# Patient Record
Sex: MALE | Race: WHITE | NOT HISPANIC OR LATINO | Employment: OTHER | URBAN - METROPOLITAN AREA
[De-identification: names, ages, dates, MRNs, and addresses within clinical notes are randomized per-mention and may not be internally consistent; named-entity substitution may affect disease eponyms.]

---

## 2017-01-03 ENCOUNTER — ALLSCRIPTS OFFICE VISIT (OUTPATIENT)
Dept: OTHER | Facility: OTHER | Age: 82
End: 2017-01-03

## 2017-02-28 ENCOUNTER — TRANSCRIBE ORDERS (OUTPATIENT)
Dept: RADIOLOGY | Facility: MEDICAL CENTER | Age: 82
End: 2017-02-28

## 2017-02-28 ENCOUNTER — APPOINTMENT (OUTPATIENT)
Dept: LAB | Facility: MEDICAL CENTER | Age: 82
End: 2017-02-28
Payer: MEDICARE

## 2017-02-28 ENCOUNTER — HOSPITAL ENCOUNTER (OUTPATIENT)
Dept: RADIOLOGY | Facility: MEDICAL CENTER | Age: 82
Discharge: HOME | End: 2017-02-28
Admitting: SURGERY
Payer: MEDICARE

## 2017-02-28 DIAGNOSIS — E11.8 TYPE 2 DIABETES MELLITUS WITH COMPLICATION, WITH LONG-TERM CURRENT USE OF INSULIN (HCC): Primary | ICD-10-CM

## 2017-02-28 DIAGNOSIS — Z79.4 TYPE 2 DIABETES MELLITUS WITH COMPLICATION, WITH LONG-TERM CURRENT USE OF INSULIN (HCC): Primary | ICD-10-CM

## 2017-02-28 DIAGNOSIS — Z01.810 PRE-OPERATIVE CARDIOVASCULAR EXAMINATION: Primary | ICD-10-CM

## 2017-02-28 LAB
ALBUMIN SERPL BCP-MCNC: 3.3 G/DL (ref 3.5–5)
ALP SERPL-CCNC: 121 U/L (ref 46–116)
ALT SERPL W P-5'-P-CCNC: 14 U/L (ref 12–78)
ANION GAP SERPL CALCULATED.3IONS-SCNC: 7 MMOL/L (ref 4–13)
AST SERPL W P-5'-P-CCNC: 7 U/L (ref 5–45)
ATRIAL RATE: 64 BPM
BILIRUB SERPL-MCNC: 0.26 MG/DL (ref 0.2–1)
BUN SERPL-MCNC: 48 MG/DL (ref 5–25)
CALCIUM SERPL-MCNC: 8.3 MG/DL (ref 8.3–10.1)
CHLORIDE SERPL-SCNC: 109 MMOL/L (ref 100–108)
CO2 SERPL-SCNC: 23 MMOL/L (ref 21–32)
CREAT SERPL-MCNC: 3.5 MG/DL (ref 0.6–1.3)
EST. AVERAGE GLUCOSE BLD GHB EST-MCNC: 143 MG/DL
GFR SERPL CREATININE-BSD FRML MDRD: 16.7 ML/MIN/1.73SQ M
GLUCOSE SERPL-MCNC: 109 MG/DL (ref 65–140)
HBA1C MFR BLD: 6.6 % (ref 4.2–6.3)
P AXIS: 48 DEGREES
POTASSIUM SERPL-SCNC: 5.2 MMOL/L (ref 3.5–5.3)
PR INTERVAL: 168 MS
PROT SERPL-MCNC: 7.5 G/DL (ref 6.4–8.2)
QRS AXIS: 10 DEGREES
QRSD INTERVAL: 90 MS
QT INTERVAL: 404 MS
QTC INTERVAL: 416 MS
SODIUM SERPL-SCNC: 139 MMOL/L (ref 136–145)
T WAVE AXIS: 40 DEGREES
VENTRICULAR RATE: 64 BPM

## 2017-02-28 PROCEDURE — 93005 ELECTROCARDIOGRAM TRACING: CPT

## 2017-02-28 PROCEDURE — 36415 COLL VENOUS BLD VENIPUNCTURE: CPT | Performed by: FAMILY MEDICINE

## 2017-02-28 PROCEDURE — 80053 COMPREHEN METABOLIC PANEL: CPT | Performed by: FAMILY MEDICINE

## 2017-02-28 PROCEDURE — 71020 HB CHEST X-RAY 2VW FRONTAL&LATL: CPT

## 2017-02-28 PROCEDURE — 83036 HEMOGLOBIN GLYCOSYLATED A1C: CPT | Performed by: FAMILY MEDICINE

## 2017-05-03 DIAGNOSIS — I70.25 ATHEROSCLEROSIS OF NATIVE ARTERIES OF OTHER EXTREMITIES WITH ULCERATION (HCC): ICD-10-CM

## 2017-07-06 ENCOUNTER — HOSPITAL ENCOUNTER (INPATIENT)
Facility: HOSPITAL | Age: 82
LOS: 3 days | Discharge: HOME | DRG: 683 | End: 2017-07-09
Attending: EMERGENCY MEDICINE | Admitting: INTERNAL MEDICINE
Payer: MEDICARE

## 2017-07-06 ENCOUNTER — APPOINTMENT (EMERGENCY)
Dept: CT IMAGING | Facility: HOSPITAL | Age: 82
DRG: 683 | End: 2017-07-06
Payer: MEDICARE

## 2017-07-06 ENCOUNTER — APPOINTMENT (INPATIENT)
Dept: ULTRASOUND IMAGING | Facility: HOSPITAL | Age: 82
DRG: 683 | End: 2017-07-06
Payer: MEDICARE

## 2017-07-06 ENCOUNTER — APPOINTMENT (EMERGENCY)
Dept: RADIOLOGY | Facility: HOSPITAL | Age: 82
DRG: 683 | End: 2017-07-06
Payer: MEDICARE

## 2017-07-06 DIAGNOSIS — E87.5 HYPERKALEMIA: ICD-10-CM

## 2017-07-06 DIAGNOSIS — E87.5 ACUTE HYPERKALEMIA: Primary | ICD-10-CM

## 2017-07-06 DIAGNOSIS — IMO0002 DM (DIABETES MELLITUS), TYPE 2, UNCONTROLLED: Chronic | ICD-10-CM

## 2017-07-06 DIAGNOSIS — E08.621 BILATERAL DIABETIC FOOT ULCER ASSOCIATED WITH SECONDARY DIABETES MELLITUS (HCC): ICD-10-CM

## 2017-07-06 DIAGNOSIS — N18.4 CKD (CHRONIC KIDNEY DISEASE), STAGE IV (HCC): ICD-10-CM

## 2017-07-06 DIAGNOSIS — L97.529 BILATERAL DIABETIC FOOT ULCER ASSOCIATED WITH SECONDARY DIABETES MELLITUS (HCC): ICD-10-CM

## 2017-07-06 DIAGNOSIS — N18.30 ACUTE RENAL FAILURE SUPERIMPOSED ON STAGE 3 CHRONIC KIDNEY DISEASE (HCC): ICD-10-CM

## 2017-07-06 DIAGNOSIS — I73.9 PVD (PERIPHERAL VASCULAR DISEASE) (HCC): Chronic | ICD-10-CM

## 2017-07-06 DIAGNOSIS — I10 ESSENTIAL HYPERTENSION: Chronic | ICD-10-CM

## 2017-07-06 DIAGNOSIS — L97.519 BILATERAL DIABETIC FOOT ULCER ASSOCIATED WITH SECONDARY DIABETES MELLITUS (HCC): ICD-10-CM

## 2017-07-06 DIAGNOSIS — N17.9 ACUTE RENAL FAILURE SUPERIMPOSED ON STAGE 3 CHRONIC KIDNEY DISEASE (HCC): ICD-10-CM

## 2017-07-06 PROBLEM — E78.5 HLD (HYPERLIPIDEMIA): Chronic | Status: ACTIVE | Noted: 2017-07-06

## 2017-07-06 LAB
ALBUMIN SERPL BCP-MCNC: 3.6 G/DL (ref 3.5–5)
ALP SERPL-CCNC: 132 U/L (ref 46–116)
ALT SERPL W P-5'-P-CCNC: 36 U/L (ref 12–78)
ANION GAP SERPL CALCULATED.3IONS-SCNC: 10 MMOL/L (ref 4–13)
ANION GAP SERPL CALCULATED.3IONS-SCNC: 7 MMOL/L (ref 4–13)
APTT PPP: 30 SECONDS (ref 23–35)
AST SERPL W P-5'-P-CCNC: 19 U/L (ref 5–45)
ATRIAL RATE: 59 BPM
BASOPHILS # BLD AUTO: 0.03 THOUSANDS/ΜL (ref 0–0.1)
BASOPHILS NFR BLD AUTO: 0 % (ref 0–1)
BILIRUB SERPL-MCNC: 0.2 MG/DL (ref 0.2–1)
BUN BLD-MCNC: 62 MG/DL (ref 5–25)
BUN SERPL-MCNC: 56 MG/DL (ref 5–25)
BUN SERPL-MCNC: 57 MG/DL (ref 5–25)
CALCIUM SERPL-MCNC: 8.3 MG/DL (ref 8.3–10.1)
CALCIUM SERPL-MCNC: 8.4 MG/DL (ref 8.3–10.1)
CHLORIDE BLD-SCNC: 110 MMOL/L (ref 100–108)
CHLORIDE SERPL-SCNC: 103 MMOL/L (ref 100–108)
CHLORIDE SERPL-SCNC: 105 MMOL/L (ref 100–108)
CLARITY, POC: CLEAR
CO2 SERPL-SCNC: 22 MMOL/L (ref 21–32)
CO2 SERPL-SCNC: 24 MMOL/L (ref 21–32)
COLOR, POC: YELLOW
CREAT BLD-MCNC: 3.2 MG/DL (ref 0.6–1.3)
CREAT SERPL-MCNC: 3.12 MG/DL (ref 0.6–1.3)
CREAT SERPL-MCNC: 3.42 MG/DL (ref 0.6–1.3)
EOSINOPHIL # BLD AUTO: 0.24 THOUSAND/ΜL (ref 0–0.61)
EOSINOPHIL NFR BLD AUTO: 4 % (ref 0–6)
ERYTHROCYTE [DISTWIDTH] IN BLOOD BY AUTOMATED COUNT: 13.8 % (ref 11.6–15.1)
EXT BILIRUBIN, UA: NEGATIVE
EXT BLOOD URINE: 6.5
EXT GLUCOSE, UA: 100
EXT KETONES: NEGATIVE
EXT PH, UA: NORMAL
EXT PROTEIN, UA: 0.2
EXT SPECIFIC GRAVITY, UA: 1.01
EXT UROBILINOGEN: NEGATIVE
GFR SERPL CREATININE-BSD FRML MDRD: 17.1 ML/MIN/1.73SQ M
GFR SERPL CREATININE-BSD FRML MDRD: 18.5 ML/MIN/1.73SQ M
GFR SERPL CREATININE-BSD FRML MDRD: 19 ML/MIN/1.73SQ M
GLUCOSE SERPL-MCNC: 174 MG/DL (ref 65–140)
GLUCOSE SERPL-MCNC: 181 MG/DL (ref 65–140)
GLUCOSE SERPL-MCNC: 200 MG/DL (ref 65–140)
GLUCOSE SERPL-MCNC: 82 MG/DL (ref 65–140)
HCT VFR BLD AUTO: 31.3 % (ref 36.5–49.3)
HCT VFR BLD CALC: 33 % (ref 36.5–49.3)
HGB BLD-MCNC: 9.9 G/DL (ref 12–17)
HGB BLDA-MCNC: 11.2 G/DL (ref 12–17)
INR PPP: 1.3 (ref 0.86–1.16)
LYMPHOCYTES # BLD AUTO: 1.28 THOUSANDS/ΜL (ref 0.6–4.47)
LYMPHOCYTES NFR BLD AUTO: 19 % (ref 14–44)
MCH RBC QN AUTO: 29.5 PG (ref 26.8–34.3)
MCHC RBC AUTO-ENTMCNC: 31.6 G/DL (ref 31.4–37.4)
MCV RBC AUTO: 93 FL (ref 82–98)
MONOCYTES # BLD AUTO: 0.42 THOUSAND/ΜL (ref 0.17–1.22)
MONOCYTES NFR BLD AUTO: 6 % (ref 4–12)
NEUTROPHILS # BLD AUTO: 4.82 THOUSANDS/ΜL (ref 1.85–7.62)
NEUTS SEG NFR BLD AUTO: 71 % (ref 43–75)
NT-PROBNP SERPL-MCNC: 1982 PG/ML
P AXIS: 43 DEGREES
PCO2 BLD: 20 MMOL/L (ref 21–32)
PLATELET # BLD AUTO: 173 THOUSANDS/UL (ref 149–390)
PMV BLD AUTO: 10.3 FL (ref 8.9–12.7)
POTASSIUM BLD-SCNC: >9 MMOL/L (ref 3.5–5.3)
POTASSIUM SERPL-SCNC: 6.8 MMOL/L (ref 3.5–5.3)
POTASSIUM SERPL-SCNC: 7.7 MMOL/L (ref 3.5–5.3)
PR INTERVAL: 192 MS
PROT SERPL-MCNC: 7.9 G/DL (ref 6.4–8.2)
PROTHROMBIN TIME: 16.6 SECONDS (ref 12.1–14.4)
QRS AXIS: 5 DEGREES
QRSD INTERVAL: 96 MS
QT INTERVAL: 428 MS
QTC INTERVAL: 423 MS
RBC # BLD AUTO: 3.36 MILLION/UL (ref 3.88–5.62)
SODIUM BLD-SCNC: 132 MMOL/L (ref 136–145)
SODIUM SERPL-SCNC: 134 MMOL/L (ref 136–145)
SODIUM SERPL-SCNC: 137 MMOL/L (ref 136–145)
SPECIMEN SOURCE: ABNORMAL
T WAVE AXIS: 49 DEGREES
TROPONIN I SERPL-MCNC: <0.02 NG/ML
VENTRICULAR RATE: 59 BPM
WBC # BLD AUTO: 6.79 THOUSAND/UL (ref 4.31–10.16)

## 2017-07-06 PROCEDURE — 81002 URINALYSIS NONAUTO W/O SCOPE: CPT | Performed by: PHYSICIAN ASSISTANT

## 2017-07-06 PROCEDURE — 71020 HB CHEST X-RAY 2VW FRONTAL&LATL: CPT

## 2017-07-06 PROCEDURE — 99285 EMERGENCY DEPT VISIT HI MDM: CPT

## 2017-07-06 PROCEDURE — 83880 ASSAY OF NATRIURETIC PEPTIDE: CPT | Performed by: PHYSICIAN ASSISTANT

## 2017-07-06 PROCEDURE — 93005 ELECTROCARDIOGRAM TRACING: CPT | Performed by: PHYSICIAN ASSISTANT

## 2017-07-06 PROCEDURE — 76770 US EXAM ABDO BACK WALL COMP: CPT

## 2017-07-06 PROCEDURE — 94760 N-INVAS EAR/PLS OXIMETRY 1: CPT

## 2017-07-06 PROCEDURE — 94640 AIRWAY INHALATION TREATMENT: CPT

## 2017-07-06 PROCEDURE — 36415 COLL VENOUS BLD VENIPUNCTURE: CPT | Performed by: PHYSICIAN ASSISTANT

## 2017-07-06 PROCEDURE — 85025 COMPLETE CBC W/AUTO DIFF WBC: CPT | Performed by: PHYSICIAN ASSISTANT

## 2017-07-06 PROCEDURE — 84484 ASSAY OF TROPONIN QUANT: CPT | Performed by: PHYSICIAN ASSISTANT

## 2017-07-06 PROCEDURE — 80048 BASIC METABOLIC PNL TOTAL CA: CPT | Performed by: PHYSICIAN ASSISTANT

## 2017-07-06 PROCEDURE — 85610 PROTHROMBIN TIME: CPT | Performed by: PHYSICIAN ASSISTANT

## 2017-07-06 PROCEDURE — 85730 THROMBOPLASTIN TIME PARTIAL: CPT | Performed by: PHYSICIAN ASSISTANT

## 2017-07-06 PROCEDURE — 82948 REAGENT STRIP/BLOOD GLUCOSE: CPT

## 2017-07-06 PROCEDURE — 80053 COMPREHEN METABOLIC PANEL: CPT | Performed by: PHYSICIAN ASSISTANT

## 2017-07-06 PROCEDURE — 85014 HEMATOCRIT: CPT

## 2017-07-06 PROCEDURE — 70450 CT HEAD/BRAIN W/O DYE: CPT

## 2017-07-06 RX ORDER — CALCIUM CARBONATE 200(500)MG
1000 TABLET,CHEWABLE ORAL DAILY PRN
Status: DISCONTINUED | OUTPATIENT
Start: 2017-07-06 | End: 2017-07-09 | Stop reason: HOSPADM

## 2017-07-06 RX ORDER — ONDANSETRON 2 MG/ML
4 INJECTION INTRAMUSCULAR; INTRAVENOUS EVERY 6 HOURS PRN
Status: DISCONTINUED | OUTPATIENT
Start: 2017-07-06 | End: 2017-07-09 | Stop reason: HOSPADM

## 2017-07-06 RX ORDER — MEMANTINE HYDROCHLORIDE 10 MG/1
10 TABLET ORAL 2 TIMES DAILY
Status: DISCONTINUED | OUTPATIENT
Start: 2017-07-07 | End: 2017-07-09 | Stop reason: HOSPADM

## 2017-07-06 RX ORDER — SERTRALINE HYDROCHLORIDE 100 MG/1
100 TABLET, FILM COATED ORAL DAILY
COMMUNITY
End: 2019-04-04

## 2017-07-06 RX ORDER — HYDRALAZINE HYDROCHLORIDE 20 MG/ML
10 INJECTION INTRAMUSCULAR; INTRAVENOUS EVERY 6 HOURS PRN
Status: DISCONTINUED | OUTPATIENT
Start: 2017-07-06 | End: 2017-07-09 | Stop reason: HOSPADM

## 2017-07-06 RX ORDER — DEXTROSE MONOHYDRATE 25 G/50ML
50 INJECTION, SOLUTION INTRAVENOUS ONCE
Status: COMPLETED | OUTPATIENT
Start: 2017-07-06 | End: 2017-07-06

## 2017-07-06 RX ORDER — WARFARIN SODIUM 1 MG/1
1 TABLET ORAL DAILY
COMMUNITY
End: 2017-07-09 | Stop reason: HOSPADM

## 2017-07-06 RX ORDER — TAMSULOSIN HYDROCHLORIDE 0.4 MG/1
0.4 CAPSULE ORAL
Status: DISCONTINUED | OUTPATIENT
Start: 2017-07-07 | End: 2017-07-09 | Stop reason: HOSPADM

## 2017-07-06 RX ORDER — MEMANTINE HYDROCHLORIDE 10 MG/1
10 TABLET ORAL 2 TIMES DAILY
COMMUNITY
End: 2017-09-07

## 2017-07-06 RX ORDER — HEPARIN SODIUM 5000 [USP'U]/ML
5000 INJECTION, SOLUTION INTRAVENOUS; SUBCUTANEOUS EVERY 8 HOURS SCHEDULED
Status: DISCONTINUED | OUTPATIENT
Start: 2017-07-06 | End: 2017-07-09 | Stop reason: HOSPADM

## 2017-07-06 RX ORDER — DOCUSATE SODIUM 100 MG/1
100 CAPSULE, LIQUID FILLED ORAL 2 TIMES DAILY
Status: DISCONTINUED | OUTPATIENT
Start: 2017-07-07 | End: 2017-07-09 | Stop reason: HOSPADM

## 2017-07-06 RX ORDER — WARFARIN SODIUM 1 MG/1
1 TABLET ORAL
Status: DISCONTINUED | OUTPATIENT
Start: 2017-07-07 | End: 2017-07-07

## 2017-07-06 RX ORDER — DEXTROSE MONOHYDRATE 25 G/50ML
25 INJECTION, SOLUTION INTRAVENOUS ONCE
Status: DISCONTINUED | OUTPATIENT
Start: 2017-07-06 | End: 2017-07-06

## 2017-07-06 RX ORDER — MELATONIN
2000 DAILY
Status: DISCONTINUED | OUTPATIENT
Start: 2017-07-07 | End: 2017-07-09 | Stop reason: HOSPADM

## 2017-07-06 RX ORDER — SODIUM POLYSTYRENE SULFONATE 15 G/60ML
15 SUSPENSION ORAL; RECTAL ONCE
Status: COMPLETED | OUTPATIENT
Start: 2017-07-06 | End: 2017-07-06

## 2017-07-06 RX ORDER — TAMSULOSIN HYDROCHLORIDE 0.4 MG/1
0.4 CAPSULE ORAL
COMMUNITY
End: 2020-08-23 | Stop reason: HOSPADM

## 2017-07-06 RX ORDER — LABETALOL HYDROCHLORIDE 5 MG/ML
10 INJECTION, SOLUTION INTRAVENOUS EVERY 6 HOURS PRN
Status: DISCONTINUED | OUTPATIENT
Start: 2017-07-06 | End: 2017-07-09 | Stop reason: HOSPADM

## 2017-07-06 RX ORDER — FUROSEMIDE 40 MG/1
40 TABLET ORAL DAILY
COMMUNITY
End: 2020-08-23 | Stop reason: HOSPADM

## 2017-07-06 RX ORDER — MULTIVIT-MIN/IRON/FOLIC ACID/K 18-600-40
2000 CAPSULE ORAL WEEKLY
COMMUNITY
End: 2020-04-28 | Stop reason: SDUPTHER

## 2017-07-06 RX ORDER — ALBUTEROL SULFATE 2.5 MG/3ML
10 SOLUTION RESPIRATORY (INHALATION) ONCE
Status: COMPLETED | OUTPATIENT
Start: 2017-07-06 | End: 2017-07-06

## 2017-07-06 RX ADMIN — HEPARIN SODIUM 5000 UNITS: 5000 INJECTION, SOLUTION INTRAVENOUS; SUBCUTANEOUS at 23:42

## 2017-07-06 RX ADMIN — DEXTROSE MONOHYDRATE 50 ML: 25 INJECTION, SOLUTION INTRAVENOUS at 18:55

## 2017-07-06 RX ADMIN — HYDRALAZINE HYDROCHLORIDE 10 MG: 20 INJECTION INTRAMUSCULAR; INTRAVENOUS at 21:50

## 2017-07-06 RX ADMIN — INSULIN HUMAN 10 UNITS: 100 INJECTION, SOLUTION PARENTERAL at 18:56

## 2017-07-06 RX ADMIN — DEXTROSE MONOHYDRATE 50 ML: 25 INJECTION, SOLUTION INTRAVENOUS at 21:53

## 2017-07-06 RX ADMIN — INSULIN HUMAN 10 UNITS: 100 INJECTION, SOLUTION PARENTERAL at 21:55

## 2017-07-06 RX ADMIN — SODIUM POLYSTYRENE SULFONATE 15 G: 15 SUSPENSION ORAL; RECTAL at 18:55

## 2017-07-06 RX ADMIN — CALCIUM GLUCONATE 1 G: 94 INJECTION, SOLUTION INTRAVENOUS at 19:08

## 2017-07-06 RX ADMIN — Medication: at 23:35

## 2017-07-06 RX ADMIN — ALBUTEROL SULFATE 10 MG: 2.5 SOLUTION RESPIRATORY (INHALATION) at 19:15

## 2017-07-07 LAB
ALBUMIN SERPL BCP-MCNC: 3.3 G/DL (ref 3.5–5)
ALP SERPL-CCNC: 119 U/L (ref 46–116)
ALT SERPL W P-5'-P-CCNC: 28 U/L (ref 12–78)
ANION GAP SERPL CALCULATED.3IONS-SCNC: 10 MMOL/L (ref 4–13)
ANION GAP SERPL CALCULATED.3IONS-SCNC: 7 MMOL/L (ref 4–13)
ANION GAP SERPL CALCULATED.3IONS-SCNC: 9 MMOL/L (ref 4–13)
AST SERPL W P-5'-P-CCNC: 19 U/L (ref 5–45)
ATRIAL RATE: 61 BPM
BACTERIA UR QL AUTO: NORMAL /HPF
BILIRUB SERPL-MCNC: 0.2 MG/DL (ref 0.2–1)
BILIRUB UR QL STRIP: NEGATIVE
BUN SERPL-MCNC: 47 MG/DL (ref 5–25)
BUN SERPL-MCNC: 53 MG/DL (ref 5–25)
BUN SERPL-MCNC: 56 MG/DL (ref 5–25)
CALCIUM SERPL-MCNC: 7.4 MG/DL (ref 8.3–10.1)
CALCIUM SERPL-MCNC: 7.9 MG/DL (ref 8.3–10.1)
CALCIUM SERPL-MCNC: 8.1 MG/DL (ref 8.3–10.1)
CHLORIDE SERPL-SCNC: 105 MMOL/L (ref 100–108)
CHLORIDE SERPL-SCNC: 105 MMOL/L (ref 100–108)
CHLORIDE SERPL-SCNC: 106 MMOL/L (ref 100–108)
CLARITY UR: CLEAR
CO2 SERPL-SCNC: 20 MMOL/L (ref 21–32)
CO2 SERPL-SCNC: 21 MMOL/L (ref 21–32)
CO2 SERPL-SCNC: 25 MMOL/L (ref 21–32)
COLOR UR: YELLOW
CREAT SERPL-MCNC: 2.75 MG/DL (ref 0.6–1.3)
CREAT SERPL-MCNC: 2.96 MG/DL (ref 0.6–1.3)
CREAT SERPL-MCNC: 3.17 MG/DL (ref 0.6–1.3)
ERYTHROCYTE [DISTWIDTH] IN BLOOD BY AUTOMATED COUNT: 13.8 % (ref 11.6–15.1)
EST. AVERAGE GLUCOSE BLD GHB EST-MCNC: 154 MG/DL
GFR SERPL CREATININE-BSD FRML MDRD: 18.7 ML/MIN/1.73SQ M
GFR SERPL CREATININE-BSD FRML MDRD: 20.2 ML/MIN/1.73SQ M
GFR SERPL CREATININE-BSD FRML MDRD: 22 ML/MIN/1.73SQ M
GLUCOSE SERPL-MCNC: 101 MG/DL (ref 65–140)
GLUCOSE SERPL-MCNC: 102 MG/DL (ref 65–140)
GLUCOSE SERPL-MCNC: 128 MG/DL (ref 65–140)
GLUCOSE SERPL-MCNC: 131 MG/DL (ref 65–140)
GLUCOSE SERPL-MCNC: 155 MG/DL (ref 65–140)
GLUCOSE SERPL-MCNC: 156 MG/DL (ref 65–140)
GLUCOSE SERPL-MCNC: 99 MG/DL (ref 65–140)
GLUCOSE SERPL-MCNC: 99 MG/DL (ref 65–140)
GLUCOSE UR STRIP-MCNC: ABNORMAL MG/DL
HBA1C MFR BLD: 7 % (ref 4.2–6.3)
HCT VFR BLD AUTO: 31.4 % (ref 36.5–49.3)
HGB BLD-MCNC: 9.9 G/DL (ref 12–17)
HGB UR QL STRIP.AUTO: NEGATIVE
KETONES UR STRIP-MCNC: NEGATIVE MG/DL
LEUKOCYTE ESTERASE UR QL STRIP: NEGATIVE
MCH RBC QN AUTO: 29.4 PG (ref 26.8–34.3)
MCHC RBC AUTO-ENTMCNC: 31.5 G/DL (ref 31.4–37.4)
MCV RBC AUTO: 93 FL (ref 82–98)
NITRITE UR QL STRIP: NEGATIVE
NON-SQ EPI CELLS URNS QL MICRO: NORMAL /HPF
P AXIS: 42 DEGREES
PH UR STRIP.AUTO: 7.5 [PH] (ref 4.5–8)
PLATELET # BLD AUTO: 181 THOUSANDS/UL (ref 149–390)
PMV BLD AUTO: 10.9 FL (ref 8.9–12.7)
POTASSIUM SERPL-SCNC: 5.9 MMOL/L (ref 3.5–5.3)
POTASSIUM SERPL-SCNC: 6.1 MMOL/L (ref 3.5–5.3)
POTASSIUM SERPL-SCNC: 6.4 MMOL/L (ref 3.5–5.3)
PR INTERVAL: 182 MS
PROT SERPL-MCNC: 7.2 G/DL (ref 6.4–8.2)
PROT UR STRIP-MCNC: ABNORMAL MG/DL
QRS AXIS: 23 DEGREES
QRSD INTERVAL: 96 MS
QT INTERVAL: 412 MS
QTC INTERVAL: 414 MS
RBC # BLD AUTO: 3.37 MILLION/UL (ref 3.88–5.62)
RBC #/AREA URNS AUTO: NORMAL /HPF
SODIUM SERPL-SCNC: 135 MMOL/L (ref 136–145)
SODIUM SERPL-SCNC: 136 MMOL/L (ref 136–145)
SODIUM SERPL-SCNC: 137 MMOL/L (ref 136–145)
SP GR UR STRIP.AUTO: 1.01 (ref 1–1.03)
T WAVE AXIS: 51 DEGREES
TSH SERPL DL<=0.05 MIU/L-ACNC: 1.16 UIU/ML (ref 0.36–3.74)
UROBILINOGEN UR QL STRIP.AUTO: 0.2 E.U./DL
VENTRICULAR RATE: 61 BPM
WBC # BLD AUTO: 7.09 THOUSAND/UL (ref 4.31–10.16)
WBC #/AREA URNS AUTO: NORMAL /HPF

## 2017-07-07 PROCEDURE — 85027 COMPLETE CBC AUTOMATED: CPT | Performed by: PHYSICIAN ASSISTANT

## 2017-07-07 PROCEDURE — 80048 BASIC METABOLIC PNL TOTAL CA: CPT | Performed by: PHYSICIAN ASSISTANT

## 2017-07-07 PROCEDURE — 83036 HEMOGLOBIN GLYCOSYLATED A1C: CPT | Performed by: PHYSICIAN ASSISTANT

## 2017-07-07 PROCEDURE — 80048 BASIC METABOLIC PNL TOTAL CA: CPT | Performed by: INTERNAL MEDICINE

## 2017-07-07 PROCEDURE — 82948 REAGENT STRIP/BLOOD GLUCOSE: CPT

## 2017-07-07 PROCEDURE — G8978 MOBILITY CURRENT STATUS: HCPCS

## 2017-07-07 PROCEDURE — 97163 PT EVAL HIGH COMPLEX 45 MIN: CPT

## 2017-07-07 PROCEDURE — G8979 MOBILITY GOAL STATUS: HCPCS

## 2017-07-07 PROCEDURE — 81001 URINALYSIS AUTO W/SCOPE: CPT | Performed by: PHYSICIAN ASSISTANT

## 2017-07-07 PROCEDURE — 84443 ASSAY THYROID STIM HORMONE: CPT | Performed by: PHYSICIAN ASSISTANT

## 2017-07-07 PROCEDURE — 80053 COMPREHEN METABOLIC PANEL: CPT | Performed by: PHYSICIAN ASSISTANT

## 2017-07-07 RX ORDER — DEXTROSE MONOHYDRATE 25 G/50ML
50 INJECTION, SOLUTION INTRAVENOUS ONCE
Status: COMPLETED | OUTPATIENT
Start: 2017-07-07 | End: 2017-07-07

## 2017-07-07 RX ORDER — WARFARIN SODIUM 5 MG/1
5 TABLET ORAL
Status: DISCONTINUED | OUTPATIENT
Start: 2017-07-07 | End: 2017-07-09 | Stop reason: HOSPADM

## 2017-07-07 RX ORDER — WARFARIN SODIUM 4 MG/1
4 TABLET ORAL
Status: DISCONTINUED | OUTPATIENT
Start: 2017-07-07 | End: 2017-07-07

## 2017-07-07 RX ORDER — GABAPENTIN 100 MG/1
100 CAPSULE ORAL DAILY
Status: DISCONTINUED | OUTPATIENT
Start: 2017-07-07 | End: 2017-07-07

## 2017-07-07 RX ORDER — METOPROLOL SUCCINATE 25 MG/1
25 TABLET, EXTENDED RELEASE ORAL DAILY
Status: DISCONTINUED | OUTPATIENT
Start: 2017-07-08 | End: 2017-07-09 | Stop reason: HOSPADM

## 2017-07-07 RX ORDER — ATORVASTATIN CALCIUM 10 MG/1
10 TABLET, FILM COATED ORAL
Status: DISCONTINUED | OUTPATIENT
Start: 2017-07-07 | End: 2017-07-09 | Stop reason: HOSPADM

## 2017-07-07 RX ORDER — CLOPIDOGREL BISULFATE 75 MG/1
75 TABLET ORAL DAILY
Status: DISCONTINUED | OUTPATIENT
Start: 2017-07-07 | End: 2017-07-09 | Stop reason: HOSPADM

## 2017-07-07 RX ORDER — SODIUM POLYSTYRENE SULFONATE 15 G/60ML
30 SUSPENSION ORAL; RECTAL ONCE
Status: COMPLETED | OUTPATIENT
Start: 2017-07-07 | End: 2017-07-07

## 2017-07-07 RX ADMIN — INSULIN LISPRO 1 UNITS: 100 INJECTION, SOLUTION INTRAVENOUS; SUBCUTANEOUS at 22:28

## 2017-07-07 RX ADMIN — CLOPIDOGREL BISULFATE 75 MG: 75 TABLET ORAL at 14:50

## 2017-07-07 RX ADMIN — MEMANTINE HYDROCHLORIDE 10 MG: 10 TABLET ORAL at 09:45

## 2017-07-07 RX ADMIN — METOPROLOL TARTRATE 25 MG: 25 TABLET ORAL at 09:45

## 2017-07-07 RX ADMIN — Medication: at 11:33

## 2017-07-07 RX ADMIN — INSULIN LISPRO 1 UNITS: 100 INJECTION, SOLUTION INTRAVENOUS; SUBCUTANEOUS at 14:55

## 2017-07-07 RX ADMIN — INSULIN HUMAN 10 UNITS: 100 INJECTION, SOLUTION PARENTERAL at 14:54

## 2017-07-07 RX ADMIN — DOCUSATE SODIUM 100 MG: 100 CAPSULE, LIQUID FILLED ORAL at 17:16

## 2017-07-07 RX ADMIN — ATORVASTATIN CALCIUM 10 MG: 10 TABLET, FILM COATED ORAL at 17:16

## 2017-07-07 RX ADMIN — HEPARIN SODIUM 5000 UNITS: 5000 INJECTION, SOLUTION INTRAVENOUS; SUBCUTANEOUS at 22:27

## 2017-07-07 RX ADMIN — MEMANTINE HYDROCHLORIDE 10 MG: 10 TABLET ORAL at 17:16

## 2017-07-07 RX ADMIN — TAMSULOSIN HYDROCHLORIDE 0.4 MG: 0.4 CAPSULE ORAL at 17:16

## 2017-07-07 RX ADMIN — DOCUSATE SODIUM 100 MG: 100 CAPSULE, LIQUID FILLED ORAL at 09:45

## 2017-07-07 RX ADMIN — SERTRALINE HYDROCHLORIDE 50 MG: 50 TABLET ORAL at 10:08

## 2017-07-07 RX ADMIN — HEPARIN SODIUM 5000 UNITS: 5000 INJECTION, SOLUTION INTRAVENOUS; SUBCUTANEOUS at 14:30

## 2017-07-07 RX ADMIN — Medication: at 10:12

## 2017-07-07 RX ADMIN — WARFARIN SODIUM 5 MG: 5 TABLET ORAL at 17:16

## 2017-07-07 RX ADMIN — DEXTROSE MONOHYDRATE 50 ML: 25 INJECTION, SOLUTION INTRAVENOUS at 14:50

## 2017-07-07 RX ADMIN — SODIUM POLYSTYRENE SULFONATE 30 G: 15 SUSPENSION ORAL; RECTAL at 14:53

## 2017-07-07 RX ADMIN — CHOLECALCIFEROL TAB 25 MCG (1000 UNIT) 2000 UNITS: 25 TAB at 09:45

## 2017-07-08 LAB
ANION GAP SERPL CALCULATED.3IONS-SCNC: 6 MMOL/L (ref 4–13)
BUN SERPL-MCNC: 44 MG/DL (ref 5–25)
CALCIUM SERPL-MCNC: 7.3 MG/DL (ref 8.3–10.1)
CHLORIDE SERPL-SCNC: 108 MMOL/L (ref 100–108)
CO2 SERPL-SCNC: 28 MMOL/L (ref 21–32)
CREAT SERPL-MCNC: 2.67 MG/DL (ref 0.6–1.3)
GFR SERPL CREATININE-BSD FRML MDRD: 22.8 ML/MIN/1.73SQ M
GLUCOSE SERPL-MCNC: 149 MG/DL (ref 65–140)
GLUCOSE SERPL-MCNC: 155 MG/DL (ref 65–140)
GLUCOSE SERPL-MCNC: 186 MG/DL (ref 65–140)
GLUCOSE SERPL-MCNC: 187 MG/DL (ref 65–140)
GLUCOSE SERPL-MCNC: 187 MG/DL (ref 65–140)
GLUCOSE SERPL-MCNC: 228 MG/DL (ref 65–140)
GLUCOSE SERPL-MCNC: 91 MG/DL (ref 65–140)
GLUCOSE SERPL-MCNC: 92 MG/DL (ref 65–140)
INR PPP: 1.64 (ref 0.86–1.16)
POTASSIUM SERPL-SCNC: 5.4 MMOL/L (ref 3.5–5.3)
PROTHROMBIN TIME: 20 SECONDS (ref 12.1–14.4)
SODIUM SERPL-SCNC: 142 MMOL/L (ref 136–145)

## 2017-07-08 PROCEDURE — 80048 BASIC METABOLIC PNL TOTAL CA: CPT | Performed by: INTERNAL MEDICINE

## 2017-07-08 PROCEDURE — 85610 PROTHROMBIN TIME: CPT | Performed by: INTERNAL MEDICINE

## 2017-07-08 PROCEDURE — 82948 REAGENT STRIP/BLOOD GLUCOSE: CPT

## 2017-07-08 RX ADMIN — DOCUSATE SODIUM 100 MG: 100 CAPSULE, LIQUID FILLED ORAL at 09:55

## 2017-07-08 RX ADMIN — HEPARIN SODIUM 5000 UNITS: 5000 INJECTION, SOLUTION INTRAVENOUS; SUBCUTANEOUS at 21:00

## 2017-07-08 RX ADMIN — METOPROLOL SUCCINATE 25 MG: 25 TABLET, EXTENDED RELEASE ORAL at 09:55

## 2017-07-08 RX ADMIN — WARFARIN SODIUM 5 MG: 5 TABLET ORAL at 18:54

## 2017-07-08 RX ADMIN — CLOPIDOGREL BISULFATE 75 MG: 75 TABLET ORAL at 09:55

## 2017-07-08 RX ADMIN — Medication: at 01:14

## 2017-07-08 RX ADMIN — MEMANTINE HYDROCHLORIDE 10 MG: 10 TABLET ORAL at 09:55

## 2017-07-08 RX ADMIN — INSULIN LISPRO 1 UNITS: 100 INJECTION, SOLUTION INTRAVENOUS; SUBCUTANEOUS at 10:07

## 2017-07-08 RX ADMIN — CHOLECALCIFEROL TAB 25 MCG (1000 UNIT) 2000 UNITS: 25 TAB at 09:55

## 2017-07-08 RX ADMIN — INSULIN LISPRO 2 UNITS: 100 INJECTION, SOLUTION INTRAVENOUS; SUBCUTANEOUS at 16:06

## 2017-07-08 RX ADMIN — INSULIN LISPRO 1 UNITS: 100 INJECTION, SOLUTION INTRAVENOUS; SUBCUTANEOUS at 18:55

## 2017-07-08 RX ADMIN — DOCUSATE SODIUM 100 MG: 100 CAPSULE, LIQUID FILLED ORAL at 18:54

## 2017-07-08 RX ADMIN — TAMSULOSIN HYDROCHLORIDE 0.4 MG: 0.4 CAPSULE ORAL at 18:53

## 2017-07-08 RX ADMIN — HEPARIN SODIUM 5000 UNITS: 5000 INJECTION, SOLUTION INTRAVENOUS; SUBCUTANEOUS at 05:30

## 2017-07-08 RX ADMIN — MEMANTINE HYDROCHLORIDE 10 MG: 10 TABLET ORAL at 18:54

## 2017-07-08 RX ADMIN — SERTRALINE HYDROCHLORIDE 50 MG: 50 TABLET ORAL at 09:55

## 2017-07-08 RX ADMIN — ATORVASTATIN CALCIUM 10 MG: 10 TABLET, FILM COATED ORAL at 16:07

## 2017-07-08 RX ADMIN — INSULIN LISPRO 1 UNITS: 100 INJECTION, SOLUTION INTRAVENOUS; SUBCUTANEOUS at 21:00

## 2017-07-09 VITALS
TEMPERATURE: 98.1 F | WEIGHT: 200.18 LBS | OXYGEN SATURATION: 98 % | BODY MASS INDEX: 28.02 KG/M2 | SYSTOLIC BLOOD PRESSURE: 183 MMHG | RESPIRATION RATE: 20 BRPM | DIASTOLIC BLOOD PRESSURE: 74 MMHG | HEIGHT: 71 IN | HEART RATE: 63 BPM

## 2017-07-09 PROBLEM — E87.5 HYPERKALEMIA: Status: RESOLVED | Noted: 2017-07-06 | Resolved: 2017-07-09

## 2017-07-09 LAB
ANION GAP SERPL CALCULATED.3IONS-SCNC: 8 MMOL/L (ref 4–13)
BASOPHILS # BLD AUTO: 0.03 THOUSANDS/ΜL (ref 0–0.1)
BASOPHILS NFR BLD AUTO: 1 % (ref 0–1)
BUN SERPL-MCNC: 47 MG/DL (ref 5–25)
CALCIUM SERPL-MCNC: 7.5 MG/DL (ref 8.3–10.1)
CHLORIDE SERPL-SCNC: 109 MMOL/L (ref 100–108)
CO2 SERPL-SCNC: 25 MMOL/L (ref 21–32)
CREAT SERPL-MCNC: 2.83 MG/DL (ref 0.6–1.3)
EOSINOPHIL # BLD AUTO: 0.29 THOUSAND/ΜL (ref 0–0.61)
EOSINOPHIL NFR BLD AUTO: 5 % (ref 0–6)
ERYTHROCYTE [DISTWIDTH] IN BLOOD BY AUTOMATED COUNT: 13.6 % (ref 11.6–15.1)
GFR SERPL CREATININE-BSD FRML MDRD: 21.3 ML/MIN/1.73SQ M
GLUCOSE SERPL-MCNC: 102 MG/DL (ref 65–140)
GLUCOSE SERPL-MCNC: 95 MG/DL (ref 65–140)
HCT VFR BLD AUTO: 26.2 % (ref 36.5–49.3)
HGB BLD-MCNC: 8.3 G/DL (ref 12–17)
INR PPP: 1.43 (ref 0.86–1.16)
LYMPHOCYTES # BLD AUTO: 1.9 THOUSANDS/ΜL (ref 0.6–4.47)
LYMPHOCYTES NFR BLD AUTO: 35 % (ref 14–44)
MCH RBC QN AUTO: 30.1 PG (ref 26.8–34.3)
MCHC RBC AUTO-ENTMCNC: 31.7 G/DL (ref 31.4–37.4)
MCV RBC AUTO: 95 FL (ref 82–98)
MONOCYTES # BLD AUTO: 0.47 THOUSAND/ΜL (ref 0.17–1.22)
MONOCYTES NFR BLD AUTO: 9 % (ref 4–12)
NEUTROPHILS # BLD AUTO: 2.69 THOUSANDS/ΜL (ref 1.85–7.62)
NEUTS SEG NFR BLD AUTO: 50 % (ref 43–75)
PLATELET # BLD AUTO: 159 THOUSANDS/UL (ref 149–390)
PMV BLD AUTO: 10.3 FL (ref 8.9–12.7)
POTASSIUM SERPL-SCNC: 4.6 MMOL/L (ref 3.5–5.3)
PROTHROMBIN TIME: 17.9 SECONDS (ref 12.1–14.4)
RBC # BLD AUTO: 2.76 MILLION/UL (ref 3.88–5.62)
SODIUM SERPL-SCNC: 142 MMOL/L (ref 136–145)
WBC # BLD AUTO: 5.38 THOUSAND/UL (ref 4.31–10.16)

## 2017-07-09 PROCEDURE — 85610 PROTHROMBIN TIME: CPT | Performed by: INTERNAL MEDICINE

## 2017-07-09 PROCEDURE — 85025 COMPLETE CBC W/AUTO DIFF WBC: CPT | Performed by: INTERNAL MEDICINE

## 2017-07-09 PROCEDURE — 82948 REAGENT STRIP/BLOOD GLUCOSE: CPT

## 2017-07-09 PROCEDURE — 80048 BASIC METABOLIC PNL TOTAL CA: CPT | Performed by: INTERNAL MEDICINE

## 2017-07-09 RX ORDER — NIFEDIPINE 30 MG/1
30 TABLET, FILM COATED, EXTENDED RELEASE ORAL DAILY
Qty: 30 TABLET | Refills: 0 | Status: SHIPPED | OUTPATIENT
Start: 2017-07-09 | End: 2019-05-14 | Stop reason: CLARIF

## 2017-07-09 RX ORDER — ATORVASTATIN CALCIUM 10 MG/1
10 TABLET, FILM COATED ORAL
Refills: 0
Start: 2017-07-09 | End: 2019-05-14 | Stop reason: CLARIF

## 2017-07-09 RX ORDER — METOPROLOL SUCCINATE 25 MG/1
25 TABLET, EXTENDED RELEASE ORAL DAILY
Refills: 0
Start: 2017-07-09 | End: 2019-05-14 | Stop reason: CLARIF

## 2017-07-09 RX ORDER — NIFEDIPINE 30 MG/1
30 TABLET, EXTENDED RELEASE ORAL DAILY
Status: DISCONTINUED | OUTPATIENT
Start: 2017-07-09 | End: 2017-07-09 | Stop reason: HOSPADM

## 2017-07-09 RX ORDER — CLOPIDOGREL BISULFATE 75 MG/1
75 TABLET ORAL DAILY
Refills: 0
Start: 2017-07-09 | End: 2020-08-23 | Stop reason: HOSPADM

## 2017-07-09 RX ORDER — WARFARIN SODIUM 5 MG/1
5 TABLET ORAL
Refills: 0
Start: 2017-07-09 | End: 2020-08-07 | Stop reason: HOSPADM

## 2017-07-09 RX ADMIN — DOCUSATE SODIUM 100 MG: 100 CAPSULE, LIQUID FILLED ORAL at 09:18

## 2017-07-09 RX ADMIN — MEMANTINE HYDROCHLORIDE 10 MG: 10 TABLET ORAL at 09:18

## 2017-07-09 RX ADMIN — CHOLECALCIFEROL TAB 25 MCG (1000 UNIT) 2000 UNITS: 25 TAB at 09:18

## 2017-07-09 RX ADMIN — SERTRALINE HYDROCHLORIDE 50 MG: 50 TABLET ORAL at 09:18

## 2017-07-09 RX ADMIN — NIFEDIPINE 30 MG: 30 TABLET, FILM COATED, EXTENDED RELEASE ORAL at 09:18

## 2017-07-09 RX ADMIN — CLOPIDOGREL BISULFATE 75 MG: 75 TABLET ORAL at 09:18

## 2017-07-09 RX ADMIN — HEPARIN SODIUM 5000 UNITS: 5000 INJECTION, SOLUTION INTRAVENOUS; SUBCUTANEOUS at 06:25

## 2017-07-09 RX ADMIN — METOPROLOL SUCCINATE 25 MG: 25 TABLET, EXTENDED RELEASE ORAL at 09:18

## 2017-07-17 ENCOUNTER — APPOINTMENT (OUTPATIENT)
Dept: LAB | Facility: MEDICAL CENTER | Age: 82
End: 2017-07-17
Payer: MEDICARE

## 2017-07-17 DIAGNOSIS — N18.4 CKD (CHRONIC KIDNEY DISEASE), STAGE IV (HCC): ICD-10-CM

## 2017-07-17 DIAGNOSIS — I10 ESSENTIAL HYPERTENSION: Chronic | ICD-10-CM

## 2017-07-17 LAB
ANION GAP SERPL CALCULATED.3IONS-SCNC: 7 MMOL/L (ref 4–13)
BUN SERPL-MCNC: 50 MG/DL (ref 5–25)
CALCIUM SERPL-MCNC: 8.3 MG/DL (ref 8.3–10.1)
CHLORIDE SERPL-SCNC: 108 MMOL/L (ref 100–108)
CO2 SERPL-SCNC: 23 MMOL/L (ref 21–32)
CREAT SERPL-MCNC: 3.01 MG/DL (ref 0.6–1.3)
GFR SERPL CREATININE-BSD FRML MDRD: 19.8 ML/MIN/1.73SQ M
GLUCOSE P FAST SERPL-MCNC: 101 MG/DL (ref 65–99)
POTASSIUM SERPL-SCNC: 5.1 MMOL/L (ref 3.5–5.3)
SODIUM SERPL-SCNC: 138 MMOL/L (ref 136–145)

## 2017-07-17 PROCEDURE — 36415 COLL VENOUS BLD VENIPUNCTURE: CPT

## 2017-07-17 PROCEDURE — 80048 BASIC METABOLIC PNL TOTAL CA: CPT

## 2017-07-18 ENCOUNTER — ALLSCRIPTS OFFICE VISIT (OUTPATIENT)
Dept: OTHER | Facility: OTHER | Age: 82
End: 2017-07-18

## 2017-07-18 LAB
BILIRUB UR QL STRIP: NORMAL
CLARITY UR: NORMAL
COLOR UR: YELLOW
GLUCOSE (HISTORICAL): 250
HGB UR QL STRIP.AUTO: NORMAL
KETONES UR STRIP-MCNC: NORMAL MG/DL
LEUKOCYTE ESTERASE UR QL STRIP: NORMAL
NITRITE UR QL STRIP: NORMAL
PH UR STRIP.AUTO: 6 [PH]
PROT UR STRIP-MCNC: 30 MG/DL
SP GR UR STRIP.AUTO: 1.01
UROBILINOGEN UR QL STRIP.AUTO: 0.2

## 2017-07-21 ENCOUNTER — HOSPITAL ENCOUNTER (OUTPATIENT)
Dept: RADIOLOGY | Facility: MEDICAL CENTER | Age: 82
Discharge: HOME | End: 2017-07-21
Payer: MEDICARE

## 2017-07-21 DIAGNOSIS — I70.25 ATHEROSCLEROSIS OF NATIVE ARTERIES OF OTHER EXTREMITIES WITH ULCERATION (HCC): ICD-10-CM

## 2017-07-21 PROCEDURE — 93925 LOWER EXTREMITY STUDY: CPT

## 2017-07-21 PROCEDURE — 93923 UPR/LXTR ART STDY 3+ LVLS: CPT

## 2017-08-10 ENCOUNTER — TRANSCRIBE ORDERS (OUTPATIENT)
Dept: ADMINISTRATIVE | Facility: HOSPITAL | Age: 82
End: 2017-08-10

## 2017-08-10 ENCOUNTER — APPOINTMENT (OUTPATIENT)
Dept: LAB | Facility: MEDICAL CENTER | Age: 82
End: 2017-08-10
Payer: MEDICARE

## 2017-08-10 DIAGNOSIS — E11.59 TYPE 2 DIABETES MELLITUS WITH OTHER CIRCULATORY COMPLICATION, WITHOUT LONG-TERM CURRENT USE OF INSULIN (HCC): ICD-10-CM

## 2017-08-10 DIAGNOSIS — I73.9 PERIPHERAL VASCULAR DISEASE, UNSPECIFIED (HCC): Primary | ICD-10-CM

## 2017-08-10 LAB
ANION GAP SERPL CALCULATED.3IONS-SCNC: 9 MMOL/L (ref 4–13)
BUN SERPL-MCNC: 47 MG/DL (ref 5–25)
CALCIUM SERPL-MCNC: 7.8 MG/DL (ref 8.3–10.1)
CHLORIDE SERPL-SCNC: 109 MMOL/L (ref 100–108)
CO2 SERPL-SCNC: 20 MMOL/L (ref 21–32)
CREAT SERPL-MCNC: 3.29 MG/DL (ref 0.6–1.3)
EST. AVERAGE GLUCOSE BLD GHB EST-MCNC: 163 MG/DL
GFR SERPL CREATININE-BSD FRML MDRD: 16 ML/MIN/1.73SQ M
GLUCOSE SERPL-MCNC: 97 MG/DL (ref 65–140)
HBA1C MFR BLD: 7.3 % (ref 4.2–6.3)
INR PPP: 2.63 (ref 0.86–1.16)
POTASSIUM SERPL-SCNC: 5.2 MMOL/L (ref 3.5–5.3)
PROTHROMBIN TIME: 28.4 SECONDS (ref 12.1–14.4)
SODIUM SERPL-SCNC: 138 MMOL/L (ref 136–145)

## 2017-08-10 PROCEDURE — 36415 COLL VENOUS BLD VENIPUNCTURE: CPT | Performed by: FAMILY MEDICINE

## 2017-08-10 PROCEDURE — 80048 BASIC METABOLIC PNL TOTAL CA: CPT | Performed by: FAMILY MEDICINE

## 2017-08-10 PROCEDURE — 83036 HEMOGLOBIN GLYCOSYLATED A1C: CPT | Performed by: FAMILY MEDICINE

## 2017-08-10 PROCEDURE — 85610 PROTHROMBIN TIME: CPT | Performed by: FAMILY MEDICINE

## 2017-08-14 DIAGNOSIS — N18.30 CHRONIC KIDNEY DISEASE, STAGE III (MODERATE) (HCC): ICD-10-CM

## 2017-08-15 ENCOUNTER — ALLSCRIPTS OFFICE VISIT (OUTPATIENT)
Dept: OTHER | Facility: OTHER | Age: 82
End: 2017-08-15

## 2017-08-26 ENCOUNTER — HOSPITAL ENCOUNTER (INPATIENT)
Facility: HOSPITAL | Age: 82
LOS: 3 days | Discharge: HOME HEALTH | DRG: 683 | End: 2017-08-30
Attending: EMERGENCY MEDICINE | Admitting: INTERNAL MEDICINE
Payer: MEDICARE

## 2017-08-26 DIAGNOSIS — N17.9 ACUTE RENAL FAILURE SUPERIMPOSED ON STAGE 3 CHRONIC KIDNEY DISEASE (HCC): ICD-10-CM

## 2017-08-26 DIAGNOSIS — I10 ESSENTIAL HYPERTENSION: Chronic | ICD-10-CM

## 2017-08-26 DIAGNOSIS — D64.9 CHRONIC ANEMIA: ICD-10-CM

## 2017-08-26 DIAGNOSIS — L97.429 ULCER OF LEFT HEEL (HCC): ICD-10-CM

## 2017-08-26 DIAGNOSIS — R33.9 URINARY RETENTION: ICD-10-CM

## 2017-08-26 DIAGNOSIS — E87.5 HYPERKALEMIA, DIMINISHED RENAL EXCRETION: Primary | ICD-10-CM

## 2017-08-26 DIAGNOSIS — F32.A DEPRESSION: ICD-10-CM

## 2017-08-26 DIAGNOSIS — I73.9 PVD (PERIPHERAL VASCULAR DISEASE) (HCC): Chronic | ICD-10-CM

## 2017-08-26 DIAGNOSIS — IMO0002 DM (DIABETES MELLITUS), TYPE 2, UNCONTROLLED: Chronic | ICD-10-CM

## 2017-08-26 DIAGNOSIS — N18.30 ACUTE RENAL FAILURE SUPERIMPOSED ON STAGE 3 CHRONIC KIDNEY DISEASE (HCC): ICD-10-CM

## 2017-08-26 DIAGNOSIS — R00.1 BRADYCARDIA: ICD-10-CM

## 2017-08-26 PROBLEM — R74.01 TRANSAMINITIS: Status: ACTIVE | Noted: 2017-08-26

## 2017-08-26 PROBLEM — I95.9 HYPOTENSION: Status: ACTIVE | Noted: 2017-08-26

## 2017-08-26 LAB
ABO GROUP BLD: NORMAL
ALBUMIN SERPL BCP-MCNC: 3.3 G/DL (ref 3.5–5)
ALP SERPL-CCNC: 157 U/L (ref 46–116)
ALT SERPL W P-5'-P-CCNC: 200 U/L (ref 12–78)
ANION GAP SERPL CALCULATED.3IONS-SCNC: 12 MMOL/L (ref 4–13)
ANION GAP SERPL CALCULATED.3IONS-SCNC: 13 MMOL/L (ref 4–13)
APTT PPP: 37 SECONDS (ref 23–35)
AST SERPL W P-5'-P-CCNC: 242 U/L (ref 5–45)
BASOPHILS # BLD AUTO: 0.04 THOUSANDS/ΜL (ref 0–0.1)
BASOPHILS NFR BLD AUTO: 1 % (ref 0–1)
BILIRUB SERPL-MCNC: 0.2 MG/DL (ref 0.2–1)
BLD GP AB SCN SERPL QL: NEGATIVE
BUN SERPL-MCNC: 60 MG/DL (ref 5–25)
BUN SERPL-MCNC: 66 MG/DL (ref 5–25)
CALCIUM SERPL-MCNC: 7.6 MG/DL (ref 8.3–10.1)
CALCIUM SERPL-MCNC: 7.7 MG/DL (ref 8.3–10.1)
CHLORIDE SERPL-SCNC: 106 MMOL/L (ref 100–108)
CHLORIDE SERPL-SCNC: 106 MMOL/L (ref 100–108)
CO2 SERPL-SCNC: 19 MMOL/L (ref 21–32)
CO2 SERPL-SCNC: 21 MMOL/L (ref 21–32)
CREAT SERPL-MCNC: 3.65 MG/DL (ref 0.6–1.3)
CREAT SERPL-MCNC: 3.98 MG/DL (ref 0.6–1.3)
EOSINOPHIL # BLD AUTO: 0.24 THOUSAND/ΜL (ref 0–0.61)
EOSINOPHIL NFR BLD AUTO: 4 % (ref 0–6)
ERYTHROCYTE [DISTWIDTH] IN BLOOD BY AUTOMATED COUNT: 13.4 % (ref 11.6–15.1)
GFR SERPL CREATININE-BSD FRML MDRD: 13 ML/MIN/1.73SQ M
GFR SERPL CREATININE-BSD FRML MDRD: 14 ML/MIN/1.73SQ M
GLUCOSE SERPL-MCNC: 187 MG/DL (ref 65–140)
GLUCOSE SERPL-MCNC: 222 MG/DL (ref 65–140)
GLUCOSE SERPL-MCNC: 247 MG/DL (ref 65–140)
HCT VFR BLD AUTO: 30.1 % (ref 36.5–49.3)
HGB BLD-MCNC: 9.4 G/DL (ref 12–17)
INR PPP: 2.1 (ref 0.86–1.16)
LYMPHOCYTES # BLD AUTO: 1.56 THOUSANDS/ΜL (ref 0.6–4.47)
LYMPHOCYTES NFR BLD AUTO: 25 % (ref 14–44)
MAGNESIUM SERPL-MCNC: 2.3 MG/DL (ref 1.6–2.6)
MAGNESIUM SERPL-MCNC: 2.3 MG/DL (ref 1.6–2.6)
MCH RBC QN AUTO: 29.3 PG (ref 26.8–34.3)
MCHC RBC AUTO-ENTMCNC: 31.2 G/DL (ref 31.4–37.4)
MCV RBC AUTO: 94 FL (ref 82–98)
MONOCYTES # BLD AUTO: 0.63 THOUSAND/ΜL (ref 0.17–1.22)
MONOCYTES NFR BLD AUTO: 10 % (ref 4–12)
NEUTROPHILS # BLD AUTO: 3.7 THOUSANDS/ΜL (ref 1.85–7.62)
NEUTS SEG NFR BLD AUTO: 60 % (ref 43–75)
PLATELET # BLD AUTO: 200 THOUSANDS/UL (ref 149–390)
PMV BLD AUTO: 10 FL (ref 8.9–12.7)
POTASSIUM SERPL-SCNC: 6 MMOL/L (ref 3.5–5.3)
POTASSIUM SERPL-SCNC: 6.2 MMOL/L (ref 3.5–5.3)
PROT SERPL-MCNC: 7.2 G/DL (ref 6.4–8.2)
PROTHROMBIN TIME: 24.3 SECONDS (ref 12.1–14.4)
RBC # BLD AUTO: 3.21 MILLION/UL (ref 3.88–5.62)
RH BLD: NEGATIVE
SODIUM SERPL-SCNC: 138 MMOL/L (ref 136–145)
SODIUM SERPL-SCNC: 139 MMOL/L (ref 136–145)
SPECIMEN EXPIRATION DATE: NORMAL
WBC # BLD AUTO: 6.17 THOUSAND/UL (ref 4.31–10.16)

## 2017-08-26 PROCEDURE — 94644 CONT INHLJ TX 1ST HOUR: CPT

## 2017-08-26 PROCEDURE — 86901 BLOOD TYPING SEROLOGIC RH(D): CPT | Performed by: EMERGENCY MEDICINE

## 2017-08-26 PROCEDURE — 85730 THROMBOPLASTIN TIME PARTIAL: CPT | Performed by: EMERGENCY MEDICINE

## 2017-08-26 PROCEDURE — 80053 COMPREHEN METABOLIC PANEL: CPT | Performed by: EMERGENCY MEDICINE

## 2017-08-26 PROCEDURE — 83735 ASSAY OF MAGNESIUM: CPT | Performed by: PHYSICIAN ASSISTANT

## 2017-08-26 PROCEDURE — 86900 BLOOD TYPING SEROLOGIC ABO: CPT | Performed by: EMERGENCY MEDICINE

## 2017-08-26 PROCEDURE — 36415 COLL VENOUS BLD VENIPUNCTURE: CPT | Performed by: EMERGENCY MEDICINE

## 2017-08-26 PROCEDURE — 85025 COMPLETE CBC W/AUTO DIFF WBC: CPT | Performed by: EMERGENCY MEDICINE

## 2017-08-26 PROCEDURE — 93005 ELECTROCARDIOGRAM TRACING: CPT | Performed by: EMERGENCY MEDICINE

## 2017-08-26 PROCEDURE — 96360 HYDRATION IV INFUSION INIT: CPT

## 2017-08-26 PROCEDURE — 83735 ASSAY OF MAGNESIUM: CPT | Performed by: EMERGENCY MEDICINE

## 2017-08-26 PROCEDURE — 80048 BASIC METABOLIC PNL TOTAL CA: CPT | Performed by: PHYSICIAN ASSISTANT

## 2017-08-26 PROCEDURE — 82948 REAGENT STRIP/BLOOD GLUCOSE: CPT

## 2017-08-26 PROCEDURE — 99285 EMERGENCY DEPT VISIT HI MDM: CPT

## 2017-08-26 PROCEDURE — 94760 N-INVAS EAR/PLS OXIMETRY 1: CPT

## 2017-08-26 PROCEDURE — 86850 RBC ANTIBODY SCREEN: CPT | Performed by: EMERGENCY MEDICINE

## 2017-08-26 PROCEDURE — 85610 PROTHROMBIN TIME: CPT | Performed by: EMERGENCY MEDICINE

## 2017-08-26 PROCEDURE — 94640 AIRWAY INHALATION TREATMENT: CPT

## 2017-08-26 RX ORDER — ATORVASTATIN CALCIUM 10 MG/1
10 TABLET, FILM COATED ORAL
Status: DISCONTINUED | OUTPATIENT
Start: 2017-08-27 | End: 2017-08-27

## 2017-08-26 RX ORDER — DEXTROSE MONOHYDRATE 25 G/50ML
50 INJECTION, SOLUTION INTRAVENOUS ONCE
Status: COMPLETED | OUTPATIENT
Start: 2017-08-26 | End: 2017-08-26

## 2017-08-26 RX ORDER — ALBUMIN, HUMAN INJ 5% 5 %
12.5 SOLUTION INTRAVENOUS ONCE
Status: COMPLETED | OUTPATIENT
Start: 2017-08-26 | End: 2017-08-27

## 2017-08-26 RX ORDER — MEMANTINE HYDROCHLORIDE 10 MG/1
10 TABLET ORAL 2 TIMES DAILY
Status: DISCONTINUED | OUTPATIENT
Start: 2017-08-26 | End: 2017-08-30 | Stop reason: HOSPADM

## 2017-08-26 RX ORDER — ATROPINE SULFATE 0.1 MG/ML
0.5 INJECTION INTRAVENOUS AS NEEDED
Status: DISCONTINUED | OUTPATIENT
Start: 2017-08-26 | End: 2017-08-30 | Stop reason: HOSPADM

## 2017-08-26 RX ORDER — ALBUTEROL SULFATE 2.5 MG/3ML
10 SOLUTION RESPIRATORY (INHALATION) ONCE
Status: COMPLETED | OUTPATIENT
Start: 2017-08-26 | End: 2017-08-26

## 2017-08-26 RX ORDER — WARFARIN SODIUM 5 MG/1
5 TABLET ORAL
Status: DISCONTINUED | OUTPATIENT
Start: 2017-08-26 | End: 2017-08-30 | Stop reason: HOSPADM

## 2017-08-26 RX ORDER — HEPARIN SODIUM 5000 [USP'U]/ML
5000 INJECTION, SOLUTION INTRAVENOUS; SUBCUTANEOUS EVERY 8 HOURS SCHEDULED
Status: DISCONTINUED | OUTPATIENT
Start: 2017-08-26 | End: 2017-08-26

## 2017-08-26 RX ORDER — OXYCODONE HYDROCHLORIDE AND ACETAMINOPHEN 5; 325 MG/1; MG/1
1 TABLET ORAL EVERY 4 HOURS PRN
Status: DISCONTINUED | OUTPATIENT
Start: 2017-08-26 | End: 2017-08-30 | Stop reason: HOSPADM

## 2017-08-26 RX ORDER — CLOPIDOGREL BISULFATE 75 MG/1
75 TABLET ORAL DAILY
Status: DISCONTINUED | OUTPATIENT
Start: 2017-08-27 | End: 2017-08-30 | Stop reason: HOSPADM

## 2017-08-26 RX ORDER — TAMSULOSIN HYDROCHLORIDE 0.4 MG/1
0.4 CAPSULE ORAL
Status: DISCONTINUED | OUTPATIENT
Start: 2017-08-27 | End: 2017-08-30 | Stop reason: HOSPADM

## 2017-08-26 RX ORDER — ATROPINE SULFATE 0.1 MG/ML
INJECTION INTRAVENOUS
Status: COMPLETED
Start: 2017-08-26 | End: 2017-08-26

## 2017-08-26 RX ORDER — ATROPINE SULFATE 1 MG/ML
INJECTION, SOLUTION INTRAMUSCULAR; INTRAVENOUS; SUBCUTANEOUS
Status: COMPLETED
Start: 2017-08-26 | End: 2017-08-26

## 2017-08-26 RX ADMIN — GLUCAGON HYDROCHLORIDE 1 MG: KIT at 22:18

## 2017-08-26 RX ADMIN — CALCIUM GLUCONATE 1 G: 94 INJECTION, SOLUTION INTRAVENOUS at 23:52

## 2017-08-26 RX ADMIN — INSULIN HUMAN 10 UNITS: 100 INJECTION, SOLUTION PARENTERAL at 19:31

## 2017-08-26 RX ADMIN — WARFARIN SODIUM 5 MG: 5 TABLET ORAL at 22:54

## 2017-08-26 RX ADMIN — SODIUM BICARBONATE 50 MEQ: 84 INJECTION, SOLUTION INTRAVENOUS at 18:20

## 2017-08-26 RX ADMIN — SODIUM CHLORIDE 500 ML: 0.9 INJECTION, SOLUTION INTRAVENOUS at 23:53

## 2017-08-26 RX ADMIN — INSULIN HUMAN 10 UNITS: 100 INJECTION, SOLUTION PARENTERAL at 23:49

## 2017-08-26 RX ADMIN — DEXTROSE MONOHYDRATE 50 ML: 25 INJECTION, SOLUTION INTRAVENOUS at 19:26

## 2017-08-26 RX ADMIN — ALBUTEROL SULFATE 10 MG: 2.5 SOLUTION RESPIRATORY (INHALATION) at 23:41

## 2017-08-26 RX ADMIN — Medication 1 MG: at 21:45

## 2017-08-26 RX ADMIN — OXYCODONE HYDROCHLORIDE AND ACETAMINOPHEN 1 TABLET: 5; 325 TABLET ORAL at 22:57

## 2017-08-26 RX ADMIN — ALBUMIN HUMAN 12.5 G: 0.05 INJECTION, SOLUTION INTRAVENOUS at 22:00

## 2017-08-26 RX ADMIN — ALBUMIN HUMAN 12.5 G: 0.05 INJECTION, SOLUTION INTRAVENOUS at 20:55

## 2017-08-26 RX ADMIN — MEMANTINE 10 MG: 10 TABLET ORAL at 23:45

## 2017-08-26 RX ADMIN — GLUCAGON HYDROCHLORIDE 1 MG: KIT at 21:09

## 2017-08-26 RX ADMIN — CALCIUM GLUCONATE 1 G: 94 INJECTION, SOLUTION INTRAVENOUS at 18:15

## 2017-08-26 RX ADMIN — SODIUM CHLORIDE 500 ML: 0.9 INJECTION, SOLUTION INTRAVENOUS at 16:45

## 2017-08-27 ENCOUNTER — APPOINTMENT (INPATIENT)
Dept: RADIOLOGY | Facility: HOSPITAL | Age: 82
DRG: 683 | End: 2017-08-27
Payer: MEDICARE

## 2017-08-27 ENCOUNTER — APPOINTMENT (INPATIENT)
Dept: ULTRASOUND IMAGING | Facility: HOSPITAL | Age: 82
DRG: 683 | End: 2017-08-27
Payer: MEDICARE

## 2017-08-27 ENCOUNTER — APPOINTMENT (OUTPATIENT)
Dept: NON INVASIVE DIAGNOSTICS | Facility: HOSPITAL | Age: 82
DRG: 683 | End: 2017-08-27
Payer: MEDICARE

## 2017-08-27 LAB
ALBUMIN SERPL BCP-MCNC: 3.5 G/DL (ref 3.5–5)
ALP SERPL-CCNC: 136 U/L (ref 46–116)
ALT SERPL W P-5'-P-CCNC: 232 U/L (ref 12–78)
ANION GAP SERPL CALCULATED.3IONS-SCNC: 10 MMOL/L (ref 4–13)
ANION GAP SERPL CALCULATED.3IONS-SCNC: 14 MMOL/L (ref 4–13)
ANION GAP SERPL CALCULATED.3IONS-SCNC: 15 MMOL/L (ref 4–13)
AST SERPL W P-5'-P-CCNC: 258 U/L (ref 5–45)
BASOPHILS # BLD AUTO: 0.02 THOUSANDS/ΜL (ref 0–0.1)
BASOPHILS NFR BLD AUTO: 0 % (ref 0–1)
BILIRUB SERPL-MCNC: 0.2 MG/DL (ref 0.2–1)
BUN SERPL-MCNC: 66 MG/DL (ref 5–25)
BUN SERPL-MCNC: 66 MG/DL (ref 5–25)
BUN SERPL-MCNC: 68 MG/DL (ref 5–25)
CALCIUM SERPL-MCNC: 7.7 MG/DL (ref 8.3–10.1)
CALCIUM SERPL-MCNC: 7.7 MG/DL (ref 8.3–10.1)
CALCIUM SERPL-MCNC: 7.9 MG/DL (ref 8.3–10.1)
CHLORIDE SERPL-SCNC: 105 MMOL/L (ref 100–108)
CHLORIDE SERPL-SCNC: 106 MMOL/L (ref 100–108)
CHLORIDE SERPL-SCNC: 106 MMOL/L (ref 100–108)
CO2 SERPL-SCNC: 19 MMOL/L (ref 21–32)
CO2 SERPL-SCNC: 20 MMOL/L (ref 21–32)
CO2 SERPL-SCNC: 21 MMOL/L (ref 21–32)
CREAT SERPL-MCNC: 3.84 MG/DL (ref 0.6–1.3)
CREAT SERPL-MCNC: 4.04 MG/DL (ref 0.6–1.3)
CREAT SERPL-MCNC: 4.12 MG/DL (ref 0.6–1.3)
EOSINOPHIL # BLD AUTO: 0.02 THOUSAND/ΜL (ref 0–0.61)
EOSINOPHIL NFR BLD AUTO: 0 % (ref 0–6)
ERYTHROCYTE [DISTWIDTH] IN BLOOD BY AUTOMATED COUNT: 13.4 % (ref 11.6–15.1)
GFR SERPL CREATININE-BSD FRML MDRD: 12 ML/MIN/1.73SQ M
GFR SERPL CREATININE-BSD FRML MDRD: 12 ML/MIN/1.73SQ M
GFR SERPL CREATININE-BSD FRML MDRD: 13 ML/MIN/1.73SQ M
GLUCOSE P FAST SERPL-MCNC: 152 MG/DL (ref 65–99)
GLUCOSE SERPL-MCNC: 107 MG/DL (ref 65–140)
GLUCOSE SERPL-MCNC: 152 MG/DL (ref 65–140)
GLUCOSE SERPL-MCNC: 173 MG/DL (ref 65–140)
GLUCOSE SERPL-MCNC: 226 MG/DL (ref 65–140)
GLUCOSE SERPL-MCNC: 276 MG/DL (ref 65–140)
GLUCOSE SERPL-MCNC: 314 MG/DL (ref 65–140)
GLUCOSE SERPL-MCNC: 355 MG/DL (ref 65–140)
HCT VFR BLD AUTO: 26.6 % (ref 36.5–49.3)
HGB BLD-MCNC: 8.4 G/DL (ref 12–17)
INR PPP: 2.58 (ref 0.86–1.16)
LYMPHOCYTES # BLD AUTO: 0.97 THOUSANDS/ΜL (ref 0.6–4.47)
LYMPHOCYTES NFR BLD AUTO: 14 % (ref 14–44)
MAGNESIUM SERPL-MCNC: 2.2 MG/DL (ref 1.6–2.6)
MCH RBC QN AUTO: 29.6 PG (ref 26.8–34.3)
MCHC RBC AUTO-ENTMCNC: 31.6 G/DL (ref 31.4–37.4)
MCV RBC AUTO: 94 FL (ref 82–98)
MONOCYTES # BLD AUTO: 0.59 THOUSAND/ΜL (ref 0.17–1.22)
MONOCYTES NFR BLD AUTO: 9 % (ref 4–12)
NEUTROPHILS # BLD AUTO: 5.19 THOUSANDS/ΜL (ref 1.85–7.62)
NEUTS SEG NFR BLD AUTO: 77 % (ref 43–75)
PLATELET # BLD AUTO: 200 THOUSANDS/UL (ref 149–390)
PMV BLD AUTO: 10.1 FL (ref 8.9–12.7)
POTASSIUM SERPL-SCNC: 5 MMOL/L (ref 3.5–5.3)
POTASSIUM SERPL-SCNC: 5 MMOL/L (ref 3.5–5.3)
POTASSIUM SERPL-SCNC: 5.9 MMOL/L (ref 3.5–5.3)
PROT SERPL-MCNC: 6.8 G/DL (ref 6.4–8.2)
PROTHROMBIN TIME: 28.6 SECONDS (ref 12.1–14.4)
RBC # BLD AUTO: 2.84 MILLION/UL (ref 3.88–5.62)
SODIUM SERPL-SCNC: 137 MMOL/L (ref 136–145)
SODIUM SERPL-SCNC: 139 MMOL/L (ref 136–145)
SODIUM SERPL-SCNC: 140 MMOL/L (ref 136–145)
WBC # BLD AUTO: 6.79 THOUSAND/UL (ref 4.31–10.16)

## 2017-08-27 PROCEDURE — 82948 REAGENT STRIP/BLOOD GLUCOSE: CPT

## 2017-08-27 PROCEDURE — 80048 BASIC METABOLIC PNL TOTAL CA: CPT | Performed by: PHYSICIAN ASSISTANT

## 2017-08-27 PROCEDURE — 94640 AIRWAY INHALATION TREATMENT: CPT

## 2017-08-27 PROCEDURE — 83735 ASSAY OF MAGNESIUM: CPT | Performed by: INTERNAL MEDICINE

## 2017-08-27 PROCEDURE — 85610 PROTHROMBIN TIME: CPT | Performed by: INTERNAL MEDICINE

## 2017-08-27 PROCEDURE — 93306 TTE W/DOPPLER COMPLETE: CPT

## 2017-08-27 PROCEDURE — 0T9B70Z DRAINAGE OF BLADDER WITH DRAINAGE DEVICE, VIA NATURAL OR ARTIFICIAL OPENING: ICD-10-PCS | Performed by: INTERNAL MEDICINE

## 2017-08-27 PROCEDURE — 76705 ECHO EXAM OF ABDOMEN: CPT

## 2017-08-27 PROCEDURE — 94760 N-INVAS EAR/PLS OXIMETRY 1: CPT

## 2017-08-27 PROCEDURE — 85025 COMPLETE CBC W/AUTO DIFF WBC: CPT | Performed by: INTERNAL MEDICINE

## 2017-08-27 PROCEDURE — 71010 HB CHEST X-RAY 1 VIEW FRONTAL (PORTABLE): CPT

## 2017-08-27 PROCEDURE — 93005 ELECTROCARDIOGRAM TRACING: CPT

## 2017-08-27 PROCEDURE — 94645 CONT INHLJ TX EACH ADDL HOUR: CPT

## 2017-08-27 PROCEDURE — 80053 COMPREHEN METABOLIC PANEL: CPT | Performed by: INTERNAL MEDICINE

## 2017-08-27 RX ORDER — HYDRALAZINE HYDROCHLORIDE 20 MG/ML
10 INJECTION INTRAMUSCULAR; INTRAVENOUS EVERY 4 HOURS PRN
Status: DISCONTINUED | OUTPATIENT
Start: 2017-08-27 | End: 2017-08-30 | Stop reason: HOSPADM

## 2017-08-27 RX ORDER — INSULIN GLARGINE 100 [IU]/ML
6 INJECTION, SOLUTION SUBCUTANEOUS EVERY MORNING
Status: DISCONTINUED | OUTPATIENT
Start: 2017-08-28 | End: 2017-08-27

## 2017-08-27 RX ORDER — DEXTROSE MONOHYDRATE 25 G/50ML
50 INJECTION, SOLUTION INTRAVENOUS ONCE
Status: COMPLETED | OUTPATIENT
Start: 2017-08-27 | End: 2017-08-27

## 2017-08-27 RX ORDER — SODIUM POLYSTYRENE SULFONATE 15 G/60ML
15 SUSPENSION ORAL; RECTAL ONCE
Status: COMPLETED | OUTPATIENT
Start: 2017-08-27 | End: 2017-08-27

## 2017-08-27 RX ORDER — FUROSEMIDE 10 MG/ML
80 INJECTION INTRAMUSCULAR; INTRAVENOUS ONCE
Status: DISCONTINUED | OUTPATIENT
Start: 2017-08-27 | End: 2017-08-27

## 2017-08-27 RX ORDER — SODIUM BICARBONATE 650 MG/1
650 TABLET ORAL
Status: DISCONTINUED | OUTPATIENT
Start: 2017-08-27 | End: 2017-08-30 | Stop reason: HOSPADM

## 2017-08-27 RX ORDER — ALBUTEROL SULFATE 2.5 MG/3ML
SOLUTION RESPIRATORY (INHALATION)
Status: COMPLETED
Start: 2017-08-27 | End: 2017-08-27

## 2017-08-27 RX ORDER — NIFEDIPINE 30 MG/1
30 TABLET, EXTENDED RELEASE ORAL DAILY
Status: DISCONTINUED | OUTPATIENT
Start: 2017-08-28 | End: 2017-08-30 | Stop reason: HOSPADM

## 2017-08-27 RX ORDER — ALBUTEROL SULFATE 2.5 MG/3ML
10 SOLUTION RESPIRATORY (INHALATION) ONCE
Status: COMPLETED | OUTPATIENT
Start: 2017-08-27 | End: 2017-08-27

## 2017-08-27 RX ORDER — INSULIN GLARGINE 100 [IU]/ML
6 INJECTION, SOLUTION SUBCUTANEOUS EVERY MORNING
Status: DISCONTINUED | OUTPATIENT
Start: 2017-08-27 | End: 2017-08-28

## 2017-08-27 RX ADMIN — SODIUM BICARBONATE 50 MEQ: 84 INJECTION INTRAVENOUS at 00:16

## 2017-08-27 RX ADMIN — ALBUTEROL SULFATE 10 MG: 2.5 SOLUTION RESPIRATORY (INHALATION) at 05:55

## 2017-08-27 RX ADMIN — INSULIN LISPRO 2 UNITS: 100 INJECTION, SOLUTION INTRAVENOUS; SUBCUTANEOUS at 07:54

## 2017-08-27 RX ADMIN — SODIUM BICARBONATE 50 MEQ: 84 INJECTION INTRAVENOUS at 06:08

## 2017-08-27 RX ADMIN — MEMANTINE 10 MG: 10 TABLET ORAL at 18:12

## 2017-08-27 RX ADMIN — SERTRALINE HYDROCHLORIDE 50 MG: 50 TABLET ORAL at 08:22

## 2017-08-27 RX ADMIN — INSULIN GLARGINE 6 UNITS: 100 INJECTION, SOLUTION SUBCUTANEOUS at 16:19

## 2017-08-27 RX ADMIN — HYDRALAZINE HYDROCHLORIDE 10 MG: 20 INJECTION INTRAMUSCULAR; INTRAVENOUS at 22:05

## 2017-08-27 RX ADMIN — SODIUM POLYSTYRENE SULFONATE 15 G: 15 SUSPENSION ORAL; RECTAL at 06:08

## 2017-08-27 RX ADMIN — CALCIUM GLUCONATE 1 G: 94 INJECTION, SOLUTION INTRAVENOUS at 06:09

## 2017-08-27 RX ADMIN — INSULIN LISPRO 6 UNITS: 100 INJECTION, SOLUTION INTRAVENOUS; SUBCUTANEOUS at 16:19

## 2017-08-27 RX ADMIN — INSULIN HUMAN 10 UNITS: 100 INJECTION, SOLUTION PARENTERAL at 06:08

## 2017-08-27 RX ADMIN — INSULIN LISPRO 5 UNITS: 100 INJECTION, SOLUTION INTRAVENOUS; SUBCUTANEOUS at 12:02

## 2017-08-27 RX ADMIN — TAMSULOSIN HYDROCHLORIDE 0.4 MG: 0.4 CAPSULE ORAL at 16:19

## 2017-08-27 RX ADMIN — WARFARIN SODIUM 5 MG: 5 TABLET ORAL at 18:12

## 2017-08-27 RX ADMIN — SODIUM BICARBONATE 650 MG TABLET 650 MG: at 10:30

## 2017-08-27 RX ADMIN — SODIUM BICARBONATE 650 MG TABLET 650 MG: at 17:58

## 2017-08-27 RX ADMIN — MEMANTINE 10 MG: 10 TABLET ORAL at 08:22

## 2017-08-27 RX ADMIN — DEXTROSE MONOHYDRATE 50 ML: 25 INJECTION, SOLUTION INTRAVENOUS at 06:08

## 2017-08-27 RX ADMIN — OXYCODONE HYDROCHLORIDE AND ACETAMINOPHEN 1 TABLET: 5; 325 TABLET ORAL at 22:04

## 2017-08-27 RX ADMIN — CLOPIDOGREL BISULFATE 75 MG: 75 TABLET ORAL at 08:22

## 2017-08-28 LAB
ALBUMIN SERPL BCP-MCNC: 3 G/DL (ref 3.5–5)
ALP SERPL-CCNC: 131 U/L (ref 46–116)
ALT SERPL W P-5'-P-CCNC: 160 U/L (ref 12–78)
ANION GAP SERPL CALCULATED.3IONS-SCNC: 11 MMOL/L (ref 4–13)
ANION GAP SERPL CALCULATED.3IONS-SCNC: 11 MMOL/L (ref 4–13)
AST SERPL W P-5'-P-CCNC: 94 U/L (ref 5–45)
BACTERIA UR QL AUTO: ABNORMAL /HPF
BILIRUB DIRECT SERPL-MCNC: 0.08 MG/DL (ref 0–0.2)
BILIRUB SERPL-MCNC: 0.2 MG/DL (ref 0.2–1)
BILIRUB UR QL STRIP: NEGATIVE
BUN SERPL-MCNC: 62 MG/DL (ref 5–25)
BUN SERPL-MCNC: 64 MG/DL (ref 5–25)
CALCIUM SERPL-MCNC: 7.6 MG/DL (ref 8.3–10.1)
CALCIUM SERPL-MCNC: 7.7 MG/DL (ref 8.3–10.1)
CHLORIDE SERPL-SCNC: 107 MMOL/L (ref 100–108)
CHLORIDE SERPL-SCNC: 108 MMOL/L (ref 100–108)
CLARITY UR: CLEAR
CO2 SERPL-SCNC: 22 MMOL/L (ref 21–32)
CO2 SERPL-SCNC: 23 MMOL/L (ref 21–32)
COLOR UR: YELLOW
CREAT SERPL-MCNC: 3.54 MG/DL (ref 0.6–1.3)
CREAT SERPL-MCNC: 3.76 MG/DL (ref 0.6–1.3)
GFR SERPL CREATININE-BSD FRML MDRD: 14 ML/MIN/1.73SQ M
GFR SERPL CREATININE-BSD FRML MDRD: 15 ML/MIN/1.73SQ M
GLUCOSE SERPL-MCNC: 103 MG/DL (ref 65–140)
GLUCOSE SERPL-MCNC: 104 MG/DL (ref 65–140)
GLUCOSE SERPL-MCNC: 133 MG/DL (ref 65–140)
GLUCOSE SERPL-MCNC: 229 MG/DL (ref 65–140)
GLUCOSE SERPL-MCNC: 252 MG/DL (ref 65–140)
GLUCOSE SERPL-MCNC: 89 MG/DL (ref 65–140)
GLUCOSE UR STRIP-MCNC: NEGATIVE MG/DL
HGB UR QL STRIP.AUTO: ABNORMAL
KETONES UR STRIP-MCNC: NEGATIVE MG/DL
LEUKOCYTE ESTERASE UR QL STRIP: ABNORMAL
NITRITE UR QL STRIP: NEGATIVE
NON-SQ EPI CELLS URNS QL MICRO: ABNORMAL /HPF
PH UR STRIP.AUTO: 7 [PH] (ref 4.5–8)
POTASSIUM SERPL-SCNC: 4.6 MMOL/L (ref 3.5–5.3)
POTASSIUM SERPL-SCNC: 5.1 MMOL/L (ref 3.5–5.3)
PROT SERPL-MCNC: 6.3 G/DL (ref 6.4–8.2)
PROT UR STRIP-MCNC: ABNORMAL MG/DL
RBC #/AREA URNS AUTO: ABNORMAL /HPF
SODIUM SERPL-SCNC: 140 MMOL/L (ref 136–145)
SODIUM SERPL-SCNC: 142 MMOL/L (ref 136–145)
SP GR UR STRIP.AUTO: 1.01 (ref 1–1.03)
UROBILINOGEN UR QL STRIP.AUTO: 0.2 E.U./DL
WBC #/AREA URNS AUTO: ABNORMAL /HPF

## 2017-08-28 PROCEDURE — 81001 URINALYSIS AUTO W/SCOPE: CPT | Performed by: NURSE PRACTITIONER

## 2017-08-28 PROCEDURE — 80048 BASIC METABOLIC PNL TOTAL CA: CPT | Performed by: PHYSICIAN ASSISTANT

## 2017-08-28 PROCEDURE — 80076 HEPATIC FUNCTION PANEL: CPT | Performed by: INTERNAL MEDICINE

## 2017-08-28 PROCEDURE — 82948 REAGENT STRIP/BLOOD GLUCOSE: CPT

## 2017-08-28 PROCEDURE — 84165 PROTEIN E-PHORESIS SERUM: CPT | Performed by: INTERNAL MEDICINE

## 2017-08-28 PROCEDURE — 83883 ASSAY NEPHELOMETRY NOT SPEC: CPT | Performed by: INTERNAL MEDICINE

## 2017-08-28 RX ADMIN — SERTRALINE HYDROCHLORIDE 50 MG: 50 TABLET ORAL at 09:04

## 2017-08-28 RX ADMIN — INSULIN LISPRO 2 UNITS: 100 INJECTION, SOLUTION INTRAVENOUS; SUBCUTANEOUS at 15:54

## 2017-08-28 RX ADMIN — MEMANTINE 10 MG: 10 TABLET ORAL at 09:04

## 2017-08-28 RX ADMIN — INSULIN LISPRO 2 UNITS: 100 INJECTION, SOLUTION INTRAVENOUS; SUBCUTANEOUS at 21:04

## 2017-08-28 RX ADMIN — SODIUM BICARBONATE 650 MG TABLET 650 MG: at 18:56

## 2017-08-28 RX ADMIN — NIFEDIPINE 30 MG: 30 TABLET, FILM COATED, EXTENDED RELEASE ORAL at 09:03

## 2017-08-28 RX ADMIN — TAMSULOSIN HYDROCHLORIDE 0.4 MG: 0.4 CAPSULE ORAL at 15:53

## 2017-08-28 RX ADMIN — MEMANTINE 10 MG: 10 TABLET ORAL at 18:56

## 2017-08-28 RX ADMIN — CLOPIDOGREL BISULFATE 75 MG: 75 TABLET ORAL at 09:04

## 2017-08-28 RX ADMIN — SODIUM BICARBONATE 650 MG TABLET 650 MG: at 09:04

## 2017-08-28 RX ADMIN — WARFARIN SODIUM 5 MG: 5 TABLET ORAL at 18:56

## 2017-08-29 PROBLEM — I95.9 HYPOTENSION: Status: RESOLVED | Noted: 2017-08-26 | Resolved: 2017-08-29

## 2017-08-29 PROBLEM — E87.5 HYPERKALEMIA: Status: RESOLVED | Noted: 2017-07-06 | Resolved: 2017-08-29

## 2017-08-29 LAB
ALBUMIN SERPL BCP-MCNC: 2.9 G/DL (ref 3.5–5)
ALP SERPL-CCNC: 131 U/L (ref 46–116)
ALT SERPL W P-5'-P-CCNC: 120 U/L (ref 12–78)
ANION GAP SERPL CALCULATED.3IONS-SCNC: 13 MMOL/L (ref 4–13)
AST SERPL W P-5'-P-CCNC: 52 U/L (ref 5–45)
ATRIAL RATE: 83 BPM
BILIRUB SERPL-MCNC: 0.3 MG/DL (ref 0.2–1)
BUN SERPL-MCNC: 63 MG/DL (ref 5–25)
CALCIUM SERPL-MCNC: 7.3 MG/DL (ref 8.3–10.1)
CHLORIDE SERPL-SCNC: 106 MMOL/L (ref 100–108)
CO2 SERPL-SCNC: 21 MMOL/L (ref 21–32)
CREAT SERPL-MCNC: 3.4 MG/DL (ref 0.6–1.3)
GFR SERPL CREATININE-BSD FRML MDRD: 15 ML/MIN/1.73SQ M
GLUCOSE SERPL-MCNC: 106 MG/DL (ref 65–140)
GLUCOSE SERPL-MCNC: 130 MG/DL (ref 65–140)
GLUCOSE SERPL-MCNC: 206 MG/DL (ref 65–140)
GLUCOSE SERPL-MCNC: 219 MG/DL (ref 65–140)
GLUCOSE SERPL-MCNC: 268 MG/DL (ref 65–140)
INR PPP: 3.14 (ref 0.86–1.16)
KAPPA LC FREE SER-MCNC: 67.8 MG/L (ref 3.3–19.4)
KAPPA LC FREE/LAMBDA FREE SER: 1.61 {RATIO} (ref 0.26–1.65)
LAMBDA LC FREE SERPL-MCNC: 42 MG/L (ref 5.7–26.3)
MAGNESIUM SERPL-MCNC: 2 MG/DL (ref 1.6–2.6)
P AXIS: 68 DEGREES
PHOSPHATE SERPL-MCNC: 3.2 MG/DL (ref 2.3–4.1)
POTASSIUM SERPL-SCNC: 4.6 MMOL/L (ref 3.5–5.3)
PR INTERVAL: 168 MS
PROT SERPL-MCNC: 6.3 G/DL (ref 6.4–8.2)
PROTHROMBIN TIME: 33.5 SECONDS (ref 12.1–14.4)
QRS AXIS: 60 DEGREES
QRSD INTERVAL: 88 MS
QT INTERVAL: 398 MS
QTC INTERVAL: 467 MS
SODIUM SERPL-SCNC: 140 MMOL/L (ref 136–145)
T WAVE AXIS: 50 DEGREES
VENTRICULAR RATE: 83 BPM

## 2017-08-29 PROCEDURE — 82948 REAGENT STRIP/BLOOD GLUCOSE: CPT

## 2017-08-29 PROCEDURE — 84100 ASSAY OF PHOSPHORUS: CPT | Performed by: NURSE PRACTITIONER

## 2017-08-29 PROCEDURE — G8978 MOBILITY CURRENT STATUS: HCPCS

## 2017-08-29 PROCEDURE — 97163 PT EVAL HIGH COMPLEX 45 MIN: CPT

## 2017-08-29 PROCEDURE — 85610 PROTHROMBIN TIME: CPT | Performed by: INTERNAL MEDICINE

## 2017-08-29 PROCEDURE — G8987 SELF CARE CURRENT STATUS: HCPCS

## 2017-08-29 PROCEDURE — G8988 SELF CARE GOAL STATUS: HCPCS

## 2017-08-29 PROCEDURE — G8979 MOBILITY GOAL STATUS: HCPCS

## 2017-08-29 PROCEDURE — 83735 ASSAY OF MAGNESIUM: CPT | Performed by: NURSE PRACTITIONER

## 2017-08-29 PROCEDURE — 80053 COMPREHEN METABOLIC PANEL: CPT | Performed by: NURSE PRACTITIONER

## 2017-08-29 PROCEDURE — 97167 OT EVAL HIGH COMPLEX 60 MIN: CPT

## 2017-08-29 RX ORDER — METOPROLOL SUCCINATE 25 MG/1
25 TABLET, EXTENDED RELEASE ORAL DAILY
Status: DISCONTINUED | OUTPATIENT
Start: 2017-08-29 | End: 2017-08-30 | Stop reason: HOSPADM

## 2017-08-29 RX ADMIN — INSULIN LISPRO 1 UNITS: 100 INJECTION, SOLUTION INTRAVENOUS; SUBCUTANEOUS at 21:00

## 2017-08-29 RX ADMIN — SODIUM BICARBONATE 650 MG TABLET 650 MG: at 08:45

## 2017-08-29 RX ADMIN — SODIUM BICARBONATE 650 MG TABLET 650 MG: at 16:39

## 2017-08-29 RX ADMIN — INSULIN LISPRO 2 UNITS: 100 INJECTION, SOLUTION INTRAVENOUS; SUBCUTANEOUS at 16:03

## 2017-08-29 RX ADMIN — SERTRALINE HYDROCHLORIDE 50 MG: 50 TABLET ORAL at 08:45

## 2017-08-29 RX ADMIN — MEMANTINE 10 MG: 10 TABLET ORAL at 17:53

## 2017-08-29 RX ADMIN — WARFARIN SODIUM 5 MG: 5 TABLET ORAL at 17:53

## 2017-08-29 RX ADMIN — CLOPIDOGREL BISULFATE 75 MG: 75 TABLET ORAL at 08:45

## 2017-08-29 RX ADMIN — METOPROLOL SUCCINATE 25 MG: 25 TABLET, EXTENDED RELEASE ORAL at 14:39

## 2017-08-29 RX ADMIN — INSULIN LISPRO 1 UNITS: 100 INJECTION, SOLUTION INTRAVENOUS; SUBCUTANEOUS at 12:05

## 2017-08-29 RX ADMIN — NIFEDIPINE 30 MG: 30 TABLET, FILM COATED, EXTENDED RELEASE ORAL at 08:45

## 2017-08-29 RX ADMIN — MEMANTINE 10 MG: 10 TABLET ORAL at 08:45

## 2017-08-29 RX ADMIN — TAMSULOSIN HYDROCHLORIDE 0.4 MG: 0.4 CAPSULE ORAL at 16:39

## 2017-08-30 VITALS
DIASTOLIC BLOOD PRESSURE: 60 MMHG | WEIGHT: 205.69 LBS | SYSTOLIC BLOOD PRESSURE: 121 MMHG | RESPIRATION RATE: 16 BRPM | HEART RATE: 62 BPM | TEMPERATURE: 97.6 F | OXYGEN SATURATION: 97 % | HEIGHT: 71 IN | BODY MASS INDEX: 28.8 KG/M2

## 2017-08-30 PROBLEM — R00.1 BRADYCARDIA: Status: RESOLVED | Noted: 2017-08-26 | Resolved: 2017-08-30

## 2017-08-30 LAB
ALBUMIN SERPL ELPH-MCNC: 3.51 G/DL (ref 3.5–5)
ALBUMIN SERPL ELPH-MCNC: 57.6 % (ref 52–65)
ALPHA1 GLOB SERPL ELPH-MCNC: 0.3 G/DL (ref 0.1–0.4)
ALPHA1 GLOB SERPL ELPH-MCNC: 4.9 % (ref 2.5–5)
ALPHA2 GLOB SERPL ELPH-MCNC: 0.6 G/DL (ref 0.4–1.2)
ALPHA2 GLOB SERPL ELPH-MCNC: 9.8 % (ref 7–13)
ANION GAP SERPL CALCULATED.3IONS-SCNC: 10 MMOL/L (ref 4–13)
BASOPHILS # BLD AUTO: 0.03 THOUSANDS/ΜL (ref 0–0.1)
BASOPHILS NFR BLD AUTO: 0 % (ref 0–1)
BETA GLOB ABNORMAL SERPL ELPH-MCNC: 0.34 G/DL (ref 0.4–0.8)
BETA1 GLOB SERPL ELPH-MCNC: 5.5 % (ref 5–13)
BETA2 GLOB SERPL ELPH-MCNC: 5.8 % (ref 2–8)
BETA2+GAMMA GLOB SERPL ELPH-MCNC: 0.35 G/DL (ref 0.2–0.5)
BUN SERPL-MCNC: 63 MG/DL (ref 5–25)
CALCIUM SERPL-MCNC: 7.5 MG/DL (ref 8.3–10.1)
CHLORIDE SERPL-SCNC: 107 MMOL/L (ref 100–108)
CO2 SERPL-SCNC: 21 MMOL/L (ref 21–32)
CREAT SERPL-MCNC: 3.28 MG/DL (ref 0.6–1.3)
EOSINOPHIL # BLD AUTO: 0.36 THOUSAND/ΜL (ref 0–0.61)
EOSINOPHIL NFR BLD AUTO: 5 % (ref 0–6)
ERYTHROCYTE [DISTWIDTH] IN BLOOD BY AUTOMATED COUNT: 13.2 % (ref 11.6–15.1)
GAMMA GLOB ABNORMAL SERPL ELPH-MCNC: 1 G/DL (ref 0.5–1.6)
GAMMA GLOB SERPL ELPH-MCNC: 16.4 % (ref 12–22)
GFR SERPL CREATININE-BSD FRML MDRD: 16 ML/MIN/1.73SQ M
GLUCOSE SERPL-MCNC: 122 MG/DL (ref 65–140)
GLUCOSE SERPL-MCNC: 122 MG/DL (ref 65–140)
GLUCOSE SERPL-MCNC: 158 MG/DL (ref 65–140)
GLUCOSE SERPL-MCNC: 200 MG/DL (ref 65–140)
GLUCOSE SERPL-MCNC: 233 MG/DL (ref 65–140)
HCT VFR BLD AUTO: 26.2 % (ref 36.5–49.3)
HGB BLD-MCNC: 8.3 G/DL (ref 12–17)
IGG/ALB SER: 1.36 {RATIO} (ref 1.1–1.8)
LYMPHOCYTES # BLD AUTO: 1.54 THOUSANDS/ΜL (ref 0.6–4.47)
LYMPHOCYTES NFR BLD AUTO: 22 % (ref 14–44)
MCH RBC QN AUTO: 29.7 PG (ref 26.8–34.3)
MCHC RBC AUTO-ENTMCNC: 31.7 G/DL (ref 31.4–37.4)
MCV RBC AUTO: 94 FL (ref 82–98)
MONOCYTES # BLD AUTO: 0.69 THOUSAND/ΜL (ref 0.17–1.22)
MONOCYTES NFR BLD AUTO: 10 % (ref 4–12)
NEUTROPHILS # BLD AUTO: 4.49 THOUSANDS/ΜL (ref 1.85–7.62)
NEUTS SEG NFR BLD AUTO: 63 % (ref 43–75)
PLATELET # BLD AUTO: 200 THOUSANDS/UL (ref 149–390)
PMV BLD AUTO: 10 FL (ref 8.9–12.7)
POTASSIUM SERPL-SCNC: 4.7 MMOL/L (ref 3.5–5.3)
PROT PATTERN SERPL ELPH-IMP: ABNORMAL
PROT SERPL-MCNC: 6.1 G/DL (ref 6.4–8.2)
RBC # BLD AUTO: 2.79 MILLION/UL (ref 3.88–5.62)
SODIUM SERPL-SCNC: 138 MMOL/L (ref 136–145)
WBC # BLD AUTO: 7.11 THOUSAND/UL (ref 4.31–10.16)

## 2017-08-30 PROCEDURE — 80048 BASIC METABOLIC PNL TOTAL CA: CPT | Performed by: INTERNAL MEDICINE

## 2017-08-30 PROCEDURE — 82948 REAGENT STRIP/BLOOD GLUCOSE: CPT

## 2017-08-30 PROCEDURE — 85025 COMPLETE CBC W/AUTO DIFF WBC: CPT | Performed by: INTERNAL MEDICINE

## 2017-08-30 PROCEDURE — 97116 GAIT TRAINING THERAPY: CPT

## 2017-08-30 RX ORDER — REPAGLINIDE 0.5 MG/1
0.5 TABLET ORAL
Status: DISCONTINUED | OUTPATIENT
Start: 2017-08-30 | End: 2017-08-30 | Stop reason: HOSPADM

## 2017-08-30 RX ORDER — REPAGLINIDE 0.5 MG/1
0.5 TABLET ORAL
Qty: 90 TABLET | Refills: 0 | Status: SHIPPED | OUTPATIENT
Start: 2017-08-30 | End: 2017-09-07

## 2017-08-30 RX ORDER — SODIUM BICARBONATE 650 MG/1
650 TABLET ORAL
Qty: 60 TABLET | Refills: 0 | Status: SHIPPED | OUTPATIENT
Start: 2017-08-30 | End: 2020-08-23 | Stop reason: HOSPADM

## 2017-08-30 RX ADMIN — INSULIN LISPRO 1 UNITS: 100 INJECTION, SOLUTION INTRAVENOUS; SUBCUTANEOUS at 11:23

## 2017-08-30 RX ADMIN — CLOPIDOGREL BISULFATE 75 MG: 75 TABLET ORAL at 08:02

## 2017-08-30 RX ADMIN — MEMANTINE 10 MG: 10 TABLET ORAL at 08:02

## 2017-08-30 RX ADMIN — NIFEDIPINE 30 MG: 30 TABLET, FILM COATED, EXTENDED RELEASE ORAL at 08:02

## 2017-08-30 RX ADMIN — METOPROLOL SUCCINATE 25 MG: 25 TABLET, EXTENDED RELEASE ORAL at 08:02

## 2017-08-30 RX ADMIN — SERTRALINE HYDROCHLORIDE 50 MG: 50 TABLET ORAL at 08:02

## 2017-08-30 RX ADMIN — TAMSULOSIN HYDROCHLORIDE 0.4 MG: 0.4 CAPSULE ORAL at 18:41

## 2017-08-30 RX ADMIN — SODIUM BICARBONATE 650 MG TABLET 650 MG: at 18:41

## 2017-08-30 RX ADMIN — SODIUM BICARBONATE 650 MG TABLET 650 MG: at 08:02

## 2017-08-30 RX ADMIN — REPAGLINIDE 0.5 MG: 0.5 TABLET ORAL at 11:23

## 2017-08-30 RX ADMIN — MEMANTINE 10 MG: 10 TABLET ORAL at 18:42

## 2017-08-30 RX ADMIN — INSULIN LISPRO 1 UNITS: 100 INJECTION, SOLUTION INTRAVENOUS; SUBCUTANEOUS at 18:43

## 2017-09-07 ENCOUNTER — HOSPITAL ENCOUNTER (EMERGENCY)
Facility: HOSPITAL | Age: 82
Discharge: HOME | End: 2017-09-07
Payer: MEDICARE

## 2017-09-07 VITALS
DIASTOLIC BLOOD PRESSURE: 74 MMHG | BODY MASS INDEX: 29.72 KG/M2 | TEMPERATURE: 98.4 F | SYSTOLIC BLOOD PRESSURE: 174 MMHG | OXYGEN SATURATION: 100 % | RESPIRATION RATE: 16 BRPM | HEART RATE: 62 BPM | WEIGHT: 213.1 LBS

## 2017-09-07 DIAGNOSIS — D68.9 COAGULOPATHY (HCC): ICD-10-CM

## 2017-09-07 DIAGNOSIS — N39.0 URINARY TRACT INFECTION: Primary | ICD-10-CM

## 2017-09-07 LAB
ALBUMIN SERPL BCP-MCNC: 2.9 G/DL (ref 3.5–5)
ALP SERPL-CCNC: 127 U/L (ref 46–116)
ALT SERPL W P-5'-P-CCNC: 29 U/L (ref 12–78)
ANION GAP SERPL CALCULATED.3IONS-SCNC: 10 MMOL/L (ref 4–13)
AST SERPL W P-5'-P-CCNC: 15 U/L (ref 5–45)
BACTERIA UR QL AUTO: ABNORMAL /HPF
BASOPHILS # BLD AUTO: 0.04 THOUSANDS/ΜL (ref 0–0.1)
BASOPHILS NFR BLD AUTO: 1 % (ref 0–1)
BILIRUB SERPL-MCNC: 0.2 MG/DL (ref 0.2–1)
BILIRUB UR QL STRIP: NEGATIVE
BUN SERPL-MCNC: 51 MG/DL (ref 5–25)
CALCIUM SERPL-MCNC: 7.7 MG/DL (ref 8.3–10.1)
CHLORIDE SERPL-SCNC: 109 MMOL/L (ref 100–108)
CLARITY UR: ABNORMAL
CO2 SERPL-SCNC: 21 MMOL/L (ref 21–32)
COLOR UR: ABNORMAL
CREAT SERPL-MCNC: 3.02 MG/DL (ref 0.6–1.3)
EOSINOPHIL # BLD AUTO: 0.37 THOUSAND/ΜL (ref 0–0.61)
EOSINOPHIL NFR BLD AUTO: 6 % (ref 0–6)
ERYTHROCYTE [DISTWIDTH] IN BLOOD BY AUTOMATED COUNT: 13.3 % (ref 11.6–15.1)
GFR SERPL CREATININE-BSD FRML MDRD: 18 ML/MIN/1.73SQ M
GLUCOSE SERPL-MCNC: 224 MG/DL (ref 65–140)
GLUCOSE UR STRIP-MCNC: ABNORMAL MG/DL
HCT VFR BLD AUTO: 25.1 % (ref 36.5–49.3)
HGB BLD-MCNC: 7.9 G/DL (ref 12–17)
HGB UR QL STRIP.AUTO: ABNORMAL
HOLD SPECIMEN: NORMAL
HOLD SPECIMEN: NORMAL
INR PPP: 2.82 (ref 0.86–1.16)
KETONES UR STRIP-MCNC: NEGATIVE MG/DL
LEUKOCYTE ESTERASE UR QL STRIP: ABNORMAL
LYMPHOCYTES # BLD AUTO: 1.48 THOUSANDS/ΜL (ref 0.6–4.47)
LYMPHOCYTES NFR BLD AUTO: 25 % (ref 14–44)
MCH RBC QN AUTO: 29.8 PG (ref 26.8–34.3)
MCHC RBC AUTO-ENTMCNC: 31.5 G/DL (ref 31.4–37.4)
MCV RBC AUTO: 95 FL (ref 82–98)
MONOCYTES # BLD AUTO: 0.5 THOUSAND/ΜL (ref 0.17–1.22)
MONOCYTES NFR BLD AUTO: 8 % (ref 4–12)
NEUTROPHILS # BLD AUTO: 3.64 THOUSANDS/ΜL (ref 1.85–7.62)
NEUTS SEG NFR BLD AUTO: 60 % (ref 43–75)
NITRITE UR QL STRIP: NEGATIVE
NON-SQ EPI CELLS URNS QL MICRO: ABNORMAL /HPF
PH UR STRIP.AUTO: 5.5 [PH] (ref 4.5–8)
PLATELET # BLD AUTO: 218 THOUSANDS/UL (ref 149–390)
PMV BLD AUTO: 10.2 FL (ref 8.9–12.7)
POTASSIUM SERPL-SCNC: 4.6 MMOL/L (ref 3.5–5.3)
PROT SERPL-MCNC: 6.8 G/DL (ref 6.4–8.2)
PROT UR STRIP-MCNC: ABNORMAL MG/DL
PROTHROMBIN TIME: 30.7 SECONDS (ref 12.1–14.4)
RBC # BLD AUTO: 2.65 MILLION/UL (ref 3.88–5.62)
RBC #/AREA URNS AUTO: ABNORMAL /HPF
SODIUM SERPL-SCNC: 140 MMOL/L (ref 136–145)
SP GR UR STRIP.AUTO: 1.02 (ref 1–1.03)
UROBILINOGEN UR QL STRIP.AUTO: 0.2 E.U./DL
WBC # BLD AUTO: 6.03 THOUSAND/UL (ref 4.31–10.16)
WBC #/AREA URNS AUTO: ABNORMAL /HPF

## 2017-09-07 PROCEDURE — 36415 COLL VENOUS BLD VENIPUNCTURE: CPT | Performed by: PHYSICIAN ASSISTANT

## 2017-09-07 PROCEDURE — 99284 EMERGENCY DEPT VISIT MOD MDM: CPT

## 2017-09-07 PROCEDURE — 85610 PROTHROMBIN TIME: CPT | Performed by: PHYSICIAN ASSISTANT

## 2017-09-07 PROCEDURE — 85025 COMPLETE CBC W/AUTO DIFF WBC: CPT | Performed by: PHYSICIAN ASSISTANT

## 2017-09-07 PROCEDURE — 80053 COMPREHEN METABOLIC PANEL: CPT | Performed by: PHYSICIAN ASSISTANT

## 2017-09-07 PROCEDURE — 87186 SC STD MICRODIL/AGAR DIL: CPT | Performed by: PHYSICIAN ASSISTANT

## 2017-09-07 PROCEDURE — 87077 CULTURE AEROBIC IDENTIFY: CPT | Performed by: PHYSICIAN ASSISTANT

## 2017-09-07 PROCEDURE — 87086 URINE CULTURE/COLONY COUNT: CPT | Performed by: PHYSICIAN ASSISTANT

## 2017-09-07 PROCEDURE — 81001 URINALYSIS AUTO W/SCOPE: CPT | Performed by: PHYSICIAN ASSISTANT

## 2017-09-07 RX ORDER — MEMANTINE HYDROCHLORIDE 14 MG/1
1 CAPSULE, EXTENDED RELEASE ORAL DAILY
COMMUNITY
End: 2020-08-23 | Stop reason: HOSPADM

## 2017-09-07 RX ORDER — CEPHALEXIN 500 MG/1
500 CAPSULE ORAL EVERY 6 HOURS SCHEDULED
Qty: 40 CAPSULE | Refills: 0 | Status: SHIPPED | OUTPATIENT
Start: 2017-09-07 | End: 2017-09-17

## 2017-09-07 RX ORDER — CEPHALEXIN 250 MG/1
500 CAPSULE ORAL ONCE
Status: COMPLETED | OUTPATIENT
Start: 2017-09-07 | End: 2017-09-07

## 2017-09-07 RX ADMIN — CEPHALEXIN 500 MG: 250 CAPSULE ORAL at 17:46

## 2017-09-09 LAB — BACTERIA UR CULT: NORMAL

## 2017-09-14 ENCOUNTER — ALLSCRIPTS OFFICE VISIT (OUTPATIENT)
Dept: OTHER | Facility: OTHER | Age: 82
End: 2017-09-14

## 2017-09-18 ENCOUNTER — TRANSCRIBE ORDERS (OUTPATIENT)
Dept: ADMINISTRATIVE | Facility: HOSPITAL | Age: 82
End: 2017-09-18

## 2017-09-18 ENCOUNTER — APPOINTMENT (OUTPATIENT)
Dept: LAB | Facility: MEDICAL CENTER | Age: 82
End: 2017-09-18
Payer: MEDICARE

## 2017-09-18 DIAGNOSIS — N18.30 CHRONIC KIDNEY DISEASE, STAGE III (MODERATE) (HCC): ICD-10-CM

## 2017-09-18 DIAGNOSIS — I48.91 ATRIAL FIBRILLATION, UNSPECIFIED TYPE (HCC): Primary | ICD-10-CM

## 2017-09-18 LAB
ANION GAP SERPL CALCULATED.3IONS-SCNC: 11 MMOL/L (ref 4–13)
BASOPHILS # BLD AUTO: 0.05 THOUSANDS/ΜL (ref 0–0.1)
BASOPHILS NFR BLD AUTO: 1 % (ref 0–1)
BUN SERPL-MCNC: 44 MG/DL (ref 5–25)
CALCIUM SERPL-MCNC: 8.1 MG/DL (ref 8.3–10.1)
CHLORIDE SERPL-SCNC: 110 MMOL/L (ref 100–108)
CO2 SERPL-SCNC: 21 MMOL/L (ref 21–32)
CREAT SERPL-MCNC: 3.19 MG/DL (ref 0.6–1.3)
CREAT UR-MCNC: 60 MG/DL
EOSINOPHIL # BLD AUTO: 0.4 THOUSAND/ΜL (ref 0–0.61)
EOSINOPHIL NFR BLD AUTO: 6 % (ref 0–6)
ERYTHROCYTE [DISTWIDTH] IN BLOOD BY AUTOMATED COUNT: 13.9 % (ref 11.6–15.1)
GFR SERPL CREATININE-BSD FRML MDRD: 17 ML/MIN/1.73SQ M
GLUCOSE SERPL-MCNC: 105 MG/DL (ref 65–140)
HCT VFR BLD AUTO: 24.2 % (ref 36.5–49.3)
HGB BLD-MCNC: 7.8 G/DL (ref 12–17)
INR PPP: 3.28 (ref 0.86–1.16)
LYMPHOCYTES # BLD AUTO: 1.85 THOUSANDS/ΜL (ref 0.6–4.47)
LYMPHOCYTES NFR BLD AUTO: 26 % (ref 14–44)
MCH RBC QN AUTO: 30.4 PG (ref 26.8–34.3)
MCHC RBC AUTO-ENTMCNC: 32.2 G/DL (ref 31.4–37.4)
MCV RBC AUTO: 94 FL (ref 82–98)
MONOCYTES # BLD AUTO: 0.59 THOUSAND/ΜL (ref 0.17–1.22)
MONOCYTES NFR BLD AUTO: 8 % (ref 4–12)
NEUTROPHILS # BLD AUTO: 4.22 THOUSANDS/ΜL (ref 1.85–7.62)
NEUTS SEG NFR BLD AUTO: 59 % (ref 43–75)
NRBC BLD AUTO-RTO: 0 /100 WBCS
PLATELET # BLD AUTO: 241 THOUSANDS/UL (ref 149–390)
PMV BLD AUTO: 10.1 FL (ref 8.9–12.7)
POTASSIUM SERPL-SCNC: 5 MMOL/L (ref 3.5–5.3)
PROT UR-MCNC: 47 MG/DL
PROT/CREAT UR: 0.78 MG/G{CREAT} (ref 0–0.1)
PROTHROMBIN TIME: 33.9 SECONDS (ref 12.1–14.4)
RBC # BLD AUTO: 2.57 MILLION/UL (ref 3.88–5.62)
SODIUM SERPL-SCNC: 142 MMOL/L (ref 136–145)
WBC # BLD AUTO: 7.13 THOUSAND/UL (ref 4.31–10.16)

## 2017-09-18 PROCEDURE — 84156 ASSAY OF PROTEIN URINE: CPT

## 2017-09-18 PROCEDURE — 80048 BASIC METABOLIC PNL TOTAL CA: CPT

## 2017-09-18 PROCEDURE — 36415 COLL VENOUS BLD VENIPUNCTURE: CPT | Performed by: FAMILY MEDICINE

## 2017-09-18 PROCEDURE — 85025 COMPLETE CBC W/AUTO DIFF WBC: CPT

## 2017-09-18 PROCEDURE — 82570 ASSAY OF URINE CREATININE: CPT

## 2017-09-18 PROCEDURE — 85610 PROTHROMBIN TIME: CPT | Performed by: FAMILY MEDICINE

## 2017-09-19 ENCOUNTER — ALLSCRIPTS OFFICE VISIT (OUTPATIENT)
Dept: OTHER | Facility: OTHER | Age: 82
End: 2017-09-19

## 2017-09-26 ENCOUNTER — APPOINTMENT (OUTPATIENT)
Dept: LAB | Facility: MEDICAL CENTER | Age: 82
End: 2017-09-26
Payer: MEDICARE

## 2017-09-26 ENCOUNTER — TRANSCRIBE ORDERS (OUTPATIENT)
Dept: ADMINISTRATIVE | Facility: HOSPITAL | Age: 82
End: 2017-09-26

## 2017-09-26 DIAGNOSIS — I48.91 ATRIAL FIBRILLATION, UNSPECIFIED TYPE (HCC): ICD-10-CM

## 2017-09-26 DIAGNOSIS — E11.9 DIABETES MELLITUS WITHOUT COMPLICATION (HCC): Primary | ICD-10-CM

## 2017-09-26 LAB
ALBUMIN SERPL BCP-MCNC: 3.4 G/DL (ref 3.5–5)
ALP SERPL-CCNC: 137 U/L (ref 46–116)
ALT SERPL W P-5'-P-CCNC: 15 U/L (ref 12–78)
ANION GAP SERPL CALCULATED.3IONS-SCNC: 7 MMOL/L (ref 4–13)
AST SERPL W P-5'-P-CCNC: 15 U/L (ref 5–45)
BILIRUB SERPL-MCNC: 0.33 MG/DL (ref 0.2–1)
BUN SERPL-MCNC: 39 MG/DL (ref 5–25)
CALCIUM SERPL-MCNC: 7.9 MG/DL (ref 8.3–10.1)
CHLORIDE SERPL-SCNC: 109 MMOL/L (ref 100–108)
CO2 SERPL-SCNC: 25 MMOL/L (ref 21–32)
CREAT SERPL-MCNC: 3.02 MG/DL (ref 0.6–1.3)
EST. AVERAGE GLUCOSE BLD GHB EST-MCNC: 148 MG/DL
GFR SERPL CREATININE-BSD FRML MDRD: 18 ML/MIN/1.73SQ M
GLUCOSE P FAST SERPL-MCNC: 96 MG/DL (ref 65–99)
HBA1C MFR BLD: 6.8 % (ref 4.2–6.3)
INR PPP: 2.8 (ref 0.86–1.16)
POTASSIUM SERPL-SCNC: 4.7 MMOL/L (ref 3.5–5.3)
PROT SERPL-MCNC: 7.6 G/DL (ref 6.4–8.2)
PROTHROMBIN TIME: 29.9 SECONDS (ref 12.1–14.4)
SODIUM SERPL-SCNC: 141 MMOL/L (ref 136–145)

## 2017-09-26 PROCEDURE — 80053 COMPREHEN METABOLIC PANEL: CPT | Performed by: FAMILY MEDICINE

## 2017-09-26 PROCEDURE — 85610 PROTHROMBIN TIME: CPT | Performed by: FAMILY MEDICINE

## 2017-09-26 PROCEDURE — 36415 COLL VENOUS BLD VENIPUNCTURE: CPT | Performed by: FAMILY MEDICINE

## 2017-09-26 PROCEDURE — 83036 HEMOGLOBIN GLYCOSYLATED A1C: CPT | Performed by: FAMILY MEDICINE

## 2017-10-02 ENCOUNTER — ALLSCRIPTS OFFICE VISIT (OUTPATIENT)
Dept: OTHER | Facility: OTHER | Age: 82
End: 2017-10-02

## 2017-10-02 DIAGNOSIS — N18.30 CHRONIC KIDNEY DISEASE, STAGE III (MODERATE) (HCC): ICD-10-CM

## 2017-10-03 NOTE — CONSULTS
Assessment  1  Urinary retention (788 20) (R33 9)   2  Enlarged prostate (600 00) (N40 0)    Plan  Urinary retention    · Uroflow w/ Post Void Residual - POC; Status:Active - Perform Order; Requested  EBV:[id number];    Perform: In Office; [phone]; Ordered; For:Urinary retention; Ordered By:Jesus Johnson;   · Follow-up visit in 6 months Evaluation and Treatment  Follow-up  Status: Hold For -  Scheduling,Retrospective Authorization  Requested for: [id number]   Ordered; For: Urinary retention; Ordered By: Adam Mccallum Performed:  Due: 13WKX5310; Last Updated By: Adam Mccallum; 10/2/2017 1:46:34 PM    Discussion/Summary  Discussion Summary:   BPH with lower urinary tract symptoms, resolved urinary retention, resolved urinary tract infection managed by Dr Edenilson Sands  with the patient and his daughter in the office today that he is emptying his bladder well with only a postvoid residual of 50 mL  Encouraged him to continue to take tamsulosin daily  Reviewed should he continue to have recurrent bouts of urinary tract infections or urinary retention plan would be for cystoscopy to evaluate for possible TURP versus Urolift  Do not feel like that is indicated at this time due to efficient bladder emptying currently  He will follow-up in 6 months with a uroflow PVR for continued surveillance  Reviewed with the patient and his daughter should he have any signs and symptoms of urinary tract infection or sensation of retention to contact us  They verbalized understanding  All questions answered        Chief Complaint  Chief Complaint Free Text Note Form: Patient presents for hospital follow up for urinary retention      History of Present Illness  HPI: Derian Estrada is an 63-year-old male that was seen once by Dr Rinku Garland back in 2014 and most recently by Dr Edenilson Sands in the hospital for an episode of acute urinary retention and urinary tract infection, presenting for hospital follow-up   was in the hospital in August 2017 and was found to be in urinary retention with SALO  Anderson catheter was placed at that time for decompression  patient then presented to the emergency department shortly after in September due to gross hematuria within his catheter and was found to have a positive urine culture at that time revealing klebsiella pneumoniae UTI which was treated with antibiotics  Ultrasound of the retroperitoneum was performed during his hospitalization was negative for any hydronephrosis  He subsequently passed a formal voiding trial in the office on 9/14/2017  He continues to take tamsulosin daily  patient feels that he is a good stream, he feels empty after urination, and has nocturia 2-3 times nightly  He notes some mild urgency  He denies any hematuria, hesitancy, incontinence, dysuria, suprapubic pressure, flank pain, fevers, chills, or mental status change  PVR in the office today reveals 50 mL  previously had an episode of postoperative urinary retention in 2014 after lower extremity bypass surgery  does follow with nephrology for chronic kidney disease  Review of Systems  Complete-Male Urology:   Constitutional: No fever or chills, feels well, no tiredness, no recent weight gain or weight loss  Respiratory: No complaints of shortness of breath, no wheezing, no cough, no SOB on exertion, no orthopnea or PND  Cardiovascular: No complaints of slow heart rate, no fast heart rate, no chest pain, no palpitations, no leg claudication, no lower extremity  Gastrointestinal: No complaints of abdominal pain, no constipation, no nausea or vomiting, no diarrhea or bloody stools  Genitourinary: Empty sensation,-feelings of urinary urgency-and-stream quality good, but-no dysuria,-no urinary hesitancy,-no hematuria-and-no incontinence-   The patient presents with complaints of nocturia (3 times )  Musculoskeletal: No complaints of arthralgia, no myalgias, no joint swelling or stiffness, no limb pain or swelling  Integumentary: No complaints of skin rash or skin lesions, no itching, no skin wound, no dry skin  Hematologic/Lymphatic: No complaints of swollen glands, no swollen glands in the neck, does not bleed easily, no easy bruising  Neurological: No compliants of headache, no confusion, no convulsions, no numbness or tingling, no dizziness or fainting, no limb weakness, no difficulty walking  ROS Reviewed:   ROS reviewed  Active Problems  1  Acute venous stasis dermatitis (454 1) (I87 2)   2  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 (440 23,707 9) (I70 25)   3  Chronic kidney disease, stage 3 (585 3) (N18 3)   4  Complications Due To Vascular Device, Implant, Or Graft (996 74)   5  Diabetes mellitus with neuropathy (250 60,357 2) (E11 40)   6  Diastolic dysfunction (666 1) (I51 9)   7  DMII (diabetes mellitus, type 2) (250 00) (E11 9)   8  Edema of extremities (782 3) (R60 0)   9  Hyperkalemia (276 7) (E87 5)   10  Hyperlipidemia (272 4) (E78 5)   11  Hypertension (401 9) (I10)   12  Osteomyelitis (015)   13  Osteomyelitis of left foot (730 27) (M86 9)   14  Peripheral arterial disease (443 9) (I73 9)   15  Skin cancer (173 90) (C44 90)   16  Type II diabetes mellitus with foot ulcer (250 80,707 15) (E11 621,L97 509)   17  Urinary retention (788 20) (R33 9)   18  Weakness of right leg (729 89) (R29 898)   19  Wound, surgical, nonhealing (998 83) (T81 89XA)    Past Medical History  1  History of chronic kidney disease (V13 09) (Z87 448)   2  History of peripheral vascular disease (V12 59) (Z86 79)   3  History of type 2 diabetes mellitus (V12 29) (Z86 39)   4  Hyperlipidemia (272 4) (E78 5)   5  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 20)   6  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)   7  History of Osteomyelitis of right foot (730 27) (M86 9)   8  Peripheral arterial disease (443 9) (I73 9)  Active Problems And Past Medical History Reviewed:    The active problems and past medical history were reviewed and updated today  Surgical History  1  History of Appendectomy   2  History of Bypass Graft Using Vein: Femoral-tibial   3  History of Bypass Graft Using Vein: Femoral-tibial   4  History of Foot Surgery   5  History of In-Situ Vein Bypass Femoral-tibial   6  History of PTA Femoral-Popliteal   7  History of Thigh Incision And Drainage   8  History of Transcath Intravascular Stent Placement Percutaneous Femoral  Surgical History Reviewed: The surgical history was reviewed and updated today  Family History  Mother    1  Family history of diabetes mellitus (V18 0) (Z83 3)  Family History    2  Family history of Coronary artery disease  Family History Reviewed: The family history was reviewed and updated today  Social History   · Denied: History of Alcohol use   · Denied: History of Drug use   · Never A Smoker  Social History Reviewed: The social history was reviewed and is unchanged  Current Meds   1  AmLODIPine Besylate 5 MG Oral Tablet; Take 1 tablet daily; Therapy: (Recorded:22Qps8809) to Recorded   2  Atorvastatin Calcium 10 MG Oral Tablet; Take 1 tablet daily; Therapy: (Recorded:92Ykl2647) to Recorded   3  Clopidogrel Bisulfate 75 MG Oral Tablet; TAKE 1 TABLET DAILY; Therapy: (Recorded:93Hux8562) to Recorded   4  Furosemide 40 MG Oral Tablet; TAKE 1 TABLET DAILY; Therapy: (Recorded:49Leu4235) to Recorded   5  GlipiZIDE ER 2 5 MG Oral Tablet Extended Release 24 Hour; TAKE 1 TABLET DAILY AS   DIRECTED; Therapy: (Recorded:24Cdc3996) to Recorded   6  Namenda XR 14 MG Oral Capsule Extended Release 24 Hour; take 1 capsule daily; Therapy: (Recorded:16Sky4848) to Recorded   7  Sertraline HCl - 50 MG Oral Tablet; TAKE 1 TABLET DAILY; Therapy: (Recorded:29Jwb5223) to Recorded   8  Sodium Bicarbonate 650 MG Oral Tablet; take one tablet by mouth every day; Therapy: (Recorded:04Pmy3764) to Recorded   9   Sodium Polystyrene Sulfonate 15 GM/60ML Oral Suspension; TAKE 15 GRAMS (60 MLS)   BY MOUTH ON MONDAY,WEDNESDAY AND FRIDAY; Therapy: 19Sep2017 to (Evaluate:18Mar2018)  Requested for: 19Sep2017; Last   Rx:19Sep2017 Ordered   10  Tamsulosin HCl - 0 4 MG Oral Capsule; TAKE ONE CAPSULE EVERY DAY; Therapy: (Recorded:41Hmg0824) to Recorded   11  Tylenol TABS; Therapy: ((20) 8700 5051) to Recorded   12  Vitamin D (Ergocalciferol) CAPS; 2,000 iu a day; Therapy: (Recorded:19Sep2017) to Recorded   13  Warfarin Sodium 1 MG Oral Tablet; TAKE 1 TABLET DAILY AS DIRECTED; Therapy: (Recorded:17Mkc8096) to Recorded   14  Warfarin Sodium 5 MG Oral Tablet; TAKE 1 TABLET DAILY; Therapy: (Recorded:81Tfr9955) to Recorded  Medication List Reviewed: The medication list was reviewed and updated today  Allergies  1  Unasyn SOLR    Vitals  Vital Signs    Recorded: 33HEV2108 01:33PM   Heart Rate 80   Systolic 764   Diastolic 72   Height 5 ft 11 in   Weight 205 lb 4 oz   BMI Calculated 28 63   BSA Calculated 2 13     Physical Exam    Constitutional   General appearance: No acute distress, well appearing and well nourished  Pulmonary   Respiratory effort: No increased work of breathing or signs of respiratory distress  Cardiovascular   Examination of extremities for edema and/or varicosities: Normal     Musculoskeletal ambulates with cane assistance  Skin   Skin and subcutaneous tissue: Normal without rashes or lesions  Additional Exam:  no focal neurologic deficits  Mood and affect appropriate        Results/Data  AUA Symptom Score 02Oct2017 01:34PM User, s     Test Name Result Flag Reference   AUA Symptom Score (for prostate disease) 8     Incomplete emptying: Not at all (0)  Frequency: Not at all (0)  Intermittency: Not at all (0)  Urgency: Almost always (5)  Weak-stream: Not at all (0)  Straining: Not at all (0)  Nocturia: About half the time (3)   AUA Symptom Score (for prostate disease) - Quality of Life Due to Urinary Symptoms Mostly satisfied AUA Symptom Score (for prostate disease) - Score Category Moderate         Future Appointments    Date/Time Provider Specialty Site   11/14/2017 02:30 PM HERVE Fuentes   Nephrology [address]   04/02/2018 01:15 PM [address] [place] [address] Urology [facility] Lan Rivas   Electronically signed by : Carla Correia, ; Oct  2 2017  1:55PM EST                       (Author)    Electronically signed by : HERVE Levi ; Oct  2 2017  2:31PM EST                       (Author)

## 2017-10-17 ENCOUNTER — TRANSCRIBE ORDERS (OUTPATIENT)
Dept: ADMINISTRATIVE | Facility: HOSPITAL | Age: 82
End: 2017-10-17

## 2017-10-17 ENCOUNTER — APPOINTMENT (OUTPATIENT)
Dept: LAB | Facility: MEDICAL CENTER | Age: 82
End: 2017-10-17
Payer: MEDICARE

## 2017-10-17 DIAGNOSIS — I48.91 ATRIAL FIBRILLATION, UNSPECIFIED TYPE (HCC): Primary | ICD-10-CM

## 2017-10-17 DIAGNOSIS — I48.91 ATRIAL FIBRILLATION, UNSPECIFIED TYPE (HCC): ICD-10-CM

## 2017-10-17 DIAGNOSIS — N18.30 CHRONIC KIDNEY DISEASE, STAGE III (MODERATE) (HCC): ICD-10-CM

## 2017-10-17 LAB
ANION GAP SERPL CALCULATED.3IONS-SCNC: 9 MMOL/L (ref 4–13)
BUN SERPL-MCNC: 42 MG/DL (ref 5–25)
CALCIUM SERPL-MCNC: 7.9 MG/DL (ref 8.3–10.1)
CHLORIDE SERPL-SCNC: 108 MMOL/L (ref 100–108)
CO2 SERPL-SCNC: 24 MMOL/L (ref 21–32)
CREAT SERPL-MCNC: 3.1 MG/DL (ref 0.6–1.3)
GFR SERPL CREATININE-BSD FRML MDRD: 17 ML/MIN/1.73SQ M
GLUCOSE P FAST SERPL-MCNC: 101 MG/DL (ref 65–99)
INR PPP: 1.88 (ref 0.86–1.16)
POTASSIUM SERPL-SCNC: 4.2 MMOL/L (ref 3.5–5.3)
PROTHROMBIN TIME: 21.8 SECONDS (ref 12.1–14.4)
SODIUM SERPL-SCNC: 141 MMOL/L (ref 136–145)

## 2017-10-17 PROCEDURE — 85610 PROTHROMBIN TIME: CPT

## 2017-10-17 PROCEDURE — 36415 COLL VENOUS BLD VENIPUNCTURE: CPT

## 2017-10-17 PROCEDURE — 80048 BASIC METABOLIC PNL TOTAL CA: CPT

## 2017-10-26 NOTE — PROGRESS NOTES
Assessment  1  Hyperkalemia (276 7) (E87 5)   2  Chronic kidney disease, stage 3 (585 3) (N18 3)   3  Hypertension (401 9) (I10)    Plan  Chronic kidney disease, stage 3    · (1) BASIC METABOLIC PROFILE; Status:Active; Requested KWH:[id number];    Perform:[facility]; LNV:92XVE0806; Ordered; For:Chronic kidney disease, stage 3; Ordered By:Milton Simmons;   · (1) BASIC METABOLIC PROFILE; Status:Active; Requested XKX:[id number];    Perform:[facility]; JOSEFA:[id number]; Ordered; For:Chronic kidney disease, stage 3; Ordered By:Milton Simmons;  Hyperkalemia    · Sodium Polystyrene Sulfonate 15 GM/60ML Oral Suspension; TAKE 15 GRAMS (60  MLS) BY MOUTH ON MONDAY,WEDNESDAY AND FRIDAY   Rx By: [facility] [place]; Dispense: 30 Days ; #:1 X 500 ML Bottle; Refill: 5;For: Hyperkalemia; GARY = N; Verified Transmission to [id number] Last Updated By: System, [employer]; 9/19/2017 2:53:47 PM    As we reviewed you have been through a lot in terms of medical issues that we discussed  It seems like things are stabilized now as you've had medicine adjustments  The creatinine is 3 1 which is the measure your kidney function and is stable  You have had issues with elevated potassium and I'm giving a prescription for sodium polystyrene to take 2 tablespoons and you eat a higher potassium meal     Monitor lab work and follow-up as scheduled     Discussion/Summary  The patient's is here for follow-up of chronic kidney disease  In terms of my evaluation it appears that after his vascular procedures his creatinine increased to 3 and has remained stable there  I explained it's possible he may have had some complication such as atheroemboli  Creatinine remained stable at 3  There was an admission for hypotension severe hyperkalemia and medications were adjusted  He also had some urine retention and apparently that's improving as the Anderson catheter was removed  He is mild hyperkalemia with a creatinine of 3 1   We're going to start him on sodium polystyrene 15 g 3 times a week especially given the severe hyperkalemia that had occurred  I will be checking labs on a monthly basis and see him back in 2 months to make sure he remained stable  I told his daughter to call me if there's any problems in between the visit  Medications were reviewed and adjusted        Reason For Visit  You're here for follow-up of chronic kidney disease and elevated potassium and blood pressure  History of Present Illness  Patient here for routine follow-up of chronic kidney disease  Since I've seen him he's actually been in the hospital couple times  One time was in late August where he was in for being weak and passing out was found to have very low blood pressure  He had some medical adjustments and has been okay  Another one was related to urine retention and he had a Anderson in and that was removed September 14 and is being followed by urology and so far states that there is no significant postvoid residual  Today the patient really presents with no complaints  Review of Systems    Constitutional: no fever,-- no chills-- and-- no anorexia  Integumentary: no rashes  Gastrointestinal: no abdominal pain,-- no nausea,-- no diarrhea-- and-- no vomiting  Respiratory: no shortness of breath-- and-- no cough  Cardiovascular: no orthopnea,-- no chest pain,-- no palpitations-- and-- no lower extremity edema  Musculoskeletal: Some left-sided shoulder pain  Neurological: no headache,-- no lightheadedness-- and-- no dizziness  Genitourinary: no dysuria,-- no hematuria,-- no nocturia-- and-- no incomplete emptying of bladder  Eyes: No complaints of eyesight problems or dryness of eyes  ENT: no complaints of hearing loss, no nasal discharge  Current Meds   1  AmLODIPine Besylate 5 MG Oral Tablet; Take 1 tablet daily; Therapy: (Recorded:58Jkt0511) to Recorded   2  Atorvastatin Calcium 10 MG Oral Tablet; Take 1 tablet daily;    Therapy: (Recorded:37Tmj0441) to Recorded   3  Clopidogrel Bisulfate 75 MG Oral Tablet; TAKE 1 TABLET DAILY; Therapy: (Recorded:84Quv3805) to Recorded   4  Furosemide 40 MG Oral Tablet; TAKE 1 TABLET DAILY; Therapy: (Recorded:01Oxi4799) to Recorded   5  GlipiZIDE ER 2 5 MG Oral Tablet Extended Release 24 Hour; TAKE 1 TABLET DAILY AS   DIRECTED; Therapy: (Recorded:20Ckx1496) to Recorded   6  Namenda XR 14 MG Oral Capsule Extended Release 24 Hour; take 1 capsule daily; Therapy: (Recorded:24Grk8136) to Recorded   7  Sertraline HCl - 50 MG Oral Tablet; TAKE 1 TABLET DAILY; Therapy: (Recorded:76Mba1111) to Recorded   8  Sodium Bicarbonate 650 MG Oral Tablet; take one tablet by mouth every day; Therapy: (Recorded:61Syt8100) to Recorded   9  Tamsulosin HCl - 0 4 MG Oral Capsule; TAKE ONE CAPSULE EVERY DAY; Therapy: (Recorded:23Mks2514) to Recorded   10  Vitamin D (Ergocalciferol) CAPS; 2,000 iu a day; Therapy: (Recorded:58Wtw2748) to Recorded   11  Warfarin Sodium 1 MG Oral Tablet; TAKE 1 TABLET DAILY AS DIRECTED; Therapy: (Recorded:29Ewt5536) to Recorded   12  Warfarin Sodium 5 MG Oral Tablet; TAKE 1 TABLET DAILY; Therapy: (Recorded:92Wcz5701) to Recorded    The medication list was reviewed and updated today  Vitals  Vital Signs    Recorded: 19Sep2017 02:28PM   Height 5 ft 11 in   Weight 207 lb    BMI Calculated 28 87   BSA Calculated 2 14     Physical Exam    Constitutional: General appearance: No acute distress, well appearing and well nourished  ENT: External ears and nose appear normal      Eyes: Anicteric sclerae  JVD:  No JVD present  Pulmonary: Respiratory effort: No increased work of breathing or signs of respiratory distress  -- Auscultation of lungs: Clear to auscultation  Cardiovascular: 1/6 systolic murmur no rub  Abdomen: Non-tender, no masses  Extremities: No cyanosis, clubbing or edema  -- No edema     Neurologic: Non Focal         Results/Data  (1) BASIC METABOLIC PROFILE 65LBS2455 01:14PM Elden Litten Order Number: [id number]     Test Name Result Flag Reference   GLUCOSE,RANDM 105 mg/dL     If the patient is fasting, the ADA then defines impaired fasting glucose as > 100 mg/dL and diabetes as > or equal to 123 mg/dL  Specimen collection should occur prior to Sulfasalazine administration due to the potential for falsely depressed results  Specimen collection should occur prior to Sulfapyridine administration due to the potential for falsely elevated results  SODIUM 142 mmol/L  136-145   POTASSIUM 5 0 mmol/L  3 5-5 3   CHLORIDE 110 mmol/L H 100-108   CARBON DIOXIDE 21 mmol/L  21-32   ANION GAP (CALC) 11 mmol/L  4-13   BLOOD UREA NITROGEN 44 mg/dL H 5-25   CREATININE 3 19 mg/dL H 0 60-1 30   Standardized to IDMS reference method   CALCIUM 8 1 mg/dL L 8 3-10 1   eGFR 17 ml/min/1 73sq [facility] Disease Education Program recommendations are as follows:  GFR calculation is accurate only with a steady state creatinine  Chronic Kidney disease less than 60 ml/min/1 73 sq  meters  Kidney failure less than 15 ml/min/1 73 sq  meters  (1) CBC/PLT/DIFF 80Hbd6163 01:14PM Elden Litten Order Number: [id number]     Test Name Result Flag Reference   WBC COUNT 7 13 Thousand/uL  4 31-10 16   RBC COUNT 2 57 Million/uL L 3 88-5 62   HEMOGLOBIN 7 8 g/dL L 12 0-17 0   HEMATOCRIT 24 2 % L 36 5-49 3   MCV 94 fL  82-98   MCH 30 4 pg  26 8-34 3   MCHC 32 2 g/dL  31 4-37 4   RDW 13 9 %  11 6-15 1   MPV 10 1 fL  8 9-12 7   PLATELET COUNT 554 Thousands/uL  149-390   nRBC AUTOMATED 0 /100 WBCs     NEUTROPHILS RELATIVE PERCENT 59 %  43-75   LYMPHOCYTES RELATIVE PERCENT 26 %  14-44   MONOCYTES RELATIVE PERCENT 8 %  4-12   EOSINOPHILS RELATIVE PERCENT 6 %  0-6   BASOPHILS RELATIVE PERCENT 1 %  0-1   NEUTROPHILS ABSOLUTE COUNT 4 22 Thousands/? ??L  1 85-7 62   LYMPHOCYTES ABSOLUTE COUNT 1 85 Thousands/? ??L  0 60-4 47   MONOCYTES ABSOLUTE COUNT 0 59 Thousand/? ? ? L 0  17-1 22   EOSINOPHILS ABSOLUTE COUNT 0 40 Thousand/? ??L  0 00-0 61   BASOPHILS ABSOLUTE COUNT 0 05 Thousands/? ??L  0 00-0 10     (1) URINE PROTEIN CREATININE RATIO 18Sep2017 01:14PM Silverio Lyle Order Number: [id number]     Test Name Result Flag Reference   CREATININE URINE 60 0 mg/dL     URINE PROTEIN:CREATININE RATIO 0 78 H 0 00-0 10   URINE PROTEIN 47 mg/dL       (1) PT WITH INR [id number] 01:14PM EPIC, Provider   Test ordered by: Carlitos Collier     Test Name Result Flag Reference   INR 3 28 H 0 86-1 16   PT 33 9 seconds H 12 1-14 4     (1) URINE CULTURE 80Gpt2848 05:18PM EPIC, Provider   Test ordered by: Chio Robertson     Test Name Result Flag Reference   CLINICAL REPORT (Report)     Test:        Urine culture  Specimen Source:  Urine, Indwelling Anderson Catheter  Specimen Type:   Urine  Specimen Date:   9/7/2017 5:18 PM  Result Date:    9/9/2017 1:26 PM  Result Status:   Final result  Resulting Lab:   [place] [address]            Tel: [phone]      CULTURE                                       ------------------                                   >100,000 cfu/ml Klebsiella pneumoniae      SUSCEPTIBILITY                                   ------------------                                                       Klebsiella pneumoniae  METHOD                 CHRISTIANNE  -------------------------------------  --------------------------  AMPICILLIN ($$)             >16 00 ug/ml Resistant  AMPICILLIN + SULBACTAM ($)       <=8/4 ug/ml  Susceptible  AZTREONAM ($$$)             <=8 ug/ml   Susceptible  CEFAZOLIN ($)              <=8 00 ug/ml Susceptible  CIPROFLOXACIN ($)            <=1 00 ug/ml Susceptible  GENTAMICIN ($$)             <=4 ug/ml   Susceptible  LEVOFLOXACIN ($)            <=2 00 ug/ml Susceptible  NITROFURANTOIN             64 ug/ml   Intermediate  PIPERACILLIN + TAZOBACTAM ($$$)     <=16 ug/ml  Susceptible  TETRACYCLINE <=4 ug/ml   Susceptible  TOBRAMYCIN ($)             <=4 ug/ml   Susceptible  TRIMETHOPRIM + SULFAMETHOXAZOLE ($$$)  <=2/38 ug/ml Susceptible     Nephrology Flowsheet [id number] 09:02AM Hector Lees     Test Name Result Flag Reference   WBC 6 03     Hemoglobin 7 9     Hematocrit 25 1     Platelets 659     Sodium 140     Potassium 4 6     Chloride 109     Carbon Dioxide 21     Calcium 7 7     GLUCOSE 224     BUN 51     Serum Creatinine 3 02     GFR, NON- 18     Albumin 2 9     AST 15     ALT 29     Alkaline Phosphatase 127     TOTAL PROTEIN 6 8         Future Appointments    Date/Time Provider Specialty Site   11/14/2017 02:30 PM HERVE Alvarenga  Nephrology [facility] 21   10/02/2017 01:15 PM 1808 [facility] Urology [facility]     Signatures   Electronically signed by :  HERVE Orlando ; Sep 19 2017  3:40PM EST                       (Author)

## 2017-10-30 ENCOUNTER — APPOINTMENT (OUTPATIENT)
Dept: LAB | Facility: MEDICAL CENTER | Age: 82
End: 2017-10-30
Payer: MEDICARE

## 2017-10-30 DIAGNOSIS — I48.91 ATRIAL FIBRILLATION, UNSPECIFIED TYPE (HCC): ICD-10-CM

## 2017-10-30 LAB
ALBUMIN SERPL BCP-MCNC: 3.2 G/DL (ref 3.5–5)
ALP SERPL-CCNC: 117 U/L (ref 46–116)
ALT SERPL W P-5'-P-CCNC: 17 U/L (ref 12–78)
ANION GAP SERPL CALCULATED.3IONS-SCNC: 7 MMOL/L (ref 4–13)
AST SERPL W P-5'-P-CCNC: 14 U/L (ref 5–45)
BILIRUB SERPL-MCNC: 0.29 MG/DL (ref 0.2–1)
BUN SERPL-MCNC: 37 MG/DL (ref 5–25)
CALCIUM SERPL-MCNC: 7.6 MG/DL (ref 8.3–10.1)
CHLORIDE SERPL-SCNC: 106 MMOL/L (ref 100–108)
CO2 SERPL-SCNC: 24 MMOL/L (ref 21–32)
CREAT SERPL-MCNC: 3.13 MG/DL (ref 0.6–1.3)
EST. AVERAGE GLUCOSE BLD GHB EST-MCNC: 137 MG/DL
GFR SERPL CREATININE-BSD FRML MDRD: 17 ML/MIN/1.73SQ M
GLUCOSE SERPL-MCNC: 263 MG/DL (ref 65–140)
HBA1C MFR BLD: 6.4 % (ref 4.2–6.3)
INR PPP: 2.29 (ref 0.86–1.16)
POTASSIUM SERPL-SCNC: 4.7 MMOL/L (ref 3.5–5.3)
PROT SERPL-MCNC: 7 G/DL (ref 6.4–8.2)
PROTHROMBIN TIME: 25.5 SECONDS (ref 12.1–14.4)
SODIUM SERPL-SCNC: 137 MMOL/L (ref 136–145)

## 2017-10-30 PROCEDURE — 83036 HEMOGLOBIN GLYCOSYLATED A1C: CPT

## 2017-10-30 PROCEDURE — 36415 COLL VENOUS BLD VENIPUNCTURE: CPT

## 2017-10-30 PROCEDURE — 80053 COMPREHEN METABOLIC PANEL: CPT

## 2017-10-30 PROCEDURE — 85610 PROTHROMBIN TIME: CPT

## 2017-11-10 ENCOUNTER — APPOINTMENT (OUTPATIENT)
Dept: LAB | Facility: MEDICAL CENTER | Age: 82
End: 2017-11-10
Payer: MEDICARE

## 2017-11-10 DIAGNOSIS — N18.30 CHRONIC KIDNEY DISEASE, STAGE III (MODERATE) (HCC): ICD-10-CM

## 2017-11-10 LAB
ANION GAP SERPL CALCULATED.3IONS-SCNC: 9 MMOL/L (ref 4–13)
BUN SERPL-MCNC: 41 MG/DL (ref 5–25)
CALCIUM SERPL-MCNC: 8.1 MG/DL (ref 8.3–10.1)
CHLORIDE SERPL-SCNC: 110 MMOL/L (ref 100–108)
CO2 SERPL-SCNC: 22 MMOL/L (ref 21–32)
CREAT SERPL-MCNC: 3.23 MG/DL (ref 0.6–1.3)
GFR SERPL CREATININE-BSD FRML MDRD: 16 ML/MIN/1.73SQ M
GLUCOSE P FAST SERPL-MCNC: 198 MG/DL (ref 65–99)
POTASSIUM SERPL-SCNC: 4.9 MMOL/L (ref 3.5–5.3)
SODIUM SERPL-SCNC: 141 MMOL/L (ref 136–145)

## 2017-11-10 PROCEDURE — 36415 COLL VENOUS BLD VENIPUNCTURE: CPT

## 2017-11-10 PROCEDURE — 80048 BASIC METABOLIC PNL TOTAL CA: CPT

## 2017-11-14 ENCOUNTER — ALLSCRIPTS OFFICE VISIT (OUTPATIENT)
Dept: OTHER | Facility: OTHER | Age: 82
End: 2017-11-14

## 2017-11-15 NOTE — PROGRESS NOTES
Assessment    1  Chronic kidney disease, stage 3 (585 3) (N18 3)  2  Hypertension (401 9) (I10)    Plan  Chronic kidney disease, stage 3    · Start: Sodium Bicarbonate 650 MG Oral Tablet; TAKE 1 TABLET TWICE DAILY WITHMEALS  Rx By: Karrie Daigle; Dispense: 90 Days ; #:180 Tablet; Refill: 3;For: Chronic kidney disease, stage 3; GARY = N; Sent To: [facility]/PHARMACY #2401  · (1) BASIC METABOLIC PROFILE; Status:Active; Requested SDS:[id number]; Perform:[facility]; GBP:86BDZ5052;XHRSPBR; For:Chronic kidney disease, stage 3; Ordered By:Milton Simmons;   · (1) BASIC METABOLIC PROFILE; Status:Active; Requested CUK:[id number]; Perform:[facility]; QNC:[id number];ECIJCWL; For:Chronic kidney disease, stage 3; Ordered By:Milton Simmons;  Hyperkalemia    · Renew: Sodium Polystyrene Sulfonate 15 GM/60ML Oral Suspension; TAKE 15 GRAMS(60 MLS) BY MOUTH ON MONDAY,WEDNESDAY AND FRIDAY  Rx By: Karrie Daigle; Dispense: 30 Days ; #:1 X 500 ML Bottle; Refill: 5;For: Hyperkalemia; GARY = N; Sent To: [facility]/PHARMACY #2439  your creatinine level was stable around 3 which has been year baseline for about a year and a half so there has been no worsening over that period time  You feel well have no other symptoms  Your blood pressure was borderline but at home it has been better and other checks around 140 so will not going to change any medications  Continue to monitor continue with wound care continue current medications no changes  Your potassium level is also in the normal range  Discussion/Summary   patient's chronic kidney disease baseline creatinine has been around 3 this level was latest at 3 2  Of the LEs urine have not been relatively stable since the rapid increase  We do not know definitively what happened but he did have some vascular procedures done at that time so there may been atheroemboli sub clinically    For now he did not have a kidney biopsy I would not recommend as I do not see anything that would really change in management  Will continue to monitor  His blood pressure is elevated today in the office but his daughter states that it usually is little bit better in the 140 range will continue current medications and follow  I instructed them to call me if there is any problems or concerns  With respect hyperkalemia this is improved as with serial monitoring it has been in the 4s so he can continue with his binder as prescribed  Reason For Visit  Your here for follow-up to monitor your chronic kidney disease      History of Present Illness  The patient is here for follow-up of chronic kidney disease  He is here any stable clinically he has had no intercurrent illnesses or hospitalizations  He has been taking his Sodium polystyramine with no difficulty  No intercurrent illnesses or hospitalizations  Review of Systems   Constitutional: no fever,-- no chills-- and-- no anorexia  Integumentary: no rashes  Gastrointestinal: no abdominal pain,-- no nausea,-- no diarrhea-- and-- no vomiting  Respiratory: no shortness of breath-- and-- no cough  Cardiovascular: no orthopnea,-- no chest pain,-- no palpitations-- and-- no lower extremity edema  Musculoskeletal: Some left-sided shoulder pain  Neurological: no headache,-- no lightheadedness-- and-- no dizziness  Genitourinary: nocturia, but-- no dysuria,-- no hematuria-- and-- no incomplete emptying of bladder  Eyes: No complaints of eyesight problems or dryness of eyes  ENT: no complaints of hearing loss, no nasal discharge  Current Meds  1  AmLODIPine Besylate 5 MG Oral Tablet; Take 1 tablet daily; Therapy: (Recorded:31Ysi4238) to Recorded  2  Atorvastatin Calcium 10 MG Oral Tablet; Take 1 tablet daily; Therapy: (Recorded:60Tjm3135) to Recorded  3  Clopidogrel Bisulfate 75 MG Oral Tablet; TAKE 1 TABLET DAILY; Therapy: (Recorded:58Wyo1270) to Recorded  4  Furosemide 40 MG Oral Tablet; TAKE 1 TABLET DAILY;  Therapy: (Recorded:81Ska4940) to Recorded  5  GlipiZIDE ER 2 5 MG Oral Tablet Extended Release 24 Hour; TAKE 1 TABLET DAILY AS DIRECTED; Therapy: (Recorded:73Oeh3643) to Recorded  6  Namenda XR 14 MG Oral Capsule Extended Release 24 Hour; take 1 capsule daily; Therapy: (Recorded:20Rhz5595) to Recorded  7  Sertraline HCl - 50 MG Oral Tablet; TAKE 1 TABLET DAILY; Therapy: (Recorded:55Qva2241) to Recorded  8  Sodium Bicarbonate 650 MG Oral Tablet; take one tablet by mouth every day; Therapy: (Recorded:48Bfm9489) to Recorded  9  Sodium Polystyrene Sulfonate 15 GM/60ML Oral Suspension; TAKE 15 GRAMS (60 MLS) BY MOUTH ON MONDAY,WEDNESDAY AND FRIDAY; Therapy: 19Sep2017 to (Evaluate:18Mar2018)  Requested for: 19Sep2017; Last Rx:19Sep2017 Ordered  10  Tamsulosin HCl - 0 4 MG Oral Capsule; TAKE ONE CAPSULE EVERY DAY; Therapy: (Recorded:19Gpx2701) to Recorded  11  Tylenol TABS; TAKE 1 TO 2 TABLETS EVERY 6 HOURS AS NEEDED; Therapy: (Recorded:14Nov2017) to Recorded  12  Vitamin D (Ergocalciferol) CAPS; TAKE ONE CAPSULE BY MOUTH WEEKLY; Therapy: (Recorded:14Nov2017) to Recorded  13  Warfarin Sodium 4 MG Oral Tablet; TAKE 1 TABLET EVERY OTHER DAY; Therapy: (Recorded:14Nov2017) to Recorded  14  Warfarin Sodium 5 MG Oral Tablet; TAKE 1 TABLET DAILY OTHER DAY; Therapy: (Recorded:14Nov2017) to Recorded    The medication list was reviewed and updated today  Allergies  1  Unasyn SOLR    Vitals  Vital Signs    Recorded: [id number] 02:54PM Recorded: 51XLW5712 02:28PM   Heart Rate 78    Respiration 18    Systolic 365    Diastolic 68    Height  5 ft 11 in   Weight  213 lb    BMI Calculated  29 71   BSA Calculated  2 17       Physical Exam   Constitutional: General appearance: No acute distress, well appearing and well nourished  ENT: External ears and nose appear normal     Eyes: Anicteric sclerae  JVD:  No JVD present  Pulmonary: Respiratory effort: No increased work of breathing or signs of respiratory distress   -- Auscultation of lungs: Clear to auscultation  Cardiovascular: 2/6 systolic murmur regular rhythm  Abdomen: Non-tender, no masses  Extremities: No cyanosis, clubbing or edema  Neurologic: Non Focal         Results/Data  (1) BASIC METABOLIC PROFILE 14AUR1953 01:19PM Mazin Tijerina Order Number: [id number]     Test Name Result Flag Reference   SODIUM 141 mmol/L  136-145   POTASSIUM 4 9 mmol/L  3 5-5 3   CHLORIDE 110 mmol/L H 100-108   CARBON DIOXIDE 22 mmol/L  21-32   ANION GAP (CALC) 9 mmol/L  4-13   BLOOD UREA NITROGEN 41 mg/dL H 5-25   CREATININE 3 23 mg/dL H 0 60-1 30   Standardized to IDMS reference method   CALCIUM 8 1 mg/dL L 8 3-10 1   eGFR 16 ml/min/1 73sq m       National Kidney Disease Education Program recommendations are as follows: GFR calculation is accurate only with a steady state creatinine Chronic Kidney disease less than 60 ml/min/1 73 sq  meters Kidney failure less than 15 ml/min/1 73 sq  meters  GLUCOSE FASTING 198 mg/dL H 65-99   Specimen collection should occur prior to Sulfasalazine administration due to the potential for falsely depressed results  Specimen collection should occur prior to Sulfapyridine administration due to the potential for falsely elevated results  Future Appointments    Date/Time Provider Specialty Site   04/02/2018 01:15 PM Mariana Fernandez Urology [facility] Micah Kern       Signatures   Electronically signed by : HERVE Hoffmann ; Nov 14 2017  2:57PM EST                       (Author)    Electronically signed by :  HERVE Hoffmann ; Nov 14 2017  2:58PM EST                       (Author)

## 2018-01-01 DIAGNOSIS — N18.30 CHRONIC KIDNEY DISEASE, STAGE III (MODERATE) (HCC): ICD-10-CM

## 2018-01-02 ENCOUNTER — APPOINTMENT (OUTPATIENT)
Dept: LAB | Facility: MEDICAL CENTER | Age: 83
End: 2018-01-02
Payer: MEDICARE

## 2018-01-02 ENCOUNTER — TRANSCRIBE ORDERS (OUTPATIENT)
Dept: ADMINISTRATIVE | Facility: HOSPITAL | Age: 83
End: 2018-01-02

## 2018-01-02 DIAGNOSIS — N18.30 CHRONIC KIDNEY DISEASE, STAGE III (MODERATE) (HCC): ICD-10-CM

## 2018-01-02 DIAGNOSIS — I48.91 ATRIAL FIBRILLATION, UNSPECIFIED TYPE (HCC): Primary | ICD-10-CM

## 2018-01-02 LAB
ANION GAP SERPL CALCULATED.3IONS-SCNC: 5 MMOL/L (ref 4–13)
BUN SERPL-MCNC: 44 MG/DL (ref 5–25)
CALCIUM SERPL-MCNC: 8.1 MG/DL (ref 8.3–10.1)
CHLORIDE SERPL-SCNC: 109 MMOL/L (ref 100–108)
CO2 SERPL-SCNC: 25 MMOL/L (ref 21–32)
CREAT SERPL-MCNC: 3.18 MG/DL (ref 0.6–1.3)
GFR SERPL CREATININE-BSD FRML MDRD: 17 ML/MIN/1.73SQ M
GLUCOSE SERPL-MCNC: 183 MG/DL (ref 65–140)
INR PPP: 2.2 (ref 0.86–1.16)
POTASSIUM SERPL-SCNC: 5.2 MMOL/L (ref 3.5–5.3)
PROTHROMBIN TIME: 24.7 SECONDS (ref 12.1–14.4)
SODIUM SERPL-SCNC: 139 MMOL/L (ref 136–145)

## 2018-01-02 PROCEDURE — 85610 PROTHROMBIN TIME: CPT | Performed by: FAMILY MEDICINE

## 2018-01-02 PROCEDURE — 80048 BASIC METABOLIC PNL TOTAL CA: CPT

## 2018-01-02 PROCEDURE — 36415 COLL VENOUS BLD VENIPUNCTURE: CPT | Performed by: FAMILY MEDICINE

## 2018-01-12 NOTE — CONSULTS
Reason For Visit  You're here for follow-up of chronic kidney disease and elevated potassium and blood pressure  History of Present Illness  Patient here for routine follow-up of chronic kidney disease  Since I've seen him he's actually been in the hospital couple times  One time was in late August where he was in for being weak and passing out was found to have very low blood pressure  He had some medical adjustments and has been okay  Another one was related to urine retention and he had a Anderson in and that was removed September 14 and is being followed by urology and so far states that there is no significant postvoid residual  Today the patient really presents with no complaints  Review of Systems    Constitutional: no fever, no chills and no anorexia  Integumentary: no rashes  Gastrointestinal: no abdominal pain, no nausea, no diarrhea and no vomiting  Respiratory: no shortness of breath and no cough  Cardiovascular: no orthopnea, no chest pain, no palpitations and no lower extremity edema  Musculoskeletal: Some left-sided shoulder pain  Neurological: no headache, no lightheadedness and no dizziness  Genitourinary: no dysuria, no hematuria, no nocturia and no incomplete emptying of bladder  Eyes: No complaints of eyesight problems or dryness of eyes  ENT: no complaints of hearing loss, no nasal discharge  Current Meds   1  AmLODIPine Besylate 5 MG Oral Tablet; Take 1 tablet daily; Therapy: (Recorded:97Cmb7855) to Recorded   2  Atorvastatin Calcium 10 MG Oral Tablet; Take 1 tablet daily; Therapy: (Recorded:58Brp9789) to Recorded   3  Clopidogrel Bisulfate 75 MG Oral Tablet; TAKE 1 TABLET DAILY; Therapy: (Recorded:38Wlj2509) to Recorded   4  Furosemide 40 MG Oral Tablet; TAKE 1 TABLET DAILY; Therapy: (Recorded:64Pxm9064) to Recorded   5  GlipiZIDE ER 2 5 MG Oral Tablet Extended Release 24 Hour; TAKE 1 TABLET DAILY AS   DIRECTED;    Therapy: (Recorded:95Zqk9220) to Recorded   6  Namenda XR 14 MG Oral Capsule Extended Release 24 Hour; take 1 capsule daily; Therapy: (Recorded:42Mjf3237) to Recorded   7  Sertraline HCl - 50 MG Oral Tablet; TAKE 1 TABLET DAILY; Therapy: (Recorded:23Kjh0768) to Recorded   8  Sodium Bicarbonate 650 MG Oral Tablet; take one tablet by mouth every day; Therapy: (Recorded:48Jpa9215) to Recorded   9  Tamsulosin HCl - 0 4 MG Oral Capsule; TAKE ONE CAPSULE EVERY DAY; Therapy: (Recorded:30Wcw3294) to Recorded   10  Vitamin D (Ergocalciferol) CAPS; 2,000 iu a day; Therapy: (Recorded:85Hne3964) to Recorded   11  Warfarin Sodium 1 MG Oral Tablet; TAKE 1 TABLET DAILY AS DIRECTED; Therapy: (Recorded:51Oho3056) to Recorded   12  Warfarin Sodium 5 MG Oral Tablet; TAKE 1 TABLET DAILY; Therapy: (Recorded:04Qzj3513) to Recorded    The medication list was reviewed and updated today  Vitals   Recorded: 19Sep2017 02:28PM   Height 5 ft 11 in   Weight 207 lb    BMI Calculated 28 87   BSA Calculated 2 14     Physical Exam    Constitutional: General appearance: No acute distress, well appearing and well nourished  ENT: External ears and nose appear normal      Eyes: Anicteric sclerae  JVD:  No JVD present  Pulmonary: Respiratory effort: No increased work of breathing or signs of respiratory distress  Auscultation of lungs: Clear to auscultation  Cardiovascular: 1/6 systolic murmur no rub  Abdomen: Non-tender, no masses  Extremities: No cyanosis, clubbing or edema  No edema  Neurologic: Non Focal         Results/Data  (1) BASIC METABOLIC PROFILE 67YHE4394 01:14PM Serafin Aquino Order Number: [id number]     Test Name Result Flag Reference   GLUCOSE,RANDM 105 mg/dL     If the patient is fasting, the ADA then defines impaired fasting glucose as > 100 mg/dL and diabetes as > or equal to 123 mg/dL    Specimen collection should occur prior to Sulfasalazine administration due to the potential for falsely depressed results  Specimen collection should occur prior to Sulfapyridine administration due to the potential for falsely elevated results  SODIUM 142 mmol/L  136-145   POTASSIUM 5 0 mmol/L  3 5-5 3   CHLORIDE 110 mmol/L H 100-108   CARBON DIOXIDE 21 mmol/L  21-32   ANION GAP (CALC) 11 mmol/L  4-13   BLOOD UREA NITROGEN 44 mg/dL H 5-25   CREATININE 3 19 mg/dL H 0 60-1 30   Standardized to IDMS reference method   CALCIUM 8 1 mg/dL L 8 3-10 1   eGFR 17 ml/min/1 73sq [facility] Disease Education Program recommendations are as follows:  GFR calculation is accurate only with a steady state creatinine  Chronic Kidney disease less than 60 ml/min/1 73 sq  meters  Kidney failure less than 15 ml/min/1 73 sq  meters  (1) CBC/PLT/DIFF 18Sep2017 01:14PM [employer] Order Number: [id number]     Test Name Result Flag Reference   WBC COUNT 7 13 Thousand/uL  4 31-10 16   RBC COUNT 2 57 Million/uL L 3 88-5 62   HEMOGLOBIN 7 8 g/dL L 12 0-17 0   HEMATOCRIT 24 2 % L 36 5-49 3   MCV 94 fL  82-98   MCH 30 4 pg  26 8-34 3   MCHC 32 2 g/dL  31 4-37 4   RDW 13 9 %  11 6-15 1   MPV 10 1 fL  8 9-12 7   PLATELET COUNT 128 Thousands/uL  149-390   nRBC AUTOMATED 0 /100 WBCs     NEUTROPHILS RELATIVE PERCENT 59 %  43-75   LYMPHOCYTES RELATIVE PERCENT 26 %  14-44   MONOCYTES RELATIVE PERCENT 8 %  4-12   EOSINOPHILS RELATIVE PERCENT 6 %  0-6   BASOPHILS RELATIVE PERCENT 1 %  0-1   NEUTROPHILS ABSOLUTE COUNT 4 22 Thousands/? ??L  1 85-7 62   LYMPHOCYTES ABSOLUTE COUNT 1 85 Thousands/? ??L  0 60-4 47   MONOCYTES ABSOLUTE COUNT 0 59 Thousand/? ??L  0 17-1 22   EOSINOPHILS ABSOLUTE COUNT 0 40 Thousand/? ??L  0 00-0 61   BASOPHILS ABSOLUTE COUNT 0 05 Thousands/? ??L  0 00-0 10     (1) URINE PROTEIN CREATININE RATIO 18Sep2017 01:14PM [employer] Order Number: [id number]     Test Name Result Flag Reference   CREATININE URINE 60 0 mg/dL     URINE PROTEIN:CREATININE RATIO 0 78 H 0 00-0 10   URINE PROTEIN 47 mg/dL       (1) PT WITH INR 07Ffl0856 01:14PM [facility], Provider   Test ordered by: Gregorio Mera     Test Name Result Flag Reference   INR 3 28 H 0 86-1 16   PT 33 9 seconds H 12 1-14 4     (1) URINE CULTURE 84Kmh7442 05:18PM [facility], Provider   Test ordered by: Jean Pierre Higginbotham     Test Name Result Flag Reference   CLINICAL REPORT (Report)     Test:        Urine culture  Specimen Source:  Urine, Indwelling Anderson Catheter  Specimen Type:   Urine  Specimen Date:   9/7/2017 5:18 PM  Result Date:    9/9/2017 1:26 PM  Result Status:   Final result  Resulting Lab:   [address] [address]            Tel: [phone]      CULTURE                                       ------------------                                   >100,000 cfu/ml Klebsiella pneumoniae      SUSCEPTIBILITY                                   ------------------                                                       Klebsiella pneumoniae  METHOD                 CHRISTIANNE  -------------------------------------  --------------------------  AMPICILLIN ($$)             >16 00 ug/ml Resistant  AMPICILLIN + SULBACTAM ($)       <=8/4 ug/ml  Susceptible  AZTREONAM ($$$)             <=8 ug/ml   Susceptible  CEFAZOLIN ($)              <=8 00 ug/ml Susceptible  CIPROFLOXACIN ($)            <=1 00 ug/ml Susceptible  GENTAMICIN ($$)             <=4 ug/ml   Susceptible  LEVOFLOXACIN ($)            <=2 00 ug/ml Susceptible  NITROFURANTOIN             64 ug/ml   Intermediate  PIPERACILLIN + TAZOBACTAM ($$$)     <=16 ug/ml  Susceptible  TETRACYCLINE              <=4 ug/ml   Susceptible  TOBRAMYCIN ($)             <=4 ug/ml   Susceptible  TRIMETHOPRIM + SULFAMETHOXAZOLE ($$$)  <=2/38 ug/ml Susceptible     Nephrology Flowsheet [id number] 09:02AM Travalee Carrier     Test Name Result Flag Reference   WBC 6 03     Hemoglobin 7 9     Hematocrit 25 1     Platelets 681     Sodium 140     Potassium 4 6     Chloride 109     Carbon Dioxide 21 Calcium 7 7     GLUCOSE 224     BUN 51     Serum Creatinine 3 02     GFR, NON- 18     Albumin 2 9     AST 15     ALT 29     Alkaline Phosphatase 127     TOTAL PROTEIN 6 8         Assessment    1  Hyperkalemia (276 7) (E87 5)   2  Chronic kidney disease, stage 3 (585 3) (N18 3)   3  Hypertension (401 9) (I10)    Plan  Chronic kidney disease, stage 3    · (1) BASIC METABOLIC PROFILE; Status:Active; Requested WUI:64KZG3688;    Perform:[facility]; [id number]; Ordered; For:Chronic kidney disease, stage 3; Ordered By:Milton Simmons;   · (1) BASIC METABOLIC PROFILE; Status:Active; Requested EVV:71UGK8835;    Perform:[facility]; EEB:53CEI0651; Ordered; For:Chronic kidney disease, stage 3; Ordered By:Milton Simmons;  Hyperkalemia    · Sodium Polystyrene Sulfonate 15 GM/60ML Oral Suspension; TAKE 15 GRAMS (60  MLS) BY MOUTH ON MONDAY,WEDNESDAY AND FRIDAY   Rx By: Sheila Pelletier; Dispense: 30 Days ; #:1 X 500 ML Bottle; Refill: 5; For: Hyperkalemia; GARY = N; Verified Transmission to [facility]/PHARMACY #3219 Last Updated By: System, [employer]; 2017 2:53:47 PM    As we reviewed you have been through a lot in terms of medical issues that we discussed  It seems like things are stabilized now as you've had medicine adjustments  The creatinine is 3 1 which is the measure your kidney function and is stable  You have had issues with elevated potassium and I'm giving a prescription for sodium polystyrene to take 2 tablespoons and you eat a higher potassium meal     Monitor lab work and follow-up as scheduled     Discussion/Summary  The patient's is here for follow-up of chronic kidney disease  In terms of my evaluation it appears that after his vascular procedures his creatinine increased to 3 and has remained stable there  I explained it's possible he may have had some complication such as atheroemboli  Creatinine remained stable at 3   There was an admission for hypotension severe hyperkalemia and medications were adjusted  He also had some urine retention and apparently that's improving as the Anderson catheter was removed  He is mild hyperkalemia with a creatinine of 3 1  We're going to start him on sodium polystyrene 15 g 3 times a week especially given the severe hyperkalemia that had occurred  I will be checking labs on a monthly basis and see him back in 2 months to make sure he remained stable  I told his daughter to call me if there's any problems in between the visit  Medications were reviewed and adjusted        Future Appointments    Signatures   Electronically signed by :  HERVE Oakes ; Sep 19 2017  3:40PM EST                       (Author)

## 2018-01-13 VITALS
SYSTOLIC BLOOD PRESSURE: 158 MMHG | HEART RATE: 78 BPM | BODY MASS INDEX: 29.82 KG/M2 | HEIGHT: 71 IN | DIASTOLIC BLOOD PRESSURE: 68 MMHG | RESPIRATION RATE: 18 BRPM | WEIGHT: 213 LBS

## 2018-01-13 VITALS
RESPIRATION RATE: 16 BRPM | WEIGHT: 223 LBS | HEIGHT: 71 IN | HEART RATE: 70 BPM | DIASTOLIC BLOOD PRESSURE: 60 MMHG | SYSTOLIC BLOOD PRESSURE: 118 MMHG | BODY MASS INDEX: 31.22 KG/M2

## 2018-01-13 VITALS — WEIGHT: 207 LBS | BODY MASS INDEX: 28.98 KG/M2 | HEIGHT: 71 IN

## 2018-01-14 VITALS
WEIGHT: 205.25 LBS | BODY MASS INDEX: 28.73 KG/M2 | DIASTOLIC BLOOD PRESSURE: 72 MMHG | HEART RATE: 80 BPM | HEIGHT: 71 IN | SYSTOLIC BLOOD PRESSURE: 142 MMHG

## 2018-01-14 VITALS
HEIGHT: 71 IN | WEIGHT: 206.13 LBS | SYSTOLIC BLOOD PRESSURE: 124 MMHG | HEART RATE: 72 BPM | BODY MASS INDEX: 28.86 KG/M2 | RESPIRATION RATE: 14 BRPM | DIASTOLIC BLOOD PRESSURE: 60 MMHG

## 2018-01-15 VITALS
HEART RATE: 70 BPM | WEIGHT: 220 LBS | BODY MASS INDEX: 30.8 KG/M2 | DIASTOLIC BLOOD PRESSURE: 52 MMHG | SYSTOLIC BLOOD PRESSURE: 120 MMHG | HEIGHT: 71 IN | RESPIRATION RATE: 18 BRPM

## 2018-01-17 NOTE — PROGRESS NOTES
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 (440 23,707 9) (I70 25)   3  Chronic kidney disease, stage 3 (585 3) (N18 3)   4  Complications Due To Vascular Device, Implant, Or Graft (996 74)   5  Diabetes mellitus with neuropathy (250 60,357 2) (E11 40)   6  Diastolic dysfunction (900 8) (I51 9)   7  DMII (diabetes mellitus, type 2) (250 00) (E11 9)   8  Edema of extremities (782 3) (R60 0)   9  Hyperlipidemia (272 4) (E78 5)   10  Hypertension (401 9) (I10)   11  Osteomyelitis (015)   12  Osteomyelitis of left foot (730 27) (M86 9)   13  Peripheral arterial disease (443 9) (I73 9)   14  Skin cancer (173 90) (C44 90)   15  Type II diabetes mellitus with foot ulcer (250 80,707 15) (E11 621,L97 509)   16  Urinary retention (788 20) (R33 9)   17  Weakness of right leg (729 89) (R29 898)   18  Wound, surgical, nonhealing (998 83) (T81 89XA)    Current Meds   1  AmLODIPine Besylate 5 MG Oral Tablet; Therapy: (Recorded:31Xwz0431) to Recorded   2  Atorvastatin Calcium 10 MG Oral Tablet; Take 1 tablet daily; Therapy: (Recorded:70Mpv4820) to Recorded   3  Cephalexin 500 MG Oral Capsule; Therapy: (Recorded:13Efl2127) to Recorded   4  Clopidogrel Bisulfate 75 MG Oral Tablet; TAKE 1 TABLET DAILY; Therapy: (Recorded:89Jki4556) to Recorded   5  Furosemide 40 MG Oral Tablet; TAKE 1 TABLET DAILY; Therapy: (Recorded:35Eku7852) to Recorded   6  GlipiZIDE ER 2 5 MG Oral Tablet Extended Release 24 Hour; Therapy: (Recorded:46Ijl8698) to Recorded   7  Memantine HCl - 10 MG Oral Tablet; TAKE 1 TABLET TWICE DAILY; Therapy: (Recorded:36Qlq4557) to Recorded   8  Metoprolol Succinate ER 25 MG Oral Tablet Extended Release 24 Hour; TAKE 1 TABLET   DAILY; Therapy: (Recorded:76Whs7470) to Recorded   9   Multivitamin TABS; TAKE 1 TABLET DAILY; Therapy: (Recorded:02Mii9286) to Recorded   10  Namenda XR 14 MG Oral Capsule Extended Release 24 Hour; Therapy: (Recorded:77Qft8762) to Recorded   11  NIFEdipine ER 30 MG Oral Tablet Extended Release 24 Hour; TAKE 1 TABLET DAILY; Therapy: (Recorded:81Nrl1326) to Recorded   12  Nystop 095793 UNIT/GM External Powder; APPLY TOPICALLY TO ABDOMINAL FOLD 3    TIMES A DAY; Therapy: (Recorded:84Ukk1495) to Recorded   13  Sertraline HCl - 50 MG Oral Tablet; Therapy: (Recorded:49Pdp2695) to Recorded   14  Sodium Bicarbonate 650 MG Oral Tablet; Therapy: (Recorded:43Rdn5754) to Recorded   15  Tamsulosin HCl - 0 4 MG Oral Capsule; TAKE ONE CAPSULE EVERY DAY; Therapy: (Recorded:34Bxm1080) to Recorded   16  Vitamin D (Ergocalciferol) 66804 UNIT Oral Capsule; TAKE 1 CAPSULE WEEKLY; Therapy: (Recorded:01Uhr2743) to Recorded   17  Warfarin Sodium 1 MG Oral Tablet; TAKE 1 TABLET DAILY AS DIRECTED; Therapy: (Recorded:71Tgz0660) to Recorded   18  Warfarin Sodium 5 MG Oral Tablet; TAKE 1 TABLET DAILY; Therapy: (Recorded:70Vwf4977) to Recorded    Allergies    1  No Known Drug Allergies    Vitals  Signs    Heart Rate: 84  Systolic: 613  Diastolic: 58  Height: 5 ft 11 in  Weight: 208 lb 6 oz  BMI Calculated: 29 06  BSA Calculated: 2 15    Procedure    Procedure:   mcneil catheter removed by VNA this morning     Procedure: Bladder Ultrasound Post Void Residual    Equipment And Procedure: The patient voided 50 ml pt only voided a small amt at home, only drank a few sips of water and a glass of juice since mcneil removal    U/S Findings: bladder scan:38 ml  Assessment    1  Urinary retention (788 20) (R33 9)    Plan  Urinary retention    · Follow-up as previously scheduled Evaluation and Treatment  Follow-up  Status:  Complete - Retrospective By Protocol Authorization  Done: 46RPM2195   Ordered;  For: Urinary retention; Ordered By: Mayela Lane Performed:  Due: 27CRU1393; Last Updated By: BETO Kit Joyce; 9/14/2017 2:24:11 PM    Discussion/Summary    Pt should complete antibx course prescribed at the last ER visit completely  otherwise FU in 2 weeks as scheduled with advanced practitioner, will check another PVR at that time to ensure continued emptying of the bladder  In the meantime patient encouraged to hydrate well and monitor voiding pattern, he is to call back with any difficulty voiding  he and dgtr verbalized understanding and agree to plan  Future Appointments    Date/Time Provider Specialty Site   09/19/2017 02:00 PM HERVE Mcgowan   Nephrology [place] [address]   10/02/2017 01:15 PM [address] [place] [address] Urology [facility] Robel Brown   Electronically signed by : Dina Weinberg RN; Sep 14 2017  2:23PM EST                       (Author)    Electronically signed by : Nicholas Gannon MD; Sep 14 2017  4:29PM EST                       (Author)

## 2018-01-22 VITALS
BODY MASS INDEX: 29.17 KG/M2 | SYSTOLIC BLOOD PRESSURE: 122 MMHG | DIASTOLIC BLOOD PRESSURE: 58 MMHG | HEIGHT: 71 IN | WEIGHT: 208.38 LBS | HEART RATE: 84 BPM

## 2018-02-15 DIAGNOSIS — I70.25 ATHEROSCLEROSIS OF NATIVE ARTERIES OF OTHER EXTREMITIES WITH ULCERATION (HCC): ICD-10-CM

## 2018-02-15 DIAGNOSIS — N18.30 CHRONIC KIDNEY DISEASE, STAGE III (MODERATE) (HCC): ICD-10-CM

## 2018-02-16 ENCOUNTER — HOSPITAL ENCOUNTER (OUTPATIENT)
Dept: RADIOLOGY | Facility: MEDICAL CENTER | Age: 83
Discharge: HOME | End: 2018-02-16
Payer: MEDICARE

## 2018-02-16 DIAGNOSIS — I70.25 ATHEROSCLEROSIS OF NATIVE ARTERIES OF OTHER EXTREMITIES WITH ULCERATION (HCC): ICD-10-CM

## 2018-02-16 PROCEDURE — 93925 LOWER EXTREMITY STUDY: CPT

## 2018-02-16 PROCEDURE — 93922 UPR/L XTREMITY ART 2 LEVELS: CPT | Performed by: SURGERY

## 2018-02-16 PROCEDURE — 93925 LOWER EXTREMITY STUDY: CPT | Performed by: SURGERY

## 2018-02-16 PROCEDURE — 93923 UPR/LXTR ART STDY 3+ LVLS: CPT

## 2018-02-26 ENCOUNTER — TRANSCRIBE ORDERS (OUTPATIENT)
Dept: ADMINISTRATIVE | Facility: HOSPITAL | Age: 83
End: 2018-02-26

## 2018-02-26 ENCOUNTER — APPOINTMENT (OUTPATIENT)
Dept: LAB | Facility: MEDICAL CENTER | Age: 83
End: 2018-02-26
Payer: MEDICARE

## 2018-02-26 DIAGNOSIS — Z79.4 TYPE 2 DIABETES MELLITUS WITHOUT COMPLICATION, WITH LONG-TERM CURRENT USE OF INSULIN (HCC): Primary | ICD-10-CM

## 2018-02-26 DIAGNOSIS — E11.9 TYPE 2 DIABETES MELLITUS WITHOUT COMPLICATION, WITH LONG-TERM CURRENT USE OF INSULIN (HCC): Primary | ICD-10-CM

## 2018-02-26 LAB
ALBUMIN SERPL BCP-MCNC: 3.6 G/DL (ref 3.5–5)
ALP SERPL-CCNC: 133 U/L (ref 46–116)
ALT SERPL W P-5'-P-CCNC: 23 U/L (ref 12–78)
ANION GAP SERPL CALCULATED.3IONS-SCNC: 7 MMOL/L (ref 4–13)
AST SERPL W P-5'-P-CCNC: 20 U/L (ref 5–45)
BILIRUB SERPL-MCNC: 0.21 MG/DL (ref 0.2–1)
BUN SERPL-MCNC: 45 MG/DL (ref 5–25)
CALCIUM SERPL-MCNC: 7.8 MG/DL (ref 8.3–10.1)
CHLORIDE SERPL-SCNC: 109 MMOL/L (ref 100–108)
CO2 SERPL-SCNC: 24 MMOL/L (ref 21–32)
CREAT SERPL-MCNC: 3.34 MG/DL (ref 0.6–1.3)
EST. AVERAGE GLUCOSE BLD GHB EST-MCNC: 151 MG/DL
GFR SERPL CREATININE-BSD FRML MDRD: 16 ML/MIN/1.73SQ M
GLUCOSE P FAST SERPL-MCNC: 93 MG/DL (ref 65–99)
HBA1C MFR BLD: 6.9 % (ref 4.2–6.3)
POTASSIUM SERPL-SCNC: 4.9 MMOL/L (ref 3.5–5.3)
PROT SERPL-MCNC: 7.7 G/DL (ref 6.4–8.2)
SODIUM SERPL-SCNC: 140 MMOL/L (ref 136–145)

## 2018-02-26 PROCEDURE — 83036 HEMOGLOBIN GLYCOSYLATED A1C: CPT | Performed by: FAMILY MEDICINE

## 2018-02-26 PROCEDURE — 36415 COLL VENOUS BLD VENIPUNCTURE: CPT | Performed by: FAMILY MEDICINE

## 2018-02-26 PROCEDURE — 80053 COMPREHEN METABOLIC PANEL: CPT | Performed by: FAMILY MEDICINE

## 2018-03-20 ENCOUNTER — OFFICE VISIT (OUTPATIENT)
Dept: NEPHROLOGY | Facility: CLINIC | Age: 83
End: 2018-03-20
Payer: MEDICARE

## 2018-03-20 VITALS
SYSTOLIC BLOOD PRESSURE: 140 MMHG | HEART RATE: 80 BPM | DIASTOLIC BLOOD PRESSURE: 68 MMHG | BODY MASS INDEX: 28 KG/M2 | HEIGHT: 71 IN | WEIGHT: 200 LBS

## 2018-03-20 DIAGNOSIS — I10 HYPERTENSION, UNSPECIFIED TYPE: ICD-10-CM

## 2018-03-20 DIAGNOSIS — N18.30 CKD (CHRONIC KIDNEY DISEASE) STAGE 3, GFR 30-59 ML/MIN (HCC): Primary | ICD-10-CM

## 2018-03-20 PROCEDURE — 99214 OFFICE O/P EST MOD 30 MIN: CPT | Performed by: INTERNAL MEDICINE

## 2018-03-20 RX ORDER — PRIMIDONE 50 MG/1
50 TABLET ORAL DAILY
COMMUNITY
End: 2020-08-23 | Stop reason: HOSPADM

## 2018-03-20 RX ORDER — AMLODIPINE BESYLATE 5 MG/1
1 TABLET ORAL DAILY
COMMUNITY
End: 2020-08-23 | Stop reason: HOSPADM

## 2018-03-20 RX ORDER — GLIPIZIDE 2.5 MG/1
1 TABLET, EXTENDED RELEASE ORAL DAILY
COMMUNITY
End: 2020-08-23 | Stop reason: HOSPADM

## 2018-03-20 NOTE — PATIENT INSTRUCTIONS
As we discussed your creatinine was 3 3 which is stable usual baseline range  It is not bad enough that you have any symptoms  Your potassium level is in the normal ranges well  Blood pressure is well controlled continue current medications and follow-up as scheduled     continue with wound care

## 2018-03-20 NOTE — LETTER
March 20, 2018     Isabela Luis MD  [id number] [facility] [address]    Patient: Kurt Ibrahim   YOB: 1930   Date of Visit: 3/20/2018       Dear Dr Leyla Jameson: Thank you for referring Vladimir Limon to me for evaluation  Below are my notes for this consultation  If you have questions, please do not hesitate to call me  I look forward to following your patient along with you  Sincerely,        Daquan Wynn MD        CC: No Recipients  Daquan Wynn MD  3/20/2018  2:49 PM  Sign at close encounter  Axel Perez 80 y o  male MRN: [id number]  Unit/Bed#:  Encounter: [id number]  Reason for Consult:  Chronic kidney disease    The patient is here for routine follow-up he is doing very well but recently did bump his toe the wound is healed now  Other than that he has no complaints  He is tolerating his medications and has had no other intercurrent illnesses  ASSESSMENT/PLAN:  1  Renal    Patient's chronic kidney disease and his creatinine team to increased significantly after he underwent some vascular procedures  I do not know if this occurred subacutely due to atheroemboli or some other factors but it appears now his baseline creatinine is in the mid 3 range  It remained relatively stable he has no symptoms of uremia or volume overload  Hyperkalemia is under good control on Kayexalate which she takes a couple times a week  He also has dietary discretion on potassium foods  Will continue to monitor with labs and blood pressure control  SUBJECTIVE:  Review of Systems   Constitution: Negative for chills and fever  HENT: Negative  Eyes: Negative  Cardiovascular: Negative  Negative for chest pain, dyspnea on exertion and orthopnea  Respiratory: Negative for cough and shortness of breath  Gastrointestinal: Negative  Genitourinary: Negative          OBJECTIVE:  Current Weight: Weight - Scale: 90 7 kg (200 lb)  [email or web address] com:     Blood pressure 140/68, pulse 80, height 5' 11" (1 803 m), weight 90 7 kg (200 lb)  , Body mass index is 27 89 kg/m²  [unfilled]    Physical Exam: /68 (BP Location: Left arm, Patient Position: Sitting, Cuff Size: Standard)   Pulse 80   Ht 5' 11" (1 803 m)   Wt 90 7 kg (200 lb)   BMI 27 89 kg/m²    Physical Exam   Constitutional: No distress  HENT:   Mouth/Throat: No oropharyngeal exudate  Eyes: No scleral icterus  Neck: Neck supple  No JVD present  Cardiovascular: Normal rate  Exam reveals no friction rub  Pulmonary/Chest: Effort normal and breath sounds normal  No respiratory distress  He has no wheezes  He has no rales  Abdominal: Soft  Bowel sounds are normal  He exhibits no distension  There is no tenderness  There is no rebound         Medications:    Current Outpatient Prescriptions:     amLODIPine (NORVASC) 5 mg tablet, Take 1 tablet by mouth daily, Disp: , Rfl:     atorvastatin (LIPITOR) 10 mg tablet, Take 1 tablet by mouth daily with dinner, Disp: , Rfl: 0    Cholecalciferol (VITAMIN D) 2000 units CAPS, Take 2,000 Units by mouth once a week tuesdays , Disp: , Rfl:     clopidogrel (PLAVIX) 75 mg tablet, Take 1 tablet by mouth daily, Disp: , Rfl: 0    furosemide (LASIX) 40 mg tablet, Take 40 mg by mouth daily, Disp: , Rfl:     glipiZIDE (GLUCOTROL XL) 2 5 mg 24 hr tablet, Take 1 tablet by mouth daily, Disp: , Rfl:     Memantine HCl ER (NAMENDA XR) 14 MG CP24, Take 1 capsule by mouth daily, Disp: , Rfl:     sertraline (ZOLOFT) 100 mg tablet, Take 100 mg by mouth daily  , Disp: , Rfl:     sodium bicarbonate 650 mg tablet, Take 1 tablet by mouth 2 (two) times daily after meals, Disp: 60 tablet, Rfl: 0    tamsulosin (FLOMAX) 0 4 mg, Take 0 4 mg by mouth daily with dinner, Disp: , Rfl:     warfarin (COUMADIN) 5 mg tablet, Take 1 tablet by mouth daily, Disp: , Rfl: 0    metoprolol succinate (TOPROL-XL) 25 mg 24 hr tablet, Take 1 tablet by mouth daily, Disp: , Rfl: 0    NIFEdipine (ADALAT CC) 30 MG 24 hr tablet, Take 1 tablet by mouth daily, Disp: 30 tablet, Rfl: 0    primidone (MYSOLINE) 50 mg tablet, Take 50 mg by mouth daily, Disp: , Rfl:     Laboratory Results:  Lab Results   Component Value Date    WBC 7 13 09/18/2017    HGB 7 8 (L) 09/18/2017    HCT 24 2 (L) 09/18/2017    MCV 94 09/18/2017     09/18/2017     Lab Results   Component Value Date    GLUCOSE 183 (H) 01/02/2018    CALCIUM 7 8 (L) 02/26/2018     02/26/2018    K 4 9 02/26/2018    CO2 24 02/26/2018     (H) 02/26/2018    BUN 45 (H) 02/26/2018    CREATININE 3 34 (H) 02/26/2018     Lab Results   Component Value Date    CALCIUM 7 8 (L) 02/26/2018    PHOS 3 2 08/29/2017     No results found for: LABPROT

## 2018-04-02 ENCOUNTER — OFFICE VISIT (OUTPATIENT)
Dept: UROLOGY | Facility: CLINIC | Age: 83
End: 2018-04-02
Payer: MEDICARE

## 2018-04-02 VITALS
WEIGHT: 226 LBS | HEART RATE: 84 BPM | BODY MASS INDEX: 31.64 KG/M2 | HEIGHT: 71 IN | SYSTOLIC BLOOD PRESSURE: 140 MMHG | DIASTOLIC BLOOD PRESSURE: 60 MMHG

## 2018-04-02 DIAGNOSIS — R33.9 INCOMPLETE BLADDER EMPTYING: ICD-10-CM

## 2018-04-02 DIAGNOSIS — N40.0 ENLARGED PROSTATE: Primary | ICD-10-CM

## 2018-04-02 PROCEDURE — 99213 OFFICE O/P EST LOW 20 MIN: CPT | Performed by: PHYSICIAN ASSISTANT

## 2018-04-02 PROCEDURE — 51798 US URINE CAPACITY MEASURE: CPT | Performed by: PHYSICIAN ASSISTANT

## 2018-04-02 RX ORDER — SODIUM POLYSTYRENE SULFONATE 15 G/60ML
SUSPENSION ORAL; RECTAL
COMMUNITY
Start: 2018-03-08 | End: 2019-04-04 | Stop reason: SDUPTHER

## 2018-04-02 RX ORDER — WARFARIN SODIUM 1 MG/1
TABLET ORAL
COMMUNITY
Start: 2018-02-13 | End: 2020-08-07 | Stop reason: HOSPADM

## 2018-04-02 NOTE — PROGRESS NOTES
1  Enlarged prostate     2  Incomplete bladder emptying  US kidney and bladder with pvr    Urinalysis with microscopic         Assessment and plan:       1  BPH, resolved urinary retention  - Patient is emptying his bladder well with PVR 61mL today  - encouraged him to continue tamsulosin daily  - follow up in 6 months with PVR for evaluation  - given worsening kidney function, he will obtain US kidney and bladder prior to visit to rule out any post-renal cause  He will also obtain UA with micro prior    - all questions answered  Greyson Hurley PA-C      Chief Complaint     Follow up urinary retention      History of Present Illness     Jos Ryan is a 80 y o  male that was seen once by Dr Maddison Gibson back in 2014 and most recently by Dr Jayy Moser in the hospital for an episode of acute urinary retention and urinary tract infection, presenting for follow-up  He was in the hospital in August 2017 and was found to be in urinary retention with SALO  Anderson catheter was placed at that time for decompression  Patient then presented to the emergency department shortly after in September due to gross hematuria within his catheter and was found to have a positive urine culture at that time revealing klebsiella pneumoniae UTI which was treated with antibiotics  Ultrasound of the retroperitoneum was performed during his hospitalization was negative for any hydronephrosis  He subsequently passed a formal voiding trial in the office on 9/14/2017  He continues to take tamsulosin daily  Patient does have CKD stage 3 and follows with nephrology      Pst void residual in the office today demonstrates adequate bladder emptying at 61mL  Laboratory     Lab Results   Component Value Date    CREATININE 3 34 (H) 02/26/2018         Review of Systems     Review of Systems   Constitutional: Negative for activity change, appetite change, chills, diaphoresis, fatigue, fever and unexpected weight change  Respiratory: Negative for chest tightness and shortness of breath  Cardiovascular: Negative for chest pain, palpitations and leg swelling  Gastrointestinal: Negative for abdominal distention, abdominal pain, constipation, diarrhea, nausea and vomiting  Genitourinary: Negative for decreased urine volume, difficulty urinating, dysuria, enuresis, flank pain, frequency, genital sores, hematuria and urgency  Musculoskeletal: Negative for back pain, gait problem and myalgias  Skin: Negative for color change, pallor, rash and wound  Psychiatric/Behavioral: Negative for behavioral problems  The patient is not nervous/anxious  Allergies     Allergies   Allergen Reactions    Unasyn [Ampicillin-Sulbactam Sodium] Rash       Physical Exam     Physical Exam   Constitutional: He is oriented to person, place, and time  He appears well-developed and well-nourished  No distress  HENT:   Head: Normocephalic and atraumatic  Eyes: Conjunctivae are normal    Neck: Normal range of motion  No tracheal deviation present  Pulmonary/Chest: Effort normal    Musculoskeletal: He exhibits no edema or deformity  Unsteady gait  Ambulates with cane assistance  Neurological: He is alert and oriented to person, place, and time  Skin: Skin is warm and dry  No rash noted  He is not diaphoretic  No erythema  Psychiatric: He has a normal mood and affect   His behavior is normal          Vital Signs     Vitals:    04/02/18 1338   BP: 140/60   Pulse: 84   Weight: 103 kg (226 lb)   Height: 5' 11" (1 803 m)         Current Medications       Current Outpatient Prescriptions:     amLODIPine (NORVASC) 5 mg tablet, Take 1 tablet by mouth daily, Disp: , Rfl:     atorvastatin (LIPITOR) 10 mg tablet, Take 1 tablet by mouth daily with dinner, Disp: , Rfl: 0    Cholecalciferol (VITAMIN D) 2000 units CAPS, Take 2,000 Units by mouth once a week tuesdays , Disp: , Rfl:     clopidogrel (PLAVIX) 75 mg tablet, Take 1 tablet by mouth daily, Disp: , Rfl: 0    furosemide (LASIX) 40 mg tablet, Take 40 mg by mouth daily, Disp: , Rfl:     glipiZIDE (GLUCOTROL XL) 2 5 mg 24 hr tablet, Take 1 tablet by mouth daily, Disp: , Rfl:     KIONEX 15 GM/60ML suspension, , Disp: , Rfl:     Memantine HCl ER (NAMENDA XR) 14 MG CP24, Take 1 capsule by mouth daily, Disp: , Rfl:     primidone (MYSOLINE) 50 mg tablet, Take 50 mg by mouth daily, Disp: , Rfl:     sertraline (ZOLOFT) 100 mg tablet, Take 100 mg by mouth daily  , Disp: , Rfl:     sodium bicarbonate 650 mg tablet, Take 1 tablet by mouth 2 (two) times daily after meals, Disp: 60 tablet, Rfl: 0    tamsulosin (FLOMAX) 0 4 mg, Take 0 4 mg by mouth daily with dinner, Disp: , Rfl:     warfarin (COUMADIN) 4 mg tablet, , Disp: , Rfl:     warfarin (COUMADIN) 5 mg tablet, Take 1 tablet by mouth daily, Disp: , Rfl: 0    metoprolol succinate (TOPROL-XL) 25 mg 24 hr tablet, Take 1 tablet by mouth daily, Disp: , Rfl: 0    NIFEdipine (ADALAT CC) 30 MG 24 hr tablet, Take 1 tablet by mouth daily, Disp: 30 tablet, Rfl: 0    sertraline (ZOLOFT) 50 mg tablet, , Disp: , Rfl:       Active Problems     Patient Active Problem List   Diagnosis    Acute renal failure superimposed on stage 3 chronic kidney disease ([place] [address] )    PVD (peripheral vascular disease) ([place] [address] )    DM (diabetes mellitus), type 2, uncontrolled ([place] [address] )    HTN (hypertension)    HLD (hyperlipidemia)    PAD [facility]peripheral artery disease) ([facility] )    Transaminitis    Chronic anemia    Depression    Ulcer of left heel (HCC)    CKD (chronic kidney disease) stage 3, GFR 30-59 ml/min    Enlarged prostate    Incomplete bladder emptying         Past Medical History     Past Medical History:   Diagnosis Date    Diabetes mellitus ([facility] )     Anderson catheter in place     History of atrial fibrillation     History of chronic kidney disease     History of peripheral vascular disease     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 )     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

## 2018-05-15 ENCOUNTER — APPOINTMENT (OUTPATIENT)
Dept: LAB | Facility: MEDICAL CENTER | Age: 83
End: 2018-05-15
Payer: MEDICARE

## 2018-07-16 ENCOUNTER — APPOINTMENT (OUTPATIENT)
Dept: LAB | Facility: MEDICAL CENTER | Age: 83
End: 2018-07-16
Payer: MEDICARE

## 2018-07-16 ENCOUNTER — TRANSCRIBE ORDERS (OUTPATIENT)
Dept: ADMINISTRATIVE | Facility: HOSPITAL | Age: 83
End: 2018-07-16

## 2018-07-16 DIAGNOSIS — Z79.4 TYPE 2 DIABETES MELLITUS WITHOUT COMPLICATION, WITH LONG-TERM CURRENT USE OF INSULIN (HCC): Primary | ICD-10-CM

## 2018-07-16 DIAGNOSIS — E11.9 TYPE 2 DIABETES MELLITUS WITHOUT COMPLICATION, WITH LONG-TERM CURRENT USE OF INSULIN (HCC): Primary | ICD-10-CM

## 2018-07-16 DIAGNOSIS — I48.91 ATRIAL FIBRILLATION, UNSPECIFIED TYPE (HCC): ICD-10-CM

## 2018-07-16 DIAGNOSIS — N18.30 CHRONIC KIDNEY DISEASE, STAGE III (MODERATE) (HCC): ICD-10-CM

## 2018-07-16 DIAGNOSIS — N18.30 CKD (CHRONIC KIDNEY DISEASE) STAGE 3, GFR 30-59 ML/MIN (HCC): ICD-10-CM

## 2018-07-16 LAB
ANION GAP SERPL CALCULATED.3IONS-SCNC: 8 MMOL/L (ref 4–13)
BASOPHILS # BLD AUTO: 0.04 THOUSANDS/ΜL (ref 0–0.1)
BASOPHILS NFR BLD AUTO: 1 % (ref 0–1)
BUN SERPL-MCNC: 48 MG/DL (ref 5–25)
CALCIUM SERPL-MCNC: 7.4 MG/DL (ref 8.3–10.1)
CHLORIDE SERPL-SCNC: 110 MMOL/L (ref 100–108)
CO2 SERPL-SCNC: 20 MMOL/L (ref 21–32)
CREAT SERPL-MCNC: 3.45 MG/DL (ref 0.6–1.3)
EOSINOPHIL # BLD AUTO: 0.37 THOUSAND/ΜL (ref 0–0.61)
EOSINOPHIL NFR BLD AUTO: 6 % (ref 0–6)
ERYTHROCYTE [DISTWIDTH] IN BLOOD BY AUTOMATED COUNT: 13.5 % (ref 11.6–15.1)
EST. AVERAGE GLUCOSE BLD GHB EST-MCNC: 160 MG/DL
GFR SERPL CREATININE-BSD FRML MDRD: 15 ML/MIN/1.73SQ M
GLUCOSE P FAST SERPL-MCNC: 93 MG/DL (ref 65–99)
HBA1C MFR BLD: 7.2 % (ref 4.2–6.3)
HCT VFR BLD AUTO: 31.7 % (ref 36.5–49.3)
HGB BLD-MCNC: 9.9 G/DL (ref 12–17)
IMM GRANULOCYTES # BLD AUTO: 0.01 THOUSAND/UL (ref 0–0.2)
IMM GRANULOCYTES NFR BLD AUTO: 0 % (ref 0–2)
INR PPP: 2.15 (ref 0.86–1.17)
LYMPHOCYTES # BLD AUTO: 1.34 THOUSANDS/ΜL (ref 0.6–4.47)
LYMPHOCYTES NFR BLD AUTO: 23 % (ref 14–44)
MCH RBC QN AUTO: 29.9 PG (ref 26.8–34.3)
MCHC RBC AUTO-ENTMCNC: 31.2 G/DL (ref 31.4–37.4)
MCV RBC AUTO: 96 FL (ref 82–98)
MONOCYTES # BLD AUTO: 0.62 THOUSAND/ΜL (ref 0.17–1.22)
MONOCYTES NFR BLD AUTO: 11 % (ref 4–12)
NEUTROPHILS # BLD AUTO: 3.55 THOUSANDS/ΜL (ref 1.85–7.62)
NEUTS SEG NFR BLD AUTO: 59 % (ref 43–75)
NRBC BLD AUTO-RTO: 0 /100 WBCS
PLATELET # BLD AUTO: 184 THOUSANDS/UL (ref 149–390)
PMV BLD AUTO: 10.8 FL (ref 8.9–12.7)
POTASSIUM SERPL-SCNC: 4.8 MMOL/L (ref 3.5–5.3)
PROTHROMBIN TIME: 24.1 SECONDS (ref 11.8–14.2)
RBC # BLD AUTO: 3.31 MILLION/UL (ref 3.88–5.62)
SODIUM SERPL-SCNC: 138 MMOL/L (ref 136–145)
WBC # BLD AUTO: 5.93 THOUSAND/UL (ref 4.31–10.16)

## 2018-07-16 PROCEDURE — 80048 BASIC METABOLIC PNL TOTAL CA: CPT

## 2018-07-16 PROCEDURE — 85025 COMPLETE CBC W/AUTO DIFF WBC: CPT

## 2018-07-16 PROCEDURE — 36415 COLL VENOUS BLD VENIPUNCTURE: CPT | Performed by: FAMILY MEDICINE

## 2018-07-16 PROCEDURE — 83036 HEMOGLOBIN GLYCOSYLATED A1C: CPT | Performed by: FAMILY MEDICINE

## 2018-07-16 PROCEDURE — 85610 PROTHROMBIN TIME: CPT | Performed by: FAMILY MEDICINE

## 2018-07-19 ENCOUNTER — OFFICE VISIT (OUTPATIENT)
Dept: NEPHROLOGY | Facility: CLINIC | Age: 83
End: 2018-07-19
Payer: MEDICARE

## 2018-07-19 VITALS
SYSTOLIC BLOOD PRESSURE: 144 MMHG | DIASTOLIC BLOOD PRESSURE: 80 MMHG | WEIGHT: 221 LBS | BODY MASS INDEX: 30.94 KG/M2 | HEART RATE: 80 BPM | HEIGHT: 71 IN

## 2018-07-19 DIAGNOSIS — N18.30 CKD (CHRONIC KIDNEY DISEASE) STAGE 3, GFR 30-59 ML/MIN (HCC): Primary | ICD-10-CM

## 2018-07-19 PROCEDURE — 99213 OFFICE O/P EST LOW 20 MIN: CPT | Performed by: INTERNAL MEDICINE

## 2018-07-19 NOTE — PROGRESS NOTES
NEPHROLOGY PROGRESS NOTE    Sera Proctor 80 y o  male MRN: [id number]  Unit/Bed#:  Encounter: [id number]  Reason for Consult:  Chronic kidney disease    ASSESSMENT/PLAN:  1  Renal   The patient is chronic kidney disease I suspect due to atheroemboli as it really worsened after vascular procedure  It remained stable in the 3 range and is around 3 5  At this point is a psych he has no significant volume overload  He has in the past had hyperkalemia but it is simply takes Kayexalate once a week  Overall patient means relatively stable will continue to monitor  His daughter was present for the visit as well  SUBJECTIVE:  Review of Systems   Constitution: Negative for chills and fever  HENT: Negative  Eyes: Negative  Cardiovascular: Negative  Negative for chest pain and dyspnea on exertion  Respiratory: Negative for cough and shortness of breath  Gastrointestinal: Negative  Genitourinary: Negative for dysuria, hematuria and incomplete emptying  OBJECTIVE:  Current Weight: Weight - Scale: 100 kg (221 lb)  [email or web address]:     Blood pressure 144/80, pulse 80, height 5' 11" (1 803 m), weight 100 kg (221 lb)  , Body mass index is 30 82 kg/m²  [unfilled]    Physical Exam: /80 (BP Location: Right arm, Patient Position: Sitting, Cuff Size: Standard)   Pulse 80   Ht 5' 11" (1 803 m)   Wt 100 kg (221 lb)   BMI 30 82 kg/m²   Physical Exam   Constitutional: No distress  HENT:   Mouth/Throat: No oropharyngeal exudate  Eyes: No scleral icterus  Neck: Neck supple  No JVD present  Cardiovascular: Normal rate  Exam reveals no friction rub  Pulmonary/Chest: Effort normal and breath sounds normal  No respiratory distress  He has no wheezes  He has no rales  Abdominal: Soft  Bowel sounds are normal  He exhibits no distension  There is no tenderness  There is no rebound         Medications:    Current Outpatient Prescriptions:     amLODIPine (NORVASC) 5 mg tablet, Take 1 tablet by mouth daily, Disp: , Rfl:     atorvastatin (LIPITOR) 10 mg tablet, Take 1 tablet by mouth daily with dinner, Disp: , Rfl: 0    Cholecalciferol (VITAMIN D) 2000 units CAPS, Take 2,000 Units by mouth once a week tuesdays , Disp: , Rfl:     clopidogrel (PLAVIX) 75 mg tablet, Take 1 tablet by mouth daily, Disp: , Rfl: 0    furosemide (LASIX) 40 mg tablet, Take 40 mg by mouth daily, Disp: , Rfl:     glipiZIDE (GLUCOTROL XL) 2 5 mg 24 hr tablet, Take 1 tablet by mouth daily, Disp: , Rfl:     KIONEX 15 GM/60ML suspension, , Disp: , Rfl:     Memantine HCl ER (NAMENDA XR) 14 MG CP24, Take 1 capsule by mouth daily, Disp: , Rfl:     NIFEdipine (ADALAT CC) 30 MG 24 hr tablet, Take 1 tablet by mouth daily, Disp: 30 tablet, Rfl: 0    primidone (MYSOLINE) 50 mg tablet, Take 50 mg by mouth daily, Disp: , Rfl:     sertraline (ZOLOFT) 100 mg tablet, Take 100 mg by mouth daily  , Disp: , Rfl:     sertraline (ZOLOFT) 50 mg tablet, , Disp: , Rfl:     sodium bicarbonate 650 mg tablet, Take 1 tablet by mouth 2 (two) times daily after meals, Disp: 60 tablet, Rfl: 0    tamsulosin (FLOMAX) 0 4 mg, Take 0 4 mg by mouth daily with dinner, Disp: , Rfl:     warfarin (COUMADIN) 4 mg tablet, , Disp: , Rfl:     warfarin (COUMADIN) 5 mg tablet, Take 1 tablet by mouth daily, Disp: , Rfl: 0    metoprolol succinate (TOPROL-XL) 25 mg 24 hr tablet, Take 1 tablet by mouth daily, Disp: , Rfl: 0    Laboratory Results:  Lab Results   Component Value Date    WBC 5 93 07/16/2018    HGB 9 9 (L) 07/16/2018    HCT 31 7 (L) 07/16/2018    MCV 96 07/16/2018     07/16/2018     Lab Results   Component Value Date    GLUCOSE 183 (H) 01/02/2018    CALCIUM 7 4 (L) 07/16/2018     07/16/2018    K 4 8 07/16/2018    CO2 20 (L) 07/16/2018     (H) 07/16/2018    BUN 48 (H) 07/16/2018    CREATININE 3 45 (H) 07/16/2018     Lab Results   Component Value Date    CALCIUM 7 4 (L) 07/16/2018    PHOS 3 2 08/29/2017     No results found for: LABPROT

## 2018-07-19 NOTE — PATIENT INSTRUCTIONS
Your creatinine is 3 5 which is the measure of your kidney function  That has remained relatively stable and likely something during your vascular procedure in the past cause this because that is when it started  Your blood pressure is good you have no symptoms related to this  Her potassium is normal so continue medications as you are doing  No other significant changes  Your hemoglobin is close to 10 which is good so hopefully that will provide with some energy

## 2018-09-19 ENCOUNTER — TRANSCRIBE ORDERS (OUTPATIENT)
Dept: ADMINISTRATIVE | Facility: HOSPITAL | Age: 83
End: 2018-09-19

## 2018-09-19 DIAGNOSIS — Z87.39 HISTORY OF OSTEOMYELITIS: ICD-10-CM

## 2018-09-19 DIAGNOSIS — L97.429 DIABETIC ULCER OF LEFT HEEL ASSOCIATED WITH DIABETES MELLITUS OF OTHER TYPE, UNSPECIFIED ULCER STAGE (HCC): Primary | ICD-10-CM

## 2018-09-19 DIAGNOSIS — E13.621 DIABETIC ULCER OF LEFT HEEL ASSOCIATED WITH DIABETES MELLITUS OF OTHER TYPE, UNSPECIFIED ULCER STAGE (HCC): Primary | ICD-10-CM

## 2018-09-27 ENCOUNTER — HOSPITAL ENCOUNTER (OUTPATIENT)
Dept: RADIOLOGY | Facility: MEDICAL CENTER | Age: 83
Discharge: HOME | End: 2018-09-27
Payer: MEDICARE

## 2018-09-27 DIAGNOSIS — L97.429 DIABETIC ULCER OF LEFT HEEL ASSOCIATED WITH DIABETES MELLITUS OF OTHER TYPE, UNSPECIFIED ULCER STAGE (HCC): ICD-10-CM

## 2018-09-27 DIAGNOSIS — Z87.39 HISTORY OF OSTEOMYELITIS: ICD-10-CM

## 2018-09-27 DIAGNOSIS — E13.621 DIABETIC ULCER OF LEFT HEEL ASSOCIATED WITH DIABETES MELLITUS OF OTHER TYPE, UNSPECIFIED ULCER STAGE (HCC): ICD-10-CM

## 2018-09-27 PROCEDURE — 93922 UPR/L XTREMITY ART 2 LEVELS: CPT | Performed by: SURGERY

## 2018-09-27 PROCEDURE — 93926 LOWER EXTREMITY STUDY: CPT

## 2018-09-27 PROCEDURE — 93926 LOWER EXTREMITY STUDY: CPT | Performed by: SURGERY

## 2018-10-01 ENCOUNTER — TELEPHONE (OUTPATIENT)
Dept: NEPHROLOGY | Facility: CLINIC | Age: 83
End: 2018-10-01

## 2018-10-01 NOTE — TELEPHONE ENCOUNTER
[facility] called, they don't have Kionex or Christian De Anda  Usually regular pharmacies don't carry it  A new script has to be called in or the script has to go to a different pharmacy

## 2018-10-01 NOTE — TELEPHONE ENCOUNTER
Prescription was called into [facility] pharmacy at [facility]

## 2018-10-09 ENCOUNTER — TRANSCRIBE ORDERS (OUTPATIENT)
Dept: ADMINISTRATIVE | Facility: HOSPITAL | Age: 83
End: 2018-10-09

## 2018-10-09 ENCOUNTER — APPOINTMENT (OUTPATIENT)
Dept: LAB | Facility: MEDICAL CENTER | Age: 83
End: 2018-10-09
Payer: MEDICARE

## 2018-10-09 ENCOUNTER — HOSPITAL ENCOUNTER (OUTPATIENT)
Dept: RADIOLOGY | Facility: MEDICAL CENTER | Age: 83
Discharge: HOME | End: 2018-10-09
Payer: MEDICARE

## 2018-10-09 ENCOUNTER — TELEPHONE (OUTPATIENT)
Dept: UROLOGY | Facility: CLINIC | Age: 83
End: 2018-10-09

## 2018-10-09 ENCOUNTER — HOSPITAL ENCOUNTER (OUTPATIENT)
Dept: MRI IMAGING | Facility: HOSPITAL | Age: 83
Discharge: HOME | End: 2018-10-09
Attending: PODIATRIST
Payer: MEDICARE

## 2018-10-09 DIAGNOSIS — E13.621 DIABETIC ULCER OF LEFT HEEL ASSOCIATED WITH DIABETES MELLITUS OF OTHER TYPE, UNSPECIFIED ULCER STAGE (HCC): ICD-10-CM

## 2018-10-09 DIAGNOSIS — L97.429 DIABETIC ULCER OF LEFT HEEL ASSOCIATED WITH DIABETES MELLITUS OF OTHER TYPE, UNSPECIFIED ULCER STAGE (HCC): ICD-10-CM

## 2018-10-09 DIAGNOSIS — R33.9 INCOMPLETE BLADDER EMPTYING: ICD-10-CM

## 2018-10-09 DIAGNOSIS — Z87.39 HISTORY OF OSTEOMYELITIS: ICD-10-CM

## 2018-10-09 DIAGNOSIS — R33.9 INCOMPLETE BLADDER EMPTYING: Primary | ICD-10-CM

## 2018-10-09 LAB
BACTERIA UR QL AUTO: ABNORMAL /HPF
BILIRUB UR QL STRIP: NEGATIVE
CLARITY UR: CLEAR
COLOR UR: YELLOW
GLUCOSE UR STRIP-MCNC: ABNORMAL MG/DL
HGB UR QL STRIP.AUTO: ABNORMAL
HYALINE CASTS #/AREA URNS LPF: ABNORMAL /LPF
KETONES UR STRIP-MCNC: NEGATIVE MG/DL
LEUKOCYTE ESTERASE UR QL STRIP: NEGATIVE
NITRITE UR QL STRIP: NEGATIVE
NON-SQ EPI CELLS URNS QL MICRO: ABNORMAL /HPF
PH UR STRIP.AUTO: 6 [PH] (ref 4.5–8)
PROT UR STRIP-MCNC: ABNORMAL MG/DL
RBC #/AREA URNS AUTO: ABNORMAL /HPF
SP GR UR STRIP.AUTO: 1.02 (ref 1–1.03)
UROBILINOGEN UR QL STRIP.AUTO: 0.2 E.U./DL
WBC #/AREA URNS AUTO: ABNORMAL /HPF

## 2018-10-09 PROCEDURE — 73721 MRI JNT OF LWR EXTRE W/O DYE: CPT

## 2018-10-09 PROCEDURE — 51798 US URINE CAPACITY MEASURE: CPT

## 2018-10-09 PROCEDURE — 81001 URINALYSIS AUTO W/SCOPE: CPT | Performed by: PHYSICIAN ASSISTANT

## 2018-10-09 NOTE — TELEPHONE ENCOUNTER
Pt was here for apt today but the u/s results were still in progress  I called [facility] to see if we could get the results but they were unable to do them in time for the apt  Pt could not r/s because he will not have transportation  Please follow up with pt when results are in

## 2018-10-10 ENCOUNTER — TELEPHONE (OUTPATIENT)
Dept: UROLOGY | Facility: CLINIC | Age: 83
End: 2018-10-10

## 2018-10-10 NOTE — TELEPHONE ENCOUNTER
I called the patient's home phone number discussed his most recent ultrasound(10/9/18) with his daughter  His ultrasound was negative for any evidence of obstructive uropathy  There is no hydronephrosis present and bilateral ureteral jets were detected  He had a postvoid residual of approximately 138 mL which is within adequate range  Patient also did have prostatomegaly which is known  They will have a significantly difficult time coming in for a follow-up appointment in the near future due to lack of transportation  Recommendations are to continue on tamsulosin monotherapy at this time  Patient should remain well hydrated  Patient's daughter verbalized understanding and will discuss further with her father  He will need a follow-up appointment in 1 year's time with a uroflow PVR at his office visit  They are aware to contact us sooner with any urologic concerns  All questions answered

## 2018-10-15 ENCOUNTER — TELEPHONE (OUTPATIENT)
Dept: NEPHROLOGY | Facility: CLINIC | Age: 83
End: 2018-10-15

## 2018-10-15 NOTE — TELEPHONE ENCOUNTER
Patient has no concerning findings on U/S but a small cysts, an enlarged prostate, and PVR of 138mL

## 2018-10-15 NOTE — TELEPHONE ENCOUNTER
[facility] and [facility] all do not have kayexaxlate  Per Dr Debra Tolbert have the patient take Ishmael Nielsen 1 packet on Monday and Thursday and repeat labs next week  I spoke with the patient's wife and she says she has enough for 3 weeks and she will call us when he's almost out and then we can see if the pharmacy's have that medication again or if they will switch to Ishmael Nielsen

## 2018-10-15 NOTE — TELEPHONE ENCOUNTER
Called and talked to Ismael's daughter, explained that there were no concerning results, small cysts and enlarged prostate which he knew and there was urine still in [address]    Reiterated 1 year f/u appointment next october

## 2018-11-12 ENCOUNTER — APPOINTMENT (OUTPATIENT)
Dept: LAB | Facility: MEDICAL CENTER | Age: 83
End: 2018-11-12
Payer: MEDICARE

## 2018-11-12 ENCOUNTER — TRANSCRIBE ORDERS (OUTPATIENT)
Dept: ADMINISTRATIVE | Facility: HOSPITAL | Age: 83
End: 2018-11-12

## 2018-11-12 DIAGNOSIS — E78.5 HYPERLIPIDEMIA, UNSPECIFIED HYPERLIPIDEMIA TYPE: ICD-10-CM

## 2018-11-12 DIAGNOSIS — E11.9 DIABETES MELLITUS WITHOUT COMPLICATION (HCC): Primary | ICD-10-CM

## 2018-11-12 DIAGNOSIS — I48.91 ATRIAL FIBRILLATION, UNSPECIFIED TYPE (HCC): ICD-10-CM

## 2018-11-12 DIAGNOSIS — N18.30 CKD (CHRONIC KIDNEY DISEASE) STAGE 3, GFR 30-59 ML/MIN (HCC): ICD-10-CM

## 2018-11-12 LAB
ALBUMIN SERPL BCP-MCNC: 3.5 G/DL (ref 3.5–5)
ALP SERPL-CCNC: 175 U/L (ref 46–116)
ALT SERPL W P-5'-P-CCNC: 16 U/L (ref 12–78)
ANION GAP SERPL CALCULATED.3IONS-SCNC: 7 MMOL/L (ref 4–13)
AST SERPL W P-5'-P-CCNC: 12 U/L (ref 5–45)
BASOPHILS # BLD AUTO: 0.04 THOUSANDS/ΜL (ref 0–0.1)
BASOPHILS NFR BLD AUTO: 1 % (ref 0–1)
BILIRUB SERPL-MCNC: 0.23 MG/DL (ref 0.2–1)
BUN SERPL-MCNC: 47 MG/DL (ref 5–25)
CALCIUM SERPL-MCNC: 7.5 MG/DL (ref 8.3–10.1)
CHLORIDE SERPL-SCNC: 107 MMOL/L (ref 100–108)
CHOLEST SERPL-MCNC: 163 MG/DL (ref 50–200)
CO2 SERPL-SCNC: 21 MMOL/L (ref 21–32)
CREAT SERPL-MCNC: 3.44 MG/DL (ref 0.6–1.3)
EOSINOPHIL # BLD AUTO: 0.42 THOUSAND/ΜL (ref 0–0.61)
EOSINOPHIL NFR BLD AUTO: 8 % (ref 0–6)
ERYTHROCYTE [DISTWIDTH] IN BLOOD BY AUTOMATED COUNT: 13.4 % (ref 11.6–15.1)
EST. AVERAGE GLUCOSE BLD GHB EST-MCNC: 169 MG/DL
GFR SERPL CREATININE-BSD FRML MDRD: 15 ML/MIN/1.73SQ M
GLUCOSE P FAST SERPL-MCNC: 104 MG/DL (ref 65–99)
HBA1C MFR BLD: 7.5 % (ref 4.2–6.3)
HCT VFR BLD AUTO: 30.9 % (ref 36.5–49.3)
HDLC SERPL-MCNC: 31 MG/DL (ref 40–60)
HGB BLD-MCNC: 9.7 G/DL (ref 12–17)
IMM GRANULOCYTES # BLD AUTO: 0.01 THOUSAND/UL (ref 0–0.2)
IMM GRANULOCYTES NFR BLD AUTO: 0 % (ref 0–2)
INR PPP: 1.52 (ref 0.86–1.17)
LDLC SERPL CALC-MCNC: 91 MG/DL (ref 0–100)
LYMPHOCYTES # BLD AUTO: 1.65 THOUSANDS/ΜL (ref 0.6–4.47)
LYMPHOCYTES NFR BLD AUTO: 32 % (ref 14–44)
MCH RBC QN AUTO: 30.4 PG (ref 26.8–34.3)
MCHC RBC AUTO-ENTMCNC: 31.4 G/DL (ref 31.4–37.4)
MCV RBC AUTO: 97 FL (ref 82–98)
MONOCYTES # BLD AUTO: 0.52 THOUSAND/ΜL (ref 0.17–1.22)
MONOCYTES NFR BLD AUTO: 10 % (ref 4–12)
NEUTROPHILS # BLD AUTO: 2.46 THOUSANDS/ΜL (ref 1.85–7.62)
NEUTS SEG NFR BLD AUTO: 49 % (ref 43–75)
NONHDLC SERPL-MCNC: 132 MG/DL
NRBC BLD AUTO-RTO: 0 /100 WBCS
PLATELET # BLD AUTO: 183 THOUSANDS/UL (ref 149–390)
PMV BLD AUTO: 11 FL (ref 8.9–12.7)
POTASSIUM SERPL-SCNC: 5.1 MMOL/L (ref 3.5–5.3)
PROT SERPL-MCNC: 7.6 G/DL (ref 6.4–8.2)
PROTHROMBIN TIME: 18.4 SECONDS (ref 11.8–14.2)
RBC # BLD AUTO: 3.19 MILLION/UL (ref 3.88–5.62)
SODIUM SERPL-SCNC: 135 MMOL/L (ref 136–145)
TRIGL SERPL-MCNC: 204 MG/DL
WBC # BLD AUTO: 5.1 THOUSAND/UL (ref 4.31–10.16)

## 2018-11-12 PROCEDURE — 85610 PROTHROMBIN TIME: CPT | Performed by: FAMILY MEDICINE

## 2018-11-12 PROCEDURE — 80053 COMPREHEN METABOLIC PANEL: CPT | Performed by: FAMILY MEDICINE

## 2018-11-12 PROCEDURE — 85025 COMPLETE CBC W/AUTO DIFF WBC: CPT

## 2018-11-12 PROCEDURE — 80061 LIPID PANEL: CPT | Performed by: FAMILY MEDICINE

## 2018-11-12 PROCEDURE — 36415 COLL VENOUS BLD VENIPUNCTURE: CPT | Performed by: FAMILY MEDICINE

## 2018-11-12 PROCEDURE — 83036 HEMOGLOBIN GLYCOSYLATED A1C: CPT | Performed by: FAMILY MEDICINE

## 2018-12-13 ENCOUNTER — OFFICE VISIT (OUTPATIENT)
Dept: NEPHROLOGY | Facility: CLINIC | Age: 83
End: 2018-12-13
Payer: MEDICARE

## 2018-12-13 VITALS
HEART RATE: 80 BPM | HEIGHT: 71 IN | WEIGHT: 230 LBS | SYSTOLIC BLOOD PRESSURE: 138 MMHG | BODY MASS INDEX: 32.2 KG/M2 | DIASTOLIC BLOOD PRESSURE: 74 MMHG

## 2018-12-13 DIAGNOSIS — N18.9 CHRONIC KIDNEY DISEASE, UNSPECIFIED CKD STAGE: Primary | ICD-10-CM

## 2018-12-13 PROBLEM — N18.30 CKD (CHRONIC KIDNEY DISEASE) STAGE 3, GFR 30-59 ML/MIN (HCC): Status: RESOLVED | Noted: 2018-03-20 | Resolved: 2018-12-13

## 2018-12-13 PROCEDURE — 99214 OFFICE O/P EST MOD 30 MIN: CPT | Performed by: INTERNAL MEDICINE

## 2018-12-13 NOTE — PROGRESS NOTES
NEPHROLOGY PROGRESS NOTE    Stacy Bustamante 80 y o  male MRN: [id number]  Unit/Bed#:  Encounter: [id number]  Reason for Consult:  Chronic kidney disease    Patient is here for routine follow-up  Since he has last been seen has not had many problems  Son able to walk a long distance cause he gets back pain but states that the legs are feeling okay  Other than that no changes in medications other than stopping nifedipine and metoprolol  ASSESSMENT/PLAN:  1  Renal    The patient is chronic kidney disease and this also really worsened in the summer of 2015 after some vascular procedures so 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  Creatinine now has remained stable in the 3 range without significant progression since that period of time  The patient is well compensated at this point he is receiving Kayexalate periodically for hyperkalemia and that is been well controlled  There has been some difficulty getting in the pharmacy but because he did not take it regularly he was continuing to take the extra he had  He recently had bladder ultrasound that shows mild postvoid residual but overall her just continue current medications with no changes  His daughter is present and I asked if they had questions and they were satisfied with the visit  Other than that follow-up as scheduled  SUBJECTIVE:  Review of Systems   Constitution: Negative for chills and fever  HENT: Negative  Eyes: Negative  Cardiovascular: Negative for chest pain, dyspnea on exertion, leg swelling and orthopnea  Respiratory: Negative for cough and shortness of breath  Gastrointestinal: Negative for abdominal pain, diarrhea and vomiting  Genitourinary: Negative for dysuria, hematuria and incomplete emptying  Neurological:        Positive tremor in his right arm  OBJECTIVE:  Current Weight:    [email or web address] com:     There were no vitals taken for this visit   , There is no height or weight on file to calculate BMI  [unfilled]    Physical Exam: There were no vitals taken for this visit  Physical Exam   Constitutional: No distress  HENT:   Mouth/Throat: No oropharyngeal exudate  Neck: Neck supple  No JVD present  Cardiovascular: Normal rate and regular rhythm  Exam reveals no friction rub  Very mild lower extremity edema  Pulmonary/Chest: Effort normal and breath sounds normal  No respiratory distress  He has no wheezes  He has no rales  Abdominal: Soft  Bowel sounds are normal  He exhibits no distension  There is no tenderness  There is no rebound         Medications:    Current Outpatient Prescriptions:     amLODIPine (NORVASC) 5 mg tablet, Take 1 tablet by mouth daily, Disp: , Rfl:     atorvastatin (LIPITOR) 10 mg tablet, Take 1 tablet by mouth daily with dinner, Disp: , Rfl: 0    Cholecalciferol (VITAMIN D) 2000 units CAPS, Take 2,000 Units by mouth once a week tuesdays , Disp: , Rfl:     clopidogrel (PLAVIX) 75 mg tablet, Take 1 tablet by mouth daily, Disp: , Rfl: 0    furosemide (LASIX) 40 mg tablet, Take 40 mg by mouth daily, Disp: , Rfl:     glipiZIDE (GLUCOTROL XL) 2 5 mg 24 hr tablet, Take 1 tablet by mouth daily, Disp: , Rfl:     KIONEX 15 GM/60ML suspension, , Disp: , Rfl:     Memantine HCl ER (NAMENDA XR) 14 MG CP24, Take 1 capsule by mouth daily, Disp: , Rfl:     metoprolol succinate (TOPROL-XL) 25 mg 24 hr tablet, Take 1 tablet by mouth daily, Disp: , Rfl: 0    NIFEdipine (ADALAT CC) 30 MG 24 hr tablet, Take 1 tablet by mouth daily, Disp: 30 tablet, Rfl: 0    primidone (MYSOLINE) 50 mg tablet, Take 50 mg by mouth daily, Disp: , Rfl:     sertraline (ZOLOFT) 100 mg tablet, Take 100 mg by mouth daily  , Disp: , Rfl:     sertraline (ZOLOFT) 50 mg tablet, , Disp: , Rfl:     sodium bicarbonate 650 mg tablet, Take 1 tablet by mouth 2 (two) times daily after meals, Disp: 60 tablet, Rfl: 0    tamsulosin (FLOMAX) 0 4 mg, Take 0 4 mg by mouth daily with dinner, Disp: , Rfl:    warfarin (COUMADIN) 4 mg tablet, , Disp: , Rfl:     warfarin (COUMADIN) 5 mg tablet, Take 1 tablet by mouth daily, Disp: , Rfl: 0    Laboratory Results:  Lab Results   Component Value Date    WBC 5 10 11/12/2018    HGB 9 7 (L) 11/12/2018    HCT 30 9 (L) 11/12/2018    MCV 97 11/12/2018     11/12/2018     Lab Results   Component Value Date    GLUCOSE 174 (H) 07/06/2017    CALCIUM 7 5 (L) 11/12/2018     09/21/2015    K 5 1 11/12/2018    CO2 21 11/12/2018     11/12/2018    BUN 47 (H) 11/12/2018    CREATININE 3 44 (H) 11/12/2018     Lab Results   Component Value Date    CALCIUM 7 5 (L) 11/12/2018    PHOS 3 2 08/29/2017     No results found for: LABPROT

## 2018-12-13 NOTE — PATIENT INSTRUCTIONS
You are here for follow-up your creatinine test is stable at your baseline and has not really gotten significantly worse even in 3 years  Continue her medications to help keep her blood thin continue with your blood pressure medications  I know it has been difficult taking the medicine for your potassium but try to take it to avoid a severe elevation of that level  We will have to look into whether not it is available in the pharmacy when you will require new prescription because there has been problems as you are aware  Labs and follow-up as scheduled

## 2018-12-13 NOTE — LETTER
December 13, 2018     Marilyn Villagran MD  [id number] [facility] [address] [facility] [address]    Patient: Agustin Mcdonough   YOB: 1930   Date of Visit: 12/13/2018       Dear Dr Erasmo Ely: Thank you for referring Scott Oneal to me for evaluation  Below are my notes for this consultation  If you have questions, please do not hesitate to call me  I look forward to following your patient along with you  Sincerely,        Yosef Ruiz MD        CC: No Recipients  Yosef Ruiz MD  12/13/2018  3:16 PM  Sign at close encounter  Axel Perez 80 y o  male MRN: [id number]  Unit/Bed#:  Encounter: [id number]  Reason for Consult:  Chronic kidney disease    Patient is here for routine follow-up  Since he has last been seen has not had many problems  Son able to walk a long distance cause he gets back pain but states that the legs are feeling okay  Other than that no changes in medications other than stopping nifedipine and metoprolol  ASSESSMENT/PLAN:  1  Renal    The patient is chronic kidney disease and this also really worsened in the summer of 2015 after some vascular procedures so 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  Creatinine now has remained stable in the 3 range without significant progression since that period of time  The patient is well compensated at this point he is receiving Kayexalate periodically for hyperkalemia and that is been well controlled  There has been some difficulty getting in the pharmacy but because he did not take it regularly he was continuing to take the extra he had  He recently had bladder ultrasound that shows mild postvoid residual but overall her just continue current medications with no changes  His daughter is present and I asked if they had questions and they were satisfied with the visit  Other than that follow-up as scheduled  SUBJECTIVE:  Review of Systems   Constitution: Negative for chills and fever  HENT: Negative  Eyes: Negative  Cardiovascular: Negative for chest pain, dyspnea on exertion, leg swelling and orthopnea  Respiratory: Negative for cough and shortness of breath  Gastrointestinal: Negative for abdominal pain, diarrhea and vomiting  Genitourinary: Negative for dysuria, hematuria and incomplete emptying  OBJECTIVE:  Current Weight:    [email or web address]:     There were no vitals taken for this visit  , There is no height or weight on file to calculate BMI  [unfilled]    Physical Exam: There were no vitals taken for this visit  Physical Exam   Constitutional: No distress  HENT:   Mouth/Throat: No oropharyngeal exudate  Neck: Neck supple  No JVD present  Cardiovascular: Normal rate and regular rhythm  Exam reveals no friction rub  Very mild lower extremity edema  Pulmonary/Chest: Effort normal and breath sounds normal  No respiratory distress  He has no wheezes  He has no rales  Abdominal: Soft  Bowel sounds are normal  He exhibits no distension  There is no tenderness  There is no rebound         Medications:    Current Outpatient Prescriptions:     amLODIPine (NORVASC) 5 mg tablet, Take 1 tablet by mouth daily, Disp: , Rfl:     atorvastatin (LIPITOR) 10 mg tablet, Take 1 tablet by mouth daily with dinner, Disp: , Rfl: 0    Cholecalciferol (VITAMIN D) 2000 units CAPS, Take 2,000 Units by mouth once a week tuesdays , Disp: , Rfl:     clopidogrel (PLAVIX) 75 mg tablet, Take 1 tablet by mouth daily, Disp: , Rfl: 0    furosemide (LASIX) 40 mg tablet, Take 40 mg by mouth daily, Disp: , Rfl:     glipiZIDE (GLUCOTROL XL) 2 5 mg 24 hr tablet, Take 1 tablet by mouth daily, Disp: , Rfl:     KIONEX 15 GM/60ML suspension, , Disp: , Rfl:     Memantine HCl ER (NAMENDA XR) 14 MG CP24, Take 1 capsule by mouth daily, Disp: , Rfl:     metoprolol succinate (TOPROL-XL) 25 mg 24 hr tablet, Take 1 tablet by mouth daily, Disp: , Rfl: 0    NIFEdipine (ADALAT CC) 30 MG 24 hr tablet, Take 1 tablet by mouth daily, Disp: 30 tablet, Rfl: 0    primidone (MYSOLINE) 50 mg tablet, Take 50 mg by mouth daily, Disp: , Rfl:     sertraline (ZOLOFT) 100 mg tablet, Take 100 mg by mouth daily  , Disp: , Rfl:     sertraline (ZOLOFT) 50 mg tablet, , Disp: , Rfl:     sodium bicarbonate 650 mg tablet, Take 1 tablet by mouth 2 (two) times daily after meals, Disp: 60 tablet, Rfl: 0    tamsulosin (FLOMAX) 0 4 mg, Take 0 4 mg by mouth daily with dinner, Disp: , Rfl:     warfarin (COUMADIN) 4 mg tablet, , Disp: , Rfl:     warfarin (COUMADIN) 5 mg tablet, Take 1 tablet by mouth daily, Disp: , Rfl: 0    Laboratory Results:  Lab Results   Component Value Date    WBC 5 10 11/12/2018    HGB 9 7 (L) 11/12/2018    HCT 30 9 (L) 11/12/2018    MCV 97 11/12/2018     11/12/2018     Lab Results   Component Value Date    GLUCOSE 174 (H) 07/06/2017    CALCIUM 7 5 (L) 11/12/2018     09/21/2015    K 5 1 11/12/2018    CO2 21 11/12/2018     11/12/2018    BUN 47 (H) 11/12/2018    CREATININE 3 44 (H) 11/12/2018     Lab Results   Component Value Date    CALCIUM 7 5 (L) 11/12/2018    PHOS 3 2 08/29/2017     No results found for: LABPROT

## 2019-02-18 ENCOUNTER — APPOINTMENT (OUTPATIENT)
Dept: LAB | Facility: MEDICAL CENTER | Age: 84
End: 2019-02-18
Payer: MEDICARE

## 2019-02-18 ENCOUNTER — TELEPHONE (OUTPATIENT)
Dept: NEUROLOGY | Facility: CLINIC | Age: 84
End: 2019-02-18

## 2019-02-18 ENCOUNTER — TRANSCRIBE ORDERS (OUTPATIENT)
Dept: ADMINISTRATIVE | Facility: HOSPITAL | Age: 84
End: 2019-02-18

## 2019-02-18 DIAGNOSIS — E11.9 TYPE 2 DIABETES MELLITUS WITHOUT COMPLICATION, UNSPECIFIED WHETHER LONG TERM INSULIN USE (HCC): Primary | ICD-10-CM

## 2019-02-18 DIAGNOSIS — I48.91 ATRIAL FIBRILLATION, UNSPECIFIED TYPE (HCC): ICD-10-CM

## 2019-02-18 DIAGNOSIS — E11.9 TYPE 2 DIABETES MELLITUS WITHOUT COMPLICATION, UNSPECIFIED WHETHER LONG TERM INSULIN USE (HCC): ICD-10-CM

## 2019-02-18 LAB
ALBUMIN SERPL BCP-MCNC: 3.6 G/DL (ref 3.5–5)
ALP SERPL-CCNC: 209 U/L (ref 46–116)
ALT SERPL W P-5'-P-CCNC: 15 U/L (ref 12–78)
ANION GAP SERPL CALCULATED.3IONS-SCNC: 8 MMOL/L (ref 4–13)
AST SERPL W P-5'-P-CCNC: 11 U/L (ref 5–45)
BILIRUB SERPL-MCNC: 0.3 MG/DL (ref 0.2–1)
BUN SERPL-MCNC: 39 MG/DL (ref 5–25)
CALCIUM SERPL-MCNC: 8 MG/DL (ref 8.3–10.1)
CHLORIDE SERPL-SCNC: 109 MMOL/L (ref 100–108)
CO2 SERPL-SCNC: 20 MMOL/L (ref 21–32)
CREAT SERPL-MCNC: 3.05 MG/DL (ref 0.6–1.3)
EST. AVERAGE GLUCOSE BLD GHB EST-MCNC: 163 MG/DL
GFR SERPL CREATININE-BSD FRML MDRD: 17 ML/MIN/1.73SQ M
GLUCOSE P FAST SERPL-MCNC: 98 MG/DL (ref 65–99)
HBA1C MFR BLD: 7.3 % (ref 4.2–6.3)
INR PPP: 1.37 (ref 0.86–1.17)
POTASSIUM SERPL-SCNC: 4.9 MMOL/L (ref 3.5–5.3)
PROT SERPL-MCNC: 8 G/DL (ref 6.4–8.2)
PROTHROMBIN TIME: 17 SECONDS (ref 11.8–14.2)
SODIUM SERPL-SCNC: 137 MMOL/L (ref 136–145)

## 2019-02-18 PROCEDURE — 83036 HEMOGLOBIN GLYCOSYLATED A1C: CPT | Performed by: FAMILY MEDICINE

## 2019-02-18 PROCEDURE — 85610 PROTHROMBIN TIME: CPT

## 2019-02-18 PROCEDURE — 80053 COMPREHEN METABOLIC PANEL: CPT

## 2019-02-18 PROCEDURE — 36415 COLL VENOUS BLD VENIPUNCTURE: CPT | Performed by: FAMILY MEDICINE

## 2019-03-28 ENCOUNTER — TELEPHONE (OUTPATIENT)
Dept: UROLOGY | Facility: AMBULATORY SURGERY CENTER | Age: 84
End: 2019-03-28

## 2019-03-28 ENCOUNTER — APPOINTMENT (OUTPATIENT)
Dept: LAB | Facility: MEDICAL CENTER | Age: 84
End: 2019-03-28
Payer: MEDICARE

## 2019-03-28 ENCOUNTER — TRANSCRIBE ORDERS (OUTPATIENT)
Dept: ADMINISTRATIVE | Facility: HOSPITAL | Age: 84
End: 2019-03-28

## 2019-03-28 DIAGNOSIS — E11.9 DIABETES MELLITUS WITHOUT COMPLICATION (HCC): ICD-10-CM

## 2019-03-28 DIAGNOSIS — R31.0 GROSS HEMATURIA: ICD-10-CM

## 2019-03-28 DIAGNOSIS — I48.91 ATRIAL FIBRILLATION, UNSPECIFIED TYPE (HCC): ICD-10-CM

## 2019-03-28 DIAGNOSIS — R31.0 GROSS HEMATURIA: Primary | ICD-10-CM

## 2019-03-28 DIAGNOSIS — N18.9 CHRONIC KIDNEY DISEASE, UNSPECIFIED CKD STAGE: ICD-10-CM

## 2019-03-28 DIAGNOSIS — I48.91 ATRIAL FIBRILLATION, UNSPECIFIED TYPE (HCC): Primary | ICD-10-CM

## 2019-03-28 LAB
ALBUMIN SERPL BCP-MCNC: 3.4 G/DL (ref 3.5–5)
ALP SERPL-CCNC: 191 U/L (ref 46–116)
ALT SERPL W P-5'-P-CCNC: 14 U/L (ref 12–78)
ANION GAP SERPL CALCULATED.3IONS-SCNC: 8 MMOL/L (ref 4–13)
AST SERPL W P-5'-P-CCNC: 14 U/L (ref 5–45)
BACTERIA UR QL AUTO: ABNORMAL /HPF
BASOPHILS # BLD AUTO: 0.05 THOUSANDS/ΜL (ref 0–0.1)
BASOPHILS NFR BLD AUTO: 1 % (ref 0–1)
BILIRUB SERPL-MCNC: 0.27 MG/DL (ref 0.2–1)
BILIRUB UR QL STRIP: NEGATIVE
BUN SERPL-MCNC: 45 MG/DL (ref 5–25)
CALCIUM SERPL-MCNC: 6.9 MG/DL (ref 8.3–10.1)
CHLORIDE SERPL-SCNC: 108 MMOL/L (ref 100–108)
CLARITY UR: CLEAR
CO2 SERPL-SCNC: 20 MMOL/L (ref 21–32)
COLOR UR: YELLOW
CREAT SERPL-MCNC: 3.08 MG/DL (ref 0.6–1.3)
EOSINOPHIL # BLD AUTO: 0.32 THOUSAND/ΜL (ref 0–0.61)
EOSINOPHIL NFR BLD AUTO: 6 % (ref 0–6)
ERYTHROCYTE [DISTWIDTH] IN BLOOD BY AUTOMATED COUNT: 13.1 % (ref 11.6–15.1)
EST. AVERAGE GLUCOSE BLD GHB EST-MCNC: 148 MG/DL
GFR SERPL CREATININE-BSD FRML MDRD: 17 ML/MIN/1.73SQ M
GLUCOSE P FAST SERPL-MCNC: 99 MG/DL (ref 65–99)
GLUCOSE UR STRIP-MCNC: ABNORMAL MG/DL
HBA1C MFR BLD: 6.8 % (ref 4.2–6.3)
HCT VFR BLD AUTO: 30.5 % (ref 36.5–49.3)
HGB BLD-MCNC: 9.6 G/DL (ref 12–17)
HGB UR QL STRIP.AUTO: ABNORMAL
IMM GRANULOCYTES # BLD AUTO: 0.01 THOUSAND/UL (ref 0–0.2)
IMM GRANULOCYTES NFR BLD AUTO: 0 % (ref 0–2)
INR PPP: 1.85 (ref 0.86–1.17)
KETONES UR STRIP-MCNC: NEGATIVE MG/DL
LEUKOCYTE ESTERASE UR QL STRIP: NEGATIVE
LYMPHOCYTES # BLD AUTO: 1.53 THOUSANDS/ΜL (ref 0.6–4.47)
LYMPHOCYTES NFR BLD AUTO: 27 % (ref 14–44)
MCH RBC QN AUTO: 30.1 PG (ref 26.8–34.3)
MCHC RBC AUTO-ENTMCNC: 31.5 G/DL (ref 31.4–37.4)
MCV RBC AUTO: 96 FL (ref 82–98)
MONOCYTES # BLD AUTO: 0.49 THOUSAND/ΜL (ref 0.17–1.22)
MONOCYTES NFR BLD AUTO: 9 % (ref 4–12)
NEUTROPHILS # BLD AUTO: 3.3 THOUSANDS/ΜL (ref 1.85–7.62)
NEUTS SEG NFR BLD AUTO: 57 % (ref 43–75)
NITRITE UR QL STRIP: NEGATIVE
NON-SQ EPI CELLS URNS QL MICRO: ABNORMAL /HPF
NRBC BLD AUTO-RTO: 0 /100 WBCS
PH UR STRIP.AUTO: 6.5 [PH]
PLATELET # BLD AUTO: 219 THOUSANDS/UL (ref 149–390)
PMV BLD AUTO: 10.7 FL (ref 8.9–12.7)
POTASSIUM SERPL-SCNC: 4.5 MMOL/L (ref 3.5–5.3)
PROT SERPL-MCNC: 7.3 G/DL (ref 6.4–8.2)
PROT UR STRIP-MCNC: ABNORMAL MG/DL
PROTHROMBIN TIME: 21.4 SECONDS (ref 11.8–14.2)
RBC # BLD AUTO: 3.19 MILLION/UL (ref 3.88–5.62)
RBC #/AREA URNS AUTO: ABNORMAL /HPF
SODIUM SERPL-SCNC: 136 MMOL/L (ref 136–145)
SP GR UR STRIP.AUTO: 1.01 (ref 1–1.03)
UROBILINOGEN UR QL STRIP.AUTO: 0.2 E.U./DL
WBC # BLD AUTO: 5.7 THOUSAND/UL (ref 4.31–10.16)
WBC #/AREA URNS AUTO: ABNORMAL /HPF

## 2019-03-28 PROCEDURE — 80053 COMPREHEN METABOLIC PANEL: CPT

## 2019-03-28 PROCEDURE — 83036 HEMOGLOBIN GLYCOSYLATED A1C: CPT

## 2019-03-28 PROCEDURE — 81001 URINALYSIS AUTO W/SCOPE: CPT

## 2019-03-28 PROCEDURE — 85025 COMPLETE CBC W/AUTO DIFF WBC: CPT

## 2019-03-28 PROCEDURE — 85610 PROTHROMBIN TIME: CPT

## 2019-03-28 PROCEDURE — 36415 COLL VENOUS BLD VENIPUNCTURE: CPT

## 2019-03-28 PROCEDURE — 87086 URINE CULTURE/COLONY COUNT: CPT

## 2019-03-28 NOTE — TELEPHONE ENCOUNTER
[facility] patient, seen in follow up on 4/2/18 with Suzy Persaud [place]  History of BPH/LUTS/ UTI/ resolved retention, managed by DR Ed Saenz 2017  Last imaging 10/9/18 - US kidney bladder with pvr in Epic   - negative for obstructive uropathy, adequate pvr range, prostatomegaly which is known  Current follow up scheduled on 10/15/19  Called and spoke to daughter, Antonina Vargas ,  Patient is reporting the past couple of days when he initiates urine stream he notices a few drops of blood, however urine is clear  Denies fever, denies chills, denies difficulty urinating  Confirmed with daughter patient is on Tamsulosin daily and he is having daily regular bowel movements  Advised to have patient increase fluids, water and go for urine testing to check for infection, ua/ culture, advised orders entered for testing and that they can go to [facility] for testing  Advised urinalysis results take about 24 hours and culture 2 to 3 days, advised we will contact her after UA results for further recommendation  Advised message will be sent to provider to review  Pharmacy is [facility] in chart  Daughter is best contact for patient

## 2019-03-29 ENCOUNTER — TELEPHONE (OUTPATIENT)
Dept: UROLOGY | Facility: AMBULATORY SURGERY CENTER | Age: 84
End: 2019-03-29

## 2019-03-29 DIAGNOSIS — R31.0 GROSS HEMATURIA: Primary | ICD-10-CM

## 2019-03-29 LAB — BACTERIA UR CULT: NORMAL

## 2019-03-29 NOTE — TELEPHONE ENCOUNTER
I have ordered a CT scan and cystoscopy for this patient  That is normal workup for hematuria  This is likely due to his Coumadin use, although he is not at a supratherapeutic, or even therapeutic level at this time  Please let me know how else I can help

## 2019-03-29 NOTE — TELEPHONE ENCOUNTER
Called and spoke to daughter, advised that it is recommended patient have hematuria work up which includes CT abdomen/pelvis with and without contrast and in office cystoscopy  I scheduled patient for cysto on 4/24/19 at 3:30 pm in [facility] with Dr Ed Saenz  Patient has kidney disease and is followed by nephrology ,  He had a comprehensive metabolic profile done yesterday 3/28/19 results in Epic  BUN = 45 and Creatinine 3 08  Patient also with family history of bladder cancer, his father and brother both had bladder cancer  I advised daughter that message will be sent to provider to review BUN/Creatinine level and review ordering of CT scan with and without contrast    Also advised perhaps patient could have urine cytology ordered prior to cystoscopy  Advised daughter office will follow up by Monday with further recommendation regarding CT scan and urine cytology

## 2019-03-29 NOTE — TELEPHONE ENCOUNTER
Please see phone note from yesterday  I spoke to daughter just now and informed her urine culture is negative  Patient is still reporting that at onset of urine stream he has a few drops of blood, and that urine is clear  Advised her I would update provider

## 2019-04-01 DIAGNOSIS — R31.0 GROSS HEMATURIA: Primary | ICD-10-CM

## 2019-04-01 NOTE — TELEPHONE ENCOUNTER
Called and spoke to daughter, advised based on kidney function,  Patient has been ordered and ultrasound of kidney and bladder instead of CT scan, provided her with phone number for central scheduling to schedule US at least 7 days prior to upcoming cystoscopy on 4/24/19  Also advised order entered for urine testing - urine cytology and to have done at least 7 days before cystoscopy as well  Verbal understanding given

## 2019-04-04 ENCOUNTER — CONSULT (OUTPATIENT)
Dept: NEUROLOGY | Facility: CLINIC | Age: 84
End: 2019-04-04
Payer: MEDICARE

## 2019-04-04 VITALS
DIASTOLIC BLOOD PRESSURE: 74 MMHG | SYSTOLIC BLOOD PRESSURE: 138 MMHG | HEART RATE: 74 BPM | BODY MASS INDEX: 31.08 KG/M2 | HEIGHT: 71 IN | WEIGHT: 222 LBS

## 2019-04-04 DIAGNOSIS — N18.9 CHRONIC KIDNEY DISEASE, UNSPECIFIED CKD STAGE: Primary | ICD-10-CM

## 2019-04-04 DIAGNOSIS — R25.1 TREMOR: Primary | ICD-10-CM

## 2019-04-04 DIAGNOSIS — I10 HYPERTENSION, UNSPECIFIED TYPE: ICD-10-CM

## 2019-04-04 PROCEDURE — 99204 OFFICE O/P NEW MOD 45 MIN: CPT | Performed by: PSYCHIATRY & NEUROLOGY

## 2019-04-04 RX ORDER — SIMVASTATIN 20 MG
20 TABLET ORAL
COMMUNITY
End: 2020-08-23 | Stop reason: HOSPADM

## 2019-04-04 RX ORDER — ZONISAMIDE 25 MG/1
100 CAPSULE ORAL
Qty: 120 CAPSULE | Refills: 3 | Status: SHIPPED | OUTPATIENT
Start: 2019-04-04 | End: 2019-10-24 | Stop reason: SDUPTHER

## 2019-04-04 RX ORDER — FLUOXETINE HYDROCHLORIDE 40 MG/1
40 CAPSULE ORAL DAILY
COMMUNITY
End: 2020-08-23 | Stop reason: HOSPADM

## 2019-04-04 RX ORDER — SODIUM POLYSTYRENE SULFONATE 15 G/60ML
15 SUSPENSION ORAL; RECTAL 3 TIMES WEEKLY
Qty: 500 ML | Refills: 3 | Status: SHIPPED | OUTPATIENT
Start: 2019-04-05 | End: 2020-08-07 | Stop reason: HOSPADM

## 2019-04-05 DIAGNOSIS — E87.5 HYPERKALEMIA: Primary | ICD-10-CM

## 2019-04-09 ENCOUNTER — TELEPHONE (OUTPATIENT)
Dept: NEPHROLOGY | Facility: CLINIC | Age: 84
End: 2019-04-09

## 2019-04-18 ENCOUNTER — HOSPITAL ENCOUNTER (OUTPATIENT)
Dept: RADIOLOGY | Facility: MEDICAL CENTER | Age: 84
Discharge: HOME | End: 2019-04-18
Payer: MEDICARE

## 2019-04-18 DIAGNOSIS — R31.0 GROSS HEMATURIA: ICD-10-CM

## 2019-04-18 PROCEDURE — 76770 US EXAM ABDO BACK WALL COMP: CPT

## 2019-04-24 ENCOUNTER — TELEPHONE (OUTPATIENT)
Dept: UROLOGY | Facility: AMBULATORY SURGERY CENTER | Age: 84
End: 2019-04-24

## 2019-04-24 ENCOUNTER — PROCEDURE VISIT (OUTPATIENT)
Dept: UROLOGY | Facility: CLINIC | Age: 84
End: 2019-04-24
Payer: MEDICARE

## 2019-04-24 VITALS
BODY MASS INDEX: 29.82 KG/M2 | HEART RATE: 84 BPM | WEIGHT: 213 LBS | HEIGHT: 71 IN | DIASTOLIC BLOOD PRESSURE: 58 MMHG | SYSTOLIC BLOOD PRESSURE: 110 MMHG

## 2019-04-24 DIAGNOSIS — N40.0 ENLARGED PROSTATE: Primary | ICD-10-CM

## 2019-04-24 DIAGNOSIS — R33.9 INCOMPLETE BLADDER EMPTYING: ICD-10-CM

## 2019-04-24 DIAGNOSIS — R31.0 GROSS HEMATURIA: ICD-10-CM

## 2019-04-24 PROCEDURE — 99214 OFFICE O/P EST MOD 30 MIN: CPT | Performed by: UROLOGY

## 2019-04-24 RX ORDER — WARFARIN SODIUM 4 MG/1
TABLET ORAL
COMMUNITY
End: 2020-08-07 | Stop reason: HOSPADM

## 2019-04-24 RX ORDER — FINASTERIDE 5 MG/1
5 TABLET, FILM COATED ORAL DAILY
Qty: 90 TABLET | Refills: 3 | Status: SHIPPED | OUTPATIENT
Start: 2019-04-24 | End: 2020-06-29 | Stop reason: SDUPTHER

## 2019-04-27 ENCOUNTER — HOSPITAL ENCOUNTER (OUTPATIENT)
Dept: MRI IMAGING | Facility: HOSPITAL | Age: 84
Discharge: HOME | End: 2019-04-27
Attending: PSYCHIATRY & NEUROLOGY
Payer: MEDICARE

## 2019-04-27 DIAGNOSIS — R25.1 TREMOR: ICD-10-CM

## 2019-04-27 PROCEDURE — 70551 MRI BRAIN STEM W/O DYE: CPT

## 2019-05-02 ENCOUNTER — TELEPHONE (OUTPATIENT)
Dept: NEUROLOGY | Facility: CLINIC | Age: 84
End: 2019-05-02

## 2019-05-10 ENCOUNTER — TRANSCRIBE ORDERS (OUTPATIENT)
Dept: ADMINISTRATIVE | Facility: HOSPITAL | Age: 84
End: 2019-05-10

## 2019-05-10 ENCOUNTER — APPOINTMENT (OUTPATIENT)
Dept: LAB | Facility: MEDICAL CENTER | Age: 84
End: 2019-05-10
Payer: MEDICARE

## 2019-05-10 DIAGNOSIS — E87.5 HYPERKALEMIA: ICD-10-CM

## 2019-05-10 LAB
ANION GAP SERPL CALCULATED.3IONS-SCNC: 10 MMOL/L (ref 4–13)
BUN SERPL-MCNC: 44 MG/DL (ref 5–25)
CALCIUM SERPL-MCNC: 7.4 MG/DL (ref 8.3–10.1)
CHLORIDE SERPL-SCNC: 105 MMOL/L (ref 100–108)
CO2 SERPL-SCNC: 20 MMOL/L (ref 21–32)
CREAT SERPL-MCNC: 3.07 MG/DL (ref 0.6–1.3)
GFR SERPL CREATININE-BSD FRML MDRD: 17 ML/MIN/1.73SQ M
GLUCOSE P FAST SERPL-MCNC: 103 MG/DL (ref 65–99)
POTASSIUM SERPL-SCNC: 4.7 MMOL/L (ref 3.5–5.3)
SODIUM SERPL-SCNC: 135 MMOL/L (ref 136–145)

## 2019-05-10 PROCEDURE — 80048 BASIC METABOLIC PNL TOTAL CA: CPT

## 2019-05-13 ENCOUNTER — TELEPHONE (OUTPATIENT)
Dept: NEPHROLOGY | Facility: CLINIC | Age: 84
End: 2019-05-13

## 2019-05-14 ENCOUNTER — OFFICE VISIT (OUTPATIENT)
Dept: NEPHROLOGY | Facility: CLINIC | Age: 84
End: 2019-05-14
Payer: MEDICARE

## 2019-05-14 VITALS
HEART RATE: 70 BPM | DIASTOLIC BLOOD PRESSURE: 62 MMHG | WEIGHT: 208 LBS | BODY MASS INDEX: 29.78 KG/M2 | HEIGHT: 70 IN | SYSTOLIC BLOOD PRESSURE: 122 MMHG

## 2019-05-14 DIAGNOSIS — I10 ESSENTIAL HYPERTENSION: Chronic | ICD-10-CM

## 2019-05-14 DIAGNOSIS — N18.9 CHRONIC KIDNEY DISEASE, UNSPECIFIED CKD STAGE: Primary | ICD-10-CM

## 2019-05-14 PROCEDURE — 99213 OFFICE O/P EST LOW 20 MIN: CPT | Performed by: INTERNAL MEDICINE

## 2019-07-26 ENCOUNTER — TELEPHONE (OUTPATIENT)
Dept: NEPHROLOGY | Facility: CLINIC | Age: 84
End: 2019-07-26

## 2019-07-26 NOTE — TELEPHONE ENCOUNTER
L/m for pt to call the office to schedule a follow up with Dr Bonita Mesa in [facility]

## 2019-09-25 ENCOUNTER — APPOINTMENT (OUTPATIENT)
Dept: LAB | Facility: MEDICAL CENTER | Age: 84
End: 2019-09-25
Payer: MEDICARE

## 2019-09-25 ENCOUNTER — TRANSCRIBE ORDERS (OUTPATIENT)
Dept: ADMINISTRATIVE | Facility: HOSPITAL | Age: 84
End: 2019-09-25

## 2019-09-25 DIAGNOSIS — E11.9 DIABETES MELLITUS WITHOUT COMPLICATION (HCC): Primary | ICD-10-CM

## 2019-09-25 DIAGNOSIS — N18.9 CHRONIC KIDNEY DISEASE, UNSPECIFIED CKD STAGE: ICD-10-CM

## 2019-09-25 DIAGNOSIS — I48.91 ATRIAL FIBRILLATION, UNSPECIFIED TYPE (HCC): ICD-10-CM

## 2019-09-25 LAB
ALBUMIN SERPL BCP-MCNC: 3.6 G/DL (ref 3.5–5)
ALP SERPL-CCNC: 133 U/L (ref 46–116)
ALT SERPL W P-5'-P-CCNC: 15 U/L (ref 12–78)
ANION GAP SERPL CALCULATED.3IONS-SCNC: 8 MMOL/L (ref 4–13)
AST SERPL W P-5'-P-CCNC: 11 U/L (ref 5–45)
BILIRUB SERPL-MCNC: 0.26 MG/DL (ref 0.2–1)
BUN SERPL-MCNC: 37 MG/DL (ref 5–25)
CALCIUM SERPL-MCNC: 7.6 MG/DL (ref 8.3–10.1)
CHLORIDE SERPL-SCNC: 111 MMOL/L (ref 100–108)
CO2 SERPL-SCNC: 20 MMOL/L (ref 21–32)
CREAT SERPL-MCNC: 3.03 MG/DL (ref 0.6–1.3)
EST. AVERAGE GLUCOSE BLD GHB EST-MCNC: 146 MG/DL
GFR SERPL CREATININE-BSD FRML MDRD: 17 ML/MIN/1.73SQ M
GLUCOSE P FAST SERPL-MCNC: 97 MG/DL (ref 65–99)
HBA1C MFR BLD: 6.7 % (ref 4.2–6.3)
POTASSIUM SERPL-SCNC: 5.1 MMOL/L (ref 3.5–5.3)
PROT SERPL-MCNC: 7.1 G/DL (ref 6.4–8.2)
SODIUM SERPL-SCNC: 139 MMOL/L (ref 136–145)

## 2019-09-25 PROCEDURE — 80053 COMPREHEN METABOLIC PANEL: CPT | Performed by: FAMILY MEDICINE

## 2019-09-25 PROCEDURE — 83036 HEMOGLOBIN GLYCOSYLATED A1C: CPT | Performed by: FAMILY MEDICINE

## 2019-10-24 ENCOUNTER — OFFICE VISIT (OUTPATIENT)
Dept: NEUROLOGY | Facility: CLINIC | Age: 84
End: 2019-10-24
Payer: MEDICARE

## 2019-10-24 ENCOUNTER — OFFICE VISIT (OUTPATIENT)
Dept: NEPHROLOGY | Facility: CLINIC | Age: 84
End: 2019-10-24
Payer: MEDICARE

## 2019-10-24 VITALS
HEIGHT: 71 IN | DIASTOLIC BLOOD PRESSURE: 70 MMHG | HEART RATE: 70 BPM | SYSTOLIC BLOOD PRESSURE: 120 MMHG | BODY MASS INDEX: 28.28 KG/M2 | WEIGHT: 202 LBS

## 2019-10-24 VITALS
SYSTOLIC BLOOD PRESSURE: 124 MMHG | DIASTOLIC BLOOD PRESSURE: 68 MMHG | WEIGHT: 202 LBS | HEIGHT: 70 IN | BODY MASS INDEX: 28.92 KG/M2

## 2019-10-24 DIAGNOSIS — G62.9 NEUROPATHY: ICD-10-CM

## 2019-10-24 DIAGNOSIS — N18.9 CHRONIC KIDNEY DISEASE, UNSPECIFIED CKD STAGE: Primary | ICD-10-CM

## 2019-10-24 DIAGNOSIS — G20 PARKINSONISM, UNSPECIFIED PARKINSONISM TYPE (HCC): Primary | ICD-10-CM

## 2019-10-24 DIAGNOSIS — I10 ESSENTIAL HYPERTENSION: Chronic | ICD-10-CM

## 2019-10-24 DIAGNOSIS — R25.1 TREMOR: ICD-10-CM

## 2019-10-24 DIAGNOSIS — R26.9 UNSPECIFIED ABNORMALITIES OF GAIT AND MOBILITY: ICD-10-CM

## 2019-10-24 PROCEDURE — 99214 OFFICE O/P EST MOD 30 MIN: CPT | Performed by: PSYCHIATRY & NEUROLOGY

## 2019-10-24 PROCEDURE — 99214 OFFICE O/P EST MOD 30 MIN: CPT | Performed by: INTERNAL MEDICINE

## 2019-10-24 RX ORDER — ZONISAMIDE 25 MG/1
75 CAPSULE ORAL
Qty: 270 CAPSULE | Refills: 3 | Status: SHIPPED | OUTPATIENT
Start: 2019-10-24 | End: 2020-08-07 | Stop reason: HOSPADM

## 2019-10-24 NOTE — PROGRESS NOTES
Assessment/Plan:    Tremor  80-year-old man presents in follow-up for tremor  His tremor is significantly improved with the addition of zonisamide at low dose  We will continue with 75 mg of zonisamide at bedtime  Could consider adding back in primidone in the future if needed  The patient has minimal parkinsonism remained unchanged  The patient is still driving  He denies any issues with driving  No accidents, no getting lost, no fender benders  His daughter reports that she drives with him regularly and has not noticed anything concerning  They were not open to a formal driving assessment  Would recommend his other providers continue to monitor for his driving safety  Multifactorial gait disorder  Patient denies any falls  Walking with a cane  Diagnoses and all orders for this visit:    Parkinsonism, unspecified Parkinsonism type ([facility] [address] )    Tremor  -     zonisamide (ZONEGRAN) 25 mg capsule; Take 3 capsules (75 mg total) by mouth daily at bedtime    Neuropathy    Unspecified abnormalities of gait and mobility        Subjective:     Patient ID: Terri Hilario is a 80 y o  male  I had the pleasure of seeing your patient, Terri Hilario in the Movement Disorders [facility] at the [facility]  Cristopher Hitchcock 80year-old right-handed man with 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 and foot ulcers, atrial fibrillation, chronic kidney disease and cognitive impairment who presents in follow up for right>left hand tremor, symptom onset 2017  On initial presentation the patient had a right sided postural tremor worse in specific positions and a bilateral 1 cm kinetic tremor  He also has mild parkinsonism  He was switched from primidone to zonisamide  Prior medication trials:  Primidone: probably was helpful (could try again in the future)   Metoprolol: depression symptomatic bradycardia       Current medications:  zonisamide 75mg QHS    Interval history:  Tremor is much better on 75mg QHS of the zonisamide  When he came off of the primidone his tremor got much worse  Main goal was to control the mouse on the computer which he can do! Signing his name is much better as well  No side effects from the medication  The following portions of the patient's history were reviewed and updated as appropriate: allergies, current medications, past family history, past medical history, past social history, past surgical history and problem list       Objective:  /68 (BP Location: Left arm, Patient Position: Sitting, Cuff Size: Standard)   Ht 5' 9 5" (1 765 m)   Wt 91 6 kg (202 lb)   BMI 29 40 kg/m²     Physical Exam    Neurological Exam    Head Tremor: 0  Face Tremor: 0  Voice Tremor: 0  Upper limb tremor       R outstretched posture: <1cm       L outstretched posture: <1cm       R Wing Beatincm       L Wing Beatincm       R Kinetic: 1cm       L Kinetic: 1 5cm       R rest: 0       L rest: 0  Lower limb tremor:        R: 0       L: 0  Archimedes Spirals:        R: slight       L: mild   Handwriting: obvious tremor but totally legible   Standing tremor: 0  Tone:       R: slight if any       L: slight if any  Bradykinesia:       R: slight, no decrement or hesitations        L: normal   Gait: needs to use his hands to stand  Slowed gait, unsteady, trips over his feet  Some balance troubles when turning  In a diabetic sandal        Review of Systems   Constitutional: Negative  Negative for appetite change and fever  HENT: Negative  Negative for hearing loss, tinnitus, trouble swallowing and voice change  Eyes: Negative  Negative for photophobia and pain  Respiratory: Negative  Negative for shortness of breath  Cardiovascular: Negative  Negative for palpitations  Gastrointestinal: Negative  Negative for nausea and vomiting  Endocrine: Negative  Negative for cold intolerance and heat intolerance  Genitourinary: Negative    Negative for dysuria, frequency and urgency  Musculoskeletal: Positive for gait problem  Negative for myalgias and neck pain  Skin: Negative  Negative for rash  Neurological: Positive for tremors  Negative for dizziness, seizures, syncope, facial asymmetry, speech difficulty, weakness, light-headedness, numbness and headaches  Hematological: Negative  Does not bruise/bleed easily  Psychiatric/Behavioral: Negative  Negative for confusion, hallucinations and sleep disturbance  The above ROS was reviewed and updated  Flaquito Hinkle MD  Medical Director   [facility]  Movement and Memory Specialist       Current Outpatient Medications on File Prior to Visit   Medication Sig Dispense Refill    amLODIPine (NORVASC) 5 mg tablet Take 1 tablet by mouth daily      Cholecalciferol (VITAMIN D) 2000 units CAPS Take 2,000 Units by mouth once a week tuesdays       clopidogrel (PLAVIX) 75 mg tablet Take 1 tablet by mouth daily  0    finasteride (PROSCAR) 5 mg tablet Take 1 tablet (5 mg total) by mouth daily 90 tablet 3    furosemide (LASIX) 40 mg tablet Take 40 mg by mouth daily      glipiZIDE (GLUCOTROL XL) 2 5 mg 24 hr tablet Take 1 tablet by mouth daily      Memantine HCl ER (NAMENDA XR) 14 MG CP24 Take 1 capsule by mouth daily      Patiromer Sorbitex Calcium (VELTASSA) 8 4 g PACK Take 8 4 g by mouth 3 (three) times a week 12 each 5    simvastatin (ZOCOR) 20 mg tablet Take 20 mg by mouth daily at bedtime      sodium bicarbonate 650 mg tablet Take 1 tablet by mouth 2 (two) times daily after meals 60 tablet 0    tamsulosin (FLOMAX) 0 4 mg Take 0 4 mg by mouth daily with dinner      warfarin (COUMADIN) 1 mg tablet Take by mouth daily       warfarin (COUMADIN) 4 mg tablet Take by mouth daily      [DISCONTINUED] zonisamide (ZONEGRAN) 25 mg capsule Take 4 capsules (100 mg total) by mouth daily at bedtime Start with 1 tab QHS and increase by 1 tab every 2 weeks up to 4 tabs   (Patient taking differently: Take 25 mg by mouth daily at bedtime 25 mg at bedtime) 120 capsule 3    FLUoxetine (PROzac) 40 MG capsule Take 40 mg by mouth daily      KIONEX 15 GM/60ML suspension Take 60 mL (15 g total) by mouth 3 (three) times a week (Patient not taking: Reported on 4/24/2019) 500 mL 3    primidone (MYSOLINE) 50 mg tablet Take 50 mg by mouth daily      warfarin (COUMADIN) 5 mg tablet Take 1 tablet by mouth daily (Patient not taking: Reported on 4/24/2019)  0     No current facility-administered medications on file prior to visit

## 2019-10-24 NOTE — PATIENT INSTRUCTIONS
You are here for follow-up sounds like your health has been doing well  Your going to see your urologist soon  Your creatinine level which is the blood test for the kidney is a 3 and I showed her going back a year and a half it is stable infected was a little worse back then so there has been no progression  At this point continue current medications her blood pressure is excellent  Will to continue to monitor and I am very happy that there has been no progression  Labs and follow-up as scheduled

## 2019-10-24 NOTE — ASSESSMENT & PLAN NOTE
51-year-old man presents in follow-up for tremor  His tremor is significantly improved with the addition of zonisamide at low dose  We will continue with 75 mg of zonisamide at bedtime  Could consider adding back in primidone in the future if needed  The patient has minimal parkinsonism remained unchanged  The patient is still driving  He denies any issues with driving  No accidents, no getting lost, no fender benders  His daughter reports that she drives with him regularly and has not noticed anything concerning  They were not open to a formal driving assessment  Would recommend his other providers continue to monitor for his driving safety

## 2019-10-24 NOTE — PROGRESS NOTES
NEPHROLOGY PROGRESS NOTE    Rocco De Anda 80 y o  male MRN: [id number]  Unit/Bed#:  Encounter: [id number]  Reason for Consult:  Chronic kidney disease    The patient is here for routine follow-up  His health has been doing well he is here with his daughter there has been no reported hospitalizations or significant changes in the medications overall he is feeling well states he still living alone in driving  ASSESSMENT/PLAN:  1  Renal    Patient's chronic kidney disease his latest creatinine is 3 in going back a year and a half there has been no progression in fact was little higher back then  In reviewing the history I suspect that the patient had some atheroemboli as his creatinine has really increased subacutely after had vascular procedures in the past   At this point creatinine remained stable blood pressure is under good control  He has no symptoms of uremia is doing very well  He does have chronic hyperkalemia is on medication to help with meals and that is under good control  Continue current medications  PLAN:  Will continue to follow richi  SUBJECTIVE:  Review of Systems   Constitution: Negative for chills and fever  HENT: Negative  Eyes: Negative  Cardiovascular: Negative  Negative for chest pain, dyspnea on exertion, leg swelling, orthopnea and palpitations  Respiratory: Negative  Negative for cough, shortness of breath and wheezing  Gastrointestinal: Negative  Negative for abdominal pain, diarrhea, nausea and vomiting  Genitourinary: Negative  Negative for dysuria, hematuria and incomplete emptying  Neurological: Negative  Psychiatric/Behavioral: Negative for altered mental status  OBJECTIVE:  Current Weight: Weight - Scale: 91 6 kg (202 lb)  [email or web address]:     Blood pressure 120/70, pulse 70, height 5' 11" (1 803 m), weight 91 6 kg (202 lb)  , Body mass index is 28 17 kg/m²      [unfilled]    Physical Exam: /70 (BP Location: Left arm, Patient Position: Sitting, Cuff Size: Standard)   Pulse 70   Ht 5' 11" (1 803 m)   Wt 91 6 kg (202 lb)   BMI 28 17 kg/m²   Physical Exam   Constitutional: He is oriented to person, place, and time  No distress  Eyes: No scleral icterus  Neck: Neck supple  No tracheal deviation present  Cardiovascular: Normal rate and regular rhythm  Exam reveals no gallop and no friction rub  Pulmonary/Chest: Effort normal and breath sounds normal  No respiratory distress  He has no wheezes  He has no rales  Abdominal: Soft  Bowel sounds are normal  He exhibits no distension  There is no tenderness  Neurological: He is alert and oriented to person, place, and time  Psychiatric: He has a normal mood and affect         Medications:    Current Outpatient Medications:     amLODIPine (NORVASC) 5 mg tablet, Take 1 tablet by mouth daily, Disp: , Rfl:     Cholecalciferol (VITAMIN D) 2000 units CAPS, Take 2,000 Units by mouth once a week tuesdays , Disp: , Rfl:     clopidogrel (PLAVIX) 75 mg tablet, Take 1 tablet by mouth daily, Disp: , Rfl: 0    finasteride (PROSCAR) 5 mg tablet, Take 1 tablet (5 mg total) by mouth daily, Disp: 90 tablet, Rfl: 3    FLUoxetine (PROzac) 40 MG capsule, Take 40 mg by mouth daily, Disp: , Rfl:     furosemide (LASIX) 40 mg tablet, Take 40 mg by mouth daily, Disp: , Rfl:     glipiZIDE (GLUCOTROL XL) 2 5 mg 24 hr tablet, Take 1 tablet by mouth daily, Disp: , Rfl:     Memantine HCl ER (NAMENDA XR) 14 MG CP24, Take 1 capsule by mouth daily, Disp: , Rfl:     Patiromer Sorbitex Calcium (VELTASSA) 8 4 g PACK, Take 8 4 g by mouth 3 (three) times a week, Disp: 12 each, Rfl: 5    simvastatin (ZOCOR) 20 mg tablet, Take 20 mg by mouth daily at bedtime, Disp: , Rfl:     sodium bicarbonate 650 mg tablet, Take 1 tablet by mouth 2 (two) times daily after meals, Disp: 60 tablet, Rfl: 0    tamsulosin (FLOMAX) 0 4 mg, Take 0 4 mg by mouth daily with dinner, Disp: , Rfl:     warfarin (COUMADIN) 1 mg tablet, Take by mouth daily , Disp: , Rfl:     warfarin (COUMADIN) 4 mg tablet, Take by mouth daily, Disp: , Rfl:     zonisamide (ZONEGRAN) 25 mg capsule, Take 3 capsules (75 mg total) by mouth daily at bedtime, Disp: 270 capsule, Rfl: 3    KIONEX 15 GM/60ML suspension, Take 60 mL (15 g total) by mouth 3 (three) times a week (Patient not taking: Reported on 4/24/2019), Disp: 500 mL, Rfl: 3    primidone (MYSOLINE) 50 mg tablet, Take 50 mg by mouth daily, Disp: , Rfl:     warfarin (COUMADIN) 5 mg tablet, Take 1 tablet by mouth daily (Patient not taking: Reported on 4/24/2019), Disp: , Rfl: 0    Laboratory Results:  Lab Results   Component Value Date    WBC 5 70 03/28/2019    HGB 9 6 (L) 03/28/2019    HCT 30 5 (L) 03/28/2019    MCV 96 03/28/2019     03/28/2019     Lab Results   Component Value Date    SODIUM 139 09/25/2019    K 5 1 09/25/2019     (H) 09/25/2019    CO2 20 (L) 09/25/2019    BUN 37 (H) 09/25/2019    CREATININE 3 03 (H) 09/25/2019    GLUC 183 (H) 01/02/2018    CALCIUM 7 6 (L) 09/25/2019     Lab Results   Component Value Date    CALCIUM 7 6 (L) 09/25/2019    PHOS 3 2 08/29/2017     No results found for: LABPROT

## 2019-10-24 NOTE — LETTER
October 24, 2019     Emiliano Hillman MD  [address] [facility] [address] [address]    Patient: Terri Hilario   YOB: 1930   Date of Visit: 10/24/2019       Dear Dr Khloe Lux: Thank you for referring Cristopher Hitchcock to me for evaluation  Below are my notes for this consultation  If you have questions, please do not hesitate to call me  I look forward to following your patient along with you  Sincerely,        Sarahy Hassan MD        CC: No Recipients  Sarahy Hassan MD  10/24/2019 11:48 AM  Sign at close encounter  Axel Perez 80 y o  male MRN: [id number]  Unit/Bed#:  Encounter: [id number]  Reason for Consult:  Chronic kidney disease    The patient is here for routine follow-up  His health has been doing well he is here with his daughter there has been no reported hospitalizations or significant changes in the medications overall he is feeling well states he still living alone in driving  ASSESSMENT/PLAN:  1  Renal    Patient's chronic kidney disease his latest creatinine is 3 in going back a year and a half there has been no progression in fact was little higher back then  In reviewing the history I suspect that the patient had some atheroemboli as his creatinine has really increased subacutely after had vascular procedures in the past   At this point creatinine remained stable blood pressure is under good control  He has no symptoms of uremia is doing very well  He does have chronic hyperkalemia is on medication to help with meals and that is under good control  Continue current medications  PLAN:  Will continue to follow richi  SUBJECTIVE:  Review of Systems   Constitution: Negative for chills and fever  HENT: Negative  Eyes: Negative  Cardiovascular: Negative  Negative for chest pain, dyspnea on exertion, leg swelling, orthopnea and palpitations  Respiratory: Negative  Negative for cough, shortness of breath and wheezing      Gastrointestinal: Negative  Negative for abdominal pain, diarrhea, nausea and vomiting  Genitourinary: Negative  Negative for dysuria, hematuria and incomplete emptying  Neurological: Negative  Psychiatric/Behavioral: Negative for altered mental status  OBJECTIVE:  Current Weight: Weight - Scale: 91 6 kg (202 lb)  [email or web address]:     Blood pressure 120/70, pulse 70, height 5' 11" (1 803 m), weight 91 6 kg (202 lb)  , Body mass index is 28 17 kg/m²  [unfilled]    Physical Exam: /70 (BP Location: Left arm, Patient Position: Sitting, Cuff Size: Standard)   Pulse 70   Ht 5' 11" (1 803 m)   Wt 91 6 kg (202 lb)   BMI 28 17 kg/m²    Physical Exam   Constitutional: He is oriented to person, place, and time  No distress  Eyes: No scleral icterus  Neck: Neck supple  No tracheal deviation present  Cardiovascular: Normal rate and regular rhythm  Exam reveals no gallop and no friction rub  Pulmonary/Chest: Effort normal and breath sounds normal  No respiratory distress  He has no wheezes  He has no rales  Abdominal: Soft  Bowel sounds are normal  He exhibits no distension  There is no tenderness  Neurological: He is alert and oriented to person, place, and time  Psychiatric: He has a normal mood and affect         Medications:    Current Outpatient Medications:     amLODIPine (NORVASC) 5 mg tablet, Take 1 tablet by mouth daily, Disp: , Rfl:     Cholecalciferol (VITAMIN D) 2000 units CAPS, Take 2,000 Units by mouth once a week tuesdays , Disp: , Rfl:     clopidogrel (PLAVIX) 75 mg tablet, Take 1 tablet by mouth daily, Disp: , Rfl: 0    finasteride (PROSCAR) 5 mg tablet, Take 1 tablet (5 mg total) by mouth daily, Disp: 90 tablet, Rfl: 3    FLUoxetine (PROzac) 40 MG capsule, Take 40 mg by mouth daily, Disp: , Rfl:     furosemide (LASIX) 40 mg tablet, Take 40 mg by mouth daily, Disp: , Rfl:     glipiZIDE (GLUCOTROL XL) 2 5 mg 24 hr tablet, Take 1 tablet by mouth daily, Disp: , Rfl:    Memantine HCl ER (NAMENDA XR) 14 MG CP24, Take 1 capsule by mouth daily, Disp: , Rfl:     Patiromer Sorbitex Calcium (VELTASSA) 8 4 g PACK, Take 8 4 g by mouth 3 (three) times a week, Disp: 12 each, Rfl: 5    simvastatin (ZOCOR) 20 mg tablet, Take 20 mg by mouth daily at bedtime, Disp: , Rfl:     sodium bicarbonate 650 mg tablet, Take 1 tablet by mouth 2 (two) times daily after meals, Disp: 60 tablet, Rfl: 0    tamsulosin (FLOMAX) 0 4 mg, Take 0 4 mg by mouth daily with dinner, Disp: , Rfl:     warfarin (COUMADIN) 1 mg tablet, Take by mouth daily , Disp: , Rfl:     warfarin (COUMADIN) 4 mg tablet, Take by mouth daily, Disp: , Rfl:     zonisamide (ZONEGRAN) 25 mg capsule, Take 3 capsules (75 mg total) by mouth daily at bedtime, Disp: 270 capsule, Rfl: 3    KIONEX 15 GM/60ML suspension, Take 60 mL (15 g total) by mouth 3 (three) times a week (Patient not taking: Reported on 4/24/2019), Disp: 500 mL, Rfl: 3    primidone (MYSOLINE) 50 mg tablet, Take 50 mg by mouth daily, Disp: , Rfl:     warfarin (COUMADIN) 5 mg tablet, Take 1 tablet by mouth daily (Patient not taking: Reported on 4/24/2019), Disp: , Rfl: 0    Laboratory Results:  Lab Results   Component Value Date    WBC 5 70 03/28/2019    HGB 9 6 (L) 03/28/2019    HCT 30 5 (L) 03/28/2019    MCV 96 03/28/2019     03/28/2019     Lab Results   Component Value Date    SODIUM 139 09/25/2019    K 5 1 09/25/2019     (H) 09/25/2019    CO2 20 (L) 09/25/2019    BUN 37 (H) 09/25/2019    CREATININE 3 03 (H) 09/25/2019    GLUC 183 (H) 01/02/2018    CALCIUM 7 6 (L) 09/25/2019     Lab Results   Component Value Date    CALCIUM 7 6 (L) 09/25/2019    PHOS 3 2 08/29/2017     No results found for: LABPROT

## 2019-10-25 ENCOUNTER — TELEPHONE (OUTPATIENT)
Dept: UROLOGY | Facility: MEDICAL CENTER | Age: 84
End: 2019-10-25

## 2019-10-25 NOTE — TELEPHONE ENCOUNTER
Patient of Dr Gautam Medrano seen at the [place] office  Last seen 4/2019 for this similar issue, declined cystoscopy  Called and spoke with patient's daughter [facility]) at this time  She reports her father has a couple drops of blood sometimes at night right when he starts to urinate  Patient reports to her no pain, discomfort or UTI symptoms  Patients daughter reports this same thing had happened previously in April  The patient has a follow up scheduled on 11/6/19  It sounds like bleeding from his prostate, the scheduled follow up is appropriate at this time  If he starts having other symptoms or having increased bleeding they should give our office a call  Patient's daughter verbalized understanding

## 2019-10-25 NOTE — TELEPHONE ENCOUNTER
Patient of Dr Cookie Camacho seen in [facility] office  Patient daughter states father started having drops of blood at night when he urinates  Please advise

## 2019-11-04 NOTE — PROGRESS NOTES
Assessment and plan:       1  BPH with a history of urinary retention, resolved  - Patient continues to take finasteride   - He is not bothered by his lower urinary tract symptoms   - He will follow up in 1 year for symptom reassessment  2  Gross hematuria  - As previously discussed, gross hematuria thought to be likely due to prostatic hypertrophy in the setting of anticoagulation   - Patient previously refused cystoscopy and is not interested in further workup at this time  - He will contact us in the interim if his bleeding worsens or if he develops any other concerns  Will Arita PA-C      Chief Complaint     Chief Complaint   Patient presents with    Benign Prostatic Hypertrophy         History of Present Illness     Ankita Garcia is a 80 y o  male patient initially seen by Dr Rubi Anne in consultation in 2014 and more recently saw Dr Soren Macias while hospitalized for an acute episode of urinary retention and urinary tract infection  Patient then contacted our office earlier this year reporting multiple episodes of gross, painless hematuria  He underwent ultrasonography for upper tract evaluation due to his chronic kidney disease  This showed no lesions  Then patient was scheduled for cystoscopy which he refused upon arriving to the office on 04/24/2019  Dr Soren Macias discussed with the patient and his daughter that his hematuria was most likely secondary to BPH in the setting of chronic anticoagulation  Patient is aware that he has risk factors including being a former smoker  He was initiated on finasteride which has been shown to reduce the risk of recurrent hematuria from the level of the prostate  Patient is a poor historian  He is not sure when he takes some of his medications  He does continue to get up 5 times nightly and is on furosemide and is unsure if he takes this in the morning or in the evening    He believes that he continues to take finasteride as prescribed  He demonstrates incomplete bladder emptying today with a PVR of 120 mL  AUA symptom score is 14 with an overall bother score of 0  He does continue to notice a drop or 2 of blood at the beginning of urination, primarily in the evening  Laboratory     Lab Results   Component Value Date    CREATININE 3 03 (H) 09/25/2019       No results found for: PSA    Recent Results (from the past 1 hour(s))   POCT Measure PVR    Collection Time: 11/06/19  2:37 PM   Result Value Ref Range    POST-VOID RESIDUAL VOLUME, ML  mL         Review of Systems     Review of Systems   Constitutional: Negative for chills and fever  HENT: Negative  Eyes: Negative  Respiratory: Negative for shortness of breath  Cardiovascular: Negative for chest pain  Gastrointestinal: Negative for constipation, diarrhea, nausea and vomiting  Genitourinary: Negative for difficulty urinating, dysuria, enuresis, frequency, hematuria and urgency  Musculoskeletal:        Patient is currently seen podiatry every 2 weeks for a left heel ulcer that is nonhealing due to his diabetes  Skin: Negative  AUA SYMPTOM SCORE      Most Recent Value   AUA SYMPTOM SCORE   How often have you had a sensation of not emptying your bladder completely after you finished urinating? 4   How often have you had to urinate again less than two hours after you finished urinating? 4   How often have you found you stopped and started again several times when you urinate?  0   How often have you found it difficult to postpone urination? 0   How often have you had a weak urinary stream?  1   How often have you had to push or strain to begin urination? 0   How many times did you most typically get up to urinate from the time you went to bed at night until the time you got up in the morning? 5   Quality of Life: If you were to spend the rest of your life with your urinary condition just the way it is now, how would you feel about that? 0   AUA SYMPTOM SCORE  14                Allergies     Allergies   Allergen Reactions    Metoprolol Bradycardia    Unasyn [Ampicillin-Sulbactam Sodium] Rash       Physical Exam     Physical Exam   Constitutional: He is oriented to person, place, and time  He appears well-developed and well-nourished  No distress  HENT:   Head: Normocephalic and atraumatic  Right Ear: External ear normal    Left Ear: External ear normal    Nose: Nose normal    Eyes: Right eye exhibits no discharge  Left eye exhibits no discharge  No scleral icterus  Cardiovascular: Normal rate  Pulmonary/Chest: Effort normal    Musculoskeletal:   Ambulates with a cane for assistance, walker for long distances  Neurological: He is alert and oriented to person, place, and time  Skin: Skin is warm and dry  He is not diaphoretic  Psychiatric: He has a normal mood and affect  His behavior is normal  Judgment and thought content normal    Nursing note and vitals reviewed          Vital Signs     Vitals:    11/06/19 1428   BP: 118/68   BP Location: Left arm   Patient Position: Sitting   Cuff Size: Adult   Pulse: 78   Weight: 91 6 kg (202 lb)   Height: 6' (1 829 m)         Current Medications       Current Outpatient Medications:     amLODIPine (NORVASC) 5 mg tablet, Take 1 tablet by mouth daily, Disp: , Rfl:     Cholecalciferol (VITAMIN D) 2000 units CAPS, Take 2,000 Units by mouth once a week tuesdays , Disp: , Rfl:     clopidogrel (PLAVIX) 75 mg tablet, Take 1 tablet by mouth daily, Disp: , Rfl: 0    finasteride (PROSCAR) 5 mg tablet, Take 1 tablet (5 mg total) by mouth daily, Disp: 90 tablet, Rfl: 3    FLUoxetine (PROzac) 40 MG capsule, Take 40 mg by mouth daily, Disp: , Rfl:     furosemide (LASIX) 40 mg tablet, Take 40 mg by mouth daily, Disp: , Rfl:     glipiZIDE (GLUCOTROL XL) 2 5 mg 24 hr tablet, Take 1 tablet by mouth daily, Disp: , Rfl:     Memantine HCl ER (NAMENDA XR) 14 MG CP24, Take 1 capsule by mouth daily, Disp: , Rfl:   simvastatin (ZOCOR) 20 mg tablet, Take 20 mg by mouth daily at bedtime, Disp: , Rfl:     sodium bicarbonate 650 mg tablet, Take 1 tablet by mouth 2 (two) times daily after meals, Disp: 60 tablet, Rfl: 0    tamsulosin (FLOMAX) 0 4 mg, Take 0 4 mg by mouth daily with dinner, Disp: , Rfl:     warfarin (COUMADIN) 1 mg tablet, Take by mouth daily , Disp: , Rfl:     warfarin (COUMADIN) 4 mg tablet, Take by mouth daily, Disp: , Rfl:     warfarin (COUMADIN) 5 mg tablet, Take 1 tablet by mouth daily, Disp: , Rfl: 0    zonisamide (ZONEGRAN) 25 mg capsule, Take 3 capsules (75 mg total) by mouth daily at bedtime, Disp: 270 capsule, Rfl: 3    KIONEX 15 GM/60ML suspension, Take 60 mL (15 g total) by mouth 3 (three) times a week (Patient not taking: Reported on 4/24/2019), Disp: 500 mL, Rfl: 3    Patiromer Sorbitex Calcium (VELTASSA) 8 4 g PACK, Take 8 4 g by mouth 3 (three) times a week (Patient not taking: Reported on 11/6/2019), Disp: 12 each, Rfl: 5    primidone (MYSOLINE) 50 mg tablet, Take 50 mg by mouth daily, Disp: , Rfl:       Active Problems     Patient Active Problem List   Diagnosis    Acute renal failure superimposed on stage 3 chronic kidney disease (HCC)    PVD (peripheral vascular disease) ([place] [address] )    DM (diabetes mellitus), type 2, uncontrolled ([place] [address] )    HTN (hypertension)    HLD (hyperlipidemia)    PAD (peripheral artery disease) (HCC)    Transaminitis    Chronic anemia    Depression    Ulcer of left heel (HCC)    Enlarged prostate    Incomplete bladder emptying    CKD (chronic kidney disease)    Tremor         Past Medical History     Past Medical History:   Diagnosis Date    A-fib ([place] [address] )     Alzheimer's disease ([place] [address] )     Depression     Diabetes mellitus ([place] [address] )     Anderson catheter in place     History of atrial fibrillation     History of chronic kidney disease     History of peripheral vascular disease     Hyperlipidemia     Hypertension     Kidney disease     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 [address] [facility]     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[facility] [address] [facility]     Memory loss     Osteomyelitis of right foot ([facility] [address] [facility]     Renal disorder          Surgical History     Past Surgical History:   Procedure Laterality Date    APPENDECTOMY  1945    FOOT SURGERY  06/14/2013    I&D with vac    INCISION AND DRAINAGE ABSCESS / HEMATOMA OF [place] / KNEE / THIGH  03/07/2014    VEIN BYPASS SURGERY Bilateral 2015         Family History     Family History   Problem Relation Age of Onset    Diabetes Mother     Heart failure Mother     Hypertension Mother    [facility] Cancer Father     Hypertension Sister     Hyperthyroidism Sister     Stroke Brother     Diabetes Maternal Grandmother     Clotting disorder Maternal Grandmother     No Known Problems Maternal Grandfather     No Known Problems Paternal Grandmother     No Known Problems Paternal Grandfather     Diabetes Brother     Arthritis Brother     Glaucoma Brother     Cataracts Brother     No Known Problems Son     Hyperthyroidism Daughter     Kidney disease Daughter     COPD Daughter     Diabetes Brother     Cancer Brother          Social History     Social History       Radiology

## 2019-11-06 ENCOUNTER — OFFICE VISIT (OUTPATIENT)
Dept: UROLOGY | Facility: CLINIC | Age: 84
End: 2019-11-06
Payer: MEDICARE

## 2019-11-06 VITALS
DIASTOLIC BLOOD PRESSURE: 68 MMHG | SYSTOLIC BLOOD PRESSURE: 118 MMHG | BODY MASS INDEX: 27.36 KG/M2 | WEIGHT: 202 LBS | HEIGHT: 72 IN | HEART RATE: 78 BPM

## 2019-11-06 DIAGNOSIS — R33.9 INCOMPLETE BLADDER EMPTYING: Primary | ICD-10-CM

## 2019-11-06 LAB — POST-VOID RESIDUAL VOLUME, ML POC: 120 ML

## 2019-11-06 PROCEDURE — 99213 OFFICE O/P EST LOW 20 MIN: CPT | Performed by: PHYSICIAN ASSISTANT

## 2019-11-06 PROCEDURE — 51798 US URINE CAPACITY MEASURE: CPT | Performed by: PHYSICIAN ASSISTANT

## 2019-12-05 ENCOUNTER — TELEPHONE (OUTPATIENT)
Dept: NEUROLOGY | Facility: CLINIC | Age: 84
End: 2019-12-05

## 2020-01-14 ENCOUNTER — TRANSCRIBE ORDERS (OUTPATIENT)
Dept: ADMINISTRATIVE | Facility: HOSPITAL | Age: 85
End: 2020-01-14

## 2020-01-14 DIAGNOSIS — I73.9 PVD (PERIPHERAL VASCULAR DISEASE) (HCC): Primary | Chronic | ICD-10-CM

## 2020-01-14 DIAGNOSIS — E11.21 TYPE 2 DIABETES MELLITUS WITH DIABETIC NEPHROPATHY, WITHOUT LONG-TERM CURRENT USE OF INSULIN (HCC): Primary | ICD-10-CM

## 2020-01-14 DIAGNOSIS — L97.429 HEEL ULCERATION, LEFT, WITH UNSPECIFIED SEVERITY (HCC): ICD-10-CM

## 2020-01-14 NOTE — PROGRESS NOTES
Pt's daughter Rafita Barnett called  Pt has been seeing podiatrist for non healing wound left foot and they ordered SACHIN doppler of LLE, she believes he is due for BLE doppler  She called central scheduling and scheduled doppler but was wondering if we could order for both legs  Informed her the doppler that is scheduled is for venous - she states she has rx and it is for arterial and she told central scheduling this  Per chart pt's last sachin doppler was 9/27/18 ordered by Dr Kristy Saleh  Pt's last ov here was w/ Dr Diane Andersen 8/15/17 and at that time he inidecated he wanted LEAD repeated every 6 mos  Also pt was scheduled for ov w/ TIERNEY MACIAS 9/25/18, this apt was cx and not r/s  Rafita Barnett agrees to schedule f/u ov as well  Order placed for ble lead, transf to Rosy Lyon to schedule doppler and f/u ov

## 2020-01-20 ENCOUNTER — HOSPITAL ENCOUNTER (OUTPATIENT)
Dept: RADIOLOGY | Facility: MEDICAL CENTER | Age: 85
Discharge: HOME | End: 2020-01-20
Payer: MEDICARE

## 2020-01-20 DIAGNOSIS — I73.9 PVD (PERIPHERAL VASCULAR DISEASE) (HCC): Chronic | ICD-10-CM

## 2020-01-20 DIAGNOSIS — L97.429 HEEL ULCERATION, LEFT, WITH UNSPECIFIED SEVERITY (HCC): ICD-10-CM

## 2020-01-20 PROCEDURE — 93923 UPR/LXTR ART STDY 3+ LVLS: CPT

## 2020-01-20 PROCEDURE — 93925 LOWER EXTREMITY STUDY: CPT

## 2020-01-21 PROCEDURE — 93922 UPR/L XTREMITY ART 2 LEVELS: CPT | Performed by: SURGERY

## 2020-01-21 PROCEDURE — 93925 LOWER EXTREMITY STUDY: CPT | Performed by: SURGERY

## 2020-01-23 ENCOUNTER — OFFICE VISIT (OUTPATIENT)
Dept: VASCULAR SURGERY | Facility: CLINIC | Age: 85
End: 2020-01-23
Payer: MEDICARE

## 2020-01-23 VITALS
SYSTOLIC BLOOD PRESSURE: 122 MMHG | HEIGHT: 72 IN | BODY MASS INDEX: 27.36 KG/M2 | HEART RATE: 68 BPM | TEMPERATURE: 96.8 F | WEIGHT: 202 LBS | DIASTOLIC BLOOD PRESSURE: 70 MMHG | RESPIRATION RATE: 16 BRPM

## 2020-01-23 DIAGNOSIS — I10 ESSENTIAL HYPERTENSION: Chronic | ICD-10-CM

## 2020-01-23 DIAGNOSIS — L97.421 SKIN ULCER OF LEFT HEEL, LIMITED TO BREAKDOWN OF SKIN (HCC): ICD-10-CM

## 2020-01-23 DIAGNOSIS — E11.51 TYPE 2 DIABETES MELLITUS WITH DIABETIC PERIPHERAL ANGIOPATHY WITHOUT GANGRENE, WITHOUT LONG-TERM CURRENT USE OF INSULIN (HCC): ICD-10-CM

## 2020-01-23 DIAGNOSIS — N18.30 STAGE 3 CHRONIC KIDNEY DISEASE (HCC): ICD-10-CM

## 2020-01-23 DIAGNOSIS — I73.9 PVD (PERIPHERAL VASCULAR DISEASE) (HCC): Primary | Chronic | ICD-10-CM

## 2020-01-23 DIAGNOSIS — E78.2 MIXED HYPERLIPIDEMIA: Chronic | ICD-10-CM

## 2020-01-23 PROBLEM — E11.59 TYPE 2 DIABETES MELLITUS WITH CIRCULATORY DISORDER, WITHOUT LONG-TERM CURRENT USE OF INSULIN (HCC): Status: ACTIVE | Noted: 2017-07-06

## 2020-01-23 PROCEDURE — 99215 OFFICE O/P EST HI 40 MIN: CPT | Performed by: PHYSICIAN ASSISTANT

## 2020-01-23 RX ORDER — CLINDAMYCIN HYDROCHLORIDE 300 MG/1
CAPSULE ORAL
COMMUNITY
Start: 2020-01-16 | End: 2020-08-07 | Stop reason: HOSPADM

## 2020-01-23 RX ORDER — ERGOCALCIFEROL 1.25 MG/1
50000 CAPSULE ORAL WEEKLY
Refills: 1 | COMMUNITY
Start: 2019-11-20 | End: 2020-04-29 | Stop reason: SDUPTHER

## 2020-01-23 NOTE — PROGRESS NOTES
Assessment/Plan:    PVD (peripheral vascular disease) ([facility] [address] )    Peripheral arterial disease with chronic L heel wound    80 y o  M DM, Htn, HLD, CKD 3, with underlying severe PAD and chronic LEFT heel wound present for 6 years  Hx LEFT AK to BK popliteal artery bypass using cadaveric vein by Dr Aggie Boxer  He has prior L femoropopliteal stents that have occluded  He was seen in 2017 by Dr Halley Mustafa who contemplated an angiogram with balloon angioplasty of the proximal anastomosis of the left above-knee to below-knee popliteal bypass  The patient did not want to proceed with interventions due to CKD and it was reported that the wound actually healed at one point  The patient was lost to office follow up      1/23/2020: The patient lives alone  He uses a cane and has no pain when walking  He has shoes to accomodates the wound so he can walk  He reports severe diabetic neuropathy for years so he does not feel his feet  He has followed regularly for years with Dr Zaire Rocha who has been taking care of his L heel wound  Unfortunately, he was never able to heal the LEFT heel wound which is present for 6 years  The patient is seen in the office today and he is accompanied by his daughter today  They tell me that he had been doing ok until about 3 months ago when he apparently developed an infection in the LEFT heel  He tells me that he is ordered for antibiotics and he is ordered to obtain MRI of the L  foot for Dr Brenda Dee  We reviewed his SACHIN which shows significant drop in LILIAN, metatarsal and great toe pressures  The great toe pressure would be below healing in a diabetic  VAS lower extremity arterial duplex 1/21/2020  R 0 67/116/46; < 75% below the knee SFA-PTA graft at previous PTA site  L 0 37/65/27; occlusion SFA - Pop stent and a 50-75% stenosis at the proximal deep femoral artery     Recommendations:  Continue with local wound care, antibiotics and work up with Dr Brenda Dee podiatry   She reportedly ordered MRI of the L foot  Given his age and renal function (stage IV), we will await Dr Marylee Prosper' opinion as to whether or not the wound can be managed with local wound care only  If podiatric procedures are needed, the patient and daughter understand that angiography and intervention may be recommended  The patient will think about angiography as the procedure may put him at risk for dialysis  We will see him back in the office after podiatry work up [employer]  I do not seem to have access to Dr Marylee Prosper' notes  Continue with Betadine to heel wound  Continue with Plavix, warfarin and statin therapy  Will copy Dr Xavi Nance, nephrology and Dr Marylee Prosper  Podiatry  HTN (hypertension)  -BP stable; c/w omt and tlc    Ulcer of left heel (HCC)  -L heel ulcer as discussed under PAD    CKD (chronic kidney disease)  -CKD, stage IV, severe  -Serum creatine which has been stable at 3; CCl est 17  -Followed by Dr Xavi Nance  Diagnoses and all orders for this visit:    PVD (peripheral vascular disease) ([facility] [address] )    Type 2 diabetes mellitus with diabetic peripheral angiopathy without gangrene, without long-term current use of insulin (HCC)    Essential hypertension    Mixed hyperlipidemia    Stage 3 chronic kidney disease ([facility] [address] )    Skin ulcer of left heel, limited to breakdown of skin ([facility] [address] )    Other orders  -     clindamycin (CLEOCIN) 300 MG capsule  -     ergocalciferol (VITAMIN D2) 50,000 units; Take 50,000 Units by mouth once a week          Subjective:      Patient ID: Nicki Ochoa is a 80 y o  male  Pt is here to review his SACHIN of 1/20/20  Pt has had a left heel ulcer for more than 2 years  Pt does see his podiatrist for this wound  Pt does not have any pain to the wound  Pt was last seen on 8/15/17 for a left heel ulcer  Pt is currently taking Warfarin and Plavix  HPI   Mr Taurus Perez 80 y o  M DM, Htn, HLD, CKD 3, with underlying severe PAD and chronic LEFT heel wound present for 6 years  Hx LEFT AK to BK popliteal artery bypass using cadaveric vein by Dr Sigrid Sykes  He has prior L femoropopliteal stents that have occluded  He was seen in 2017 by Dr Shyanne Laura who contemplated an angiogram with balloon angioplasty of the proximal anastomosis of the left above-knee to below-knee popliteal bypass  The patient underwent hyperbaric therapy,   Wound VAC, etc  According to the notes, he was able to heal the wound  However, patient presents today with daughter who states that the wound has been present for 6 years  History of RIGHT femoral to PT bypass using contralateral vein 1/8/2014 1/23/2020: Mr Meena Magaña lives alone  He uses a cane and has no pain when walking  He has shoes to accomodates the wound so he can walk  He reports severe diabetic neuropathy for years so he does not feel his feet  He has followed regularly for years with Dr Guillermina Sandhu who has been taking care of his wound  Unfortunately, he was never able to heal the LEFT heel wound which is present for 6 years  The patient is seen in the office today and he is accompanied by his daughter today  They tell me that he had been doing ok until about 3 months ago when he apparently developed an infection in the LEFT heel  He tells me that he is ordered for antibiotics and he is ordered to obtain MRI of the L  foot for Dr William Reynaga  The patient's daughter has a pretty good understanding of the history  She tells me that repeat angiography was considered 3 years ago, but was avoided due to patient's renal function since he was told he could end up on dialysis  In the meantime, they were lost to vascular office follow up  No fevers, chills  We reviewed his SACHIN which shows significant drop in LILIAN, metatarsal and great toe pressures  The great toe pressure would be below healing in a diabetic  VAS lower extremity arterial duplex 1/21/2020  R 0 67/116/46; < 75% below the knee SFA-PTA graft at previous PTA site    L 0 37/65/27; occlusion SFA - Pop stent and a 50-75% stenosis at the proximal deep femoral artery     Medical therapy includes: Plavix 75, warfarin, simvastatin 20, Lasix 40    A1C 6 7; BUN/SCr 37/3 03 (9/25/19)    60"; extra time required review patient's records and history  The following portions of the patient's history were reviewed and updated as appropriate: allergies, current medications, past family history, past medical history, past social history, past surgical history and problem list     Review of Systems   Constitutional: Positive for appetite change and unexpected weight change  HENT: Positive for hearing loss, rhinorrhea and tinnitus  Eyes: Negative  Respiratory: Negative  Cardiovascular: Negative  Gastrointestinal: Negative  Endocrine: Positive for cold intolerance  Genitourinary: Negative  Musculoskeletal: Positive for back pain and gait problem  Skin: Negative  Allergic/Immunologic: Negative  Neurological: Positive for tremors and numbness  Hematological: Bruises/bleeds easily  Psychiatric/Behavioral: Positive for confusion and decreased concentration  Objective:    /70 (BP Location: Right arm, Patient Position: Sitting, Cuff Size: Adult)   Pulse 68   Temp (!) 96 8 °F (36 °C) (Tympanic)   Resp 16   Ht 6' (1 829 m)   Wt 91 6 kg (202 lb)   BMI 27 40 kg/m²        Physical Exam   Constitutional: He is oriented to person, place, and time  He appears well-developed and well-nourished  He is cooperative  Elderly gentleman, appears stated age  Uses walker  HENT:   Head: Normocephalic and atraumatic  Eyes: Pupils are equal, round, and reactive to light  EOM are normal    Neck: Trachea normal  Neck supple  No JVD present  Cardiovascular: Normal rate, regular rhythm, S1 normal and S2 normal  Exam reveals no gallop and no friction rub  Murmur (soft sm) heard  Pulses:       Carotid pulses are 2+ on the right side, and 2+ on the left side  Radial pulses are 2+ on the right side, and 2+ on the left side  Femoral pulses are 2+ on the right side, and 2+ on the left side  Popliteal pulses are 0 on the right side, and 0 on the left side  Dorsalis pedis pulses are 0 on the right side, and 0 on the left side  Posterior tibial pulses are 0 on the right side, and 0 on the left side  Decreased sensation in feet  Moves toes  No pulses in feet  R monophasic doppler AT/DP signal    L weak doppler DP signal    LEFT heel wound       Pulmonary/Chest: Effort normal and breath sounds normal  No accessory muscle usage  No respiratory distress  He has no wheezes  He has no rales  Decreased BS   Abdominal: Soft  Bowel sounds are normal  He exhibits no distension  There is no hepatosplenomegaly  There is no tenderness  Musculoskeletal: Normal range of motion  He exhibits edema (1+ LE edema)  He exhibits no deformity  Neurological: He is alert and oriented to person, place, and time  Grossly normal    Skin: Skin is warm and dry  No lesion and no rash noted  No cyanosis  Nails show no clubbing  Psychiatric: He has a normal mood and affect  Nursing note and vitals reviewed                                    VAS lower extremity arterial duplex 1/21/2020  FINDINGS:     Segment                Rig       Left                                            PSV  EDV  Impression  PSV  EDV    Inflow Anastomosis      66    0                          Mid Thigh (Graft)       38    0                          Low Thigh (Graft)       54    4                          Near Knee (Graft)       64    0                          Mid Calf (Graft)        61    0                          High Calf (Graft)      545   35                          Low Calf (Graft)        50    0                          Outflow Anastomosis     56    0                          Common Femoral Artery  103    0              131    0    Prox Profunda          136    0  50-75% 212    0    Prox SFA                93    0               85    0    Mid SFA                          <50%        117    0    Dist SFA - Stent                              70    4    Proximal Pop - Stent             Occluded                Distal Pop                       Occluded                Prox Post Tibial                              55   11    Dist Post Tibial        48   10               57    9    Prox  Ant  Tibial       37    9               17    0    Dist  Ant  Tibial       14    0               11    0             CONCLUSION:  Impression:  RIGHT LOWER LIMB:  <75% stenosis below the knee SFA-PTA graft at the previous PTA  site  Ankle/Brachial index: 0 67, moderate claudication range  Prior 0 82  PVR/ PPG tracings are dampened  Metatarsal pressure of 116 mmHg  Great toe pressure of 46 mmHg, within the healing range     LEFT LOWER LIMB:  High- grade stenosis vs occlusion noted within the SFA-POP stent and a 50-75%  stenosis noted at the proximal deep femoral artery  Ankle/Brachial index: 0 37, ischemic range  Prior 0 69  PVR/ PPG tracings are dampened  Metatarsal pressure of 65 mmHg  Great toe pressure of 27 mmHg, within the healing range        VAS lower extremity arterial duplex limited 9/27/2018  FINDINGS:     Left                   Impression  PSV  EDV    Common Femoral Artery              135   12    Prox Profunda          50-75%      204    0    Prox SFA                            87    0    Mid SFA                            117    6    Dist SFA                           125   14    Proximal Pop                        63    9    Distal Pop                          58    9    Prox Post Tibial                    71   19    Dist Post Tibial                    62   14    Prox  Ant  Tibial                   22    7    Dist  Ant   Tibial                   14    6             CONCLUSION:     Impression:  RIGHT LOWER LIMB: (limited)  Ankle/Brachial index: 0 82 ( within moderate range), Prior: 0 84  Metatarsal pressure of 123 mmHg  Great toe pressure of  94 mmHg, within the healing range     LEFT LOWER LIMB:  High- grade stenosis vs occlusion noted within the SFA-POP stent and a 50-75%  stenosis noted at the proximal deep femoral artery  >75% stenosis noted in a collateral artery at distal SFA  Ankle/Brachial index: 0 69 ( within moderate range), Prior: 0 72  Metatarsal pressure of 104 mmHg  Great toe pressure of 108 mmHg, within the healing range     Compared to previous studies on 2/16/2018 there is no significant change  Angiogram / intervention 5/29/2015  R BRENDA, IIA and EIA patent  L BRENDA, IIA and EIA patent  CFA is patent  PF is patent  Prox SFA is patent  High-grade stenosis at the level of the abductor canal and the AK popliteal artery  The popliteal artery occludes a the joint PTA reconstitutes and is the predominant runoff to foot  Recanalization of occlusion involving the L popliteal artery  The L popliteal artery was balloon dilated with ALLISON  Stenoses about knee popliteal artery and distal SFA resulted in dissection and were stented  Runoff to feet unchanged  I have reviewed and made appropriate changes to the review of systems input by the medical assistant      Vitals:    01/23/20 1613   BP: 122/70   BP Location: Right arm   Patient Position: Sitting   Cuff Size: Adult   Pulse: 68   Resp: 16   Temp: (!) 96 8 °F (36 °C)   TempSrc: Tympanic   Weight: 91 6 kg (202 lb)   Height: 6' (1 829 m)       Patient Active Problem List   Diagnosis    Acute renal failure superimposed on stage 3 chronic kidney disease (HCC)    [facility]    Type 2 diabetes mellitus with circulatory disorder, without long-term current use of insulin (HCC)    HTN (hypertension)    HLD (hyperlipidemia)    PAD (peripheral artery disease) ([facility])    Transaminitis    Chronic anemia    Depression    Ulcer of left heel (HCC)    Enlarged prostate    Incomplete bladder emptying    CKD (chronic kidney disease)    Tremor       Past Surgical History:   Procedure Laterality Date    APPENDECTOMY  1945    FOOT SURGERY  06/14/2013    I&D with vac    INCISION AND DRAINAGE ABSCESS / HEMATOMA OF Araiza Deed / KNEE / THIGH  03/07/2014    VEIN BYPASS SURGERY Bilateral 2015       Family History   Problem Relation Age of Onset    Diabetes Mother     Heart failure Mother     Hypertension Mother    Brayan Taylor Cancer Father     Hypertension Sister     Hyperthyroidism Sister     Stroke Brother     Diabetes Maternal Grandmother     Clotting disorder Maternal Grandmother     No Known Problems Maternal Grandfather     No Known Problems Paternal Grandmother     No Known Problems Paternal Grandfather     Diabetes Brother     Arthritis Brother     Glaucoma Brother     Cataracts Brother     No Known Problems Son     Hyperthyroidism Daughter     Kidney disease Daughter     COPD Daughter     Diabetes Brother     Cancer Brother        Social History     Socioeconomic History    Marital status:       Spouse name: Not on file    Number of children: Not on file    Years of education: Not on file    Highest education level: Not on file   Occupational History    Not on file   Social Needs    Financial resource strain: Not on file    Food insecurity:     Worry: Not on file     Inability: Not on file    Transportation needs:     Medical: Not on file     Non-medical: Not on file   Tobacco Use    Smoking status: Never Smoker    Smokeless tobacco: Never Used   Substance and Sexual Activity    Alcohol use: Yes     Comment: one beer on his birthday    Drug use: No    Sexual activity: Never   Lifestyle    Physical activity:     Days per week: Not on file     Minutes per session: Not on file    Stress: Not on file   Relationships    Social connections:     Talks on phone: Not on file     Gets together: Not on file     Attends [employer] service: Not on file     Active member of club or organization: Not on file     Attends meetings of clubs or organizations: Not on file     Relationship status: Not on file    Intimate partner violence:     Fear of current or ex partner: Not on file     Emotionally abused: Not on file     Physically abused: Not on file     Forced sexual activity: Not on file   Other Topics Concern    Not on file   Social History Narrative    Not on file       Allergies   Allergen Reactions    Metoprolol Bradycardia    Unasyn [Ampicillin-Sulbactam Sodium] Rash         Current Outpatient Medications:     amLODIPine (NORVASC) 5 mg tablet, Take 1 tablet by mouth daily, Disp: , Rfl:     Cholecalciferol (VITAMIN D) 2000 units CAPS, Take 2,000 Units by mouth once a week tuesdays , Disp: , Rfl:     clindamycin (CLEOCIN) 300 MG capsule, , Disp: , Rfl:     clopidogrel (PLAVIX) 75 mg tablet, Take 1 tablet by mouth daily, Disp: , Rfl: 0    ergocalciferol (VITAMIN D2) 50,000 units, Take 50,000 Units by mouth once a week, Disp: , Rfl: 1    finasteride (PROSCAR) 5 mg tablet, Take 1 tablet (5 mg total) by mouth daily, Disp: 90 tablet, Rfl: 3    FLUoxetine (PROzac) 40 MG capsule, Take 40 mg by mouth daily, Disp: , Rfl:     furosemide (LASIX) 40 mg tablet, Take 40 mg by mouth daily, Disp: , Rfl:     glipiZIDE (GLUCOTROL XL) 2 5 mg 24 hr tablet, Take 1 tablet by mouth daily, Disp: , Rfl:     Memantine HCl ER (NAMENDA XR) 14 MG CP24, Take 1 capsule by mouth daily, Disp: , Rfl:     simvastatin (ZOCOR) 20 mg tablet, Take 20 mg by mouth daily at bedtime, Disp: , Rfl:     sodium bicarbonate 650 mg tablet, Take 1 tablet by mouth 2 (two) times daily after meals, Disp: 60 tablet, Rfl: 0    tamsulosin (FLOMAX) 0 4 mg, Take 0 4 mg by mouth daily with dinner, Disp: , Rfl:     warfarin (COUMADIN) 1 mg tablet, Take by mouth daily , Disp: , Rfl:     warfarin (COUMADIN) 4 mg tablet, Take by mouth daily, Disp: , Rfl:     warfarin (COUMADIN) 5 mg tablet, Take 1 tablet by mouth daily, Disp: , Rfl: 0    zonisamide (ZONEGRAN) 25 mg capsule, Take 3 capsules (75 mg total) by mouth daily at bedtime, Disp: 270 capsule, Rfl: 3    KIONEX 15 GM/60ML suspension, Take 60 mL (15 g total) by mouth 3 (three) times a week (Patient not taking: Reported on 4/24/2019), Disp: 500 mL, Rfl: 3    Patiromer Sorbitex Calcium (VELTASSA) 8 4 g PACK, Take 8 4 g by mouth 3 (three) times a week (Patient not taking: Reported on 11/6/2019), Disp: 12 each, Rfl: 5    primidone (MYSOLINE) 50 mg tablet, Take 50 mg by mouth daily, Disp: , Rfl:

## 2020-01-23 NOTE — ASSESSMENT & PLAN NOTE
Peripheral arterial disease with chronic L heel wound    80 y o  M DM, Htn, HLD, CKD 3, with underlying severe PAD and chronic LEFT heel wound present for 6 years  Hx LEFT AK to BK popliteal artery bypass using cadaveric vein by Dr Jimena Falk  He has prior L femoropopliteal stents that have occluded  He was seen in 2017 by Dr Jaren Eric who contemplated an angiogram with balloon angioplasty of the proximal anastomosis of the left above-knee to below-knee popliteal bypass  The patient did not want to proceed with interventions due to CKD and it was reported that the wound actually healed at one point  The patient was lost to office follow up      1/23/2020: The patient lives alone  He uses a cane and has no pain when walking  He has shoes to accomodates the wound so he can walk  He reports severe diabetic neuropathy for years so he does not feel his feet  He has followed regularly for years with Dr Rosas Shannon who has been taking care of his L heel wound  Unfortunately, he was never able to heal the LEFT heel wound which is present for 6 years  The patient is seen in the office today and he is accompanied by his daughter today  They tell me that he had been doing ok until about 3 months ago when he apparently developed an infection in the LEFT heel  He tells me that he is ordered for antibiotics and he is ordered to obtain MRI of the L  foot for Dr Leia Duong  We reviewed his SACHIN which shows significant drop in LILIAN, metatarsal and great toe pressures  The great toe pressure would be below healing in a diabetic  VAS lower extremity arterial duplex 1/21/2020  R 0 67/116/46; < 75% below the knee SFA-PTA graft at previous PTA site  L 0 37/65/27; occlusion SFA - Pop stent and a 50-75% stenosis at the proximal deep femoral artery     Recommendations:  Continue with local wound care, antibiotics and work up with Dr Leia Duong podiatry  She reportedly ordered MRI of the L foot   Given his age and renal function (stage IV), we will await Dr Usha Munoz' opinion as to whether or not the wound can be managed with local wound care only  If podiatric procedures are needed, the patient and daughter understand that angiography and intervention may be recommended  The patient will think about angiography as the procedure may put him at risk for dialysis  We will see him back in the office after podiatry work up [employer]  I do not seem to have access to Dr Usha Munoz' notes  Continue with Betadine to heel wound  Continue with Plavix, warfarin and statin therapy  Will copy Dr Cintia Tomas, nephrology and Dr Usha Munoz  Podiatry

## 2020-01-23 NOTE — ASSESSMENT & PLAN NOTE
-CKD, stage IV, severe  -Serum creatine which has been stable at 3; CCl est 17  -Followed by Dr Mona Graham

## 2020-01-23 NOTE — PATIENT INSTRUCTIONS
Assessment/Plan:    PVD (peripheral vascular disease) ([address] )    Peripheral arterial disease with chronic L heel wound    Recommendations:  Continue with local wound care, antibiotics and work up with Dr Deanna Banks podiatry  She reportedly ordered MRI of the L foot  Given his age and renal function (stage IV), we will await Dr Deanna Banks' opinion as to whether or not the wound can be managed with local wound care only  If podiatric procedures are needed, the patient and daughter understand that angiography and intervention may be recommended  The patient will think about angiography as the procedure may put him at risk for dialysis  We will see him back in the office after podiatry work up [employer]  I do not seem to have access to Dr Deanna Banks' notes  Continue with Betadine to heel wound  Continue with Plavix, warfarin and statin therapy  Will copy Dr Vernell Bruce, nephrology and Dr Deanna Banks  Podiatry             VAS lower extremity arterial duplex 1/21/2020  FINDINGS:     Segment                Rig       Left                                            PSV  EDV  Impression  PSV  EDV    Inflow Anastomosis      66    0                          Mid Thigh (Graft)       38    0                          Low Thigh (Graft)       54    4                          Near Knee (Graft)       64    0                          Mid Calf (Graft)        61    0                          High Calf (Graft)      545   35                          Low Calf (Graft)        50    0                          Outflow Anastomosis     56    0                          Common Femoral Artery  103    0              131    0    Prox Profunda          136    0  50-75%      212    0    Prox SFA                93    0               85    0    Mid SFA                          <50%        117    0    Dist SFA - Stent                              70    4    Proximal Pop - Stent             Occluded                Distal Pop                       Occluded Prox Post Tibial                              55   11    Dist Post Tibial        48   10               57    9    Prox  Ant  Tibial       37    9               17    0    Dist  Ant  Tibial       14    0               11    0             CONCLUSION:  Impression:  RIGHT LOWER LIMB:  <75% stenosis below the knee SFA-PTA graft at the previous PTA  site  Ankle/Brachial index: 0 67, moderate claudication range  Prior 0 82  PVR/ PPG tracings are dampened  Metatarsal pressure of 116 mmHg  Great toe pressure of 46 mmHg, within the healing range     LEFT LOWER LIMB:  High- grade stenosis vs occlusion noted within the SFA-POP stent and a 50-75%  stenosis noted at the proximal deep femoral artery  Ankle/Brachial index: 0 37, ischemic range  Prior 0 69  PVR/ PPG tracings are dampened    Metatarsal pressure of 65 mmHg  Great toe pressure of 27 mmHg, within the healing range

## 2020-01-24 ENCOUNTER — TRANSCRIBE ORDERS (OUTPATIENT)
Dept: ADMINISTRATIVE | Facility: HOSPITAL | Age: 85
End: 2020-01-24

## 2020-01-24 DIAGNOSIS — M86.9 OSTEOMYELITIS OF LEFT FOOT, UNSPECIFIED TYPE (HCC): Primary | ICD-10-CM

## 2020-02-11 ENCOUNTER — TELEPHONE (OUTPATIENT)
Dept: NEPHROLOGY | Facility: CLINIC | Age: 85
End: 2020-02-11

## 2020-02-16 ENCOUNTER — HOSPITAL ENCOUNTER (OUTPATIENT)
Dept: MRI IMAGING | Facility: HOSPITAL | Age: 85
Discharge: HOME | End: 2020-02-16
Attending: PODIATRIST
Payer: MEDICARE

## 2020-02-16 DIAGNOSIS — M86.9 OSTEOMYELITIS OF LEFT FOOT, UNSPECIFIED TYPE (HCC): ICD-10-CM

## 2020-02-16 PROCEDURE — 73721 MRI JNT OF LWR EXTRE W/O DYE: CPT

## 2020-03-04 ENCOUNTER — OFFICE VISIT (OUTPATIENT)
Dept: VASCULAR SURGERY | Facility: CLINIC | Age: 85
End: 2020-03-04
Payer: MEDICARE

## 2020-03-04 VITALS
HEART RATE: 78 BPM | RESPIRATION RATE: 16 BRPM | WEIGHT: 204 LBS | SYSTOLIC BLOOD PRESSURE: 118 MMHG | HEIGHT: 72 IN | BODY MASS INDEX: 27.63 KG/M2 | DIASTOLIC BLOOD PRESSURE: 70 MMHG | TEMPERATURE: 98.3 F

## 2020-03-04 DIAGNOSIS — M86.9 OSTEOMYELITIS OF RIGHT FOOT, UNSPECIFIED TYPE (HCC): ICD-10-CM

## 2020-03-04 DIAGNOSIS — L97.909 ATHEROSCLEROSIS OF ARTERY OF EXTREMITY WITH ULCERATION (HCC): Primary | Chronic | ICD-10-CM

## 2020-03-04 DIAGNOSIS — I70.299 ATHEROSCLEROSIS OF ARTERY OF EXTREMITY WITH ULCERATION (HCC): Primary | Chronic | ICD-10-CM

## 2020-03-04 DIAGNOSIS — N18.4 CKD (CHRONIC KIDNEY DISEASE) STAGE 4, GFR 15-29 ML/MIN (HCC): Chronic | ICD-10-CM

## 2020-03-04 PROCEDURE — 99214 OFFICE O/P EST MOD 30 MIN: CPT | Performed by: SURGERY

## 2020-03-04 NOTE — LETTER
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 with evidence of underlying osteomyelitis  We discussed the findings on recent duplex evaluation along with the treatment options available and the associated risks and benefits in light of his known underlying renal insufficiency and previous revascularization procedures which have required use of cadaveric vein bypass which subsequently failed  Unfortunately in this setting further surgical revascularization is of limited benefit and high risk  We did discuss the option of proceeding with endovascular intervention to attempt to optimize perfusion and healing potential   He is very hesitant to proceed with the concern for further renal failure and asked to discuss 1st with his nephrologist   We will make an appointment to discuss further after nephrology evaluation  2   Atherosclerotic occlusive disease right lower extremity with history of femoral -popliteal bypass  There is evidence of graft stenosis  This will be followed with duplex imaging with same concerns as noted above  Osteomyelitis of right foot ([facility] [address] )    MRI suggestive of  Left calcaneus osteomyelitis  Continue management as per podiatry  If you have questions, please do not hesitate to call me  I look forward to following Marichuy Sportsman along with you           Sincerely,        Rosie Mcdonough MD        CC: MD Barbie Ayoub DPM

## 2020-03-04 NOTE — PROGRESS NOTES
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 with evidence of underlying osteomyelitis  We discussed the findings on recent duplex evaluation along with the treatment options available and the associated risks and benefits in light of his known underlying renal insufficiency and previous revascularization procedures which have required use of cadaveric vein bypass which subsequently failed  Unfortunately in this setting further surgical revascularization is of limited benefit and high risk  We did discuss the option of proceeding with endovascular intervention to attempt to optimize perfusion and healing potential   He is very hesitant to proceed with the concern for further renal failure and asked to discuss 1st with his nephrologist   We will make an appointment to discuss further after nephrology evaluation  2   Atherosclerotic occlusive disease right lower extremity with history of femoral -popliteal bypass  There is evidence of graft stenosis  This will be followed with duplex imaging with same concerns as noted above  Osteomyelitis of right foot ([facility] )    MRI suggestive of  Left calcaneus osteomyelitis  Continue management as per podiatry  Diagnoses and all orders for this visit:    Atherosclerosis of artery of extremity with ulceration (HCC)  -     VAS lower limb arterial duplex, complete bilateral; Future    CKD (chronic kidney disease) stage 4, GFR 15-29 ml/min ([facility])    Osteomyelitis of right foot, unspecified type ([facility])          Subjective:      Patient ID: Dario Romero is a 80 y o  male  Pt is here to for a 1 month follow up for his left heel ulcer and PVD  Pt denies any pain or swelling in his LLE  Pt states the left heel ulcer is still the same  Pt has been going to his Podiatrist for treatment of the heel wound    Pt was last seen on 1/23/2020 for PAD and the left heel ulcer  Pt has a Hx of DM Type 2, HTN, CKD Stage 3, HLD and PVD  Pt is currently taking Warfarin, Plavix and Simvastatin  80year-old with long history of severe peripheral arterial occlusive disease status post bilateral lower extremity bypass procedures of which the left bypass is chronically occluded  Also of note the left bypass was performed with cadaveric vein secondary to absent conduit  He now presents in follow-up of a chronic left heel ulcer which has been present for over 6 years  He has been told by his podiatrist that this will never heal   He is present with his daughter  Of note he does live independently  He denies any pain associated with this wound since he does have an underlying neuropathy  He denies any symptoms on the contralateral right side  Arterial duplex 01/20/2020 with right greater than 75% bypass stenosis with ankle-brachial index is 0 67 and toe pressure of 46  On the left there is a stenosis versus occlusion of the femoral -popliteal bypass with 50-75% deep femoral artery stenosis with ankle-brachial index is 0 37 and toe pressure of 27  MRI 02/16/2020 with reported findings consistent with left calcaneus osteomyelitis  I have spent 30 minutes with Family today in which greater than 50% of this time was spent in counseling/coordination of care regarding Diagnostic results, Prognosis, Risks and benefits of tx options, Patient and family education and Impressions  The following portions of the patient's history were reviewed and updated as appropriate: allergies, current medications, past family history, past medical history, past social history, past surgical history and problem list     I have reviewed and made appropriate changes to the review of systems input by the medical assistant      Vitals:    03/04/20 1531   BP: 118/70   BP Location: Left arm   Patient Position: Sitting   Cuff Size: Adult   Pulse: 78   Resp: 16   Temp: 98 3 °F (36 8 °C) TempSrc: Tympanic   Weight: 92 5 kg (204 lb)   Height: 6' (1 829 m)       Patient Active Problem List   Diagnosis    Acute renal failure superimposed on stage 3 chronic kidney disease ([place] [address] )    PVD (peripheral vascular disease) ([place] [address] )    Type 2 diabetes mellitus with circulatory disorder, without long-term current use of insulin ([place] [address] )    HTN (hypertension)    HLD (hyperlipidemia)    Atherosclerosis of artery of extremity with ulceration ([facility])    Transaminitis    Chronic anemia    Depression    Ulcer of left heel (HCC)    Enlarged prostate    Incomplete bladder emptying    CKD (chronic kidney disease) stage 4, GFR 15-29 ml/min ([facility])    Tremor    Osteomyelitis of right foot ([place] [address] )       Past Surgical History:   Procedure Laterality Date    APPENDECTOMY  1945    FOOT SURGERY  06/14/2013    I&D with vac    INCISION AND DRAINAGE ABSCESS / HEMATOMA OF BURSA / KNEE / THIGH  03/07/2014    VEIN BYPASS SURGERY Bilateral 2015       Family History   Problem Relation Age of Onset    Diabetes Mother     Heart failure Mother     Hypertension Mother     Cancer Father     Hypertension Sister     Hyperthyroidism Sister     Stroke Brother     Diabetes Maternal Grandmother     Clotting disorder Maternal Grandmother     No Known Problems Maternal Grandfather     No Known Problems Paternal Grandmother     No Known Problems Paternal Grandfather     Diabetes Brother     Arthritis Brother     Glaucoma Brother     Cataracts Brother     No Known Problems Son     Hyperthyroidism Daughter     Kidney disease Daughter     COPD Daughter     Diabetes Brother     Cancer Brother        Social History     Socioeconomic History    Marital status:       Spouse name: Not on file    Number of children: Not on file    Years of education: Not on file    Highest education level: Not on file   Occupational History    Not on file   Social Needs    Financial resource strain: Not on file    Food insecurity: Worry: Not on file     Inability: Not on file    Transportation needs:     Medical: Not on file     Non-medical: Not on file   Tobacco Use    Smoking status: Never Smoker    Smokeless tobacco: Never Used   Substance and Sexual Activity    Alcohol use: Yes     Comment: one beer on his birthday   [place] Drug use: No    Sexual activity: Never   Lifestyle    Physical activity:     Days per week: Not on file     Minutes per session: Not on file    Stress: Not on file   Relationships    Social connections:     Talks on phone: Not on file     Gets together: Not on file     Attends [employer] service: Not on file     Active member of club or organization: Not on file     Attends meetings of clubs or organizations: Not on file     Relationship status: Not on file    Intimate partner violence:     Fear of current or ex partner: Not on file     Emotionally abused: Not on file     Physically abused: Not on file     Forced sexual activity: Not on file   Other Topics Concern    Not on file   Social History Narrative    Not on file       Allergies   Allergen Reactions    Metoprolol Bradycardia    Unasyn [Ampicillin-Sulbactam Sodium] Rash         Current Outpatient Medications:     amLODIPine (NORVASC) 5 mg tablet, Take 1 tablet by mouth daily, Disp: , Rfl:     Cholecalciferol (VITAMIN D) 2000 units CAPS, Take 2,000 Units by mouth once a week tuesdays , Disp: , Rfl:     clopidogrel (PLAVIX) 75 mg tablet, Take 1 tablet by mouth daily, Disp: , Rfl: 0    ergocalciferol (VITAMIN D2) 50,000 units, Take 50,000 Units by mouth once a week, Disp: , Rfl: 1    finasteride (PROSCAR) 5 mg tablet, Take 1 tablet (5 mg total) by mouth daily, Disp: 90 tablet, Rfl: 3    FLUoxetine (PROzac) 40 MG capsule, Take 40 mg by mouth daily, Disp: , Rfl:     furosemide (LASIX) 40 mg tablet, Take 40 mg by mouth daily, Disp: , Rfl:     glipiZIDE (GLUCOTROL XL) 2 5 mg 24 hr tablet, Take 1 tablet by mouth daily, Disp: , Rfl:     KIONEX 15 GM/60ML suspension, Take 60 mL (15 g total) by mouth 3 (three) times a week, Disp: 500 mL, Rfl: 3    Memantine HCl ER (NAMENDA XR) 14 MG CP24, Take 1 capsule by mouth daily, Disp: , Rfl:     Patiromer Sorbitex Calcium (VELTASSA) 8 4 g PACK, Take 8 4 g by mouth 3 (three) times a week, Disp: 12 each, Rfl: 5    primidone (MYSOLINE) 50 mg tablet, Take 50 mg by mouth daily, Disp: , Rfl:     simvastatin (ZOCOR) 20 mg tablet, Take 20 mg by mouth daily at bedtime, Disp: , Rfl:     sodium bicarbonate 650 mg tablet, Take 1 tablet by mouth 2 (two) times daily after meals, Disp: 60 tablet, Rfl: 0    tamsulosin (FLOMAX) 0 4 mg, Take 0 4 mg by mouth daily with dinner, Disp: , Rfl:     warfarin (COUMADIN) 1 mg tablet, Take by mouth daily , Disp: , Rfl:     warfarin (COUMADIN) 4 mg tablet, Take by mouth daily, Disp: , Rfl:     warfarin (COUMADIN) 5 mg tablet, Take 1 tablet by mouth daily, Disp: , Rfl: 0    zonisamide (ZONEGRAN) 25 mg capsule, Take 3 capsules (75 mg total) by mouth daily at bedtime, Disp: 270 capsule, Rfl: 3    clindamycin (CLEOCIN) 300 MG capsule, , Disp: , Rfl:        Review of Systems   Constitutional:        No motivation   HENT: Negative  Eyes: Negative  Respiratory: Negative  Cardiovascular: Negative  Gastrointestinal: Negative  Endocrine: Negative  Genitourinary: Negative  Musculoskeletal: Negative  Skin: Negative  Allergic/Immunologic: Negative  Neurological: Negative  Hematological: Negative  Psychiatric/Behavioral: Negative  Objective:      /70 (BP Location: Left arm, Patient Position: Sitting, Cuff Size: Adult)   Pulse 78   Temp 98 3 °F (36 8 °C) (Tympanic)   Resp 16   Ht 6' (1 829 m)   Wt 92 5 kg (204 lb)   BMI 27 67 kg/m²          Physical Exam   Constitutional: He is oriented to person, place, and time  He appears well-developed and well-nourished  Cardiovascular:   Pulses:       Popliteal pulses are 0 on the left side          Dorsalis pedis pulses are 0 on the left side  Posterior tibial pulses are 0 on the left side  Bilateral feet are pink and warm with decreased capillary refill  There is a dressing in place on the left heel  Musculoskeletal: Normal range of motion  He exhibits edema (  Bilateral edema)  He exhibits no tenderness  Neurological: He is alert and oriented to person, place, and time  A sensory deficit ( bilateral peripheral neuropathy) is present  Skin: Skin is warm and dry  Psychiatric: He has a normal mood and affect

## 2020-03-04 NOTE — ASSESSMENT & PLAN NOTE
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 with evidence of underlying osteomyelitis  We discussed the findings on recent duplex evaluation along with the treatment options available and the associated risks and benefits in light of his known underlying renal insufficiency and previous revascularization procedures which have required use of cadaveric vein bypass which subsequently failed  Unfortunately in this setting further surgical revascularization is of limited benefit and high risk  We did discuss the option of proceeding with endovascular intervention to attempt to optimize perfusion and healing potential   He is very hesitant to proceed with the concern for further renal failure and asked to discuss 1st with his nephrologist   We will make an appointment to discuss further after nephrology evaluation  2   Atherosclerotic occlusive disease right lower extremity with history of femoral -popliteal bypass  There is evidence of graft stenosis  This will be followed with duplex imaging with same concerns as noted above

## 2020-03-04 NOTE — PATIENT INSTRUCTIONS
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 with evidence of underlying osteomyelitis  We discussed the findings on recent duplex evaluation along with the treatment options available and the associated risks and benefits in light of his known underlying renal insufficiency and previous revascularization procedures which have required use of cadaveric vein bypass which subsequently failed  Unfortunately in this setting further surgical revascularization is of limited benefit and high risk  We did discuss the option of proceeding with endovascular intervention to attempt to optimize perfusion and healing potential   He is very hesitant to proceed with the concern for further renal failure and asked to discuss 1st with his nephrologist   We will make an appointment to discuss further after nephrology evaluation  2   Atherosclerotic occlusive disease right lower extremity with history of femoral -popliteal bypass  There is evidence of graft stenosis  This will be followed with duplex imaging with same concerns as noted above  Osteomyelitis of right foot ([facility] )    MRI suggestive of  Left calcaneus osteomyelitis  Continue management as per podiatry

## 2020-03-17 ENCOUNTER — APPOINTMENT (OUTPATIENT)
Dept: LAB | Facility: MEDICAL CENTER | Age: 85
End: 2020-03-17
Payer: MEDICARE

## 2020-03-17 ENCOUNTER — TRANSCRIBE ORDERS (OUTPATIENT)
Dept: ADMINISTRATIVE | Facility: HOSPITAL | Age: 85
End: 2020-03-17

## 2020-03-17 DIAGNOSIS — E78.5 HYPERLIPIDEMIA, UNSPECIFIED HYPERLIPIDEMIA TYPE: ICD-10-CM

## 2020-03-17 DIAGNOSIS — E11.9 TYPE 2 DIABETES MELLITUS WITHOUT COMPLICATION, WITHOUT LONG-TERM CURRENT USE OF INSULIN (HCC): Primary | ICD-10-CM

## 2020-03-17 DIAGNOSIS — I48.91 ATRIAL FIBRILLATION, UNSPECIFIED TYPE (HCC): ICD-10-CM

## 2020-03-17 DIAGNOSIS — N18.9 CHRONIC KIDNEY DISEASE, UNSPECIFIED CKD STAGE: ICD-10-CM

## 2020-03-17 LAB
ANION GAP SERPL CALCULATED.3IONS-SCNC: 9 MMOL/L (ref 4–13)
BUN SERPL-MCNC: 38 MG/DL (ref 5–25)
CALCIUM SERPL-MCNC: 7.7 MG/DL (ref 8.3–10.1)
CHLORIDE SERPL-SCNC: 111 MMOL/L (ref 100–108)
CHOLEST SERPL-MCNC: 115 MG/DL (ref 50–200)
CO2 SERPL-SCNC: 20 MMOL/L (ref 21–32)
CREAT SERPL-MCNC: 2.78 MG/DL (ref 0.6–1.3)
EST. AVERAGE GLUCOSE BLD GHB EST-MCNC: 140 MG/DL
GFR SERPL CREATININE-BSD FRML MDRD: 19 ML/MIN/1.73SQ M
GLUCOSE P FAST SERPL-MCNC: 96 MG/DL (ref 65–99)
HBA1C MFR BLD: 6.5 %
HDLC SERPL-MCNC: 40 MG/DL
INR PPP: 2.15 (ref 0.84–1.19)
LDLC SERPL CALC-MCNC: 59 MG/DL (ref 0–100)
NONHDLC SERPL-MCNC: 75 MG/DL
POTASSIUM SERPL-SCNC: 3.9 MMOL/L (ref 3.5–5.3)
PROTHROMBIN TIME: 23.5 SECONDS (ref 11.6–14.5)
SODIUM SERPL-SCNC: 140 MMOL/L (ref 136–145)
TRIGL SERPL-MCNC: 81 MG/DL

## 2020-03-17 PROCEDURE — 80048 BASIC METABOLIC PNL TOTAL CA: CPT | Performed by: FAMILY MEDICINE

## 2020-03-17 PROCEDURE — 36415 COLL VENOUS BLD VENIPUNCTURE: CPT | Performed by: FAMILY MEDICINE

## 2020-03-17 PROCEDURE — 83036 HEMOGLOBIN GLYCOSYLATED A1C: CPT | Performed by: FAMILY MEDICINE

## 2020-03-17 PROCEDURE — 80061 LIPID PANEL: CPT | Performed by: FAMILY MEDICINE

## 2020-03-17 PROCEDURE — 85610 PROTHROMBIN TIME: CPT | Performed by: FAMILY MEDICINE

## 2020-03-30 ENCOUNTER — TELEPHONE (OUTPATIENT)
Dept: NEPHROLOGY | Facility: CLINIC | Age: 85
End: 2020-03-30

## 2020-03-30 NOTE — TELEPHONE ENCOUNTER
Cirilo Hand called in and left a voicemail on [employer]  She would like a call back regarding getting [facility] set up to administer Ismael's medications three times a week   Please call [phone]

## 2020-04-03 ENCOUNTER — TELEMEDICINE (OUTPATIENT)
Dept: FAMILY MEDICINE CLINIC | Facility: CLINIC | Age: 85
End: 2020-04-03
Payer: MEDICARE

## 2020-04-03 DIAGNOSIS — E11.52 TYPE 2 DIABETES MELLITUS WITH DIABETIC PERIPHERAL ANGIOPATHY AND GANGRENE, WITHOUT LONG-TERM CURRENT USE OF INSULIN (HCC): Primary | ICD-10-CM

## 2020-04-03 DIAGNOSIS — L97.909 ATHEROSCLEROSIS OF ARTERY OF EXTREMITY WITH ULCERATION (HCC): Chronic | ICD-10-CM

## 2020-04-03 DIAGNOSIS — I70.299 ATHEROSCLEROSIS OF ARTERY OF EXTREMITY WITH ULCERATION (HCC): Chronic | ICD-10-CM

## 2020-04-03 DIAGNOSIS — M86.9 OSTEOMYELITIS OF RIGHT FOOT, UNSPECIFIED TYPE (HCC): ICD-10-CM

## 2020-04-03 DIAGNOSIS — I10 ESSENTIAL HYPERTENSION: Chronic | ICD-10-CM

## 2020-04-03 DIAGNOSIS — I73.9 PVD (PERIPHERAL VASCULAR DISEASE) (HCC): Chronic | ICD-10-CM

## 2020-04-03 PROCEDURE — 3074F SYST BP LT 130 MM HG: CPT | Performed by: FAMILY MEDICINE

## 2020-04-03 PROCEDURE — 1036F TOBACCO NON-USER: CPT | Performed by: FAMILY MEDICINE

## 2020-04-03 PROCEDURE — 1160F RVW MEDS BY RX/DR IN RCRD: CPT | Performed by: FAMILY MEDICINE

## 2020-04-03 PROCEDURE — 3078F DIAST BP <80 MM HG: CPT | Performed by: FAMILY MEDICINE

## 2020-04-03 PROCEDURE — 99213 OFFICE O/P EST LOW 20 MIN: CPT | Performed by: FAMILY MEDICINE

## 2020-04-03 PROCEDURE — 3066F NEPHROPATHY DOC TX: CPT | Performed by: FAMILY MEDICINE

## 2020-04-03 PROCEDURE — 3044F HG A1C LEVEL LT 7.0%: CPT | Performed by: FAMILY MEDICINE

## 2020-04-06 ENCOUNTER — TELEPHONE (OUTPATIENT)
Dept: FAMILY MEDICINE CLINIC | Facility: CLINIC | Age: 85
End: 2020-04-06

## 2020-04-20 ENCOUNTER — TELEPHONE (OUTPATIENT)
Dept: NEPHROLOGY | Facility: CLINIC | Age: 85
End: 2020-04-20

## 2020-04-27 DIAGNOSIS — E87.5 HYPERKALEMIA: ICD-10-CM

## 2020-04-28 ENCOUNTER — TELEMEDICINE (OUTPATIENT)
Dept: NEPHROLOGY | Facility: CLINIC | Age: 85
End: 2020-04-28
Payer: MEDICARE

## 2020-04-28 VITALS — DIASTOLIC BLOOD PRESSURE: 78 MMHG | SYSTOLIC BLOOD PRESSURE: 120 MMHG | HEART RATE: 70 BPM

## 2020-04-28 DIAGNOSIS — I10 HYPERTENSION, UNSPECIFIED TYPE: Primary | ICD-10-CM

## 2020-04-28 DIAGNOSIS — N18.4 CKD (CHRONIC KIDNEY DISEASE) STAGE 4, GFR 15-29 ML/MIN (HCC): Chronic | ICD-10-CM

## 2020-04-28 DIAGNOSIS — E87.5 HYPERKALEMIA: ICD-10-CM

## 2020-04-28 DIAGNOSIS — E87.5 HYPERKALEMIA: Primary | ICD-10-CM

## 2020-04-28 DIAGNOSIS — E55.9 VITAMIN D DEFICIENCY: Primary | ICD-10-CM

## 2020-04-28 PROCEDURE — 99443 PR PHYS/QHP TELEPHONE EVALUATION 21-30 MIN: CPT | Performed by: INTERNAL MEDICINE

## 2020-04-28 RX ORDER — PATIROMER 8.4 G/1
POWDER, FOR SUSPENSION ORAL
Qty: 30 EACH | OUTPATIENT
Start: 2020-04-28

## 2020-04-28 RX ORDER — MULTIVIT-MIN/IRON/FOLIC ACID/K 18-600-40
2000 CAPSULE ORAL WEEKLY
Qty: 12 CAPSULE | Refills: 1 | Status: SHIPPED | OUTPATIENT
Start: 2020-04-28 | End: 2020-08-07 | Stop reason: HOSPADM

## 2020-04-29 DIAGNOSIS — E55.9 VITAMIN D DEFICIENCY: Primary | ICD-10-CM

## 2020-04-29 DIAGNOSIS — E87.5 HYPERKALEMIA: ICD-10-CM

## 2020-04-29 RX ORDER — ERGOCALCIFEROL 1.25 MG/1
50000 CAPSULE ORAL WEEKLY
Qty: 12 CAPSULE | Refills: 1 | Status: SHIPPED | OUTPATIENT
Start: 2020-04-29 | End: 2020-08-23 | Stop reason: HOSPADM

## 2020-04-30 ENCOUNTER — TELEMEDICINE (OUTPATIENT)
Dept: VASCULAR SURGERY | Facility: CLINIC | Age: 85
End: 2020-04-30
Payer: MEDICARE

## 2020-04-30 ENCOUNTER — TELEPHONE (OUTPATIENT)
Dept: ADMINISTRATIVE | Facility: HOSPITAL | Age: 85
End: 2020-04-30

## 2020-04-30 VITALS — WEIGHT: 210 LBS | HEIGHT: 72 IN | BODY MASS INDEX: 28.44 KG/M2

## 2020-04-30 DIAGNOSIS — L97.909 ATHEROSCLEROSIS OF ARTERY OF EXTREMITY WITH ULCERATION (HCC): Primary | Chronic | ICD-10-CM

## 2020-04-30 DIAGNOSIS — N18.4 CKD (CHRONIC KIDNEY DISEASE) STAGE 4, GFR 15-29 ML/MIN (HCC): Chronic | ICD-10-CM

## 2020-04-30 DIAGNOSIS — I70.299 ATHEROSCLEROSIS OF ARTERY OF EXTREMITY WITH ULCERATION (HCC): Primary | Chronic | ICD-10-CM

## 2020-04-30 DIAGNOSIS — M86.8X7 OTHER OSTEOMYELITIS OF LEFT FOOT (HCC): ICD-10-CM

## 2020-04-30 PROCEDURE — 99214 OFFICE O/P EST MOD 30 MIN: CPT | Performed by: SURGERY

## 2020-05-06 ENCOUNTER — TELEPHONE (OUTPATIENT)
Dept: NEUROLOGY | Facility: CLINIC | Age: 85
End: 2020-05-06

## 2020-05-06 DIAGNOSIS — I73.9 PVD (PERIPHERAL VASCULAR DISEASE) (HCC): Primary | Chronic | ICD-10-CM

## 2020-05-11 ENCOUNTER — TELEPHONE (OUTPATIENT)
Dept: RADIOLOGY | Facility: HOSPITAL | Age: 85
End: 2020-05-11

## 2020-05-11 RX ORDER — SODIUM CHLORIDE 9 MG/ML
100 INJECTION, SOLUTION INTRAVENOUS CONTINUOUS
Status: CANCELLED | OUTPATIENT
Start: 2020-05-11

## 2020-05-12 ENCOUNTER — TELEPHONE (OUTPATIENT)
Dept: RADIOLOGY | Facility: HOSPITAL | Age: 85
End: 2020-05-12

## 2020-05-13 ENCOUNTER — TELEPHONE (OUTPATIENT)
Dept: VASCULAR SURGERY | Facility: CLINIC | Age: 85
End: 2020-05-13

## 2020-05-18 ENCOUNTER — TELEPHONE (OUTPATIENT)
Dept: INPATIENT UNIT | Facility: HOSPITAL | Age: 85
End: 2020-05-18

## 2020-05-19 ENCOUNTER — TELEPHONE (OUTPATIENT)
Dept: VASCULAR SURGERY | Facility: CLINIC | Age: 85
End: 2020-05-19

## 2020-06-03 ENCOUNTER — TELEPHONE (OUTPATIENT)
Dept: VASCULAR SURGERY | Facility: CLINIC | Age: 85
End: 2020-06-03

## 2020-06-29 DIAGNOSIS — N40.0 ENLARGED PROSTATE: ICD-10-CM

## 2020-06-29 DIAGNOSIS — R33.9 INCOMPLETE BLADDER EMPTYING: ICD-10-CM

## 2020-06-29 DIAGNOSIS — R31.0 GROSS HEMATURIA: ICD-10-CM

## 2020-06-29 RX ORDER — FINASTERIDE 5 MG/1
5 TABLET, FILM COATED ORAL DAILY
Qty: 90 TABLET | Refills: 3 | Status: SHIPPED | OUTPATIENT
Start: 2020-06-29 | End: 2020-08-23 | Stop reason: HOSPADM

## 2020-07-06 ENCOUNTER — HOSPITAL ENCOUNTER (EMERGENCY)
Facility: HOSPITAL | Age: 85
Discharge: HOME | End: 2020-07-06
Attending: EMERGENCY MEDICINE | Admitting: EMERGENCY MEDICINE
Payer: MEDICARE

## 2020-07-06 VITALS
HEART RATE: 73 BPM | DIASTOLIC BLOOD PRESSURE: 54 MMHG | RESPIRATION RATE: 18 BRPM | OXYGEN SATURATION: 96 % | TEMPERATURE: 97.7 F | SYSTOLIC BLOOD PRESSURE: 110 MMHG

## 2020-07-06 DIAGNOSIS — E16.2 HYPOGLYCEMIA: Primary | ICD-10-CM

## 2020-07-06 LAB
ATRIAL RATE: 74 BPM
GLUCOSE SERPL-MCNC: 266 MG/DL (ref 65–140)
QRS AXIS: 20 DEGREES
QRSD INTERVAL: 86 MS
QT INTERVAL: 410 MS
QTC INTERVAL: 446 MS
T WAVE AXIS: -13 DEGREES
VENTRICULAR RATE: 71 BPM

## 2020-07-06 PROCEDURE — 82948 REAGENT STRIP/BLOOD GLUCOSE: CPT

## 2020-07-06 PROCEDURE — 93010 ELECTROCARDIOGRAM REPORT: CPT | Performed by: INTERNAL MEDICINE

## 2020-07-06 PROCEDURE — 93005 ELECTROCARDIOGRAM TRACING: CPT

## 2020-07-06 PROCEDURE — 99285 EMERGENCY DEPT VISIT HI MDM: CPT

## 2020-07-06 PROCEDURE — 99283 EMERGENCY DEPT VISIT LOW MDM: CPT | Performed by: EMERGENCY MEDICINE

## 2020-07-06 NOTE — ED NOTES
[place] daughter would like an update when possible and her fathers medications are in his [address]  07/06/20 1224

## 2020-07-06 NOTE — ED NOTES
Notified patient's daughter Colletta Minor of patient status  States she will be here in approximately 30 minutes  Patient is sitting up eating and drinking at this time       Nael Chan RN  07/06/20 0112

## 2020-07-06 NOTE — ED PROVIDER NOTES
History  Chief Complaint   Patient presents with    Hypoglycemia - Symptomatic     pt was at podiatrist today and was reportedly acting strange  BG 54  given oral glucose tabs PTA  per EMS last blood sugar 199  no complaints at this time     Patient referred to the emergency department for evaluation of a low blood sugar episode  Patient was at his private podiatrist prior to arrival when his odd behavior prompted them to check his sugar which was 59  Patient was given glucose and medics were called in when medics got there is sugar was 250  Patient has no complaints at this time  Patient is a type 2 diabetic  Currently has no chest pain sob fever chills runny nose abdominal pain nausea vomiting or diarrhea  No rash  He denies headache or visual complaints  He denies extremity numbness tingling or weakness  Prior to Admission Medications   Prescriptions Last Dose Informant Patient Reported? Taking?    Cholecalciferol (VITAMIN D) 50 MCG (2000 UT) CAPS   No No   Sig: Take 1 capsule (2,000 Units total) by mouth once a week tuesdays   FLUoxetine (PROzac) 40 MG capsule  Self Yes No   Sig: Take 40 mg by mouth daily   KIONEX 15 GM/60ML suspension  Self No No   Sig: Take 60 mL (15 g total) by mouth 3 (three) times a week   Memantine HCl ER (NAMENDA XR) 14 MG CP24  Self Yes No   Sig: Take 1 capsule by mouth daily   Patiromer Sorbitex Calcium (Veltassa) 8 4 g PACK   No No   Sig: Take 8 4 g by mouth 3 (three) times a week   Patiromer Sorbitex Calcium 8 4 g PACK   No No   Sig: Take 8 4 g by mouth 3 (three) times a week   amLODIPine (NORVASC) 5 mg tablet  Self Yes No   Sig: Take 1 tablet by mouth daily   clindamycin (CLEOCIN) 300 MG capsule  Self Yes No   clopidogrel (PLAVIX) 75 mg tablet  Self No No   Sig: Take 1 tablet by mouth daily   ergocalciferol (VITAMIN D2) 50,000 units   No No   Sig: Take 1 capsule (50,000 Units total) by mouth once a week   finasteride (PROSCAR) 5 mg tablet   No No   Sig: Take 1 tablet (5 mg total) by mouth daily   furosemide (LASIX) 40 mg tablet  Self Yes No   Sig: Take 40 mg by mouth daily   glipiZIDE (GLUCOTROL XL) 2 5 mg 24 hr tablet  Self Yes No   Sig: Take 1 tablet by mouth daily   primidone (MYSOLINE) 50 mg tablet  Self Yes No   Sig: Take 50 mg by mouth daily   simvastatin (ZOCOR) 20 mg tablet  Self Yes No   Sig: Take 20 mg by mouth daily at bedtime   sodium bicarbonate 650 mg tablet  Self No No   Sig: Take 1 tablet by mouth 2 (two) times daily after meals   tamsulosin (FLOMAX) 0 4 mg  Self Yes No   Sig: Take 0 4 mg by mouth daily with dinner   warfarin (COUMADIN) 1 mg tablet  Self Yes No   Sig: Take by mouth daily    warfarin (COUMADIN) 4 mg tablet  Self Yes No   Sig: Take by mouth daily   warfarin (COUMADIN) 5 mg tablet  Self No No   Sig: Take 1 tablet by mouth daily   zonisamide (ZONEGRAN) 25 mg capsule  Self No No   Sig: Take 3 capsules (75 mg total) by mouth daily at bedtime      Facility-Administered Medications: None       Past Medical History:   Diagnosis Date    [facility]     [facility]'s disease [facility]     Depression     Diabetes mellitus [facility]     Anderson catheter in place     History of atrial fibrillation     History of chronic kidney disease     History of peripheral vascular disease     Hyperlipidemia     Hypertension     Kidney disease     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[facility]     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[facility]     Memory loss     Osteomyelitis of right foot ([facility]     Renal disorder        Past Surgical History:   Procedure Laterality Date    APPENDECTOMY  1945    FOOT SURGERY  06/14/2013    I&D with vac    INCISION AND DRAINAGE ABSCESS / HEMATOMA OF Terrea Fleet / KNEE / THIGH  03/07/2014    VEIN BYPASS SURGERY Bilateral 2015       Family History   Problem Relation Age of Onset    Diabetes Mother     Heart failure Mother     Hypertension Mother     Cancer Father     Hypertension Sister     Hyperthyroidism Sister     Stroke Brother     Diabetes Maternal Grandmother     Clotting disorder Maternal Grandmother     No Known Problems Maternal Grandfather     No Known Problems Paternal Grandmother     No Known Problems Paternal Grandfather     Diabetes Brother     Arthritis Brother     Glaucoma Brother     Cataracts Brother     No Known Problems Son     Hyperthyroidism Daughter     Kidney disease Daughter     COPD Daughter     Diabetes Brother     Cancer Brother      I have reviewed and agree with the history as documented  E-Cigarette/Vaping    E-Cigarette Use Never User      E-Cigarette/Vaping Substances    Nicotine No     THC No     CBD No     Flavoring No     Other No     Unknown No      Social History     Tobacco Use    Smoking status: Never Smoker    Smokeless tobacco: Never Used   Substance Use Topics    Alcohol use: Yes     Comment: one beer on his birthday    Drug use: No       Review of Systems   Constitutional: Negative  Negative for activity change, appetite change, chills, diaphoresis, fatigue and fever  HENT: Negative  Negative for congestion, drooling, ear pain, facial swelling, rhinorrhea, sinus pressure, sinus pain, sneezing, sore throat, tinnitus, trouble swallowing and voice change  Eyes: Negative  Negative for photophobia and visual disturbance  Respiratory: Negative  Negative for chest tightness, shortness of breath, wheezing and stridor  Cardiovascular: Negative  Negative for chest pain, palpitations and leg swelling  Gastrointestinal: Negative  Negative for abdominal pain, anal bleeding, diarrhea, nausea and vomiting  Endocrine: Negative  Genitourinary: Negative  Negative for discharge, dysuria, frequency, hematuria, penile pain, penile swelling, scrotal swelling and testicular pain  Musculoskeletal: Negative  Negative for arthralgias, back pain, neck pain and neck stiffness  Skin: Negative  Negative for rash and wound  Allergic/Immunologic: Negative  Neurological: Negative    Negative for dizziness, tremors, seizures, syncope, facial asymmetry, speech difficulty, weakness, light-headedness, numbness and headaches  Hematological: Negative  Does not bruise/bleed easily  Psychiatric/Behavioral: Positive for confusion  Physical Exam  Physical Exam   Constitutional: He is oriented to person, place, and time  He appears well-developed and well-nourished  No distress  Nontoxic appearance  No respiratory distress  Patient appears comfortable sitting upright on the stretcher  Can engage in normal conversation  Answer simple questions appropriately moves all extremities spontaneously and to command  HENT:   Head: Normocephalic and atraumatic  Right Ear: External ear normal    Left Ear: External ear normal    Nose: Nose normal    Mouth/Throat: Oropharynx is clear and moist  No oropharyngeal exudate  No visible evidence of head scalp or facial trauma   Eyes: Pupils are equal, round, and reactive to light  Conjunctivae and EOM are normal    Neck: Normal range of motion  Neck supple  Cardiovascular: Normal rate, regular rhythm, normal heart sounds and intact distal pulses  Pulmonary/Chest: Effort normal and breath sounds normal  No stridor  No respiratory distress  He has no wheezes  He has no rales  He exhibits no tenderness  Abdominal: Soft  Bowel sounds are normal  He exhibits no distension and no mass  There is no tenderness  There is no rebound and no guarding  No hernia  Musculoskeletal: Normal range of motion  He exhibits no edema, tenderness or deformity  Neurological: He is alert and oriented to person, place, and time  He has normal reflexes  He displays normal reflexes  No cranial nerve deficit or sensory deficit  He exhibits normal muscle tone  Coordination normal    Skin: Skin is warm and dry  Capillary refill takes less than 2 seconds  Rash noted  He is not diaphoretic  No erythema  No pallor  Psychiatric: He has a normal mood and affect   His behavior is normal  Judgment and thought content normal    Nursing note and vitals reviewed  Vital Signs  ED Triage Vitals   Temperature Pulse Respirations Blood Pressure SpO2   07/06/20 1305 07/06/20 1223 07/06/20 1220 07/06/20 1220 07/06/20 1223   97 7 °F (36 5 °C) 73 18 110/54 96 %      Temp Source Heart Rate Source Patient Position - Orthostatic VS BP Location FiO2 (%)   07/06/20 1305 07/06/20 1220 -- -- --   Oral Monitor         Pain Score       07/06/20 1223       No Pain           Vitals:    07/06/20 1220 07/06/20 1223   BP: 110/54    Pulse:  73         Visual Acuity      ED Medications  Medications - No data to display    Diagnostic Studies  Results Reviewed     Procedure Component Value Units Date/Time    Fingerstick Glucose (POCT) [[id number]]  (Abnormal) Collected:  07/06/20 1222    Lab Status:  Final result Updated:  07/06/20 1223     POC Glucose 266 mg/dl                  No orders to display              Procedures  ECG 12 Lead Documentation Only  Date/Time: 7/6/2020 2:01 PM  Performed by: Abelardo Frey MD  Authorized by: Abelardo Frey MD     Patient location:  ED  Previous ECG:     Previous ECG:  Compared to current    Similarity:  Changes noted  Interpretation:     Interpretation: abnormal    Quality:     Tracing quality:  Limited by artifact  Rate:     ECG rate:  71    ECG rate assessment: normal    Rhythm:     Rhythm: sinus rhythm    Ectopy:     Ectopy: none    QRS:     QRS axis:  Normal    QRS intervals:  Normal  Conduction:     Conduction: normal    ST segments:     ST segments:  Non-specific  T waves:     T waves: non-specific               ED Course  ED Course as of Jul 06 1430   Mon Jul 06, 2020   1402 Patient is stable for discharge  No complaints at discharge  EKG unremarkable along with normal vital signs  Blood sugar on arrival was 266  Patient had lunch and discussed with patient signs and symptoms requiring return to the emergency department  MDM      Disposition  Final diagnoses:   Hypoglycemia     Time reflects when diagnosis was documented in both MDM as applicable and the Disposition within this note     Time User Action Codes Description Comment    7/6/2020  2:02 PM Blane Alonso Add [E16 2] Hypoglycemia       ED Disposition     ED Disposition Condition Date/Time Comment    Discharge Stable Mon Jul 6, 2020  2:02 PM Nayla Correa discharge to home/self care  Follow-up Information     Follow up With Specialties Details Why Contact Info    Fanny Wagner MD Family Medicine Schedule an appointment as soon as possible for a visit  As needed [id number] [facility] [address] [place] [address] [address]  [phone]            Patient's Medications   Discharge Prescriptions    No medications on file     No discharge procedures on file      PDMP Review     None          ED Provider  Electronically Signed by           Abelardo Frey MD  07/06/20 9305

## 2020-07-07 ENCOUNTER — OFFICE VISIT (OUTPATIENT)
Dept: FAMILY MEDICINE CLINIC | Facility: CLINIC | Age: 85
End: 2020-07-07
Payer: MEDICARE

## 2020-07-07 VITALS
BODY MASS INDEX: 26.82 KG/M2 | RESPIRATION RATE: 18 BRPM | WEIGHT: 198 LBS | OXYGEN SATURATION: 98 % | DIASTOLIC BLOOD PRESSURE: 62 MMHG | HEIGHT: 72 IN | HEART RATE: 74 BPM | SYSTOLIC BLOOD PRESSURE: 110 MMHG | TEMPERATURE: 98.6 F

## 2020-07-07 DIAGNOSIS — G20 PARKINSONISM, UNSPECIFIED PARKINSONISM TYPE (HCC): ICD-10-CM

## 2020-07-07 DIAGNOSIS — R35.0 FREQUENCY OF URINATION: ICD-10-CM

## 2020-07-07 DIAGNOSIS — D68.9 COAGULOPATHY (HCC): ICD-10-CM

## 2020-07-07 DIAGNOSIS — E13.9 DIABETES 1.5, MANAGED AS TYPE 2 (HCC): Primary | ICD-10-CM

## 2020-07-07 DIAGNOSIS — E16.2 HYPOGLYCEMIA: ICD-10-CM

## 2020-07-07 PROCEDURE — 3074F SYST BP LT 130 MM HG: CPT | Performed by: NURSE PRACTITIONER

## 2020-07-07 PROCEDURE — 4040F PNEUMOC VAC/ADMIN/RCVD: CPT | Performed by: NURSE PRACTITIONER

## 2020-07-07 PROCEDURE — 1036F TOBACCO NON-USER: CPT | Performed by: NURSE PRACTITIONER

## 2020-07-07 PROCEDURE — 1160F RVW MEDS BY RX/DR IN RCRD: CPT | Performed by: NURSE PRACTITIONER

## 2020-07-07 PROCEDURE — 3044F HG A1C LEVEL LT 7.0%: CPT | Performed by: NURSE PRACTITIONER

## 2020-07-07 PROCEDURE — 99214 OFFICE O/P EST MOD 30 MIN: CPT | Performed by: NURSE PRACTITIONER

## 2020-07-07 PROCEDURE — 3078F DIAST BP <80 MM HG: CPT | Performed by: NURSE PRACTITIONER

## 2020-07-07 PROCEDURE — 3066F NEPHROPATHY DOC TX: CPT | Performed by: NURSE PRACTITIONER

## 2020-07-07 PROCEDURE — 3008F BODY MASS INDEX DOCD: CPT | Performed by: NURSE PRACTITIONER

## 2020-07-07 NOTE — PROGRESS NOTES
Assessment/Plan:   Advised daughter that he should stop the glipizide if he is missing meals  We will recheck his a1c in 1 month and adjust the plan of care accordingly  Also advised he need his urine checked for uti with changes in mental status  Orders were given  If fails to improve of his condition worsens seek emergency care  Pt and daughter verbalized agreement and understanding of the plan of care as outlined during is visit  There are no diagnoses linked to this encounter  Subjective:     Patient ID: Sebastián Paul is a 80 y o  male  Vira Membrenog is here for a hospital fu for hypoglycemic episode  His daughter is part of the visit  She is concerned that he is missing meals and having frequent bs lows  Review of Systems   Constitutional: Positive for appetite change and fatigue  Negative for fever  HENT: Negative for congestion and trouble swallowing  Respiratory: Negative for shortness of breath  Cardiovascular: Positive for leg swelling  Negative for chest pain  Gastrointestinal: Negative for abdominal pain  Genitourinary: Positive for dysuria, frequency and urgency  Negative for difficulty urinating  Musculoskeletal: Negative for myalgias  Skin: Positive for wound  Neurological: Positive for weakness  Negative for dizziness  Psychiatric/Behavioral: Positive for confusion  The patient is not nervous/anxious  Objective:     Physical Exam   Constitutional: He is oriented to person, place, and time  He appears well-developed and well-nourished  HENT:   Head: Normocephalic  Right Ear: External ear normal    Left Ear: External ear normal    Mouth/Throat: Oropharynx is clear and moist    Eyes: Pupils are equal, round, and reactive to light  EOM are normal    Neck: Normal range of motion  Cardiovascular: Normal rate and regular rhythm  Pulmonary/Chest: Effort normal    Abdominal: Soft  Musculoskeletal: Normal range of motion  He exhibits tenderness (throughout)  Neurological: He is alert and oriented to person, place, and time  Skin: Skin is warm and dry  Capillary refill takes more than 3 seconds  Psychiatric: He has a normal mood and affect  His behavior is normal  Judgment and thought content normal    Nursing note and vitals reviewed

## 2020-07-08 ENCOUNTER — APPOINTMENT (OUTPATIENT)
Dept: LAB | Facility: CLINIC | Age: 85
End: 2020-07-08
Payer: MEDICARE

## 2020-07-08 LAB
BACTERIA UR QL AUTO: ABNORMAL /HPF
BILIRUB UR QL STRIP: ABNORMAL
CLARITY UR: ABNORMAL
COLOR UR: ABNORMAL
GLUCOSE UR STRIP-MCNC: ABNORMAL MG/DL
HGB UR QL STRIP.AUTO: ABNORMAL
KETONES UR STRIP-MCNC: ABNORMAL MG/DL
LEUKOCYTE ESTERASE UR QL STRIP: ABNORMAL
NITRITE UR QL STRIP: ABNORMAL
NON-SQ EPI CELLS URNS QL MICRO: ABNORMAL /HPF
PH UR STRIP.AUTO: ABNORMAL [PH]
PROT UR STRIP-MCNC: ABNORMAL MG/DL
RBC #/AREA URNS AUTO: ABNORMAL /HPF
SP GR UR STRIP.AUTO: 1.01 (ref 1–1.03)
UROBILINOGEN UR QL STRIP.AUTO: ABNORMAL E.U./DL
WBC #/AREA URNS AUTO: ABNORMAL /HPF

## 2020-07-08 PROCEDURE — 87086 URINE CULTURE/COLONY COUNT: CPT | Performed by: NURSE PRACTITIONER

## 2020-07-08 PROCEDURE — 87186 SC STD MICRODIL/AGAR DIL: CPT | Performed by: NURSE PRACTITIONER

## 2020-07-08 PROCEDURE — 81001 URINALYSIS AUTO W/SCOPE: CPT | Performed by: NURSE PRACTITIONER

## 2020-07-08 PROCEDURE — 87077 CULTURE AEROBIC IDENTIFY: CPT | Performed by: NURSE PRACTITIONER

## 2020-07-09 ENCOUNTER — TELEPHONE (OUTPATIENT)
Dept: NEPHROLOGY | Facility: CLINIC | Age: 85
End: 2020-07-09

## 2020-07-09 NOTE — TELEPHONE ENCOUNTER
Patient's daughter called and stated her father fell this week, and then had dark urine that looked purple  He went to the family doctor who ordered a UA and advised them to see his Urologist  She wanted Dr Raffaele Bustos aware as well

## 2020-07-10 ENCOUNTER — TELEPHONE (OUTPATIENT)
Dept: UROLOGY | Facility: MEDICAL CENTER | Age: 85
End: 2020-07-10

## 2020-07-10 DIAGNOSIS — N30.01 ACUTE CYSTITIS WITH HEMATURIA: Primary | ICD-10-CM

## 2020-07-10 LAB — BACTERIA UR CULT: ABNORMAL

## 2020-07-10 RX ORDER — CEPHALEXIN 250 MG/1
250 CAPSULE ORAL EVERY 6 HOURS SCHEDULED
Qty: 20 CAPSULE | Refills: 0 | Status: SHIPPED | OUTPATIENT
Start: 2020-07-10 | End: 2020-07-10 | Stop reason: ALTCHOICE

## 2020-07-10 RX ORDER — AMOXICILLIN AND CLAVULANATE POTASSIUM 500; 125 MG/1; MG/1
1 TABLET, FILM COATED ORAL EVERY 12 HOURS SCHEDULED
Qty: 10 TABLET | Refills: 0 | Status: SHIPPED | OUTPATIENT
Start: 2020-07-10 | End: 2020-07-15

## 2020-07-10 NOTE — TELEPHONE ENCOUNTER
This is a patient of Dr Regulo Melgoza and seen by Elise in [place]  Patients daughter called and said her dad had a fall over the weekend and his urine was purple  The primary did a UA and said it had a very high amount of blood in urine and wanted urologist notified  The results are in epic  Please call daughter back at [phone]

## 2020-07-10 NOTE — TELEPHONE ENCOUNTER
Called and spoke with [facility]) again  Advised augmentin sent to preferred pharmacy, this is to be taken twice a day for 5 days  Patient should also aggressively hydrate and continue to monitor  If hematuria worsens or does not resolve after antibiotics completed advised a call back  Patient's daughter verbalized understanding

## 2020-07-10 NOTE — TELEPHONE ENCOUNTER
Called and spoke with patient's daughter at this time  Advised our providers did review his urine results  A prescription of keflex was sent to his preferred pharmacy  He should also aggressively hydrate and monitor hematuria  Patient's daughter reports her dad lives alone and cannot remember to take medication four times per day  She is requesting if he can have a twice a day antibiotic  Advised I will route back to providers and return call  She verbalized understanding of conversation

## 2020-07-10 NOTE — TELEPHONE ENCOUNTER
Patient of Dr Tabatha Pedraza in the [facility] office  Patient is known to the practice for BPH, incomplete bladder emptying and intermittent gross hematuria  Per notes hematuria thought to be from prostatic hypertrophy in the setting of anticoagulation  Patient previously refused cystoscopy  Please review urine testing from 7/8/20 and advise

## 2020-07-10 NOTE — TELEPHONE ENCOUNTER
Patient had a urine specimen performed 2 days ago which was concerning for infection  Preliminary culture results to reveal gram-negative bacteria  A prescription for Keflex was sent to his pharmacy  Will need to continue to monitor to ensure sensitivity  Patient should hydrate and monitor his hematuria  Thank you

## 2020-07-13 ENCOUNTER — APPOINTMENT (OUTPATIENT)
Dept: LAB | Facility: MEDICAL CENTER | Age: 85
End: 2020-07-13
Payer: MEDICARE

## 2020-07-13 ENCOUNTER — TELEPHONE (OUTPATIENT)
Dept: FAMILY MEDICINE CLINIC | Facility: CLINIC | Age: 85
End: 2020-07-13

## 2020-07-13 DIAGNOSIS — I73.9 PVD (PERIPHERAL VASCULAR DISEASE) (HCC): Chronic | ICD-10-CM

## 2020-07-13 LAB
ANION GAP SERPL CALCULATED.3IONS-SCNC: 9 MMOL/L (ref 4–13)
BUN SERPL-MCNC: 49 MG/DL (ref 5–25)
CALCIUM SERPL-MCNC: 7.4 MG/DL (ref 8.3–10.1)
CHLORIDE SERPL-SCNC: 105 MMOL/L (ref 100–108)
CO2 SERPL-SCNC: 22 MMOL/L (ref 21–32)
CREAT SERPL-MCNC: 2.98 MG/DL (ref 0.6–1.3)
ERYTHROCYTE [DISTWIDTH] IN BLOOD BY AUTOMATED COUNT: 14.6 % (ref 11.6–15.1)
GFR SERPL CREATININE-BSD FRML MDRD: 18 ML/MIN/1.73SQ M
GLUCOSE SERPL-MCNC: 316 MG/DL (ref 65–140)
HCT VFR BLD AUTO: 25.9 % (ref 36.5–49.3)
HGB BLD-MCNC: 7.8 G/DL (ref 12–17)
MCH RBC QN AUTO: 27.6 PG (ref 26.8–34.3)
MCHC RBC AUTO-ENTMCNC: 30.1 G/DL (ref 31.4–37.4)
MCV RBC AUTO: 92 FL (ref 82–98)
PLATELET # BLD AUTO: 290 THOUSANDS/UL (ref 149–390)
PMV BLD AUTO: 10.3 FL (ref 8.9–12.7)
POTASSIUM SERPL-SCNC: 3.7 MMOL/L (ref 3.5–5.3)
RBC # BLD AUTO: 2.83 MILLION/UL (ref 3.88–5.62)
SODIUM SERPL-SCNC: 136 MMOL/L (ref 136–145)
WBC # BLD AUTO: 5.51 THOUSAND/UL (ref 4.31–10.16)

## 2020-07-13 PROCEDURE — 80048 BASIC METABOLIC PNL TOTAL CA: CPT

## 2020-07-13 PROCEDURE — 36415 COLL VENOUS BLD VENIPUNCTURE: CPT

## 2020-07-13 PROCEDURE — 85027 COMPLETE CBC AUTOMATED: CPT

## 2020-07-13 NOTE — TELEPHONE ENCOUNTER
Received a call from [address] stating that patient had a CBC and a BMP done today and wanted to make sure that you look at these results because it is showing that he is anemic with his Hemoglobin at 7 8

## 2020-07-15 NOTE — TELEPHONE ENCOUNTER
Ilana Merrill should have been the one to address the CBC results  Daughter wants to know if a visit is really necessary or if an iron pill can just be prescribed, Also wanted to let candace know that the UTI is clearing up and that the urologist gave him an antibiotic   Please advise

## 2020-07-16 DIAGNOSIS — D50.9 IRON DEFICIENCY ANEMIA, UNSPECIFIED IRON DEFICIENCY ANEMIA TYPE: Primary | ICD-10-CM

## 2020-07-16 RX ORDER — FERROUS SULFATE TAB EC 324 MG (65 MG FE EQUIVALENT) 324 (65 FE) MG
324 TABLET DELAYED RESPONSE ORAL
Qty: 60 TABLET | Refills: 5 | Status: ON HOLD | OUTPATIENT
Start: 2020-07-16 | End: 2020-08-07 | Stop reason: SDUPTHER

## 2020-07-21 ENCOUNTER — APPOINTMENT (EMERGENCY)
Dept: RADIOLOGY | Facility: HOSPITAL | Age: 85
DRG: 540 | End: 2020-07-21
Payer: MEDICARE

## 2020-07-21 ENCOUNTER — HOSPITAL ENCOUNTER (INPATIENT)
Facility: HOSPITAL | Age: 85
LOS: 17 days | Discharge: NURSING, REHAB OR LONG-TERM CARE | DRG: 540 | End: 2020-08-07
Attending: EMERGENCY MEDICINE | Admitting: INTERNAL MEDICINE
Payer: MEDICARE

## 2020-07-21 DIAGNOSIS — E87.5 HYPERKALEMIA: ICD-10-CM

## 2020-07-21 DIAGNOSIS — L97.423 SKIN ULCER OF LEFT HEEL WITH NECROSIS OF MUSCLE (HCC): ICD-10-CM

## 2020-07-21 DIAGNOSIS — D50.9 IRON DEFICIENCY ANEMIA, UNSPECIFIED IRON DEFICIENCY ANEMIA TYPE: ICD-10-CM

## 2020-07-21 DIAGNOSIS — L08.9 WOUND INFECTION: Primary | ICD-10-CM

## 2020-07-21 DIAGNOSIS — D64.9 LOW HEMOGLOBIN: ICD-10-CM

## 2020-07-21 DIAGNOSIS — R26.2 AMBULATORY DYSFUNCTION: ICD-10-CM

## 2020-07-21 DIAGNOSIS — M86.8X7 OTHER OSTEOMYELITIS OF LEFT FOOT (HCC): ICD-10-CM

## 2020-07-21 DIAGNOSIS — S91.302A: ICD-10-CM

## 2020-07-21 DIAGNOSIS — T14.8XXA WOUND INFECTION: Primary | ICD-10-CM

## 2020-07-21 DIAGNOSIS — I73.9 PERIPHERAL ARTERIAL DISEASE (HCC): ICD-10-CM

## 2020-07-21 DIAGNOSIS — I48.91 ATRIAL FIBRILLATION, UNSPECIFIED TYPE (HCC): ICD-10-CM

## 2020-07-21 DIAGNOSIS — N18.4 CHRONIC KIDNEY DISEASE, STAGE 4 (SEVERE) (HCC): Chronic | ICD-10-CM

## 2020-07-21 DIAGNOSIS — I73.9 PVD (PERIPHERAL VASCULAR DISEASE) (HCC): Chronic | ICD-10-CM

## 2020-07-21 PROBLEM — D63.8 ANEMIA OF CHRONIC DISEASE: Status: ACTIVE | Noted: 2020-07-21

## 2020-07-21 LAB
ABO GROUP BLD: NORMAL
ALBUMIN SERPL BCP-MCNC: 2.4 G/DL (ref 3.5–5)
ALP SERPL-CCNC: 296 U/L (ref 46–116)
ALT SERPL W P-5'-P-CCNC: 12 U/L (ref 12–78)
ANION GAP SERPL CALCULATED.3IONS-SCNC: 7 MMOL/L (ref 4–13)
AST SERPL W P-5'-P-CCNC: 8 U/L (ref 5–45)
BACTERIA UR QL AUTO: ABNORMAL /HPF
BASOPHILS # BLD AUTO: 0.03 THOUSANDS/ΜL (ref 0–0.1)
BASOPHILS NFR BLD AUTO: 0 % (ref 0–1)
BILIRUB SERPL-MCNC: 0.34 MG/DL (ref 0.2–1)
BILIRUB UR QL STRIP: NEGATIVE
BLD GP AB SCN SERPL QL: NEGATIVE
BUN SERPL-MCNC: 43 MG/DL (ref 5–25)
CALCIUM SERPL-MCNC: 7.4 MG/DL (ref 8.3–10.1)
CHLORIDE SERPL-SCNC: 107 MMOL/L (ref 100–108)
CK SERPL-CCNC: 117 U/L (ref 39–308)
CLARITY UR: CLEAR
CO2 SERPL-SCNC: 22 MMOL/L (ref 21–32)
COLOR UR: YELLOW
CREAT SERPL-MCNC: 2.95 MG/DL (ref 0.6–1.3)
EOSINOPHIL # BLD AUTO: 0.23 THOUSAND/ΜL (ref 0–0.61)
EOSINOPHIL NFR BLD AUTO: 2 % (ref 0–6)
ERYTHROCYTE [DISTWIDTH] IN BLOOD BY AUTOMATED COUNT: 14.4 % (ref 11.6–15.1)
GFR SERPL CREATININE-BSD FRML MDRD: 18 ML/MIN/1.73SQ M
GLUCOSE SERPL-MCNC: 346 MG/DL (ref 65–140)
GLUCOSE SERPL-MCNC: 498 MG/DL (ref 65–140)
GLUCOSE UR STRIP-MCNC: ABNORMAL MG/DL
HCT VFR BLD AUTO: 26 % (ref 36.5–49.3)
HGB BLD-MCNC: 7.7 G/DL (ref 12–17)
HGB UR QL STRIP.AUTO: ABNORMAL
HYALINE CASTS #/AREA URNS LPF: ABNORMAL /LPF
IMM GRANULOCYTES # BLD AUTO: 0.05 THOUSAND/UL (ref 0–0.2)
IMM GRANULOCYTES NFR BLD AUTO: 1 % (ref 0–2)
INR PPP: 1.56 (ref 0.84–1.19)
KETONES UR STRIP-MCNC: NEGATIVE MG/DL
LEUKOCYTE ESTERASE UR QL STRIP: ABNORMAL
LYMPHOCYTES # BLD AUTO: 1.35 THOUSANDS/ΜL (ref 0.6–4.47)
LYMPHOCYTES NFR BLD AUTO: 14 % (ref 14–44)
MCH RBC QN AUTO: 27.8 PG (ref 26.8–34.3)
MCHC RBC AUTO-ENTMCNC: 29.6 G/DL (ref 31.4–37.4)
MCV RBC AUTO: 94 FL (ref 82–98)
MONOCYTES # BLD AUTO: 1.07 THOUSAND/ΜL (ref 0.17–1.22)
MONOCYTES NFR BLD AUTO: 11 % (ref 4–12)
NEUTROPHILS # BLD AUTO: 7.25 THOUSANDS/ΜL (ref 1.85–7.62)
NEUTS SEG NFR BLD AUTO: 72 % (ref 43–75)
NITRITE UR QL STRIP: NEGATIVE
NON-SQ EPI CELLS URNS QL MICRO: ABNORMAL /HPF
NRBC BLD AUTO-RTO: 0 /100 WBCS
PH UR STRIP.AUTO: 6 [PH]
PLATELET # BLD AUTO: 271 THOUSANDS/UL (ref 149–390)
PMV BLD AUTO: 9.9 FL (ref 8.9–12.7)
POTASSIUM SERPL-SCNC: 4.2 MMOL/L (ref 3.5–5.3)
PROT SERPL-MCNC: 6.7 G/DL (ref 6.4–8.2)
PROT UR STRIP-MCNC: ABNORMAL MG/DL
PROTHROMBIN TIME: 18.6 SECONDS (ref 11.6–14.5)
RBC # BLD AUTO: 2.77 MILLION/UL (ref 3.88–5.62)
RBC #/AREA URNS AUTO: ABNORMAL /HPF
RH BLD: NEGATIVE
SODIUM SERPL-SCNC: 136 MMOL/L (ref 136–145)
SP GR UR STRIP.AUTO: 1.02 (ref 1–1.03)
SPECIMEN EXPIRATION DATE: NORMAL
UROBILINOGEN UR QL STRIP.AUTO: 1 E.U./DL
WBC # BLD AUTO: 9.98 THOUSAND/UL (ref 4.31–10.16)
WBC #/AREA URNS AUTO: ABNORMAL /HPF

## 2020-07-21 PROCEDURE — 80053 COMPREHEN METABOLIC PANEL: CPT | Performed by: EMERGENCY MEDICINE

## 2020-07-21 PROCEDURE — 87040 BLOOD CULTURE FOR BACTERIA: CPT | Performed by: EMERGENCY MEDICINE

## 2020-07-21 PROCEDURE — 85025 COMPLETE CBC W/AUTO DIFF WBC: CPT | Performed by: EMERGENCY MEDICINE

## 2020-07-21 PROCEDURE — 86923 COMPATIBILITY TEST ELECTRIC: CPT

## 2020-07-21 PROCEDURE — 1124F ACP DISCUSS-NO DSCNMKR DOCD: CPT | Performed by: HOSPITALIST

## 2020-07-21 PROCEDURE — 36415 COLL VENOUS BLD VENIPUNCTURE: CPT | Performed by: EMERGENCY MEDICINE

## 2020-07-21 PROCEDURE — 99284 EMERGENCY DEPT VISIT MOD MDM: CPT | Performed by: EMERGENCY MEDICINE

## 2020-07-21 PROCEDURE — 82948 REAGENT STRIP/BLOOD GLUCOSE: CPT

## 2020-07-21 PROCEDURE — 82550 ASSAY OF CK (CPK): CPT

## 2020-07-21 PROCEDURE — 82272 OCCULT BLD FECES 1-3 TESTS: CPT

## 2020-07-21 PROCEDURE — 86900 BLOOD TYPING SEROLOGIC ABO: CPT | Performed by: EMERGENCY MEDICINE

## 2020-07-21 PROCEDURE — 81001 URINALYSIS AUTO W/SCOPE: CPT | Performed by: EMERGENCY MEDICINE

## 2020-07-21 PROCEDURE — 86850 RBC ANTIBODY SCREEN: CPT | Performed by: EMERGENCY MEDICINE

## 2020-07-21 PROCEDURE — 73630 X-RAY EXAM OF FOOT: CPT

## 2020-07-21 PROCEDURE — 86901 BLOOD TYPING SEROLOGIC RH(D): CPT | Performed by: EMERGENCY MEDICINE

## 2020-07-21 PROCEDURE — 85610 PROTHROMBIN TIME: CPT | Performed by: EMERGENCY MEDICINE

## 2020-07-21 PROCEDURE — 99284 EMERGENCY DEPT VISIT MOD MDM: CPT

## 2020-07-21 PROCEDURE — 99223 1ST HOSP IP/OBS HIGH 75: CPT | Performed by: INTERNAL MEDICINE

## 2020-07-21 RX ORDER — TAMSULOSIN HYDROCHLORIDE 0.4 MG/1
0.4 CAPSULE ORAL
Status: DISCONTINUED | OUTPATIENT
Start: 2020-07-22 | End: 2020-08-07 | Stop reason: HOSPADM

## 2020-07-21 RX ORDER — ONDANSETRON 2 MG/ML
4 INJECTION INTRAMUSCULAR; INTRAVENOUS EVERY 4 HOURS PRN
Status: DISCONTINUED | OUTPATIENT
Start: 2020-07-21 | End: 2020-08-07 | Stop reason: HOSPADM

## 2020-07-21 RX ORDER — AMLODIPINE BESYLATE 5 MG/1
5 TABLET ORAL DAILY
Status: DISCONTINUED | OUTPATIENT
Start: 2020-07-22 | End: 2020-07-23

## 2020-07-21 RX ORDER — VANCOMYCIN HYDROCHLORIDE 1 G/200ML
10 INJECTION, SOLUTION INTRAVENOUS EVERY 24 HOURS
Status: DISCONTINUED | OUTPATIENT
Start: 2020-07-22 | End: 2020-07-25

## 2020-07-21 RX ORDER — FINASTERIDE 5 MG/1
5 TABLET, FILM COATED ORAL DAILY
Status: DISCONTINUED | OUTPATIENT
Start: 2020-07-22 | End: 2020-08-07 | Stop reason: HOSPADM

## 2020-07-21 RX ORDER — WARFARIN SODIUM 1 MG/1
4 TABLET ORAL
Status: DISCONTINUED | OUTPATIENT
Start: 2020-07-21 | End: 2020-07-23

## 2020-07-21 RX ORDER — ACETAMINOPHEN 325 MG/1
650 TABLET ORAL EVERY 6 HOURS PRN
Status: DISCONTINUED | OUTPATIENT
Start: 2020-07-21 | End: 2020-08-07 | Stop reason: HOSPADM

## 2020-07-21 RX ORDER — CLOPIDOGREL BISULFATE 75 MG/1
75 TABLET ORAL DAILY
Status: DISCONTINUED | OUTPATIENT
Start: 2020-07-22 | End: 2020-08-07 | Stop reason: HOSPADM

## 2020-07-21 RX ORDER — PRAVASTATIN SODIUM 20 MG
40 TABLET ORAL
Status: DISCONTINUED | OUTPATIENT
Start: 2020-07-22 | End: 2020-08-07 | Stop reason: HOSPADM

## 2020-07-21 RX ORDER — FLUOXETINE HYDROCHLORIDE 20 MG/1
40 CAPSULE ORAL DAILY
Status: DISCONTINUED | OUTPATIENT
Start: 2020-07-22 | End: 2020-08-07 | Stop reason: HOSPADM

## 2020-07-21 RX ORDER — MEMANTINE HYDROCHLORIDE 5 MG/1
5 TABLET ORAL DAILY
Status: DISCONTINUED | OUTPATIENT
Start: 2020-07-22 | End: 2020-08-07 | Stop reason: HOSPADM

## 2020-07-21 RX ORDER — PRIMIDONE 50 MG/1
50 TABLET ORAL DAILY
Status: DISCONTINUED | OUTPATIENT
Start: 2020-07-22 | End: 2020-07-24

## 2020-07-21 RX ORDER — FUROSEMIDE 40 MG/1
40 TABLET ORAL DAILY
Status: DISCONTINUED | OUTPATIENT
Start: 2020-07-22 | End: 2020-07-23

## 2020-07-21 RX ORDER — SODIUM BICARBONATE 650 MG/1
650 TABLET ORAL
Status: DISCONTINUED | OUTPATIENT
Start: 2020-07-21 | End: 2020-08-07 | Stop reason: HOSPADM

## 2020-07-21 RX ADMIN — WARFARIN SODIUM 4 MG: 1 TABLET ORAL at 21:57

## 2020-07-21 RX ADMIN — SODIUM BICARBONATE 650 MG TABLET 650 MG: at 22:00

## 2020-07-21 RX ADMIN — INSULIN LISPRO 4 UNITS: 100 INJECTION, SOLUTION INTRAVENOUS; SUBCUTANEOUS at 22:05

## 2020-07-21 RX ADMIN — INSULIN LISPRO 6 UNITS: 100 INJECTION, SOLUTION INTRAVENOUS; SUBCUTANEOUS at 21:57

## 2020-07-21 RX ADMIN — VANCOMYCIN HYDROCHLORIDE 1250 MG: 10 INJECTION, POWDER, LYOPHILIZED, FOR SOLUTION INTRAVENOUS at 16:22

## 2020-07-21 NOTE — ED PROVIDER NOTES
History  Chief Complaint   Patient presents with    Wound Infection     Per pt's family member, pt is to be admitted to the hospital because the wound on his foot needs an Abx that requires his PTT to be monitored  27-year-old male with past medical history of Parkinson's, PVD, hypertension, CKD, diabetes, and chronic anemia presents with wound infection  Patient has had a wound infection on the bottom of his right foot for years now  He follows with Dr Elizabeth Ibrahim  He saw Dr Elizabeth Ibrahim yesterday she did a culture of the wound and told patient that he needed to come to the hospital to receive an antibiotic  Patient does not know which antibiotic he was told to receive and we cannot see the results of the culture in our EMR  Patient states that the wound has gotten worse over the past week  Patient says it is not painful and he is still able to walk normally  Patient normally ambulates with a cane  Patient has no fevers or chills  Patient had a fall about a week ago and he is on Coumadin  Patient did not get evaluated in the hospital   He got a urine done by his doctor afterwards due to blood found in his urine  Patient is currently taking Keflex for a hemorrhagic UTI, which he does not take consistently  Upon further investigation of patient's chart, it was found that patient had labs done by his primary care doctor and was found to have a hemoglobin of 7 9  The patient is unsure if he was supposed to follow-up about this  His hemoglobin is normally in the 9s  Patient denies shortness of breath, but is noted to be more sleepy by his daughter  She says he is only awake for about 6 hours a day, which is not normal for him  She also notes that he is more pale than usual   Patient denies other symptoms  Prior to Admission Medications   Prescriptions Last Dose Informant Patient Reported? Taking?    Cholecalciferol (VITAMIN D) 50 MCG (2000 UT) CAPS   No No   Sig: Take 1 capsule (2,000 Units total) by mouth once a week tuesdays   Patient not taking: Reported on 7/7/2020   FLUoxetine (PROzac) 40 MG capsule  Self Yes No   Sig: Take 40 mg by mouth daily   KIONEX 15 GM/60ML suspension  Self No No   Sig: Take 60 mL (15 g total) by mouth 3 (three) times a week   Patient not taking: Reported on 7/7/2020   Memantine HCl ER (NAMENDA XR) 14 MG CP24  Self Yes No   Sig: Take 1 capsule by mouth daily   Patiromer Sorbitex Calcium (Veltassa) 8 4 g PACK   No No   Sig: Take 8 4 g by mouth 3 (three) times a week   Patient not taking: Reported on 7/7/2020   Patiromer Sorbitex Calcium 8 4 g PACK   No No   Sig: Take 8 4 g by mouth 3 (three) times a week   amLODIPine (NORVASC) 5 mg tablet  Self Yes No   Sig: Take 1 tablet by mouth daily   clindamycin (CLEOCIN) 300 MG capsule  Self Yes No   clopidogrel (PLAVIX) 75 mg tablet  Self No No   Sig: Take 1 tablet by mouth daily   ergocalciferol (VITAMIN D2) 50,000 units   No No   Sig: Take 1 capsule (50,000 Units total) by mouth once a week   ferrous sulfate 324 (65 Fe) mg   No No   Sig: Take 1 tablet (324 mg total) by mouth 2 (two) times a day before meals   finasteride (PROSCAR) 5 mg tablet   No No   Sig: Take 1 tablet (5 mg total) by mouth daily   furosemide (LASIX) 40 mg tablet  Self Yes No   Sig: Take 40 mg by mouth daily   glipiZIDE (GLUCOTROL XL) 2 5 mg 24 hr tablet  Self Yes No   Sig: Take 1 tablet by mouth daily   primidone (MYSOLINE) 50 mg tablet  Self Yes No   Sig: Take 50 mg by mouth daily   simvastatin (ZOCOR) 20 mg tablet  Self Yes No   Sig: Take 20 mg by mouth daily at bedtime   sodium bicarbonate 650 mg tablet  Self No No   Sig: Take 1 tablet by mouth 2 (two) times daily after meals   tamsulosin (FLOMAX) 0 4 mg  Self Yes No   Sig: Take 0 4 mg by mouth daily with dinner   warfarin (COUMADIN) 1 mg tablet  Self Yes No   Sig: Take by mouth daily    warfarin (COUMADIN) 4 mg tablet  Self Yes No   Sig: Take by mouth daily   warfarin (COUMADIN) 5 mg tablet  Self No No   Sig: Take 1 tablet by mouth daily   Patient not taking: Reported on 7/7/2020   zonisamide (ZONEGRAN) 25 mg capsule  Self No No   Sig: Take 3 capsules (75 mg total) by mouth daily at bedtime      Facility-Administered Medications: None       Past Medical History:   Diagnosis Date    [place] ([place] [address] )     Alzheimer's disease ([place] [address] )     Depression     Diabetes mellitus ([place] [address] )     Anderson catheter in place     History of atrial fibrillation     History of chronic kidney disease     History of peripheral vascular disease     Hyperlipidemia     Hypertension     Kidney disease     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[place] [address] [place]     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[place] [address] )     Memory loss     Osteomyelitis of right foot ([place] [address] )     Renal disorder        Past Surgical History:   Procedure Laterality Date    APPENDECTOMY  1945    FOOT SURGERY  06/14/2013    I&D with vac    INCISION AND DRAINAGE ABSCESS / HEMATOMA OF BURSA / KNEE / THIGH  03/07/2014    VEIN BYPASS SURGERY Bilateral 2015       Family History   Problem Relation Age of Onset    Diabetes Mother     Heart failure Mother     Hypertension Mother    Maria D Nicos Cancer Father     Hypertension Sister     Hyperthyroidism Sister     Stroke Brother     Diabetes Maternal Grandmother     Clotting disorder Maternal Grandmother     No Known Problems Maternal Grandfather     No Known Problems Paternal Grandmother     No Known Problems Paternal Grandfather     Diabetes Brother     Arthritis Brother     Glaucoma Brother     Cataracts Brother     No Known Problems Son     Hyperthyroidism Daughter     Kidney disease Daughter     COPD Daughter     Diabetes Brother     Cancer Brother      I have reviewed and agree with the history as documented      E-Cigarette/Vaping    E-Cigarette Use Never User      E-Cigarette/Vaping Substances    Nicotine No     THC No     CBD No     Flavoring No     Other No     Unknown No      Social History     Tobacco Use    Smoking status: Never Smoker    Smokeless tobacco: Never Used Substance Use Topics    Alcohol use: Yes     Comment: one beer on his birthday    Drug use: No        Review of Systems   Constitutional: Positive for activity change and fatigue  Negative for chills, diaphoresis and fever  Respiratory: Negative for cough, chest tightness, shortness of breath and wheezing  Cardiovascular: Negative for chest pain, palpitations and leg swelling  Gastrointestinal: Negative for diarrhea, nausea and vomiting  Musculoskeletal: Negative for gait problem, joint swelling and myalgias  Skin: Positive for pallor and wound  Neurological: Negative for dizziness, syncope, weakness, light-headedness and headaches  Physical Exam  ED Triage Vitals   Temperature Pulse Respirations Blood Pressure SpO2   07/21/20 1231 07/21/20 1221 07/21/20 1231 07/21/20 1231 07/21/20 1221   98 1 °F (36 7 °C) 73 18 134/63 98 %      Temp Source Heart Rate Source Patient Position - Orthostatic VS BP Location FiO2 (%)   07/21/20 1231 07/21/20 1239 07/21/20 1231 07/21/20 1231 --   Oral Monitor Sitting Right arm       Pain Score       --                    Orthostatic Vital Signs  Vitals:    07/21/20 1221 07/21/20 1231 07/21/20 1239   BP:  134/63 139/62   Pulse: 73  72   Patient Position - Orthostatic VS:  Sitting Lying       Physical Exam   Constitutional: He is oriented to person, place, and time  He appears well-developed and well-nourished  HENT:   Head: Normocephalic and atraumatic  Eyes: Conjunctivae are normal  Right eye exhibits no discharge  Left eye exhibits no discharge  Neck: Normal range of motion  Neck supple  Cardiovascular: Normal rate and regular rhythm  No murmur heard  Pulses:       Dorsalis pedis pulses are 2+ on the right side, and 2+ on the left side  Pulmonary/Chest: Effort normal and breath sounds normal  No respiratory distress  He has no wheezes  Abdominal: Soft  There is no tenderness     Musculoskeletal:        Right knee: Normal         Left knee: Normal  Right ankle: He exhibits normal range of motion and no swelling  No tenderness  Left ankle: He exhibits decreased range of motion  He exhibits no swelling  Tenderness (to the bottom of the foot)  Neurological: He is alert and oriented to person, place, and time  Skin:   Pt has a few ulcerations to the bottom of the left foot (all less than 0 5 cm in size)  The bottom of the foot is diffusely erythematous and warm  There is pus draining from the ulcerations  He also has erythema extending up the left leg, ending before the knee  It is warm  ED Medications  Medications - No data to display    Diagnostic Studies  Results Reviewed     Procedure Component Value Units Date/Time    CBC and differential [[id number]]     Lab Status:  No result Specimen:  Blood     Comprehensive metabolic panel [[id number]]     Lab Status:  No result Specimen:  Blood     Blood culture #1 [[id number]]     Lab Status:  No result Specimen:  Blood     Blood culture #2 [[id number]]     Lab Status:  No result Specimen:  Blood     Wound culture and Gram stain [[id number]]     Lab Status:  No result Specimen:  Wound from Foot, Left                  XR foot 3+ views LEFT    (Results Pending)         Procedures  Procedures      ED Course  ED Course as of Jul 21 1557   Tue Jul 21, 2020   1354 AMNA performed with nursejesica  Pt had stool in the rectal vault  Rectal tone normal  No lesions or abrasion of the rectum noted  No masses in the rectum palpated  Hemoccult test negative  18 Pt's daughter expresses that pt has been MRSA positive in the past  We we give vanco for abx in this situation      1526 I spoke with [facility] and they agreed to take pt  They would like an INR because he is on coumadin  A type and screen has been added  They agree with decision to use vanco  Pt has been made aware and is compliant                                                 MDM  Number of Diagnoses or Management Options  Low hemoglobin: established and worsening  Wound infection: established and worsening  Diagnosis management comments: 59-year-old male presents with persistent wound infection  It has been getting worse over the past week and his doctor recommended he come to the ED for antibiotics after positive wound culture yesterday  The patient also had a recent hemoglobin around 7, which did not seem to be followed up by his doctor  Patient has been increasingly tired and pale  He is being treated for a hemorrhagic UTI with Keflex  Due to the patient's exam and likely symptomatic anemia, we will get a CMP and CBC  We will get a wound and blood culture because of the patient's wound  We will get x-ray to evaluate for osteomyelitis  We will get a rectal exam to evaluate for bleeding  Amount and/or Complexity of Data Reviewed  Clinical lab tests: ordered and reviewed  Tests in the radiology section of CPT®: ordered and reviewed    Risk of Complications, Morbidity, and/or Mortality  Presenting problems: moderate  Diagnostic procedures: moderate  Management options: moderate    Patient Progress  Patient progress: stable    X-ray of patient's left foot was not significant  We are waiting for wound culture, so we will treat with broad-spectrum antibiotics for now  Vancomycin was started  Patient's hemoglobin has been trending down for the past year  He is fatigued and paler than usual according to his daughter  Type and screen was sent  He has been accepted by [facility] for low hemoglobin in to receive IV antibiotics for his foot infection  Disposition  Final diagnoses:   None     ED Disposition     None      Follow-up Information    None         Patient's Medications   Discharge Prescriptions    No medications on file     No discharge procedures on file  PDMP Review     None           ED Provider  Attending physically available and evaluated Melvi Friedman I managed the patient along with the ED Attending      Electronically Signed by         Dre Reynaga,   07/21/20 1608

## 2020-07-21 NOTE — ASSESSMENT & PLAN NOTE
Baseline creatinine approximately 2 9-3 0 -> presents today @ 2 95  Monitor renal function and urine output - limit/avoid nephrotoxins as possible

## 2020-07-21 NOTE — ASSESSMENT & PLAN NOTE
Lab Results   Component Value Date    HGBA1C 6 5 (H) 03/17/2020     Hold oral hypoglycemics while hospitalized  On SSI coverage per [employer]

## 2020-07-21 NOTE — ED ATTENDING ATTESTATION
7/21/2020  I, Norah Dotson DO, saw and evaluated the patient  I have discussed the patient with the resident/non-physician practitioner and agree with the resident's/non-physician practitioner's findings, Plan of Care, and MDM as documented in the resident's/non-physician practitioner's note, except where noted  All available labs and Radiology studies were reviewed  I was present for key portions of any procedure(s) performed by the resident/non-physician practitioner and I was immediately available to provide assistance  At this point I agree with the current assessment done in the Emergency Department  I have conducted an independent evaluation of this patient a history and physical is as follows:    79yo male presents with wound on his foot that podiatrist referred him to the ED for  Per daughter pt has had wound for a long time and podiatrist did culture  Sensitive to an abx that podiatrist said pt would have to be in hospital to get  No fevers  Also noted to be pale and more tired than normal per daughter  Pt denies pain  Did have a fall 2 weeks ago and did not seek treatment after that  Then was found to have discolored urine which resulted in a urine test and a course of abx that the pt did not complete  Daughter reports that he is more sleepy than normal and also more pale  On exam - nad, pale, heart reg, no resp distress, abd soft and nt, 2 wounds on plantar aspect of left foot  Erythema on LLE    Plan - check labs, rectal exam negative per resident, will need admit, start abx    ED Course         Critical Care Time  Procedures

## 2020-07-21 NOTE — H&P
History & Physical - [facility] Internal Medicine  Patient: Nicolasa Nissen 80 y o  male MRN: [id number]  Unit/Bed#: ED 14 Encounter: [id number]  Primary Care Provider: Haven Joy MD  Date & Time of Admission: 7/21/2020 12:32 PM        Assessment & Plan:    * Left foot wound/infection  Assessment & Plan  Sent in by outpatient podiatrist out of concern for worsening infection -> h/o MRSA noted, hence, will initiate an IV Vancomycin regimen  Blood/wound cultures pending  XR imaging negative for acute osseous abnormalities -> consider further imaging of suspicion of osteomyelitis warrants  PRN pain control  Will appreciate inpatient podiatry consultation  Needs aggressive blood sugar control  PT/OT as tolerated    Chronic kidney disease stage 4  Assessment & Plan  Baseline creatinine approximately 2 9-3 0 -> presents today @ 2 95  Monitor renal function and urine output - limit/avoid nephrotoxins as possible    PVD (peripheral vascular disease)  Assessment & Plan  Status post previous vein bypass surgery  Continue Plavix/statin    Diabetes mellitus type 2  Assessment & Plan  Lab Results   Component Value Date    HGBA1C 6 5 (H) 03/17/2020     Hold oral hypoglycemics while hospitalized  On SSI coverage per [employer]    Essential hypertension  Assessment & Plan  Continue Norvasc  Low-sodium diet    Atrial fibrillation   Assessment & Plan  Rate controlled off agents  On Coumadin for anticoagulation    Parkinson's disease   Assessment & Plan  Outpatient follow-up - supportive care  PT/OT as tolerated  Continue Primidone/Namenda    Anemia of chronic disease  Assessment & Plan  Monitor H/H      DVT Prophylaxis:  Coumadin    Code Status: Full code    Discussion with:  Patient, and daughter, at bedside    Anticipated Length of Stay:  Patient will be admitted on an Inpatient basis with an anticipated length of stay of greater than 2 midnights     Justification for Hospital Stay:  Worsening foot ulceration/infection requiring intravenous antibiotics and podiatry evaluation  Total Time for Visit, including Counseling / Coordination of Care: 72 minutes  Greater than 50% of this total time spent on direct patient counseling and coordination of care  Chief Complaint:  Foot wound/infection      History of Present Illness:    Agustin Mcdonough is a 80 y o  male who presents per request of his outpatient podiatrist regarding a progressively worsening foot wound/ulceration  Notably, he has a history of diabetes mellitus and neuropathy and per daughter, she feels he may have stepped in water several days back which led to a flare up of a possible infection  He also has a prior history of MRSA  In the ER, he was initiated on a dose of IV Vancomycin  His daughter recalls him previously being on a Doxycycline regimen for prior infection  Blood and wound cultures were ordered in the ER  He denies any pain at this time, however, this is stated in the setting of his neuropathy  Denies any fever/chills or other constitutional symptoms at this time  Overall, he remains in pleasant and hopeful spirits  Review of Systems:    Review of Systems - A thorough 12 point review systems was conducted  Pertinent positives and negatives are mentioned in the history of present illness        Past Medical and Surgical History:     Past Medical History:   Diagnosis Date    [facility] [address] [facility]     Alzheimer's disease ([facility] [address] [facility]     Depression     Diabetes mellitus ([facility] [address] [facility]     Anderson catheter in place     History of atrial fibrillation     History of chronic kidney disease     History of peripheral vascular disease     Hyperlipidemia     Hypertension     Kidney disease     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[facility] [address] [facility]     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[facility] [address] [facility]     Memory loss     Osteomyelitis of right foot ([facility] [address] [facility]     Renal disorder        Past Surgical History:   Procedure Laterality Date    APPENDECTOMY  1945    FOOT SURGERY  06/14/2013    I&D with vac    INCISION AND DRAINAGE ABSCESS / HEMATOMA OF BURSA / KNEE / THIGH  03/07/2014    VEIN BYPASS SURGERY Bilateral 2015         Medications & Allergies:    Prior to Admission medications    Medication Sig Start Date End Date Taking?  Authorizing Provider   amLODIPine (NORVASC) 5 mg tablet Take 1 tablet by mouth daily   Yes Historical Provider, MD   clopidogrel (PLAVIX) 75 mg tablet Take 1 tablet by mouth daily 7/9/17  Yes Laine Russell DO   ergocalciferol (VITAMIN D2) 50,000 units Take 1 capsule (50,000 Units total) by mouth once a week 4/29/20  Yes Andi Zepeda MD   finasteride (PROSCAR) 5 mg tablet Take 1 tablet (5 mg total) by mouth daily 6/29/20  Yes Andi Zepeda MD   FLUoxetine (PROzac) 40 MG capsule Take 40 mg by mouth daily   Yes Historical Provider, MD   furosemide (LASIX) 40 mg tablet Take 40 mg by mouth daily   Yes Historical Provider, MD   Memantine HCl ER (NAMENDA XR) 14 MG CP24 Take 1 capsule by mouth daily   Yes Historical Provider, MD   Patiromer Sorbitex Calcium 8 4 g PACK Take 8 4 g by mouth 3 (three) times a week  Patient taking differently: Take 8 4 g by mouth 2 (two) times a week  4/29/20  Yes Xiomara Mustafa MD   simvastatin (ZOCOR) 20 mg tablet Take 20 mg by mouth daily at bedtime   Yes Historical Provider, MD   sodium bicarbonate 650 mg tablet Take 1 tablet by mouth 2 (two) times daily after meals 8/30/17  Yes Essence Ramirez MD   tamsulosin (FLOMAX) 0 4 mg Take 0 4 mg by mouth daily with dinner   Yes Historical Provider, MD   warfarin (COUMADIN) 4 mg tablet Take by mouth daily   Yes Historical Provider, MD   Cholecalciferol (VITAMIN D) 50 MCG (2000 UT) CAPS Take 1 capsule (2,000 Units total) by mouth once a week tuesdays  Patient not taking: Reported on 7/7/2020 4/28/20   Andi Zepeda MD   clindamycin (CLEOCIN) 300 MG capsule  1/16/20   Historical Provider, MD   ferrous sulfate 324 (65 Fe) mg Take 1 tablet (324 mg total) by mouth 2 (two) times a day before meals  Patient not taking: Reported on 7/21/2020 7/16/20   Donavon Brock MD   glipiZIDE (GLUCOTROL XL) 2 5 mg 24 hr tablet Take 1 tablet by mouth daily    Historical Provider, MD Natarajan Kiesha 15 GM/60ML suspension Take 60 mL (15 g total) by mouth 3 (three) times a week  Patient not taking: Reported on 7/7/2020 4/5/19   Jerson Epperson MD   Patiromer Sorbitex Calcium (Veltassa) 8 4 g PACK Take 8 4 g by mouth 3 (three) times a week  Patient not taking: Reported on 7/7/2020 5/1/20   Jerson Epperson MD   primidone (MYSOLINE) 50 mg tablet Take 50 mg by mouth daily    Historical Provider, MD   warfarin (COUMADIN) 1 mg tablet Take by mouth daily  2/13/18   Historical Provider, MD   warfarin (COUMADIN) 5 mg tablet Take 1 tablet by mouth daily  Patient not taking: Reported on 7/7/2020 7/9/17   Darol Ran, DO   zonisamide (ZONEGRAN) 25 mg capsule Take 3 capsules (75 mg total) by mouth daily at bedtime 10/24/19   Alondra Kent MD         Allergies: Allergies   Allergen Reactions    Metoprolol Bradycardia    Unasyn [Ampicillin-Sulbactam Sodium] Rash         Social History:    Substance Use History:   Social History     Substance and Sexual Activity   Alcohol Use Yes    Comment: one beer on his birthday     Social History     Tobacco Use   Smoking Status Never Smoker   Smokeless Tobacco Never Used     Social History     Substance and Sexual Activity   Drug Use No         Family History:    Per medical chart, significant for hypertension in mother and sister, for heart failure in mother, for diabetes mellitus in mother and brothers, for stroke in brother, and for an unknown cancer in father        Physical Exam:     Vitals:   Blood Pressure: 139/62 (07/21/20 1239)  Pulse: 72 (07/21/20 1239)  Temperature: 97 8 °F (36 6 °C) (07/21/20 1239)  Temp Source: Tympanic (07/21/20 1239)  Respirations: 18 (07/21/20 1239)  Height: 6' (182 9 cm) (07/21/20 1239)  Weight - Scale: 89 8 kg (198 lb) (07/21/20 1239)  SpO2: 99 % (07/21/20 1239)      GENERAL:  Well-developed/nourished - no immediate distress at rest  HEAD:  Normocephalic - atraumatic  EYES: PERRL - EOMI   MOUTH:  Mucosa moist  NECK:  Supple - full range of motion  CARDIAC:  Rate controlled - S1/S2 positive  PULMONARY:  Clear breath sounds bilaterally - nonlabored respirations  ABDOMEN:  Soft - nontender/nondistended - active bowel sounds  MUSCULOSKELETAL:  Motor strength/range of motion fairly intact - left foot dressed/bandaged with underlying heel ulceration  NEUROLOGIC:  Alert/oriented at baseline  SKIN:  Chronic wrinkles/blemishes other than as mentioned on musculoskeletal exam - erythema extending up left foot to below knee  PSYCHIATRIC:  Mood/affect stable      Additional Data:     Labs & Recent Cultures:    Results from last 7 days   Lab Units 07/21/20  1337   WBC Thousand/uL 9 98   HEMOGLOBIN g/dL 7 7*   HEMATOCRIT % 26 0*   PLATELETS Thousands/uL 271   NEUTROS PCT % 72   LYMPHS PCT % 14   MONOS PCT % 11   EOS PCT % 2     Results from last 7 days   Lab Units 07/21/20  1337   SODIUM mmol/L 136   POTASSIUM mmol/L 4 2   CHLORIDE mmol/L 107   CO2 mmol/L 22   BUN mg/dL 43*   CREATININE mg/dL 2 95*   ANION GAP mmol/L 7   CALCIUM mg/dL 7 4*   ALBUMIN g/dL 2 4*   TOTAL BILIRUBIN mg/dL 0 34   ALK PHOS U/L 296*   ALT U/L 12   AST U/L 8   GLUCOSE RANDOM mg/dL 346*                 Imaging:     Xr Foot 3+ Views Left    Result Date: 7/21/2020  Narrative: LEFT FOOT INDICATION:   foot wound  COMPARISON:  5/28/2015  VIEWS:  XR FOOT 3+ VW LEFT FINDINGS: There is no acute fracture or dislocation  Calcaneal spur(s) noted  No lytic or blastic osseous lesion  Soft tissues are unremarkable  Impression: No acute osseous abnormality  Workstation performed: [id number]                 ** Please Note: This note is constructed using a voice recognition dictation system  An occasional wrong word/phrase or sound-a-like substitution may have been picked up by dictation device due to the inherent limitations of voice recognition software    Read the chart carefully and recognize, using reasonable context, where substitutions may have occurred  **

## 2020-07-21 NOTE — ASSESSMENT & PLAN NOTE
Sent in by outpatient podiatrist out of concern for worsening infection -> h/o MRSA noted, hence, will initiate an IV Vancomycin regimen  Blood/wound cultures pending  XR imaging negative for acute osseous abnormalities -> consider further imaging of suspicion of osteomyelitis warrants  PRN pain control  Will appreciate inpatient podiatry consultation  Needs aggressive blood sugar control  PT/OT as tolerated

## 2020-07-22 ENCOUNTER — APPOINTMENT (INPATIENT)
Dept: NON INVASIVE DIAGNOSTICS | Facility: HOSPITAL | Age: 85
DRG: 540 | End: 2020-07-22
Payer: MEDICARE

## 2020-07-22 LAB
EST. AVERAGE GLUCOSE BLD GHB EST-MCNC: 157 MG/DL
GLUCOSE SERPL-MCNC: 215 MG/DL (ref 65–140)
GLUCOSE SERPL-MCNC: 216 MG/DL (ref 65–140)
GLUCOSE SERPL-MCNC: 289 MG/DL (ref 65–140)
GLUCOSE SERPL-MCNC: 97 MG/DL (ref 65–140)
HBA1C MFR BLD: 7.1 %
INR PPP: 1.62 (ref 0.84–1.19)
PROTHROMBIN TIME: 19.2 SECONDS (ref 11.6–14.5)

## 2020-07-22 PROCEDURE — ND001 PR NO DOCUMENTATION: Performed by: SURGERY

## 2020-07-22 PROCEDURE — 83036 HEMOGLOBIN GLYCOSYLATED A1C: CPT | Performed by: INTERNAL MEDICINE

## 2020-07-22 PROCEDURE — 82948 REAGENT STRIP/BLOOD GLUCOSE: CPT

## 2020-07-22 PROCEDURE — 99232 SBSQ HOSP IP/OBS MODERATE 35: CPT | Performed by: PHYSICIAN ASSISTANT

## 2020-07-22 PROCEDURE — 93925 LOWER EXTREMITY STUDY: CPT

## 2020-07-22 PROCEDURE — 99222 1ST HOSP IP/OBS MODERATE 55: CPT | Performed by: PHYSICAL MEDICINE & REHABILITATION

## 2020-07-22 PROCEDURE — 97163 PT EVAL HIGH COMPLEX 45 MIN: CPT

## 2020-07-22 PROCEDURE — 85610 PROTHROMBIN TIME: CPT | Performed by: INTERNAL MEDICINE

## 2020-07-22 PROCEDURE — 99223 1ST HOSP IP/OBS HIGH 75: CPT | Performed by: INTERNAL MEDICINE

## 2020-07-22 PROCEDURE — 93923 UPR/LXTR ART STDY 3+ LVLS: CPT

## 2020-07-22 PROCEDURE — 97167 OT EVAL HIGH COMPLEX 60 MIN: CPT

## 2020-07-22 RX ORDER — CEFAZOLIN SODIUM 1 G/50ML
1000 SOLUTION INTRAVENOUS EVERY 8 HOURS
Status: DISCONTINUED | OUTPATIENT
Start: 2020-07-22 | End: 2020-07-29

## 2020-07-22 RX ADMIN — PRIMIDONE 50 MG: 50 TABLET ORAL at 11:27

## 2020-07-22 RX ADMIN — AMLODIPINE BESYLATE 5 MG: 5 TABLET ORAL at 10:06

## 2020-07-22 RX ADMIN — FLUOXETINE 40 MG: 20 CAPSULE ORAL at 10:03

## 2020-07-22 RX ADMIN — PRAVASTATIN SODIUM 40 MG: 20 TABLET ORAL at 17:01

## 2020-07-22 RX ADMIN — TAMSULOSIN HYDROCHLORIDE 0.4 MG: 0.4 CAPSULE ORAL at 17:01

## 2020-07-22 RX ADMIN — VANCOMYCIN HYDROCHLORIDE 1000 MG: 1 INJECTION, SOLUTION INTRAVENOUS at 17:10

## 2020-07-22 RX ADMIN — FINASTERIDE 5 MG: 5 TABLET, FILM COATED ORAL at 10:04

## 2020-07-22 RX ADMIN — MEMANTINE 5 MG: 5 TABLET ORAL at 10:05

## 2020-07-22 RX ADMIN — CLOPIDOGREL BISULFATE 75 MG: 75 TABLET ORAL at 10:05

## 2020-07-22 RX ADMIN — INSULIN LISPRO 4 UNITS: 100 INJECTION, SOLUTION INTRAVENOUS; SUBCUTANEOUS at 21:08

## 2020-07-22 RX ADMIN — CEFAZOLIN SODIUM 1000 MG: 1 SOLUTION INTRAVENOUS at 19:35

## 2020-07-22 RX ADMIN — SODIUM BICARBONATE 650 MG TABLET 650 MG: at 10:02

## 2020-07-22 RX ADMIN — SODIUM BICARBONATE 650 MG TABLET 650 MG: at 17:02

## 2020-07-22 RX ADMIN — INSULIN LISPRO 2 UNITS: 100 INJECTION, SOLUTION INTRAVENOUS; SUBCUTANEOUS at 11:27

## 2020-07-22 RX ADMIN — WARFARIN SODIUM 4 MG: 1 TABLET ORAL at 17:01

## 2020-07-22 RX ADMIN — FUROSEMIDE 40 MG: 40 TABLET ORAL at 10:03

## 2020-07-22 RX ADMIN — INSULIN LISPRO 2 UNITS: 100 INJECTION, SOLUTION INTRAVENOUS; SUBCUTANEOUS at 17:00

## 2020-07-22 NOTE — ASSESSMENT & PLAN NOTE
· Follows with Dr Juan Lucas of Vascular Surgery as outpatient  · Continue Plavix/statin  · With chronic OM

## 2020-07-22 NOTE — CONSULTS
Consultation - Vascular Surgery   Luis Enrique Lin 80 y o  male MRN: [id number]  Unit/Bed#: -01 Encounter: [id number]      Assessment/Plan      Assessment:  Pt is a 81 y/o M w PMH of Afib, HTN, DM, CKD (cr: 2 9-3), PVD, prior L Fem- posterior tibial bypass (2015) and R fem- posterior tibial bypass (2014) who presents w LLE chronic heel and lateral forefoot wounds  Vascular surgery consulted for worsening ABIs possible revascularization  1/20 LEADS: RLE: <75% stenosis below knee SFA-TPA graft at previous PTA site  LILIAN: 0 67 prior (0 82)  MP: 116  GTP: 46  LLE: high grade stenosis of L SFA-pop stent, and 50-75% stenosis of prox deep fem art  LILIAN: 0 37  Prior 0 69  MP: 65, GTP: 27    2/16 MRI: concerning for osteomyelitis along L calcaneus  VSS  Afebrile  LE motor intact  Absent sensation b/l from ankle distal  B/l palp femoral pulses  B/l dopplerable popliteal  L doppl peroneal/  PT  R doppl DP/ PT  LLE with 2 chronic ulcers one over heel and other over lateral forefoot  Nontender  No purulence  Pertinent labs:   INR: 1 62  Cr: 2 95 GFR: 18    Hgb A1c: 7 1    Plan:  F/u repeat b/l LEADs  Based on LEADS we will discuss endovascular possible revascularization strategies  Noted that pt is refusing any form of amputation  F/u cultures  Continue antibiotics, vancomycin  Local wound care per Podiatry  Rest of care per primary team  Discussed case in detail with Vascular Surgery Fellow Dr Fely Orozco  History of Present Illness   Physician Requesting Consult: Gary Piedra MD  Reason for Consult / Principal Problem: Left lower extremity wound  History, ROS and PFSH unobtainable from any source due to none    HPI: Luis Enrique Lin is a 80y o  year old male past medical history 9 of hypertension, hyperlipidemia , diabetes depression , Alzheimer's , AFib on Coumadin, 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, and bilateral lower extremity bypass, who presents with worsening ulcer over plantar surface left lower extremity  Vascular surgery consulted by Podiatry for any possible revascularization strategies  Patient with worsening lower extremity arterial duplex study in January of 2020, and MRI on February 2020 concerning for osteomyelitis of the left calcaneus  Patient denied any pain over his lower extremities, denied any fevers, chills, chest pain or shortness of breath recently  Noted that he walks on a daily basis, has sensory loss from his ankle distally bilateral lower extremities  Patient explained that he presented to the hospital because he was seen by his podiatrist outpatient and there was concern for nonhealing ulcers and osteomyelitis therefore is admitted  Patient denied any history of heart attack or stroke  Of note it was discussed that patient is at high risk of limb loss concerning his possible osteomyelitis involving his left calcaneus, and patient said that he refuses any form of amputation, however would undergo angiogram and any form of revascularization strategy  Inpatient consult to Vascular Surgery     Performed by  Hector Pablo MD     Authorized by Elenita Subramanian DPM              Review of Systems   Constitutional: Negative for chills and fever  HENT: Negative  Eyes: Negative  Respiratory: Negative  Cardiovascular: Negative  Gastrointestinal: Negative  Endocrine: Negative  Genitourinary: Negative  Musculoskeletal: Negative  Skin: Positive for wound  LLE foot wound on heel and lateral forefoot  Allergic/Immunologic: Negative  Neurological: Negative  Hematological: Negative  Psychiatric/Behavioral: Negative          Historical Information   Past Medical History:   Diagnosis Date    [facility] )     Alzheimer's disease ([facility] )     Depression     Diabetes mellitus ([facility] )     Anderson catheter in place     History of atrial fibrillation     History of chronic kidney disease     History of peripheral vascular disease     Hyperlipidemia     Hypertension     Kidney disease     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     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     Memory loss     Osteomyelitis of right foot [facility]     Renal disorder      Past Surgical History:   Procedure Laterality Date    APPENDECTOMY  1945    FEMORAL ARTERY - TIBIAL ARTERY BYPASS GRAFT Right 01/08/2014    R SFA- PT w/ nonrev GSV, [facility]- [facility]    FEMORAL ARTERY - TIBIAL ARTERY BYPASS GRAFT Left 06/04/2015    L SFA- PT w/ Cryovein- Ivan/ [facility]    FOOT SURGERY  06/14/2013    I&D with vac    WOUND DEBRIDEMENT Right 03/07/2014    R thigh wound washout, V[facility]- [facility]     Social History   Social History     Substance and Sexual Activity   Alcohol Use Yes    Comment: one beer on his birthday     Social History     Substance and Sexual Activity   Drug Use No     E-Cigarette/Vaping    E-Cigarette Use Never User      E-Cigarette/Vaping Substances    Nicotine No     THC No     CBD No     Flavoring No     Other No     Unknown No      Social History     Tobacco Use   Smoking Status Never Smoker   Smokeless Tobacco Never Used     Family History: non-contributory}    Meds/Allergies   all current active meds have been reviewed  Allergies   Allergen Reactions    Metoprolol Bradycardia    Unasyn [Ampicillin-Sulbactam Sodium] Rash       Objective   Vitals: Blood pressure 140/67, pulse 72, temperature 97 5 °F (36 4 °C), resp  rate 18, height 6' (1 829 m), weight 89 8 kg (198 lb), SpO2 100 %  ,Body mass index is 26 85 kg/m²  Intake/Output Summary (Last 24 hours) at 7/22/2020 1518  Last data filed at 7/22/2020 1210  Gross per 24 hour   Intake 480 ml   Output 950 ml   Net -470 ml     Invasive Devices     Peripheral Intravenous Line            Peripheral IV 07/21/20 Right Antecubital 1 day                Physical Exam   Constitutional: He is oriented to person, place, and time  He appears well-developed and well-nourished  HENT:   Head: Normocephalic and atraumatic     Eyes: Pupils are equal, round, and reactive to light  No scleral icterus  Neck: Normal range of motion  Neck supple  No JVD present  No tracheal deviation present  Cardiovascular: Normal rate  B/l palp fem and doppl pop  LLE doppl pt/ peroneal  RLE: doppl dp/ pt  LLE w venous stasis dermatitis  B/l LE motor intact, sensory deficit from ankle distal     Pulmonary/Chest: Effort normal and breath sounds normal  No stridor  Abdominal: Soft  He exhibits distension  Genitourinary:   Genitourinary Comments: deferred   Musculoskeletal: Normal range of motion  He exhibits no edema  Ulcer over left heel and lateral left forefoot  No purulence or erythema  Neurological: He is alert and oriented to person, place, and time  Skin: Skin is warm  Capillary refill takes 2 to 3 seconds  Psychiatric: He has a normal mood and affect  Vitals reviewed  Lab Results:   I have personally reviewed pertinent reports  , Coags:   Lab Results   Component Value Date    INR 1 62 (H) 07/22/2020   , Creatinine: No results found for: CREATININE, Lipid Panel: No results found for: CHOL, CBC with diff: No results found for: WBC, HGB, HCT, MCV, PLT, ADJUSTEDWBC, MCH, MCHC, RDW, MPV, NRBC, BMP/CMP: No results found for: SODIUM, K, CL, CO2, ANIONGAP, BUN, CREATININE, GLUCOSE, CALCIUM, AST, ALT, ALKPHOS, PROT, BILITOT, EGFR, Coags:   Lab Results   Component Value Date    INR 1 62 (H) 07/22/2020   , CRP: No results found for: CRP  Imaging Studies: I have personally reviewed pertinent reports  EKG, Pathology, and Other Studies: I have personally reviewed pertinent reports  VTE Prophylaxis: Heparin     Code Status: Level 1 - Full Code  Advance Directive and Living Will:      Power of :    POLST:      Counseling / Coordination of Care  Counseling/Coordination of Care: Total floor / unit time spent today 30 minutes   Greater than 50% of total time was spent with the patient and / or family counseling and / or coordination of care   A description of the counseling / coordination of care: 30

## 2020-07-22 NOTE — SOCIAL WORK
CM with Pt an followed up with daughter/POA Ting Rios with an introduction and explanation of role  Pt reported residing alone in ranch style home with use to be his tax office, everything on one level, the use of a cane, roller walker and has 2 steps to enter  Pt reported being independent with ADLs, Ting Rios assist with meals, shopping and transport  Pt has a hx with SAVAGE and was at [facility], [facility] and [facility] in the past but no hx of mental health or drug/alcohol placements  Pt reported having a living will with Ting Rios as Jennie Ayala, uses [facility] in [address] and has Severa Bos as a PCP  Ting Mais reported she is about to begin the process of assisting the Pt with getting into an Assisted Living program  Ting Rios reported, in the event the Pt is recommended for SNF, she would like a referral to [facility], [facility] and [facility]  CM made the requested referrals  CM reviewed d/c planning process including the following: identifying help at home, patient preference for d/c planning needs, Discharge Lounge, [facility] Meds to Bed program, availability of treatment team to discuss questions or concerns patient and/or family may have regarding understanding medications and recognizing signs and symptoms once discharged  CM also encouraged patient to follow up with all recommended appointments after discharge  Patient advised of importance for patient and family to participate in managing patients medical well being

## 2020-07-22 NOTE — ASSESSMENT & PLAN NOTE
Lab Results   Component Value Date    HGBA1C 7 1 (H) 07/22/2020     (P) 270     · Controlled for patient's age as evidenced by A1c  · Orals on hold while hospitalized   · With significant hyperglycemia on admission, now improved

## 2020-07-22 NOTE — ASSESSMENT & PLAN NOTE
· Patient follows with Dr Josephine Cabezas of Nephrology as outpatient  · Baseline creatinine approximately 2 9-3 0   · No AM labs available for review  · Monitor   Avoid nephrotoxins and hypotension

## 2020-07-22 NOTE — CONSULTS
PHYSICAL MEDICINE AND REHABILITATION CONSULT NOTE  Amber Slater 80 y o  male MRN: [id number]  Unit/Bed#: -88 Encounter: [id number]    Requested by (Physician/Service): Eneida Eaton MD  Reason for Consultation:  Assessment of rehabilitation needs    Assessment:  Rehabilitation Diagnosis:    Debility    Recommendations:  Rehabilitation Plan:   Continue PT/OT while on acute care   The patient is awaiting an angiogram   If no further interventions are planned after that would recommend sub-acute inpatient rehabilitation  Medical Co-morbidities Plan:  · Left heel wound  · Left foot submetatarsal 5 foot would  · Mild cognitive deficits   · Ambulatory dysfunction   · CKD stage IV  · Diabetes type 2  · PVD  · DVT ppx:  Coumadin and SCD    Thank you for this consultation  Do not hesitate to contact service with further questions  COLLEEN Romero  PM&R    History of Present Illness:  Amber Slater is a 80 y o  male with a PMH of CKD stage 4, diabetes, HTN, Parkinson's disease and PVD who presented to the [address] on 7/21/20 with worsening left foot wounds/ulceration  He was started on IV vancomycin  Podiatry was consulted and recommended local wound care  X-ray was negative for acute osseous abnormality  PM&R are consulted for rehabilitation recommendations  The patient was seen in his room with his daughter at bedside  He currently denies any pain  He does have a history of neuropathy  He is to have an angiogram tomorrow  Per daughter the patient would likely sign himself out of rehab  Review of Systems: 10 point ROS negative except for what is noted in HPI    Function:  Prior level of function and living situation:  The patient lives alone in a ranch style home where he has his tax office  He was independent with ADLs, his daughter assisted with meals, shopping and transport  He has a hx of SNF rehabs  He has a cane and RW        Current level of function:  Physical Therapy:  Minimal assist for bed mobility, moderate assist for transfers, minimal assist for ambulation  Occupational Therapy:  Minimal assist for eating, moderate assist for grooming, UB bathing/dressing, maximal assist for LB dressing/bathing  Physical Exam:  /53 (BP Location: Left arm)   Pulse 70   Temp 98 3 °F (36 8 °C) (Oral)   Resp 16   Ht 6' (1 829 m)   Wt 89 8 kg (198 lb)   SpO2 100%   BMI 26 85 kg/m²        Intake/Output Summary (Last 24 hours) at 7/22/2020 1356  Last data filed at 7/22/2020 1210  Gross per 24 hour   Intake 480 ml   Output 950 ml   Net -470 ml       Body mass index is 26 85 kg/m²  Physical Exam   Constitutional: He is oriented to person, place, and time  He appears well-developed and well-nourished  HENT:   Head: Normocephalic and atraumatic  Eyes: EOM are normal    Pulmonary/Chest: Effort normal    Abdominal: He exhibits no distension  Musculoskeletal:   LLE weakness    Neurological: He is alert and oriented to person, place, and time  Skin: Skin is warm and dry  Psychiatric: Cognition and memory are impaired  Social History:    Social History     Socioeconomic History    Marital status:       Spouse name: None    Number of children: None    Years of education: None    Highest education level: None   Occupational History    Occupation: Retired   Social Needs    Financial resource strain: None    Food insecurity:     Worry: None     Inability: None    Transportation needs:     Medical: None     Non-medical: None   Tobacco Use    Smoking status: Never Smoker    Smokeless tobacco: Never Used   Substance and Sexual Activity    Alcohol use: Yes     Comment: one beer on his birthday    Drug use: No    Sexual activity: Never   Lifestyle    Physical activity:     Days per week: None     Minutes per session: None    Stress: None   Relationships    Social connections:     Talks on phone: None     Gets together: None Attends [employer] service: None     Active member of club or organization: None     Attends meetings of clubs or organizations: None     Relationship status: None    Intimate partner violence:     Fear of current or ex partner: None     Emotionally abused: None     Physically abused: None     Forced sexual activity: None   Other Topics Concern    None   Social History Narrative    · Most recent tobacco use screenin2019      · Do you currently or have you served in the [employer]:    Yes      · Were you activated, into active duty, as a member of the [employer] or as a Reservist:   Yes        · Exercise level:   Occasional      · Diet:   Regular      · Caffeine intake:   Occasional      · Sunscreen used routinely:   Yes      · Smoke alarm in home:   Yes         Family History:    Family History   Problem Relation Age of Onset    Diabetes Mother     Heart failure Mother     Hypertension Mother     Cancer Father     Hypertension Sister     Hyperthyroidism Sister     Stroke Brother     Diabetes Maternal Grandmother     Clotting disorder Maternal Grandmother     No Known Problems Maternal Grandfather     No Known Problems Paternal Grandmother     No Known Problems Paternal Grandfather     Diabetes Brother     Arthritis Brother     Glaucoma Brother     Cataracts Brother     No Known Problems Son     Hyperthyroidism Daughter     Kidney disease Daughter     COPD Daughter     Diabetes Brother     Cancer Brother          Medications:     Current Facility-Administered Medications:     acetaminophen (TYLENOL) tablet 650 mg, 650 mg, Oral, Q6H PRN, Tarik Romero MD    amLODIPine (NORVASC) tablet 5 mg, 5 mg, Oral, Daily, Tarik Romero MD, 5 mg at 20 1006    clopidogrel (PLAVIX) tablet 75 mg, 75 mg, Oral, Daily, Tarik Romero MD, 75 mg at 20 1005    finasteride (PROSCAR) tablet 5 mg, 5 mg, Oral, Daily, Tarik Romero MD, 5 mg at 20 1004    FLUoxetine (PROzac) capsule 40 mg, 40 mg, Oral, Daily, Araceli Burdick MD, 40 mg at 07/22/20 1003    furosemide (LASIX) tablet 40 mg, 40 mg, Oral, Daily, Araceli Burdick MD, 40 mg at 07/22/20 1003    insulin lispro (HumaLOG) 100 units/mL subcutaneous injection 1-6 Units, 1-6 Units, Subcutaneous, 4x Daily (AC & HS), 2 Units at 07/22/20 1127 **AND** Fingerstick Glucose (POCT), , , 4x Daily AC and at bedtime, Araceli Burdick MD    [facility]) tablet 5 mg, 5 mg, Oral, Daily, Araceli Burdick MD, 5 mg at 07/22/20 1005    ondansetron (ZOFRAN) injection 4 mg, 4 mg, Intravenous, Q4H PRN, Araceli Burdick MD  Master Dinh  [START ON 7/23/2020] Patiromer Sorbitex Calcium PACK 8 4 g, 8 4 g, Oral, Once per day on Mon Thu, Araceli Burdick MD    pravastatin (PRAVACHOL) tablet 40 mg, 40 mg, Oral, Daily With Dinner, Araceli Burdick MD    primidone (MYSOLINE) tablet 50 mg, 50 mg, Oral, Daily, Araceli Burdick MD, 50 mg at 07/22/20 1127    sodium bicarbonate tablet 650 mg, 650 mg, Oral, BID after meals, Araceli Burdick MD, 650 mg at 07/22/20 1002    tamsulosin (FLOMAX) capsule 0 4 mg, 0 4 mg, Oral, Daily With Dinner, Araceli Burdick MD    vancomycin (VANCOCIN) IVPB (premix) 1,000 mg 200 mL, 10 mg/kg, Intravenous, Q24H, Araceli Burdick MD    warfarin (COUMADIN) tablet 4 mg, 4 mg, Oral, Daily (warfarin), Araceli Burdick MD, 4 mg at 07/21/20 2157    Past Medical History:     Past Medical History:   Diagnosis Date    A-fib 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[facility]     Memory loss     Osteomyelitis of right foot ([facility]     Renal disorder         Past Surgical History:     Past Surgical History:   Procedure Laterality Date    APPENDECTOMY  1945    FOOT SURGERY  06/14/2013 I&D with vac    INCISION AND DRAINAGE ABSCESS / HEMATOMA OF BURSA / KNEE / THIGH  03/07/2014    VEIN BYPASS SURGERY Bilateral 2015         Allergies: Allergies   Allergen Reactions    Metoprolol Bradycardia    Unasyn [Ampicillin-Sulbactam Sodium] Rash           LABORATORY RESULTS:      Lab Results   Component Value Date    HGB 7 7 (L) 07/21/2020    HGB 8 9 (L) 09/21/2015    HCT 26 0 (L) 07/21/2020    HCT 27 6 (L) 09/21/2015    WBC 9 98 07/21/2020    WBC 5 62 09/21/2015     Lab Results   Component Value Date    BUN 43 (H) 07/21/2020    BUN 46 (H) 09/21/2015     09/21/2015    K 4 2 07/21/2020    K 6 2 (H) 09/21/2015     07/21/2020     (H) 09/21/2015    GLUCOSE 174 (H) 07/06/2017    GLUCOSE 92 09/21/2015    CREATININE 2 95 (H) 07/21/2020    CREATININE 3 08 (H) 09/21/2015     Lab Results   Component Value Date    PROTIME 19 2 (H) 07/22/2020    PROTIME 21 9 (H) 10/20/2015    INR 1 62 (H) 07/22/2020    INR 2 01 (H) 10/20/2015        DIAGNOSTIC STUDIES: Reviewed  Xr Foot 3+ Views Left    Result Date: 7/21/2020  Impression: No acute osseous abnormality   Workstation performed: [id number]

## 2020-07-22 NOTE — PLAN OF CARE
Problem: Potential for Falls  Goal: Patient will remain free of falls  Description  INTERVENTIONS:  - Assess patient frequently for physical needs  -  Identify cognitive and physical deficits and behaviors that affect risk of falls    -  [place] fall precautions as indicated by assessment   - Educate patient/family on patient safety including physical limitations  - Instruct patient to call for assistance with activity based on assessment  - Modify environment to reduce risk of injury  - Consider OT/PT consult to assist with strengthening/mobility  7/21/2020 2019 by Heather Amador RN  Outcome: Progressing  7/21/2020 2018 by Heather Amador RN  Outcome: Progressing     Problem: PAIN - ADULT  Goal: Verbalizes/displays adequate comfort level or baseline comfort level  Description  Interventions:  - Encourage patient to monitor pain and request assistance  - Assess pain using appropriate pain scale  - Administer analgesics based on type and severity of pain and evaluate response  - Implement non-pharmacological measures as appropriate and evaluate response  - Consider cultural and social influences on pain and pain management  - Notify physician/advanced practitioner if interventions unsuccessful or patient reports new pain  Outcome: Progressing     Problem: INFECTION - ADULT  Goal: Absence or prevention of progression during hospitalization  Description  INTERVENTIONS:  - Assess and monitor for signs and symptoms of infection  - Monitor lab/diagnostic results  - Monitor all insertion sites, i e  indwelling lines, tubes, and drains  - Monitor endotracheal if appropriate and nasal secretions for changes in amount and color  - [place] appropriate cooling/warming therapies per order  - Administer medications as ordered  - Instruct and encourage patient and family to use good hand hygiene technique  - Identify and instruct in appropriate isolation precautions for identified infection/condition  Outcome: Progressing  Goal: Absence of fever/infection during neutropenic period  Description  INTERVENTIONS:  - Monitor WBC    Outcome: Progressing

## 2020-07-22 NOTE — OCCUPATIONAL THERAPY NOTE
Occupational Therapy Evaluation     Patient Name: Isiah Elias  PNZRO'T Date: 7/22/2020  Problem List  Principal Problem:    Left foot wound/infection  Active Problems:    PVD (peripheral vascular disease)    Diabetes mellitus type 2    Essential hypertension    Chronic kidney disease stage 4    Parkinson's disease     Atrial fibrillation     Anemia of chronic disease    Past Medical History  Past Medical History:   Diagnosis Date    [facility]     Alzheimer's disease ([facility] )     Depression     Diabetes mellitus ([facility]     Anderson catheter in place     History of atrial fibrillation     History of chronic kidney disease     History of peripheral vascular disease     Hyperlipidemia     Hypertension     Kidney disease     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     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[facility]     Memory loss     [facility]elitis of right foot [facility]     Renal disorder      Past Surgical History  Past Surgical History:   Procedure Laterality Date    APPENDECTOMY  1945    FOOT SURGERY  06/14/2013    I&D with vac    INCISION AND DRAINAGE ABSCESS / HEMATOMA OF BURSA / KNEE / THIGH  03/07/2014    VEIN BYPASS SURGERY Bilateral 2015 07/22/20 0833   Note Type   Note type Eval only   Restrictions/Precautions   Weight Bearing Precautions Per Order Yes   LLE Weight Bearing Per Order WBAT   Other Precautions Fall Risk;Pain; Chair Alarm; Bed Alarm   Pain Assessment   Pain Assessment Tool Pain Assessment not indicated - pt denies pain   Pain Score No Pain   Home Living   Type of Home Other (Comment)  (Room in his office)   Home Layout One level;Stairs to enter without rails  (2 TONYA (blocks on 1st step))   Bathroom Shower/Tub None   Bathroom Toilet Standard   Home Equipment Walker;Cane   Additional Comments Pt lives in his office  Pt reports that step to enter way too high so he has blocks to create a 2nd step; Pt reports that there is no shower/tub nor kitchen   Says that he uses a microwave to cook meals   Prior Function   Level of [place] Independent with ADLs and functional mobility   Lives With Alone   Receives Help From Family;Home health  (Pt reports that aides come to home for wound care)   ADL Assistance Independent   IADLs Independent   Falls in the last 6 months 1 to 4  (2)   Vocational Full time employment   Comments Pt claims that when he falls that he cant get up and tries to be extra cautious  Pt reports that he uses of RW or SPC depending how he is feeling that day   Lifestyle   Autonomy PTA pt reports that he is I in ADLs, IADLS and functional mobility with use of RW or Cane (depending how he is feeling that day)   Reciprocal Relationships DTR lives in [place] and able to visit frequently   Service to Others Full time; taxes   Intrinsic Gratification Reading the newspaper; 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   Psychosocial   Psychosocial (WDL) X   Patient Behaviors/Mood Calm; Cooperative   Subjective   Subjective "I do everything on my own"   ADL   Where Assessed Edge of bed   Eating Assistance 4  Minimal Assistance   Grooming Assistance 3  Moderate Assistance   UB Bathing Assistance 3  Moderate Assistance   LB Bathing Assistance 2  Maximal Assistance   UB Dressing Assistance 3  Moderate Assistance   LB Dressing Assistance 2  Maximal Assistance   Toileting Assistance  3  Moderate Assistance   Bed Mobility   Supine to Sit 4  Minimal assistance   Additional items Assist x 1   Transfers   Sit to Stand 3  Moderate assistance   Additional items Assist x 2   Stand to Sit 3  Moderate assistance   Additional items Assist x 2   Functional Mobility   Functional Mobility 4  Minimal assistance   Additional Comments Pt required Min A t/o functional mobility for stability and safety   Additional items Rolling walker   Balance   Static Sitting Fair +   Dynamic Sitting Fair +   Static Standing Fair   Dynamic Standing Fair   Ambulatory Poor +   Activity Tolerance   Activity Tolerance Patient limited by fatigue; Other (Comment)  (pt was distracted by back itch)   Medical Staff Made Aware OT Noemi, PT [facility] with RN before session   RUE Assessment   RUE Assessment WFL   LUE Assessment   LUE Assessment WFL   Hand Function   Gross Motor Coordination Functional   Fine Motor Coordination Functional   Cognition   Overall Cognitive Status Impaired   Arousal/Participation Cooperative   Attention Attends with cues to redirect   Orientation Level Oriented X4   Memory Decreased recall of precautions   Following Commands Follows one step commands with increased time or repetition   Comments Pt is a 79 y/o M admitted at [facility] on 7/21/20 with L foot wound and infection  Pt is a poor historian with a history of Parkinson's disease  Pt's  PMH consist of A-fib, Alzheimer's disease, depression, DM2, mcneil catheter in place, history of atrial fibrillation, history of chronic kidney disease, history of peripheral vascular disease, hyperlipidemia, hypertension, Kidney disease, Melanoma of ear, Melanoma of wrist, memory loss, osteomyelitis of right foot, and renal disorder  Active OT orders and contact precaution orders  Prior to session, confirmed pt is WBAT on E with [facility] from podiatry  PTA, pt reports being I in ADLs, IADLs and functional mobility  Pt reports that he is I with driving  Pt reports home health aide come for wound care  Pt lives in his office alone with 2 TONYA  Pt reports that DTR lives near by and visits him frequently  Pt reports using a RW or SPC depending on how he is feeling that day  Pt is currently MOD A assist level with UB ADLs and MAX A with LB ADLs  Pt is retropulsive with standing, requiring MOD Ax2 for sit to stand and stand to sit transfers and MIN A x1 for functional mobility with use of RW   Pt is limited at this time 2* activity tolerance, pain, balance, functional standing tolerance, unsupportive home environment, decreased I in ADLs and IADLs, endurance, cognitive impairments, decreased safety awareness, and decreased insight in deficit  These impair baseline function in grooming, oral hygiene, toilet hygiene, dressing, health maintenance, functional mobility, household maintenance, driving and job performance  Pt educated on compensatory technique and safety awareness during transfers  From OT standpoint anticipate d/c to short term rehab pending progress  OT will continue to follow 3-5x/wk in the next 10-14 days to meet the following goals  Assessment   Limitation Decreased ADL status; Decreased Safe judgement during ADL;Decreased cognition;Decreased endurance;Decreased self-care trans;Decreased high-level ADLs   Prognosis Good   Assessment Pt is a 81 y/o M admitted at [facility] on 7/21/20 with L foot wound and infection  Pt is a poor historian with a history of Parkinson's disease  Pt's  PMH consist of A-fib, Alzheimer's disease, depression, DM2, mcneil catheter in place, history of atrial fibrillation, history of chronic kidney disease, history of peripheral vascular disease, hyperlipidemia, hypertension, Kidney disease, Melanoma of ear, Melanoma of wrist, memory loss, osteomyelitis of right foot, and renal disorder  Active OT orders and contact precaution orders  Pt is currently WBAT on LLE  PTA, pt reports being I in ADLs, IADLs and functional mobility  Pt reports home health aide come for wound care  Pt lives in his office alone with 2 TONYA  Pt reports that DTR lives able and visits him frequently  Pt reports using a RW or SPC depending on how he is feeling that day  Pt is currently MOD A assist level with UB ADLs and MAX A with LB ADLs  Pt requires MOD Ax2 for sit to stand and stand to sit transfers and MIN A x1 for functional mobility with use of RW   Pt is limited at this time 2* activity tolerance, pain, balance, functional standing tolerance, unsupportive home environment, decreased I in ADLs and IADLs, endurance, cognitive impairments, decreased safety awareness, and decreased insight in deficit  These impair baseline function in grooming, oral hygiene, toilet hygiene, dressing, health maintenance, functional mobility, household maintenance, driving and job performance  Pt educated on compensatory technique and safety awareness during transfers  From OT standpoint anticipate d/c to short term rehab pending progress  OT will continue to follow 3-5x/wk in the next 10-14 days to meet the following goals  Goals   Patient Goals return home    LTG Time Frame 10-14   Plan   Treatment Interventions ADL retraining;Functional transfer training; Endurance training;Cognitive reorientation;Patient/family training; Compensatory technique education; Activityengagement; Energy conservation   Goal Expiration Date 08/05/20   OT Frequency 3-5x/wk   Recommendation   OT Discharge Recommendation Post-Acute Rehabilitation Services   OT - OK to Discharge Yes   Modified Leander Scale   Modified Leander Scale 4     GOALS    1  Pt will complete UB ADL tasks @ MOD I assist level with DME PRN    2  Pt will complete LB ADL tasks @ MOD I assist level with DME PRN    3  Pt will complete IADL tasks @ MOD Iassist level with DME PRN    4  Pt will have G carryover of safety awareness during ADL/IADL task & functional mobility    5  Pt will complete transfers @ MOD I assist level with DME PRN    6  Pt will complete functional mobility @ MOD I assist level with DME PRN    7  Pt will be able to identify & demonstrate precautions t/o I/ADLs and functional mobility    8   Pt will participate in formal cog assessment (ACLS) w/ G attention to assist w/ safe d/c    BAILEY Woody

## 2020-07-22 NOTE — H&P (VIEW-ONLY)
Consultation - Vascular Surgery   Susan Cooney 80 y o  male MRN: [id number]  Unit/Bed#: -01 Encounter: [id number]      Assessment/Plan      Assessment:  Pt is a 79 y/o M w PMH of Afib, HTN, DM, CKD (cr: 2 9-3), PVD, prior L Fem- posterior tibial bypass (2015) and R fem- posterior tibial bypass (2014) who presents w LLE chronic heel and lateral forefoot wounds  Vascular surgery consulted for worsening ABIs possible revascularization  1/20 LEADS: RLE: <75% stenosis below knee SFA-TPA graft at previous PTA site  LILIAN: 0 67 prior (0 82)  MP: 116  GTP: 46  LLE: high grade stenosis of L SFA-pop stent, and 50-75% stenosis of prox deep fem art  LILIAN: 0 37  Prior 0 69  MP: 65, GTP: 27    2/16 MRI: concerning for osteomyelitis along L calcaneus  VSS  Afebrile  LE motor intact  Absent sensation b/l from ankle distal  B/l palp femoral pulses  B/l dopplerable popliteal  L doppl peroneal/  PT  R doppl DP/ PT  LLE with 2 chronic ulcers one over heel and other over lateral forefoot  Nontender  No purulence  Pertinent labs:   INR: 1 62  Cr: 2 95 GFR: 18    Hgb A1c: 7 1    Plan:  F/u repeat b/l LEADs  Based on LEADS we will discuss endovascular possible revascularization strategies  Noted that pt is refusing any form of amputation  F/u cultures  Continue antibiotics, vancomycin  Local wound care per Podiatry  Rest of care per primary team  Discussed case in detail with Vascular Surgery Fellow Dr Kari June  History of Present Illness   Physician Requesting Consult: Noemy Medina MD  Reason for Consult / Principal Problem: Left lower extremity wound  History, ROS and PFSH unobtainable from any source due to none    HPI: Susan Cooney is a 80y o  year old male past medical history 9 of hypertension, hyperlipidemia , diabetes depression , Alzheimer's , AFib on Coumadin, 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, and bilateral lower extremity bypass, who presents with worsening ulcer over plantar surface left lower extremity  Vascular surgery consulted by Podiatry for any possible revascularization strategies  Patient with worsening lower extremity arterial duplex study in January of 2020, and MRI on February 2020 concerning for osteomyelitis of the left calcaneus  Patient denied any pain over his lower extremities, denied any fevers, chills, chest pain or shortness of breath recently  Noted that he walks on a daily basis, has sensory loss from his ankle distally bilateral lower extremities  Patient explained that he presented to the hospital because he was seen by his podiatrist outpatient and there was concern for nonhealing ulcers and osteomyelitis therefore is admitted  Patient denied any history of heart attack or stroke  Of note it was discussed that patient is at high risk of limb loss concerning his possible osteomyelitis involving his left calcaneus, and patient said that he refuses any form of amputation, however would undergo angiogram and any form of revascularization strategy  Inpatient consult to Vascular Surgery     Performed by  Francisco Fitzgerald MD     Authorized by CARLOS Calhoun              Review of Systems   Constitutional: Negative for chills and fever  HENT: Negative  Eyes: Negative  Respiratory: Negative  Cardiovascular: Negative  Gastrointestinal: Negative  Endocrine: Negative  Genitourinary: Negative  Musculoskeletal: Negative  Skin: Positive for wound  LLE foot wound on heel and lateral forefoot  Allergic/Immunologic: Negative  Neurological: Negative  Hematological: Negative  Psychiatric/Behavioral: Negative          Historical Information   Past Medical History:   Diagnosis Date    [facility]fib [facility] )     Alzheimer's disease ([facility] )     Depression     Diabetes mellitus ([facility] )     Anderson catheter in place     History of atrial fibrillation     History of chronic kidney disease     History of peripheral vascular disease     Hyperlipidemia     Hypertension     Kidney disease     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     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     Memory loss     Osteomyelitis of right foot [facility]     Renal disorder      Past Surgical History:   Procedure Laterality Date    APPENDECTOMY  1945    FEMORAL ARTERY - TIBIAL ARTERY BYPASS GRAFT Right 01/08/2014    R SFA- PT w/ nonrev GSV, [facility]- [facility]    FEMORAL ARTERY - TIBIAL ARTERY BYPASS GRAFT Left 06/04/2015    L SFA- PT w/ Cryovein- Ivan/ [facility]    FOOT SURGERY  06/14/2013    I&D with vac    WOUND DEBRIDEMENT Right 03/07/2014    R thigh wound washout, [facility]- [facility]     Social History   Social History     Substance and Sexual Activity   Alcohol Use Yes    Comment: one beer on his birthday     Social History     Substance and Sexual Activity   Drug Use No     E-Cigarette/Vaping    E-Cigarette Use Never User      E-Cigarette/Vaping Substances    Nicotine No     THC No     CBD No     Flavoring No     Other No     Unknown No      Social History     Tobacco Use   Smoking Status Never Smoker   Smokeless Tobacco Never Used     Family History: non-contributory}    Meds/Allergies   all current active meds have been reviewed  Allergies   Allergen Reactions    Metoprolol Bradycardia    Unasyn [Ampicillin-Sulbactam Sodium] Rash       Objective   Vitals: Blood pressure 140/67, pulse 72, temperature 97 5 °F (36 4 °C), resp  rate 18, height 6' (1 829 m), weight 89 8 kg (198 lb), SpO2 100 %  ,Body mass index is 26 85 kg/m²  Intake/Output Summary (Last 24 hours) at 7/22/2020 1518  Last data filed at 7/22/2020 1210  Gross per 24 hour   Intake 480 ml   Output 950 ml   Net -470 ml     Invasive Devices     Peripheral Intravenous Line            Peripheral IV 07/21/20 Right Antecubital 1 day                Physical Exam   Constitutional: He is oriented to person, place, and time  He appears well-developed and well-nourished  HENT:   Head: Normocephalic and atraumatic     Eyes: Pupils are equal, round, and reactive to light  No scleral icterus  Neck: Normal range of motion  Neck supple  No JVD present  No tracheal deviation present  Cardiovascular: Normal rate  B/l palp fem and doppl pop  LLE doppl pt/ peroneal  RLE: doppl dp/ pt  LLE w venous stasis dermatitis  B/l LE motor intact, sensory deficit from ankle distal     Pulmonary/Chest: Effort normal and breath sounds normal  No stridor  Abdominal: Soft  He exhibits distension  Genitourinary:   Genitourinary Comments: deferred   Musculoskeletal: Normal range of motion  He exhibits no edema  Ulcer over left heel and lateral left forefoot  No purulence or erythema  Neurological: He is alert and oriented to person, place, and time  Skin: Skin is warm  Capillary refill takes 2 to 3 seconds  Psychiatric: He has a normal mood and affect  Vitals reviewed  Lab Results:   I have personally reviewed pertinent reports  , Coags:   Lab Results   Component Value Date    INR 1 62 (H) 07/22/2020   , Creatinine: No results found for: CREATININE, Lipid Panel: No results found for: CHOL, CBC with diff: No results found for: WBC, HGB, HCT, MCV, PLT, ADJUSTEDWBC, MCH, MCHC, RDW, MPV, NRBC, BMP/CMP: No results found for: SODIUM, K, CL, CO2, ANIONGAP, BUN, CREATININE, GLUCOSE, CALCIUM, AST, ALT, ALKPHOS, PROT, BILITOT, EGFR, Coags:   Lab Results   Component Value Date    INR 1 62 (H) 07/22/2020   , CRP: No results found for: CRP  Imaging Studies: I have personally reviewed pertinent reports  EKG, Pathology, and Other Studies: I have personally reviewed pertinent reports  VTE Prophylaxis: Heparin     Code Status: Level 1 - Full Code  Advance Directive and Living Will:      Power of :    POLST:      Counseling / Coordination of Care  Counseling/Coordination of Care: Total floor / unit time spent today 30 minutes   Greater than 50% of total time was spent with the patient and / or family counseling and / or coordination of care   A description of the counseling / coordination of care: 30

## 2020-07-22 NOTE — PLAN OF CARE
Problem: OCCUPATIONAL THERAPY ADULT  Goal: Performs self-care activities at highest level of function for planned discharge setting  See evaluation for individualized goals  Description  Treatment Interventions: ADL retraining, Functional transfer training, Endurance training, Cognitive reorientation, Patient/family training, Compensatory technique education, Activityengagement, Energy conservation          See flowsheet documentation for full assessment, interventions and recommendations  Note:   Limitation: Decreased ADL status, Decreased Safe judgement during ADL, Decreased cognition, Decreased endurance, Decreased self-care trans, Decreased high-level ADLs  Prognosis: Good  Assessment: Pt is a 79 y/o M admitted at [facility] on 7/21/20 with L foot wound and infection  Pt is a poor historian with a history of Parkinson's disease  Pt's  PMH consist of A-fib, Alzheimer's disease, depression, DM2, mcneil catheter in place, history of atrial fibrillation, history of chronic kidney disease, history of peripheral vascular disease, hyperlipidemia, hypertension, Kidney disease, Melanoma of ear, Melanoma of wrist, memory loss, osteomyelitis of right foot, and renal disorder  Active OT orders and contact precaution orders  Pt is currently WBAT on LLE  PTA, pt reports being I in ADLs, IADLs and functional mobility  Pt reports home health aide come for wound care  Pt lives in his office alone with 2 TONYA  Pt reports that DTR lives able and visits him frequently  Pt reports using a RW or SPC depending on how he is feeling that day  Pt is currently MOD A assist level with UB ADLs and MAX A with LB ADLs  Pt requires MOD Ax2 for sit to stand and stand to sit transfers and MIN A x1 for functional mobility with use of RW   Pt is limited at this time 2* activity tolerance, pain, balance, functional standing tolerance, unsupportive home environment, decreased I in ADLs and IADLs, endurance, cognitive impairments, decreased safety awareness, and decreased insight in deficit  These impair baseline function in grooming, oral hygiene, toilet hygiene, dressing, health maintenance, functional mobility, household maintenance, driving and job performance  Pt educated on compensatory technique and safety awareness during transfers  From OT standpoint anticipate d/c to short term rehab pending progress  OT will continue to follow 3-5x/wk in the next 10-14 days to meet the following goals  OT Discharge Recommendation: Post-Acute Rehabilitation Services  OT - OK to Discharge:  Yes

## 2020-07-22 NOTE — CONSULTS
Consultation - Infectious Disease   Armando Obrien 80 y o  male MRN: [id number]  Unit/Bed#: -01 Encounter: [id number]      IMPRESSION & RECOMMENDATIONS:   1  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-wound cultures polymicrobial but unclear which of the organism is a pathogen as this was not a deep operative culture  The cultures grown MRSA, Klebsiella, and Enterococcus  Fortunately the patient is not systemically ill, is hemodynamically stable, nontoxic  There is no definitive plan at this time for any surgical intervention    -continue vancomycin for now while waiting additional data  -pharmacy follow-up for vancomycin trough management  -add cefazolin 1 g IV q 8 hours for now  -vascular workup underwent  -local wound care  -close podiatry follow-up  -recheck CBC with diff and BMP  -check sedimentation rate and CRP    2  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-the heel wound is been present for about 2 years and the metatarsal ulceration over the last few months   -local wound care    3  Chronic kidney disease-stage IV  The renal function is quite abnormal   -dose adjusted antibiotics as above  -recheck BMP  -volume management    4  Diabetes mellitus type 2-with hyperglycemia  Very poorly controlled   -tighten diabetic control to improve leukocyte function    5   Peripheral arterial disease-with only 30% flow to the left lower extremity as per the daughter's report   -await noninvasive studies  -vascular follow-up      Have discussed the above management plan in detail with the primary service    Extensive review of the medical records in epic including review of the notes, radiographs, and laboratory results     HISTORY OF PRESENT ILLNESS:  Reason for Consult:  Left calcaneal osteomyelitis  HPI: Armando Obrien is a 80y o  year old male with diabetes mellitus and peripheral arterial disease in recurrent left foot infection including osteomyelitis in the past admitted [facility] [place] with persistent worsening left foot swelling and redness who I am asked to assist with management  According to his daughter he only has about 30% blood flow to that left foot  Over the past 2 years he suffered with left foot ulcerations involving the calcaneus and 5th metatarsal   He apparently has received prolonged courses of intravenous antibiotics in the past   He has also had prolonged courses of oral antibiotics  About a month or 2 ago the patient developed worsening 5th metatarsal ulceration  He has calcaneal ulceration apparently has probed to the bone for quite some time in MRI in February suggested osteomyelitis  Patient was seen by Dr Polly Motta due to worsening foot appearance and had a wound culture obtained  Apparently had been on oral antibiotics recently for UTI but he has not finished his treatment course  Based upon the cultures Dr Polly Motta told him that she would go with doxycycline  However because of the appearance of the foot he was admitted for intravenous antibiotics  He had blood cultures obtained and was started on vancomycin for reported MRSA foot infection  He denies any fever chills or sweats, denies any nausea vomiting or diarrhea, denies any cough or shortness of breath, denies any dysuria or hematuria  REVIEW OF SYSTEMS:  A complete review of systems is negative other than that noted in the HPI      PAST MEDICAL HISTORY:  Past Medical History:   Diagnosis Date    [facility]-fib [facility])     [facility] [address] [facility]     Depression     Diabetes mellitus [facility] [facility]     Anderson catheter in place     History of atrial fibrillation     History of chronic kidney disease     History of peripheral vascular disease     Hyperlipidemia     Hypertension     Kidney disease     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 [facility]     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[facility] [address] [facility]     Memory loss     Osteomyelitis of right foot ([facility] [address] [facility]     Renal disorder      Past Surgical History:   Procedure Laterality Date    APPENDECTOMY  1945    FEMORAL ARTERY - TIBIAL ARTERY BYPASS GRAFT Right 2014    R SFA- PT w/ nonrev GSV, PreVena VAC- Balshi    FEMORAL ARTERY - TIBIAL ARTERY BYPASS GRAFT Left 2015    L SFA- PT w/ Cryovein- Ivan/ Balshi    FOOT SURGERY  2013    I&D with vac    WOUND DEBRIDEMENT Right 2014    R thigh wound washout, VAC- Balshi       FAMILY HISTORY:  Non-contributory    SOCIAL HISTORY:  Social History   Social History     Substance and Sexual Activity   Alcohol Use Yes    Comment: one beer on his birthday     Social History     Substance and Sexual Activity   Drug Use No     Social History     Tobacco Use   Smoking Status Never Smoker   Smokeless Tobacco Never Used       ALLERGIES:  Allergies   Allergen Reactions    Metoprolol Bradycardia    Unasyn [Ampicillin-Sulbactam Sodium] Rash       MEDICATIONS:  All current active medications have been reviewed    Antibiotics:  Vancomycin 2    PHYSICAL EXAM:  Temp:  [97 5 °F (36 4 °C)-98 9 °F (37 2 °C)] 97 5 °F (36 4 °C)  HR:  [70-77] 72  Resp:  [16-18] 18  BP: (115-140)/(53-67) 140/67  SpO2:  [99 %-100 %] 100 %  Temp (24hrs), Av 3 °F (36 8 °C), Min:97 5 °F (36 4 °C), Max:98 9 °F (37 2 °C)  Current: Temperature: 97 5 °F (36 4 °C)    Intake/Output Summary (Last 24 hours) at 2020 1609  Last data filed at 2020 1210  Gross per 24 hour   Intake 480 ml   Output 950 ml   Net -470 ml       General Appearance:  Elderly, debilitated, nontoxic, and in no distress   Head:  Normocephalic, without obvious abnormality, atraumatic   Eyes:  Conjunctiva pink and sclera anicteric, both eyes   Nose: Nares normal, mucosa normal, no drainage   Throat: Oropharynx moist without lesions   Neck: Supple, symmetrical, no adenopathy, no tenderness/mass/nodules   Back:   Symmetric, no curvature, ROM normal, no CVA tenderness   Lungs:   Clear to auscultation bilaterally, respirations unlabored   Chest Wall:  No tenderness or deformity   Heart:  RRR; no murmur, rub or gallop   Abdomen:   Soft, non-tender, non-distended, positive bowel sounds    Extremities: No cyanosis, clubbing  Left foot edema with erythema and warmth and some erythema going up the left calf  Plantar ulceration over the calcaneal area, as well as the 5th MTP  No purulent drainage  Skin: No rashes or lesions  No draining wounds noted  Lymph nodes: Cervical, supraclavicular nodes normal   Neurologic: Alert and oriented times 3, extremity strength 5/5 and symmetric       LABS, IMAGING, & OTHER STUDIES:  Lab Results:  I have personally reviewed pertinent labs  Results from last 7 days   Lab Units 07/21/20  1337   WBC Thousand/uL 9 98   HEMOGLOBIN g/dL 7 7*   PLATELETS Thousands/uL 271     Results from last 7 days   Lab Units 07/21/20  1337   SODIUM mmol/L 136   POTASSIUM mmol/L 4 2   CHLORIDE mmol/L 107   CO2 mmol/L 22   BUN mg/dL 43*   CREATININE mg/dL 2 95*   EGFR ml/min/1 73sq m 18   CALCIUM mg/dL 7 4*   AST U/L 8   ALT U/L 12   ALK PHOS U/L 296*     Results from last 7 days   Lab Units 07/21/20  1337   BLOOD CULTURE  No Growth at 24 hrs  No Growth at 24 hrs                         Imaging Studies:     MRI left foot February 2020-wound extending down to the calcaneus with some marrow edema the plantar aspect of the calcaneus suspicious for osteomyelitis     Plain x-ray left foot-no acute osseous abnormality    Images personally reviewed by me in PACS

## 2020-07-22 NOTE — ASSESSMENT & PLAN NOTE
· Patient follows with Dr Felice Perera of Podiatry as outpatient  Podiatry following here, I discussed with them today and their consult is appreciated  · MRI from February was concerning for OM  Per my discussion with Podiatry today he was being management conservatively and no surgery planned given his age  · Documents history of MRSA  Currently on IV Vancomycin  Pharmacy following for dosing   Wound culture is pending   · Will appreciate ID consult regarding long term abx plan if no surgical intervention is planned

## 2020-07-22 NOTE — PHYSICAL THERAPY NOTE
Physical Therapy Evaluation Note     Patient Name: Damir Patel    BXLBM'[id number] Date: 7/22/2020     Problem List  Principal Problem:    Left foot wound/infection  Active Problems:    PVD (peripheral vascular disease)    Diabetes mellitus type 2    Essential hypertension    Chronic kidney disease stage 4    Parkinson's disease     Atrial fibrillation     Anemia of chronic disease       Past Medical History  Past Medical History:   Diagnosis Date    [facility] [address] [facility]     Alzheimer's disease ([facility] [address] [facility]     Depression     Diabetes mellitus ([facility] [address] [facility]     Anderson catheter in place     History of atrial fibrillation     History of chronic kidney disease     History of peripheral vascular disease     Hyperlipidemia     Hypertension     Kidney disease     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[facility] [address] [facility]     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[facility] [address] [facility]     Memory loss     Osteomyelitis of right foot ([facility] [address] [facility]     Renal disorder         Past Surgical History  Past Surgical History:   Procedure Laterality Date    APPENDECTOMY  1945    FOOT SURGERY  06/14/2013    I&D with vac    INCISION AND DRAINAGE ABSCESS / HEMATOMA OF BURSA / KNEE / THIGH  03/07/2014    VEIN BYPASS SURGERY Bilateral 2015 07/22/20 6616   Note Type   Note type Eval only   Pain Assessment   Pain Assessment Tool Pain Assessment not indicated - pt denies pain   Pain Score No Pain   Home Living   Type of Home Other (Comment)  (room 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 )   Home Layout One level   Additional Comments Pt lives alone in a room located at the 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  Pt was I for ADL's and mobility prior  Pt occ used a SPC or RW depending on where he was going/what he was doing  Pt's daughter assist if needed  Pt currently works   Pt's office building has 2 TONYA (blocks placed in front of his one step)   Prior Function   Level of [place] Independent with ADLs and functional mobility   Lives With Alone   Receives Help From [facility] ADL Assistance Independent   IADLs Independent   Vocational Full time employment   Restrictions/Precautions   Weight Bearing Precautions Per Order Yes   LLE Weight Bearing Per Order WBAT   Other Precautions Fall Risk;Multiple lines; Chair Alarm; Bed Alarm   General   Family/Caregiver Present No   Cognition   Overall Cognitive Status WFL   Arousal/Participation Alert   Orientation Level Oriented X4   Memory Within functional limits   Following Commands Follows all commands and directions without difficulty   RLE Assessment   RLE Assessment WNL   LLE Assessment   LLE Assessment WNL   Bed Mobility   Supine to Sit 4  Minimal assistance   Additional items Assist x 1   Transfers   Sit to Stand 3  Moderate assistance   Additional items Assist x 2   Stand to Sit 3  Moderate assistance   Additional items Assist x 2   Ambulation/Elevation   Gait pattern Short stride; Foward flexed; Shuffling   Gait Assistance 4  Minimal assist   Additional items Assist x 1   Assistive Device Rolling walker   Distance 15ftx1   Balance   Static Sitting Fair +   Dynamic Sitting Fair +   Static Standing Fair   Dynamic Standing Fair   Ambulatory Poor +   Endurance Deficit   Endurance Deficit Yes   Activity Tolerance   Activity Tolerance Patient limited by fatigue   Medical Staff Made Aware OT and OT student    Nurse Made Aware nurse approved therapy session   Assessment   Prognosis Fair   Problem List Decreased strength;Decreased endurance; Impaired balance;Decreased mobility; Decreased safety awareness   Assessment Pt is a 81 yo male admitted to [place] [address] on 7/21/2020 s/p wound infection  Dx: Left foot/wound infection, chronic kidney disease stage 4, PVD, DM, hypertension, atrial fibrillation, parkinson's disease, anemia  Per ortho patient is WBAT on L LE  Two patient identifiers were used to confirm  Pt lives in a room in his office building  Pt was I for ADL's and mobility prior  Pt used either a RW or SPC for mboility  Pt still works   Pt's impairments include reduced mobility, limited endurance, high risk of falling, poor safety insight  These impairments limit the ability of the patient to perform mobility without increased assistance, return to PLOF and participate in everyday life activities  Pt would benefit from continued skilled therapy while in the hospital to improve overall mobility and work towards a safe d/c  Recommend discharge to rehab  At the end of the session the patient was left in seated position with call bell and phone within reach  Pt had unexpected posterior fall when trying to stand  Pt landed on bed but required assist of 2 to complete transfer  Barriers to Discharge Inaccessible home environment;Decreased caregiver support   Goals   STG Expiration Date 08/05/20   Short Term Goal #1 STG 1: Pt will perform transfers at a MI level to return to baseline of function  STG 2: Pt will ambulate 300ft with RW at a MI level to reduce the level of assistance needed upon d/c home  STG 3: Pt will negotiate 2 steps with HR at a MI level to ensure safety with activity when able to ambulate 150ft at a S level  STG 4: Pt will perform bed mobility at a I to safety return to PLOF  PT Treatment Day 0   Plan   Treatment/Interventions Functional transfer training;LE strengthening/ROM; Therapeutic exercise; Endurance training;Equipment eval/education; Bed mobility;Gait training;Elevations   PT Frequency Other (Comment)  (3-5xwk)   Recommendation   PT Discharge Recommendation Post-Acute Rehabilitation Services   PT - OK to Discharge Yes   Additional Comments if to rehab   Modified Cumming Scale   Modified Cumming Scale 4   Barthel Index   Feeding 10   Bathing 0   Grooming Score 5   Dressing Score 5   Bladder Score 10   Bowels Score 10   Toilet Use Score 5   Transfers (Bed/Chair) Score 5   Mobility (Level Surface) Score 0   Stairs Score 0   Barthel Index Score 50   Mary Cortés, Pt, DPT

## 2020-07-22 NOTE — PROGRESS NOTES
Progress Note - William Lainez 6/28/1930, 80 y o  male MRN: [id number]    Unit/Bed#: MS Dodd8-01 Encounter: [id number]    Primary Care Provider: Donavon Brock MD   Date and time admitted to hospital: 7/21/2020 12:32 PM        * Left foot wound/infection  Assessment & Plan  · Patient follows with Dr Inocente Reyes of Podiatry as outpatient  Podiatry following here, I discussed with them today and their consult is appreciated  · MRI from February was concerning for OM  Per my discussion with Podiatry today he was being management conservatively and no surgery planned given his age  · Documents history of MRSA  Currently on IV Vancomycin  Pharmacy following for dosing  Wound culture is pending   · Will appreciate ID consult regarding long term abx plan if no surgical intervention is planned     Chronic kidney disease stage 4  Assessment & Plan  · Patient follows with Dr Juliann Snow of Nephrology as outpatient  · Baseline creatinine approximately 2 9-3 0   · No AM labs available for review  · Monitor   Avoid nephrotoxins and hypotension     Parkinsonism ([address] )  Assessment & Plan  · Follows with Dr Clari Worthy of Neurology as outpatient  · On Namenda     Diabetes mellitus type 2  Assessment & Plan  Lab Results   Component Value Date    HGBA1C 7 1 (H) 07/22/2020     (P) 270     · Controlled for patient's age as evidenced by A1c  · Orals on hold while hospitalized   · With significant hyperglycemia on admission, now improved     PVD (peripheral vascular disease)  Assessment & Plan  · Follows with Dr Kenan Fu of Vascular Surgery as outpatient  · Continue Plavix/statin  · With chronic OM    Atrial fibrillation   Assessment & Plan  · Rate controlled off agents  · On Coumadin for anticoagulation    Anemia of chronic disease  Assessment & Plan  · Monitor hemoglobin and transfuse PRN      VTE Pharmacologic Prophylaxis:   Pharmacologic: Warfarin (Coumadin)  Mechanical VTE Prophylaxis in Place: No    Patient Centered Rounds: I have performed bedside rounds with nursing staff today  Discussions with Specialists or Other Care Team Provider: Podiatry and ID    Education and Discussions with Family / Patient: patient; when asked if there was anyone he would like me to call today with an update the patient refused     Time Spent for Care: 30 minutes  More than 50% of total time spent on counseling and coordination of care as described above  Current Length of Stay: 1 day(s)    Current Patient Status: Inpatient   Certification Statement: The patient will continue to require additional inpatient hospital stay due to continued IV abx    Discharge Plan: continued IV abx, pending ID consult     Code Status: Level 1 - Full Code      Subjective:   Mr Ad Birmingham denies complaint  Objective:     Vitals:   Temp (24hrs), Av 3 °F (36 8 °C), Min:97 5 °F (36 4 °C), Max:98 9 °F (37 2 °C)    Temp:  [97 5 °F (36 4 °C)-98 9 °F (37 2 °C)] 97 5 °F (36 4 °C)  HR:  [70-77] 72  Resp:  [16-18] 18  BP: (115-140)/(53-67) 140/67  SpO2:  [99 %-100 %] 100 %  Body mass index is 26 85 kg/m²  Input and Output Summary (last 24 hours): Intake/Output Summary (Last 24 hours) at 2020 1521  Last data filed at 2020 1210  Gross per 24 hour   Intake 480 ml   Output 950 ml   Net -470 ml       Physical Exam:     Physical Exam   Constitutional: He is oriented to person, place, and time  No distress  Patient seen sitting upright comfortably resting in bedside chair with legs elevated  Pleasant and cooperative   Cardiovascular: Normal rate and regular rhythm  Pulmonary/Chest: Effort normal and breath sounds normal  No respiratory distress  He has no wheezes  Abdominal: Soft  Bowel sounds are normal  There is no tenderness  Musculoskeletal: He exhibits no edema  Left foot wrapped   Neurological: He is alert and oriented to person, place, and time  Skin: Skin is warm     Erythema to left lower extremity extending beyond wrapping but within skin marker lines Psychiatric: He has a normal mood and affect  His behavior is normal    Vitals reviewed  Additional Data:     Labs:    Results from last 7 days   Lab Units 07/21/20  1337   WBC Thousand/uL 9 98   HEMOGLOBIN g/dL 7 7*   HEMATOCRIT % 26 0*   PLATELETS Thousands/uL 271   NEUTROS PCT % 72   LYMPHS PCT % 14   MONOS PCT % 11   EOS PCT % 2     Results from last 7 days   Lab Units 07/21/20  1337   SODIUM mmol/L 136   POTASSIUM mmol/L 4 2   CHLORIDE mmol/L 107   CO2 mmol/L 22   BUN mg/dL 43*   CREATININE mg/dL 2 95*   ANION GAP mmol/L 7   CALCIUM mg/dL 7 4*   ALBUMIN g/dL 2 4*   TOTAL BILIRUBIN mg/dL 0 34   ALK PHOS U/L 296*   ALT U/L 12   AST U/L 8   GLUCOSE RANDOM mg/dL 346*     Results from last 7 days   Lab Units 07/22/20  0442   INR  1 62*     Results from last 7 days   Lab Units 07/22/20  1038 07/22/20  0551 07/21/20  2059   POC GLUCOSE mg/dl 215* 97 498*     Results from last 7 days   Lab Units 07/22/20  0442   HEMOGLOBIN A1C % 7 1*               * I Have Reviewed All Lab Data Listed Above  * Additional Pertinent Lab Tests Reviewed: All Labs Within Last 24 Hours Reviewed    Imaging:    Imaging Reports Reviewed Today Include: MRI  Imaging Personally Reviewed by Myself Includes:  None    Recent Cultures (last 7 days):     Results from last 7 days   Lab Units 07/21/20  1337   BLOOD CULTURE  Received in Microbiology Lab  Culture in Progress  Received in Microbiology Lab  Culture in Progress         Last 24 Hours Medication List:     Current Facility-Administered Medications:  acetaminophen 650 mg Oral Q6H PRN Fredy Rodney MD   amLODIPine 5 mg Oral Daily Fredy Rodeny MD   clopidogrel 75 mg Oral Daily Fredy Rodney MD   finasteride 5 mg Oral Daily Fredy Rodney MD   FLUoxetine 40 mg Oral Daily Fredy Rodney MD   furosemide 40 mg Oral Daily Fredy Rodney MD   insulin lispro 1-6 Units Subcutaneous 4x Daily (AC & HS) Fredy Rodney MD   memantine 5 mg Oral Daily Fredy Rodney MD   ondansetron 4 mg Intravenous Q4H PRN Iliana Wallace MD   [START ON 7/23/2020] Patiromer Sorbitex Calcium 8 4 g Oral Once per day on Mon Thu Iliana Wallace MD   pravastatin 40 mg Oral Daily With Kofi Strickland MD   primidone 50 mg Oral Daily Iliana Wallace MD   sodium bicarbonate 650 mg Oral BID after meals Iliana Wallace MD   tamsulosin 0 4 mg Oral Daily With Kofi Strickland MD   vancomycin 10 mg/kg Intravenous Q24H Iliana Wallace MD   warfarin 4 mg Oral Daily (warfarin) Iliana Wallace MD        Today, Patient Was Seen By: Taniya Diamond PA-C    ** Please Note: Dictation voice to text software may have been used in the creation of this document   **

## 2020-07-22 NOTE — PLAN OF CARE
Problem: PHYSICAL THERAPY ADULT  Goal: Performs mobility at highest level of function for planned discharge setting  See evaluation for individualized goals  Description  Treatment/Interventions: Functional transfer training, LE strengthening/ROM, Therapeutic exercise, Endurance training, Equipment eval/education, Bed mobility, Gait training, Elevations          See flowsheet documentation for full assessment, interventions and recommendations  Note:   Prognosis: Fair  Problem List: Decreased strength, Decreased endurance, Impaired balance, Decreased mobility, Decreased safety awareness  Assessment: Pt is a 79 yo male admitted to [place] [address] on 7/21/2020 s/p wound infection  Dx: Left foot/wound infection, chronic kidney disease stage 4, PVD, DM, hypertension, atrial fibrillation, parkinson's disease, anemia  Per ortho patient is WBAT on L LE  Two patient identifiers were used to confirm  Pt lives in a room in his office building  Pt was I for ADL's and mobility prior  Pt used either a RW or SPC for mboility  Pt still works  Pt's impairments include reduced mobility, limited endurance, high risk of falling, poor safety insight  These impairments limit the ability of the patient to perform mobility without increased assistance, return to PLOF and participate in everyday life activities  Pt would benefit from continued skilled therapy while in the hospital to improve overall mobility and work towards a safe d/c  Recommend discharge to rehab  At the end of the session the patient was left in seated position with call bell and phone within reach  Pt had unexpected posterior fall when trying to stand  Pt landed on bed but required assist of 2 to complete transfer  Barriers to Discharge: Inaccessible home environment, Decreased caregiver support     PT Discharge Recommendation: [id number]     PT - OK to Discharge: Yes    See flowsheet documentation for full assessment

## 2020-07-22 NOTE — PROGRESS NOTES
Vancomycin Assessment    William Lainez is a 80 y o  male who is currently receiving vancomycin 1250 mg q24h for skin-soft tissue infection  Relevant clinical data and objective history reviewed:  Creatinine   Date Value Ref Range Status   07/21/2020 2 95 (H) 0 60 - 1 30 mg/dL Final     Comment:     Standardized to IDMS reference method   07/13/2020 2 98 (H) 0 60 - 1 30 mg/dL Final     Comment:     Standardized to IDMS reference method   03/17/2020 2 78 (H) 0 60 - 1 30 mg/dL Final     Comment:     Standardized to IDMS reference method   09/21/2015 3 08 (H) 0 60 - 1 30 mg/dL Final     Comment:     Standardized to IDMS reference method   06/08/2015 1 61 (H) 0 60 - 1 30 mg/dL Final     Comment:     Standardized to IDMS reference method   06/06/2015 1 55 (H) 0 60 - 1 30 mg/dL Final     Comment:     Standardized to IDMS reference method     /53   Pulse 77   Temp 98 7 °F (37 1 °C)   Resp 16   Ht 6' (1 829 m)   Wt 89 8 kg (198 lb)   SpO2 99%   BMI 26 85 kg/m²   No intake/output data recorded  Lab Results   Component Value Date/Time    BUN 43 (H) 07/21/2020 01:37 PM    BUN 46 (H) 09/21/2015 04:43 PM    WBC 9 98 07/21/2020 01:37 PM    WBC 5 62 09/21/2015 04:43 PM    HGB 7 7 (L) 07/21/2020 01:37 PM    HGB 8 9 (L) 09/21/2015 04:43 PM    HCT 26 0 (L) 07/21/2020 01:37 PM    HCT 27 6 (L) 09/21/2015 04:43 PM    MCV 94 07/21/2020 01:37 PM    MCV 90 09/21/2015 04:43 PM     07/21/2020 01:37 PM     09/21/2015 04:43 PM     Temp Readings from Last 3 Encounters:   07/21/20 98 7 °F (37 1 °C)   07/07/20 98 6 °F (37 °C) (Tympanic)   07/06/20 97 7 °F (36 5 °C) (Oral)     Vancomycin Days of Therapy: 1    Assessment/Plan  The patient is currently on vancomycin utilizing scheduled dosing based on actual body weight  The patient is currently receiving 1250 mg q24h and after clinical evaluation will be changed to 1000 mg q24h   Pharmacy will also follow closely for s/sx of nephrotoxicity, infusion reactions and appropriateness of therapy  BMP and CBC will be ordered per protocol  Plan for trough as patient approaches steady state, prior to the 4th  dose at approximately 1600 on 7/24  Due to infection severity, will target a trough of 15-20 (appropriate for most indications)  Pharmacy will continue to follow the patients culture results and clinical progress daily      Jos Casillas, Pharmacist

## 2020-07-22 NOTE — CONSULTS
Podiatry - Consultation    Patient Information:   Gina Lee 80 y o  male MRN: [id number]  Unit/Bed#: -61 Encounter: [id number]  PCP: Marcelino Mendoza MD  Date of Admission:  7/21/2020  Date of Consultation: 07/22/20  Requesting Physician: Dave Genao MD      ASSESSMENT:    Gina Lee is a 80 y o  male with:    1  Left foot heel wound - Garcia 3 - POA  2  Left foot submetatarsal 5 wound - Garcia 1 - POA  3  CKD Stage IV  4  DM2  5  PVD    PLAN:    · Plan discussed with Dr Leona Mendoza, patient to be seen as needed for local wound care consisting of [address], DSD  · Xray reviewed, no signs of osseous abnormalities or TONYA  · Patient currently on Vancomycin 1g qDay - Patient has history of MRSA  · [id number]  · VSS, no leukocytosis noted, although patient does have low Hgb and Hct  · Pt currently on Plavix and coumadin  · Rest of Medical care per primary team    · Will discuss this plan with my attending and update as needed  SUBJECTIVE:    History of Present Illness:    Gina Lee is a 80 y o  male who is originally admitted 7/21/2020 due to a worsening left foot wound  Patient has a past medical history of CKD Stage 4, DM2, HTN, Parkinson's disease, and PVD  We are consulted for the previously mentioned wounds on the patient's left foot  The patient is a poor historian, and although he can communicate when awake, he sometimes falls asleep during questioning, so his daughter presents in the room as his advocate  She says that the wound on his left heel has been ongoing for about 2 years  She says that the skin surrounding the actual ulceration has recently started "to come off too " She also mentions that the wound on the patient's forefoot has been present for "about a month or two " She mentions that he has local wound care provided to him by Dr Jarred Young as an outpatient and says that a few times over the past 2-years, the wound has probed to bone   The patient's daughter endorses that her father has limited sensation in both feet  Patient denies nausea, vomiting, chest pain, shortness of breath, chills, fever  Review of Systems:    Constitutional: Negative  HENT: Negative  Eyes: Negative  Respiratory: Negative  Cardiovascular: Negative  Gastrointestinal: Negative  Musculoskeletal: Negative   Skin: Left foot ulcerations (heel and submet 5)   Neurological: Neuropathy   Psych: Negative       Past Medical and Surgical History:     Past Medical History:   Diagnosis Date    A-fib ([place] [address] )     Alzheimer's disease ([place] [address] )     Depression     Diabetes mellitus ([place] [address] )     Anderson catheter in place     History of atrial fibrillation     History of chronic kidney disease     History of peripheral vascular disease     Hyperlipidemia     Hypertension     Kidney disease     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[place] [address] )     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[address] )     Memory loss     Osteomyelitis of right foot ([place] [address] )     Renal disorder        Past Surgical History:   Procedure Laterality Date    APPENDECTOMY  1945    FOOT SURGERY  06/14/2013    I&D with vac    INCISION AND DRAINAGE ABSCESS / HEMATOMA OF BURSA / KNEE / THIGH  03/07/2014    VEIN BYPASS SURGERY Bilateral 2015       Meds/Allergies:    Medications Prior to Admission   Medication    amLODIPine (NORVASC) 5 mg tablet    clopidogrel (PLAVIX) 75 mg tablet    ergocalciferol (VITAMIN D2) 50,000 units    finasteride (PROSCAR) 5 mg tablet    FLUoxetine (PROzac) 40 MG capsule    furosemide (LASIX) 40 mg tablet    Memantine HCl ER (NAMENDA XR) 14 MG CP24    Pat[employer]er Sorb[employer] Calcium 8 4 g PACK    simvastatin (ZOCOR) 20 mg tablet    sodium bicarbonate 650 mg tablet    tamsulosin (FLOMAX) 0 4 mg    warfarin (COUMADIN) 4 mg tablet    Cholecalciferol (VITAMIN D) 50 MCG (2000 UT) CAPS    clindamycin (CLEOCIN) 300 MG capsule    ferrous sulfate 324 (65 Fe) mg    glipiZIDE (GLUCOTROL XL) 2 5 mg 24 hr tablet    KIONEX 15 GM/60ML suspension    [employer] Calcium ([place]) 8 4 g PACK    primidone (MYSOLINE) 50 mg tablet    warfarin (COUMADIN) 1 mg tablet    warfarin (COUMADIN) 5 mg tablet    zonisamide (ZONEGRAN) 25 mg capsule       Allergies   Allergen Reactions    Metoprolol Bradycardia    Unasyn [Ampicillin-Sulbactam Sodium] Rash       Social History:     Marital Status:     Substance Use History:   Social History     Substance and Sexual Activity   Alcohol Use Yes    Comment: one beer on his birthday     Social History     Tobacco Use   Smoking Status Never Smoker   Smokeless Tobacco Never Used     Social History     Substance and Sexual Activity   Drug Use No       Family History:    Family History   Problem Relation Age of Onset    Diabetes Mother     Heart failure Mother     Hypertension Mother     Cancer Father     Hypertension Sister     Hyperthyroidism Sister     Stroke Brother     Diabetes Maternal Grandmother     Clotting disorder Maternal Grandmother     No Known Problems Maternal Grandfather     No Known Problems Paternal Grandmother     No Known Problems Paternal Grandfather     Diabetes Brother     Arthritis Brother     Glaucoma Brother     Cataracts Brother     No Known Problems Son     Hyperthyroidism Daughter     Kidney disease Daughter     COPD Daughter     Diabetes Brother     Cancer Brother          OBJECTIVE:    Vitals:   Blood Pressure: 126/55 (07/21/20 2300)  Pulse: 76 (07/21/20 2300)  Temperature: 98 9 °F (37 2 °C) (07/21/20 2245)  Temp Source: Tympanic (07/21/20 1239)  Respirations: 16 (07/21/20 2245)  Height: 6' (182 9 cm) (07/21/20 1239)  Weight - Scale: 89 8 kg (198 lb) (07/21/20 1239)  SpO2: 99 % (07/21/20 2300)    Physical Exam:    General Appearance: Alert, cooperative, no distress  HEENT: Head normocephalic, atraumatic, without obvious abnormality  Heart: Normal rate and rhythm  Lungs: Non-labored breathing  No respiratory distress  Abdomen: Without distension    Psychiatric: AAOx3  Lower Extremity:  Vascular:   Right DP and PT pulses are monophasic by Doppler  Left DP and PT pulses are monophasic by Doppler  CRT > 3 seconds at the digits  +1/4 edema noted at bilateral lower extremities  Pedal hair is absent  Skin temperature is cool to the right foot and much warmer to the left foot  Musculoskeletal:  MMT is 4/5 in all muscle compartments bilaterally  ROM at the 1st MPJ and ankle joint are decreased bilaterally with the leg extended  No Pain on palpation of the left heel or the left plantar-lateral foot at the location of patient's ulcerations  No pain noted with probing the patient's left heel ulceration    Dermatological:  Lower extremity wound(s) as noted below:    Wound #: 1  Location: left plantar heel  Length 0 6cm: Width 0 5cm: Depth 2 0cm:   Deepest Tissue Noted in Base: Periosteum  Probe to Bone: No  Peripheral Skin Description: rolled edge with hyperkeratosis  Epithelium surrounding the wound is erythematous with a large area of superficial abrasion, which is superficial, but has scattered areas of fibrous tissue throughout  Granulation: 10% Fibrotic Tissue: 90% Necrotic Tissue: 0%   Drainage Amount: moderate, serous  Signs of Infection: Yes - Erythema, edema, warmth        Wound #: 2  Location: Left plantar foot, submetatarsal 5  Length 1 0cm: Width 0 5cm: Depth 0 1cm:   Deepest Tissue Noted in Base: Subcutaneous tissue   Probe to Bone: No  Peripheral Skin Description: Attached and rolled edge with hyperkeratotic tissue  Granulation: 40% Fibrotic Tissue: 60% Necrotic Tissue: 0%   Drainage Amount: minimal  Signs of Infection: No            Neurological:  Gross sensation is diminished  Protective sensation is absent  Patient Reports numbness and/or paresthesias        Additional data:     Lab Results: I have personally reviewed pertinent labs including:    Results from last 7 days   Lab Units 07/21/20  1337   WBC Thousand/uL 9 98   HEMOGLOBIN g/dL 7 7*   HEMATOCRIT % 26 0*   PLATELETS Thousands/uL 271   NEUTROS PCT % 72   LYMPHS PCT % 14   MONOS PCT % 11   EOS PCT % 2     Results from last 7 days   Lab Units 07/21/20  1337   POTASSIUM mmol/L 4 2   CHLORIDE mmol/L 107   CO2 mmol/L 22   BUN mg/dL 43*   CREATININE mg/dL 2 95*   CALCIUM mg/dL 7 4*   ALK PHOS U/L 296*   ALT U/L 12   AST U/L 8     Results from last 7 days   Lab Units 07/21/20  1556   INR  1 56*       Cultures: I have personally reviewed pertinent cultures including:    Results from last 7 days   Lab Units 07/21/20  1337   BLOOD CULTURE  Received in Microbiology Lab  Culture in Progress  Received in Microbiology Lab  Culture in Progress  Imaging: I have personally reviewed pertinent reports in PACS  EKG, Pathology, and Other Studies: I have personally reviewed pertinent reports  ** Please Note: Portions of the record may have been created with voice recognition software  Occasional wrong word or "sound a like" substitutions may have occurred due to the inherent limitations of voice recognition software  Read the chart carefully and recognize, using context, where substitutions have occurred   **

## 2020-07-22 NOTE — PROGRESS NOTES
Vancomycin IV Pharmacy-to-Dose Consultation    Haylee Cali is a 80 y o  male who is currently receiving Vancomycin IV with management by the Pharmacy Consult service  Assessment/Plan:  The patient was reviewed  Renal function is stable and no signs or symptoms of nephrotoxicity and/or infusion reactions were documented in the chart  Based on todays assessment, continue current vancomycin (day # 2) dosing of 1000mg q24hrs  Trough prior to 4th dose at 1600 on 7/24    We will continue to follow the patients culture results and clinical progress daily      Bindu Redd, Pharmacist

## 2020-07-23 PROBLEM — S91.309A WOUND OF FOOT: Status: ACTIVE | Noted: 2020-07-23

## 2020-07-23 LAB
ANION GAP SERPL CALCULATED.3IONS-SCNC: 8 MMOL/L (ref 4–13)
APTT PPP: >210 SECONDS (ref 23–37)
BUN SERPL-MCNC: 39 MG/DL (ref 5–25)
CALCIUM SERPL-MCNC: 8 MG/DL (ref 8.3–10.1)
CHLORIDE SERPL-SCNC: 105 MMOL/L (ref 100–108)
CO2 SERPL-SCNC: 22 MMOL/L (ref 21–32)
CREAT SERPL-MCNC: 2.81 MG/DL (ref 0.6–1.3)
ERYTHROCYTE [DISTWIDTH] IN BLOOD BY AUTOMATED COUNT: 14.5 % (ref 11.6–15.1)
GFR SERPL CREATININE-BSD FRML MDRD: 19 ML/MIN/1.73SQ M
GLUCOSE SERPL-MCNC: 160 MG/DL (ref 65–140)
GLUCOSE SERPL-MCNC: 164 MG/DL (ref 65–140)
GLUCOSE SERPL-MCNC: 209 MG/DL (ref 65–140)
GLUCOSE SERPL-MCNC: 231 MG/DL (ref 65–140)
GLUCOSE SERPL-MCNC: 285 MG/DL (ref 65–140)
HCT VFR BLD AUTO: 25.7 % (ref 36.5–49.3)
HGB BLD-MCNC: 7.7 G/DL (ref 12–17)
INR PPP: 1.76 (ref 0.84–1.19)
MCH RBC QN AUTO: 27.9 PG (ref 26.8–34.3)
MCHC RBC AUTO-ENTMCNC: 30 G/DL (ref 31.4–37.4)
MCV RBC AUTO: 93 FL (ref 82–98)
PLATELET # BLD AUTO: 286 THOUSANDS/UL (ref 149–390)
PMV BLD AUTO: 10.2 FL (ref 8.9–12.7)
POTASSIUM SERPL-SCNC: 4.8 MMOL/L (ref 3.5–5.3)
PROTHROMBIN TIME: 20.5 SECONDS (ref 11.6–14.5)
RBC # BLD AUTO: 2.76 MILLION/UL (ref 3.88–5.62)
SODIUM SERPL-SCNC: 135 MMOL/L (ref 136–145)
WBC # BLD AUTO: 14.17 THOUSAND/UL (ref 4.31–10.16)

## 2020-07-23 PROCEDURE — 99233 SBSQ HOSP IP/OBS HIGH 50: CPT | Performed by: INTERNAL MEDICINE

## 2020-07-23 PROCEDURE — B41DYZZ FLUOROSCOPY OF AORTA AND BILATERAL LOWER EXTREMITY ARTERIES USING OTHER CONTRAST: ICD-10-PCS | Performed by: RADIOLOGY

## 2020-07-23 PROCEDURE — 85027 COMPLETE CBC AUTOMATED: CPT | Performed by: PHYSICIAN ASSISTANT

## 2020-07-23 PROCEDURE — 99232 SBSQ HOSP IP/OBS MODERATE 35: CPT | Performed by: INTERNAL MEDICINE

## 2020-07-23 PROCEDURE — 82948 REAGENT STRIP/BLOOD GLUCOSE: CPT

## 2020-07-23 PROCEDURE — 80048 BASIC METABOLIC PNL TOTAL CA: CPT | Performed by: PHYSICIAN ASSISTANT

## 2020-07-23 PROCEDURE — 99223 1ST HOSP IP/OBS HIGH 75: CPT | Performed by: SURGERY

## 2020-07-23 PROCEDURE — 85610 PROTHROMBIN TIME: CPT | Performed by: INTERNAL MEDICINE

## 2020-07-23 PROCEDURE — 93922 UPR/L XTREMITY ART 2 LEVELS: CPT | Performed by: SURGERY

## 2020-07-23 PROCEDURE — 93925 LOWER EXTREMITY STUDY: CPT | Performed by: SURGERY

## 2020-07-23 PROCEDURE — 85730 THROMBOPLASTIN TIME PARTIAL: CPT | Performed by: PHYSICIAN ASSISTANT

## 2020-07-23 PROCEDURE — 99223 1ST HOSP IP/OBS HIGH 75: CPT | Performed by: NURSE PRACTITIONER

## 2020-07-23 RX ORDER — AMLODIPINE BESYLATE 5 MG/1
5 TABLET ORAL DAILY
Status: DISCONTINUED | OUTPATIENT
Start: 2020-07-24 | End: 2020-08-07 | Stop reason: HOSPADM

## 2020-07-23 RX ORDER — ACETYLCYSTEINE 200 MG/ML
1200 SOLUTION ORAL; RESPIRATORY (INHALATION) 2 TIMES DAILY
Status: COMPLETED | OUTPATIENT
Start: 2020-07-23 | End: 2020-07-24

## 2020-07-23 RX ORDER — HEPARIN SODIUM 1000 [USP'U]/ML
3400 INJECTION, SOLUTION INTRAVENOUS; SUBCUTANEOUS
Status: DISCONTINUED | OUTPATIENT
Start: 2020-07-23 | End: 2020-08-07

## 2020-07-23 RX ORDER — HEPARIN SODIUM 10000 [USP'U]/100ML
3-30 INJECTION, SOLUTION INTRAVENOUS
Status: DISCONTINUED | OUTPATIENT
Start: 2020-07-23 | End: 2020-08-07

## 2020-07-23 RX ORDER — HEPARIN SODIUM 1000 [USP'U]/ML
6800 INJECTION, SOLUTION INTRAVENOUS; SUBCUTANEOUS
Status: DISCONTINUED | OUTPATIENT
Start: 2020-07-23 | End: 2020-08-07

## 2020-07-23 RX ORDER — HEPARIN SODIUM 1000 [USP'U]/ML
6800 INJECTION, SOLUTION INTRAVENOUS; SUBCUTANEOUS ONCE
Status: COMPLETED | OUTPATIENT
Start: 2020-07-23 | End: 2020-07-23

## 2020-07-23 RX ORDER — SODIUM CHLORIDE 9 MG/ML
60 INJECTION, SOLUTION INTRAVENOUS CONTINUOUS
Status: DISCONTINUED | OUTPATIENT
Start: 2020-07-24 | End: 2020-07-25

## 2020-07-23 RX ADMIN — FINASTERIDE 5 MG: 5 TABLET, FILM COATED ORAL at 07:50

## 2020-07-23 RX ADMIN — VANCOMYCIN HYDROCHLORIDE 1000 MG: 1 INJECTION, SOLUTION INTRAVENOUS at 16:24

## 2020-07-23 RX ADMIN — ACETYLCYSTEINE 1200 MG: 200 SOLUTION ORAL; RESPIRATORY (INHALATION) at 17:24

## 2020-07-23 RX ADMIN — FUROSEMIDE 40 MG: 40 TABLET ORAL at 07:51

## 2020-07-23 RX ADMIN — MEMANTINE 5 MG: 5 TABLET ORAL at 07:50

## 2020-07-23 RX ADMIN — INSULIN LISPRO 1 UNITS: 100 INJECTION, SOLUTION INTRAVENOUS; SUBCUTANEOUS at 07:52

## 2020-07-23 RX ADMIN — CLOPIDOGREL BISULFATE 75 MG: 75 TABLET ORAL at 07:51

## 2020-07-23 RX ADMIN — HEPARIN SODIUM AND DEXTROSE 18 UNITS/KG/HR: 10000; 5 INJECTION INTRAVENOUS at 11:11

## 2020-07-23 RX ADMIN — INSULIN LISPRO 4 UNITS: 100 INJECTION, SOLUTION INTRAVENOUS; SUBCUTANEOUS at 16:25

## 2020-07-23 RX ADMIN — CEFAZOLIN SODIUM 1000 MG: 1 SOLUTION INTRAVENOUS at 11:11

## 2020-07-23 RX ADMIN — INSULIN LISPRO 4 UNITS: 100 INJECTION, SOLUTION INTRAVENOUS; SUBCUTANEOUS at 11:18

## 2020-07-23 RX ADMIN — TAMSULOSIN HYDROCHLORIDE 0.4 MG: 0.4 CAPSULE ORAL at 16:24

## 2020-07-23 RX ADMIN — PRIMIDONE 50 MG: 50 TABLET ORAL at 07:52

## 2020-07-23 RX ADMIN — CEFAZOLIN SODIUM 1000 MG: 1 SOLUTION INTRAVENOUS at 04:41

## 2020-07-23 RX ADMIN — HEPARIN SODIUM 6800 UNITS: 1000 INJECTION INTRAVENOUS; SUBCUTANEOUS at 11:10

## 2020-07-23 RX ADMIN — SODIUM CHLORIDE 60 ML/HR: 0.9 INJECTION, SOLUTION INTRAVENOUS at 23:30

## 2020-07-23 RX ADMIN — INSULIN LISPRO 2 UNITS: 100 INJECTION, SOLUTION INTRAVENOUS; SUBCUTANEOUS at 21:29

## 2020-07-23 RX ADMIN — ACETYLCYSTEINE 1200 MG: 200 SOLUTION ORAL; RESPIRATORY (INHALATION) at 13:21

## 2020-07-23 RX ADMIN — FLUOXETINE 40 MG: 20 CAPSULE ORAL at 07:52

## 2020-07-23 RX ADMIN — SODIUM BICARBONATE 650 MG TABLET 650 MG: at 17:24

## 2020-07-23 RX ADMIN — SODIUM BICARBONATE 650 MG TABLET 650 MG: at 07:52

## 2020-07-23 RX ADMIN — CEFAZOLIN SODIUM 1000 MG: 1 SOLUTION INTRAVENOUS at 21:25

## 2020-07-23 RX ADMIN — AMLODIPINE BESYLATE 5 MG: 5 TABLET ORAL at 07:51

## 2020-07-23 RX ADMIN — PRAVASTATIN SODIUM 40 MG: 20 TABLET ORAL at 16:24

## 2020-07-23 NOTE — ORTHOTIC NOTE
Orthotic Note            Date: 7/23/2020      Patient Name: Armando Obrien            Reason for Consult:  Patient Active Problem List   Diagnosis    Acute renal failure superimposed on stage 3 chronic kidney disease ([facility] [address] )    PVD (peripheral vascular disease)    Diabetes mellitus type 2    Essential hypertension    HLD (hyperlipidemia)    Atherosclerosis of artery of extremity with ulceration (HCC)    Transaminitis    Chronic anemia    Depression    Ulcer of left heel (HCC)    Enlarged prostate    Incomplete bladder emptying    Chronic kidney disease stage 4    Tremor    Osteomyelitis of right foot (HCC)    Other chronic osteomyelitis, multiple sites ([facility] [address] )    Acute venous stasis dermatitis    Atherosclerosis of native artery of extremity (HCC)    Diastolic dysfunction    Edema of extremities    Other complications due to other vascular device, implant, and graft    Wound, surgical, nonhealing    Hyperlipidemia    Hypertension    Osteomyelitis of left foot ([facility] [address] )    Peripheral arterial disease ([facility] [address] )    Diabetes mellitus with neuropathy ([facility] [address] )    Type II diabetes mellitus with foot ulcer ([facility] [address] )    Hyperkalemia    Parkinsonism ([facility] [address] )    Coagulopathy ([facility] [address] )    Left foot wound/infection    Atrial fibrillation     Anemia of chronic disease   [employer]care Multipodus Boot/Bauerfeind Globoped Shoe     [employer] delivered and fit a Bauerfeind Globoped Shoe onto pt's LLE while supine in bed  Sized and measured pt to a size Large for optimal fit and comfort  Pt tolerated fitting well  Educated pt on proper donning, doffing, and cleaning instructions  Pt without questions or concerns at this time  Globoped shoe doffed and left at bedside  [employer] then donned a multipodus boot onto LLE  Posterior kickstand left at neutral position  RN aware and will continue to follow up  Recommendations:  Please call [employer] ext [phone] in regards to any bracing instructions and/or adjustments  [address] [facility] Restorative Technician, BS

## 2020-07-23 NOTE — PROGRESS NOTES
Progress Note - Vascular Surgery   Karina Dejesus 80 y o  male MRN: [id number]  Unit/Bed#: -99 Encounter: [id number]    Assessment:  79 y/o M w/ PAD and chronic left foot wounds     Plan:  - Patient will benefit from LLE angiogram  Date/time TBD  - LEADS reveal RLE: LILIAN 1 2/88/52 w/ 50-75% stenosis of prox-mid SFA, >75% stenosis in proximal calf bypass graft, high grade stenosis vs occlusion of distal SFA artery/stent; LLE: LILIAN 0 78/86/32 with occlusion of the distal SFA and above knee pop artery/stent  - Continue Plavix/Statin  - Hold coumadin  Start heparin gtt bridge  INR 1 76 today  - Cultures pending  Continue Ancef/Vanc  ID following   - Left foot wound management per podiatry  - Rest of care per primary team     Subjective/Objective     Subjective: Awake, alert, no acute distress  Denies n/v/f/sob/cp  Objective:     Blood pressure 148/72, pulse 79, temperature 98 2 °F (36 8 °C), resp  rate 16, height 6' (1 829 m), weight 89 8 kg (198 lb), SpO2 99 %  ,Body mass index is 26 85 kg/m²  Intake/Output Summary (Last 24 hours) at 7/23/2020 0653  Last data filed at 7/23/2020 7581  Gross per 24 hour   Intake 680 ml   Output 600 ml   Net 80 ml       Invasive Devices     Peripheral Intravenous Line            Peripheral IV 07/22/20 Left;Proximal;Ventral (anterior) Forearm less than 1 day                Physical Exam: General appearance: alert and oriented, in no acute distress  Head: Normocephalic, without obvious abnormality, atraumatic  Lungs: clear to auscultation bilaterally  Heart: regular rate and rhythm, S1, S2 normal, no murmur, click, rub or gallop  Abdomen: soft, non-tender; bowel sounds normal; no masses,  no organomegaly  Extremities: Left sub-met 5 wound, Left plantar heel wound notedt; motor intact; CRT delayed; Palp fem arteries BL, Doppl DP/PT BL  Lab, Imaging and other studies:I have personally reviewed pertinent lab results      VTE Pharmacologic Prophylaxis: Warfarin (Coumadin)  VTE Mechanical Prophylaxis: sequential compression device

## 2020-07-23 NOTE — PLAN OF CARE
Problem: Potential for Falls  Goal: Patient will remain free of falls  Description  INTERVENTIONS:  - Assess patient frequently for physical needs  -  Identify cognitive and physical deficits and behaviors that affect risk of falls    -  [place] fall precautions as indicated by assessment   - Educate patient/family on patient safety including physical limitations  - Instruct patient to call for assistance with activity based on assessment  - Modify environment to reduce risk of injury  - Consider OT/PT consult to assist with strengthening/mobility  Outcome: Progressing     Problem: PAIN - ADULT  Goal: Verbalizes/displays adequate comfort level or baseline comfort level  Description  Interventions:  - Encourage patient to monitor pain and request assistance  - Assess pain using appropriate pain scale  - Administer analgesics based on type and severity of pain and evaluate response  - Implement non-pharmacological measures as appropriate and evaluate response  - Consider cultural and social influences on pain and pain management  - Notify physician/advanced practitioner if interventions unsuccessful or patient reports new pain  Outcome: Progressing     Problem: INFECTION - ADULT  Goal: Absence or prevention of progression during hospitalization  Description  INTERVENTIONS:  - Assess and monitor for signs and symptoms of infection  - Monitor lab/diagnostic results  - Monitor all insertion sites, i e  indwelling lines, tubes, and drains  - Monitor endotracheal if appropriate and nasal secretions for changes in amount and color  - [place] appropriate cooling/warming therapies per order  - Administer medications as ordered  - Instruct and encourage patient and family to use good hand hygiene technique  - Identify and instruct in appropriate isolation precautions for identified infection/condition  Outcome: Progressing  Goal: Absence of fever/infection during neutropenic period  Description  INTERVENTIONS:  - Monitor WBC    Outcome: Progressing     Problem: Nutrition/Hydration-ADULT  Goal: Nutrient/Hydration intake appropriate for improving, restoring or maintaining nutritional needs  Description  Monitor and assess patient's nutrition/hydration status for malnutrition  Collaborate with interdisciplinary team and initiate plan and interventions as ordered  Monitor patient's weight and dietary intake as ordered or per policy  Utilize nutrition screening tool and intervene as necessary  Determine patient's food preferences and provide high-protein, high-caloric foods as appropriate       INTERVENTIONS:  - Monitor oral intake, urinary output, labs, and treatment plans  - Assess nutrition and hydration status and recommend course of action  - Evaluate amount of meals eaten  - Assist patient with eating if necessary   - Allow adequate time for meals  - Recommend/ encourage appropriate diets, oral nutritional supplements, and vitamin/mineral supplements  - Order, calculate, and assess calorie counts as needed  - Recommend, monitor, and adjust tube feedings and TPN/PPN based on assessed needs  - Assess need for intravenous fluids  - Provide specific nutrition/hydration education as appropriate  - Include patient/family/caregiver in decisions related to nutrition  Outcome: Progressing

## 2020-07-23 NOTE — ASSESSMENT & PLAN NOTE
· Polymicrobial wound cultures positive for MRSA, Klebsiella, Enterococcus - unclear which is pathogen  · ID following - input appreciated  · On vancomycin, cefazolin  · Possible surgical intervention after left lower extremity vascular optimization

## 2020-07-23 NOTE — PROGRESS NOTES
Vancomycin IV Pharmacy-to-Dose Consultation    Elda Daniels is a 80 y o  male who is currently receiving Vancomycin IV with management by the Pharmacy Consult service  Assessment/Plan:  The patient was reviewed  Renal function is stable and no signs or symptoms of nephrotoxicity and/or infusion reactions were documented in the chart  Based on todays assessment, continue current vancomycin (day # 3) dosing of 1000mg q24hrs, with a plan for trough to be drawn at 1600 on 7/24    We will continue to follow the patients culture results and clinical progress daily      Karyna Carballo, Pharmacist

## 2020-07-23 NOTE — PROGRESS NOTES
OFFICE VISIT      Patient: Preston Cabello   : 1960 MRN: [id number]    SUBJECTIVE:  Chief Complaint   Patient presents with   • Convey Results     Lab        A 59 year old male is presents for a followup on medications and recent fasting blood work.    HISTORY OF PRESENT ILLNESS:    Benign essential hypertension:   On Trandolapril-Verapamil (Tarka), Doxazosin (For prostate as well) and Carvedilol (Coreg) twice a day. In recent lab work, all electrolytes and kidney function tests were normal. His blood is normal today at 136/72 mmHg. Is feeling well and has no new complaints.       Hypercholesterolemia:   On Crestor. In recent lab work, liver function test was normal. In previously lab work total cholesterol was 159 mg/dl and in recent lab work decreased at 121 mg/dL which was normal. Triglycerides was high in previous lab work at 227 mg/dL and now decreased to 114 mg/dL, which was normal. HDL was elevated from 36 mg/dL to 42 mg/dL, which was normal. LDL decreased from 78 mg/dL to 56 mg/dL, which was normal.    Impaired fasting glucose:    He lost 4 lb as compared to the previous office visit. Blood glucose level was 110 mg/dL in previous lab work and in recent lab it was at 104 mg/dL; below 100 mg/dL is being normal and will be considered diabetic until 125 mg/dL. In previous lab work HbA1c was 5.7 % and in recent lab work it was decreased to 5.6 %, normal being 4.5-5.6 %, not will not be considered diabatic till 6.5 % or higher.    Benign prostatic hyperplasia without lower urinary tract symptoms:   In recent lab work PSA was normal.    Tinea unguium:   Uses Efinaconazole ointment as needed to be applied for each nail daily.    Medications: Takes Viagra as needed (3 tablets a month), Norco (Hydrocodone) and Aleve as needed. Flonase and Allegra occasional. Aspirin 81 mg. Requests refill for Hydrocodone and Viagra. Takes Norco with benefits.    Lab work: In recent lab work, CMP was normal, except for mildly  Progress Note - Infectious Disease   Brook Eisenmenger 80 y o  male MRN: [id number]  Unit/Bed#: -01 Encounter: [id number]      Impression/Plan:  1  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-wound cultures polymicrobial but unclear which of the organism is a pathogen as this was not a deep operative culture  The cultures grown MRSA, Klebsiella, and Enterococcus  Fortunately the patient is not systemically ill, is hemodynamically stable, nontoxic  There is no definitive plan at this time for any surgical intervention for now               -continue vancomycin and cefazolin for now while waiting additional data  -check vancomycin trough with dose tomorrow  -pharmacy follow-up for vancomycin trough management  -vascular workup as below  -local wound care  -close podiatry follow-up  -recheck CBC with diff and BMP  -check sedimentation rate and CRP     2  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-the heel wound is been present for about 2 years and the metatarsal ulceration over the last few months   -local wound care  -close podiatry follow-up     3  Chronic kidney disease-stage IV  The renal function is quite abnormal   -dose adjusted antibiotics as above  -recheck BMP  -volume management     4  Diabetes mellitus type 2-with hyperglycemia  Very poorly controlled   -tighten diabetic control to improve leukocyte function     5  Peripheral arterial disease-patient now with continued abnormal leads  -patient for angiogram and possible PTA  -vascular follow-up    Antibiotics:  Vancomycin 3  Cefazolin 2    Subjective:  Patient has no fever, chills, sweats; no nausea, vomiting, diarrhea; no cough, shortness of breath; no pain  No new symptoms    He is sitting in a chair and appears comfortable    Objective:  Vitals:  Temp:  [97 5 °F (36 4 °C)-99 4 °F (37 4 °C)] 99 4 °F (37 4 °C)  HR:  [71-79] 71  Resp:  [16-19] 19  BP: (128-148)/(61-72) 128/61  SpO2:  [99 %-100 %] 99 %  Temp (24hrs), Av 4 °F (36 9 °C), Min:97 5 °F (36 4 elevated blood glucose level.    Additional comments:   Experiences sleep disturbance.  Denies smoking, discontinued smoking 25 years ago.    PAST MEDICAL HISTORY:  History reviewed. No pertinent past medical history.    MEDICATIONS:  Current Outpatient Medications   Medication Sig   • HYDROcodone-acetaminophen (NORCO) 5-325 MG per tablet Take 1 tablet by mouth every 6 hours as needed for Pain. TAKE 1 TO 2 TABLETS EVERY 4 TO 6 HOURS AS NEEDED FOR PAIN   • sildenafil (VIAGRA) 100 MG tablet Take 1 tablet by mouth as needed for Erectile Dysfunction.   • rosuvastatin (CRESTOR) 10 MG tablet TAKE 1 TABLET BY MOUTH  DAILY   • doxazosin (CARDURA) 1 MG tablet TAKE 1 TABLET BY MOUTH  DAILY   • trandolapril-verapamil (TARKA) 4-240 MG per tablet TAKE 1 TABLET BY MOUTH  DAILY   • carvedilol (COREG) 12.5 MG tablet TAKE 1 TABLET BY MOUTH TWO  TIMES DAILY   • econazole (SPECTAZOLE) 1 % cream Apply topically daily.   • fexofenadine (ALLEGRA ALLERGY) 180 MG tablet    • aspirin 81 MG tablet Take by mouth daily.   • diphenhydramine-acetaminophen (TYLENOL PM EXTRA STRENGTH)  MG Tab Take by mouth daily.   • naproxen sodium (ALEVE) 220 MG tablet    • scopolamine (TRANSDERM-SCOP, 1.5 MG,) 1 MG/3DAYS patch Place 1 patch onto the skin every 72 hours.   • fluticasone (FLONASE ALLERGY RELIEF) 50 MCG/ACT nasal spray    • efinaconazole (JUBLIA) 10 % topical solution Apply topically daily.     No current facility-administered medications for this visit.        ALLERGIES:  ALLERGIES:   Allergen Reactions   • Hydrochlorothiazide Other (See Comments)     hypercalcemia, see labs, 9/26/14         PAST SURGICAL HISTORY:  Past Surgical History:   Procedure Laterality Date   • Appendectomy     • Back surgery         FAMILY HISTORY:  Family History   Problem Relation Age of Onset   • Thyroid Mother    • Heart disease Mother    • Hypertension Father    • Diabetes Father    • Coronary Artery Disease Father    • Stroke Sister    • Cancer, Pancreatic  °C), Max:99 4 °F (37 4 °C)  Current: Temperature: 99 4 °F (37 4 °C)    Physical Exam:   General Appearance:  Alert, interactive, nontoxic, no acute distress  Throat: Oropharynx moist without lesions  Lungs:   Clear to auscultation bilaterally; no wheezes, rhonchi or rales; respirations unlabored   Heart:  RRR; no murmur, rub or gallop   Abdomen:   Soft, non-tender, non-distended, positive bowel sounds  Extremities: No clubbing, cyanosis  Left foot heavily dressed with a dry dressing in place  Some erythema involving left foot and leg with associated edema   Skin: No new rashes or lesions  No draining wounds noted  Labs, Imaging, & Other studies:   All pertinent labs and imaging studies were personally reviewed  Results from last 7 days   Lab Units 07/23/20  0536 07/21/20  1337   WBC Thousand/uL 14 17* 9 98   HEMOGLOBIN g/dL 7 7* 7 7*   PLATELETS Thousands/uL 286 271     Results from last 7 days   Lab Units 07/23/20  0536 07/21/20  1337   SODIUM mmol/L 135* 136   POTASSIUM mmol/L 4 8 4 2   CHLORIDE mmol/L 105 107   CO2 mmol/L 22 22   BUN mg/dL 39* 43*   CREATININE mg/dL 2 81* 2 95*   EGFR ml/min/1 73sq m 19 18   CALCIUM mg/dL 8 0* 7 4*   AST U/L  --  8   ALT U/L  --  12   ALK PHOS U/L  --  296*     Results from last 7 days   Lab Units 07/21/20  1337   BLOOD CULTURE  No Growth at 24 hrs  No Growth at 24 hrs  Maternal Aunt    • Multiple Sclerosis Niece        SOCIAL HISTORY:  Social History     Tobacco Use   Smoking Status Former Smoker   • Packs/day: 0.00   • Start date:    • Last attempt to quit:    • Years since quittin.1   Smokeless Tobacco Never Used     Social History     Substance and Sexual Activity   Alcohol Use Yes   • Alcohol/week: 4.0 standard drinks   • Types: 4 Glasses of wine per week   • Frequency: 2-3 times a week   • Drinks per session: 1 or 2       Review of Systems    A 12-point review of systems was conducted and there were no additional findings except as mentioned in the HPI.     OBJECTIVE:    Vitals:    20 0847   BP: 136/72   Pulse: (!) 56   Resp: 16   Temp: 98.9 °F (37.2 °C)   SpO2: 98%   Weight: 86.3 kg (190 lb 3.2 oz)   Height: 5' 8\" (1.727 m)   PainSc:  0         Physical Exam    Constitutional: Alert, in no acute distress.  Eyes: No discharge, normal conjunctiva, no eyelid swelling, no ptosis and the sclerae were normal. Pupils equal, round and reactive to light and accommodation, conjugate gaze and extraocular movements were intact.  ENT: Normal appearing outer ear, normal appearing nose. Examination of the tympanic membrane showed normal landmarks, normal appearing external canal. Nasal mucosa moist and pink, no nasal discharge. Normal lips. Oral mucosa pink and moist, no oral lesions.  Neck: Normal appearing, supple neck and no mass was seen. Thyroid not enlarged and no thyroid nodules. No lymphadenopathy.  Pulmonary: No respiratory distress, normal respiratory rate and effort and no accessory muscle use. Breath sounds clear to auscultation bilaterally.  Cardiovascular: Normal rate, no murmurs,, regular rhythm, normal S1 and normal S2. Edema was not present in the lower extremities.  Abdomen: Soft, nontender, nondistended, normal bowel sounds and no abdominal mass. No hepatomegaly and no splenomegaly. No umbilical hernia was seen.  Musculoskeletal: Normal gait. No  musculoskeletal erythema was seen, no joint swelling seen and no joint tenderness was elicited. No scoliosis. Normal range of motion. There was no joint instability noted. Muscle strength and tone were normal.  Psychiatric: Oriented to person, oriented to place and oriented to time. Interactive and mood/affect were appropriate. Judgment not impaired. Normal attention span. Short term memory intact.  Skin, Hair, Nails: Normal skin color and pigmentation and no rash. No foot ulcers and no skin ulcer was seen. Normal skin turgor. No clubbing or cyanosis of the fingernails.   PE summary: The blood pressure as measured today is 132/70 mmHg.      DIAGNOSTIC STUDIES:  LAB RESULTS:    Lab Services on 02/06/2020   Component Date Value Ref Range Status   • PSA, Total 02/06/2020 0.47  <4.01 ng/mL Final   • Hemoglobin A1C 02/06/2020 5.6  4.5 - 5.6 % Final   • FASTING STATUS 02/06/2020 UNKNOWN  hrs Final   • CHOLESTEROL 02/06/2020 121  <200 mg/dL Final   • CALCULATED LDL 02/06/2020 56  <130 mg/dL Final   • HDL 02/06/2020 42  >39 mg/dL Final   • TRIGLYCERIDE 02/06/2020 114  <150 mg/dL Final   • CALCULATED NON HDL 02/06/2020 79  mg/dL Final   • CHOL/HDL 02/06/2020 2.9  <4.5 Final   • Fasting Status 02/06/2020 UNKNOWN  hrs Final   • Sodium 02/06/2020 138  135 - 145 mmol/L Final   • Potassium 02/06/2020 4.5  3.4 - 5.1 mmol/L Final   • Chloride 02/06/2020 106  98 - 107 mmol/L Final   • Carbon Dioxide 02/06/2020 26  21 - 32 mmol/L Final   • Anion Gap 02/06/2020 10  10 - 20 mmol/L Final   • Glucose 02/06/2020 104* 65 - 99 mg/dL Final   • BUN 02/06/2020 14  6 - 20 mg/dL Final   • Creatinine 02/06/2020 1.03  0.67 - 1.17 mg/dL Final   • GFR Estimate,  02/06/2020 >90   Final   • GFR Estimate, Non  02/06/2020 79   Final   • BUN/Creatinine Ratio 02/06/2020 14  7 - 25 Final   • CALCIUM 02/06/2020 9.5  8.4 - 10.2 mg/dL Final   • TOTAL BILIRUBIN 02/06/2020 0.9  0.2 - 1.0 mg/dL Final   • AST/SGOT 02/06/2020 22   <38 Units/L Final   • ALT/SGPT 02/06/2020 30  <64 Units/L Final   • ALK PHOSPHATASE 02/06/2020 62  45 - 117 Units/L Final   • TOTAL PROTEIN 02/06/2020 7.7  6.4 - 8.2 g/dL Final   • Albumin 02/06/2020 4.6  3.6 - 5.1 g/dL Final   • GLOBULIN 02/06/2020 3.1  2.0 - 4.0 g/dL Final   • A/G Ratio, Serum 02/06/2020 1.5  1.0 - 2.4 Final         ASSESSMENT AND PLAN:  This is a 59 year old male who presents with :    1. Benign essential hypertension    2. Hypercholesterolemia    3. Impaired fasting glucose    4. Benign prostatic hyperplasia without lower urinary tract symptoms    5. Tinea unguium          Orders Placed This Encounter   • URINALYSIS & REFLEX MICRO WITH CULTURE IF INDICATED   • HYDROcodone-acetaminophen (NORCO) 5-325 MG per tablet   • efinaconazole (JUBLIA) 10 % topical solution   • DISCONTD: sildenafil (VIAGRA) 100 MG tablet   • sildenafil (VIAGRA) 100 MG tablet       Plan:    1. Benign essential hypertension:   Medications reviewed. Is compliant and has no side effects.   Continue current medication.  Reviewed and discussed previous reports; stable and at goal.  Lab ordered. Refer to orders.    2. Hypercholesterolemia:  Same as above.  Advised to monitor blood work twice a year due to side effects of current medications.    3. Impaired fasting glucose:  Reviewed and discussed previous reports; HbA1c is normal at 5.6% with blood glucose level at 104 mg/dL.  He lost 4 lb as compared to the previous office visit. Continue to stay active and continue current diet.      4. Benign prostatic hyperplasia without lower urinary tract symptoms:    Reviewed and discussed previous reports; PSA is stable and at goal.    5. Tinea unguium:   Refill for Jublia ointment provided. Refer to orders.    Refill for Viagra and Hydrocodone  provided. Refer to orders.  Informed Viagra can cause low blood pressure.  Discussed healthy sleep habits.    Followup in 6 months or sooner if needed.     Refer to orders.  Medical compliance with  plan discussed and risks of non-compliance reviewed.  Patient education completed on disease process, etiology & prognosis.  Proper usage and side effects of medications reviewed & discussed.  Patient understands and agrees with the plan.  Return to clinic as clinically indicated as discussed with patient who verbalized understanding of the plan and is in agreement with the plan.    Entered by Dr. Dennys Cisneros acting as scribe for Dr. Mukund Saleh DO

## 2020-07-23 NOTE — CONSULTS
Consultation - Nephrology   Mayank Denis 80 y o  male MRN: [id number]  Unit/Bed#: -01 Encounter: [id number]    ASSESSMENT and PLAN:  Chronic kidney disease IV :  Suspect secondary to diabetic nephropathy, hypertensive nephrosclerosis, arterial nephrosclerosis, and age-related nephron loss   -after review medical records baseline creatinine between high two range to mid three range dating back to 2017 with EGFR 16-19  -patient follows with Dr Nandini Holm and was last seen via telemedicine 4/28/2020  -patient not on Ace/Arb/NSAIDs  -patient renal function is stable with most recent creatinine 2 81 with EGFR 19  -patient can be on a higher potassium and current potassium is 4 8  -in preparation for arteriogram tomorrow  Recommendation for kidney protection:  -will start IV fluids 60 cc an hour starting at midnight  -will start Mucomyst 1200 mg b i d   For four doses  -will hold Lasix in a m   -will check BMP in a m   -as long as creatinine stable in a m  okay for arteriogram tomorrow    Blood pressure/hypertension:  BP acceptable  -maximize hemodynamics to maintain MAP >65  -avoid hypotension or fluctuations in blood pressure  -will continue to trend    H&H/anemia:  Likely of Chronic Kidney Disease  -current hemoglobin 7 7 and appears stable  -will check iron stores  -transfuse if hemoglobin less than 7 0    Acid-base:  Current bicarb 22  -on oral sodium bicarbonate tablets 650 mg 2 times daily  -will continue trend    Diabetes:  Per primary team  -need adequate blood sugar control  -last A1c 7 1 on 07/22/2020    Bone mineral disease Chronic Kidney Disease:  -will check pth and phos in am    Volume status:  - Clinically patient appears to be euvolemic    PAD:  With nonhealing left foot ulcer  -currently on IV antibiotics per Infectious Disease  -patient for arteriogram with CO2-tentative in a m   -as above-please review recommendations  -vascular following        HISTORY OF PRESENT ILLNESS:  Requesting Physician: Santos Rivas MD  Reason for Consult: optimization to reduce incidence of acute kidney injury in the setting of Chronic Kidney Disease IV       Agustin Mcdonough is a 80 y o  male who has past medical history of Chronic Kidney Disease  IV, hypertension, hyperlipidemia, diabetes II, atrial fibrillation on anticoagulation, Parkinson's disease, anemia Chronic Kidney Disease, and peripheral artery disease with prior femoral artery bypass who presented to the ER with worsening symptoms of a nonhealing wound  There is concern for left foot osteomyelitis and currently on antibiotics per Infectious Disease with Ancef and vancomycin  Patient requires IR arteriogram for further evaluation and treatment options  A renal consultation is requested today for assistance in the management for optimization to reduce incidence of acute kidney injury in the setting of Chronic Kidney Disease IV  On discussion, patient feels tired  Denies chest pain, shortness of breath, dizziness, lightheadedness, nausea, vomiting, urinary issues, fevers, chills  He is having some left foot pain          PAST MEDICAL HISTORY:  Past Medical History:   Diagnosis Date    A-fib [facility])     [facility]'s disease [facility] [address] [facility]     Depression     Diabetes mellitus [facility] [address] [facility]     Anderson catheter in place     History of atrial fibrillation     History of chronic kidney disease     History of peripheral vascular disease     Hyperlipidemia     Hypertension     Kidney disease     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[facility] [address] [facility]     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[facility] [address] [facility]     Memory loss     Osteomyelitis of right foot ([facility] [address] [facility]     Renal disorder        PAST SURGICAL HISTORY:  Past Surgical History:   Procedure Laterality Date    APPENDECTOMY  1945    FEMORAL ARTERY - TIBIAL ARTERY BYPASS GRAFT Right 01/08/2014    R SFA- PT w/ nonrev GSV, PreVena VAC- [facility]    FEMORAL ARTERY - TIBIAL ARTERY BYPASS GRAFT Left 06/04/2015    L SFA- PT w/ Cryovein- Ivan/ [facility]    FOOT SURGERY  06/14/2013    I&D with vac  WOUND DEBRIDEMENT Right 03/07/2014    R thigh wound washout, VAC- [facility]       ALLERGIES:  Allergies   Allergen Reactions    Metoprolol Bradycardia    Unasyn [Ampicillin-Sulbactam Sodium] Rash       SOCIAL HISTORY:  Social History     Substance and Sexual Activity   Alcohol Use Yes    Comment: one beer on his birthday     Social History     Substance and Sexual Activity   Drug Use No     Social History     Tobacco Use   Smoking Status Never Smoker   Smokeless Tobacco Never Used       FAMILY HISTORY:  Family History   Problem Relation Age of Onset    Diabetes Mother     Heart failure Mother     Hypertension Mother     Cancer Father     Hypertension Sister     Hyperthyroidism Sister     Stroke Brother     Diabetes Maternal Grandmother     Clotting disorder Maternal Grandmother     No Known Problems Maternal Grandfather     No Known Problems Paternal Grandmother     No Known Problems Paternal Grandfather     Diabetes Brother     Arthritis Brother     Glaucoma Brother     Cataracts Brother     No Known Problems Son     Hyperthyroidism Daughter     Kidney disease Daughter     COPD Daughter     Diabetes Brother     Cancer Brother        MEDICATIONS:    Current Facility-Administered Medications:     acetaminophen (TYLENOL) tablet 650 mg, 650 mg, Oral, Q6H PRN, Esteban Escamilla MD    [START ON 7/24/2020] amLODIPine (NORVASC) tablet 5 mg, 5 mg, Oral, Daily, COLLEEN Deleon    ceFAZolin (ANCEF) IVPB (premix) 1,000 mg 50 mL, 1,000 mg, Intravenous, Q8H, Nicky Maza MD, Last Rate: 100 mL/hr at 07/23/20 1111, 1,000 mg at 07/23/20 1111    clopidogrel (PLAVIX) tablet 75 mg, 75 mg, Oral, Daily, Esteban Escamilla MD, 75 mg at 07/23/20 0751    finasteride (PROSCAR) tablet 5 mg, 5 mg, Oral, Daily, Esteban Escamilla MD, 5 mg at 07/23/20 0750    FLUoxetine (PROzac) capsule 40 mg, 40 mg, Oral, Daily, Esteban Escamilla MD, 40 mg at 07/23/20 0752    furosemide (LASIX) tablet 40 mg, 40 mg, Oral, Daily, Esteban Escamilla MD, 40 mg at 07/23/20 0751    heparin (porcine) 25,000 units in 250 mL infusion (premix), 3-30 Units/kg/hr (Order-Specific), Intravenous, Titrated, Rachel Dempsey PA-C, Last Rate: 15 3 mL/hr at 07/23/20 1111, 18 Units/kg/hr at 07/23/20 1111    heparin (porcine) injection 3,400 Units, 3,400 Units, Intravenous, Q1H PRN, Rachel Dempsey PA-C    heparin (porcine) injection 6,800 Units, 6,800 Units, Intravenous, Q1H PRN, Rachel Dempsey PA-C    insulin lispro (HumaLOG) 100 units/mL subcutaneous injection 1-6 Units, 1-6 Units, Subcutaneous, 4x Daily (AC & HS), 4 Units at 07/23/20 1118 **AND** Fingerstick Glucose (POCT), , , 4x Daily AC and at bedtime, Venita Tirado MD    [facility]) tablet 5 mg, 5 mg, Oral, Daily, Venita Tirado MD, 5 mg at 07/23/20 0750    ondansetron (ZOFRAN) injection 4 mg, 4 mg, Intravenous, Q4H PRN, Venita Tirado MD    Patiromer Sorbitex Calcium PACK 8 4 g, 8 4 g, Oral, Once per day on Mon Thu, Venita Tirado MD    pravastatin (PRAVACHOL) tablet 40 mg, 40 mg, Oral, Daily With Bonnie Gonzalez MD, 40 mg at 07/22/20 1701    primidone (MYSOLINE) tablet 50 mg, 50 mg, Oral, Daily, Venita Tirado MD, 50 mg at 07/23/20 5644    sodium bicarbonate tablet 650 mg, 650 mg, Oral, BID after meals, Venita Tirado MD, 650 mg at 07/23/20 3314    tamsulosin (FLOMAX) capsule 0 4 mg, 0 4 mg, Oral, Daily With Bonnie Gonzalez MD, 0 4 mg at 07/22/20 1701    vancomycin (VANCOCIN) IVPB (premix) 1,000 mg 200 mL, 10 mg/kg, Intravenous, Q24H, Venita Tirado MD, Last Rate: 200 mL/hr at 07/22/20 1710, 1,000 mg at 07/22/20 1710      REVIEW OF SYSTEMS:  A complete 10 point review of systems was performed and found to be negative unless otherwise noted below or in the HPI  General: No fevers, chills, weakness  Cardiovascular:  Denies chest pain, palpitations  Respiratory:  Denies cough, sputum production, shortness of breath    Gastrointestinal:  Denies nausea, vomiting, abdominal pain, diarrhea or constipation  Genitourinary: No dysuria, burning, hematuria or increased frequency or difficulty with stream     PHYSICAL EXAM:  Current Weight: Weight - Scale: 89 8 kg (198 lb)  First Weight: Weight - Scale: 89 8 kg (198 lb)  Vitals:    07/22/20 0724 07/22/20 1505 07/22/20 2232 07/23/20 0713   BP: 115/53 140/67 148/72 128/61   BP Location: Left arm      Pulse: 70 72 79 71   Resp: 16 18 16 19   Temp: 98 3 °F (36 8 °C) 97 5 °F (36 4 °C) 98 2 °F (36 8 °C) 99 4 °F (37 4 °C)   TempSrc: Oral      SpO2: 100% 100% 99% 99%   Weight:       Height:           Intake/Output Summary (Last 24 hours) at 7/23/2020 1146  Last data filed at 7/23/2020 0800  Gross per 24 hour   Intake 680 ml   Output 500 ml   Net 180 ml     General: conscious, coherent, cooperative, not in acute distress, OOB  Skin: no rash, warm and dry  Eyes: pale conjunctivae, anicteric sclerae  ENT: moist lips and mucous membranes  Neck: supple, no JVD noted  Chest: clear breath sounds without crackles, rhonchi, wheezes  CVS: normal rate, regular rhythm without rub  Abdomen: soft, non-tender, non-distended, normoactive bowel sounds  Extremities: no significant edema of both legs    Left foot wrapped in boot, slight erythema noted on left catheterization  Neuro: awake, alert, oriented, no gross deficits  Psych: appropriate affect          Lab Results:   Results from last 7 days   Lab Units 07/23/20  0536 07/21/20  1337   WBC Thousand/uL 14 17* 9 98   HEMOGLOBIN g/dL 7 7* 7 7*   HEMATOCRIT % 25 7* 26 0*   PLATELETS Thousands/uL 286 271   SODIUM mmol/L 135* 136   POTASSIUM mmol/L 4 8 4 2   CHLORIDE mmol/L 105 107   CO2 mmol/L 22 22   BUN mg/dL 39* 43*   CREATININE mg/dL 2 81* 2 95*   CALCIUM mg/dL 8 0* 7 4*   ALK PHOS U/L  --  296*   ALT U/L  --  12   AST U/L  --  8       Other Studies:

## 2020-07-24 ENCOUNTER — APPOINTMENT (INPATIENT)
Dept: RADIOLOGY | Facility: HOSPITAL | Age: 85
DRG: 540 | End: 2020-07-24
Payer: MEDICARE

## 2020-07-24 LAB
ANION GAP SERPL CALCULATED.3IONS-SCNC: 8 MMOL/L (ref 4–13)
APTT PPP: 48 SECONDS (ref 23–37)
APTT PPP: >210 SECONDS (ref 23–37)
BUN SERPL-MCNC: 47 MG/DL (ref 5–25)
CALCIUM SERPL-MCNC: 8 MG/DL (ref 8.3–10.1)
CHLORIDE SERPL-SCNC: 103 MMOL/L (ref 100–108)
CO2 SERPL-SCNC: 22 MMOL/L (ref 21–32)
CREAT SERPL-MCNC: 3.05 MG/DL (ref 0.6–1.3)
CRP SERPL QL: 110 MG/L
ERYTHROCYTE [DISTWIDTH] IN BLOOD BY AUTOMATED COUNT: 14.6 % (ref 11.6–15.1)
ERYTHROCYTE [SEDIMENTATION RATE] IN BLOOD: 49 MM/HOUR (ref 0–10)
FERRITIN SERPL-MCNC: 189 NG/ML (ref 8–388)
GFR SERPL CREATININE-BSD FRML MDRD: 17 ML/MIN/1.73SQ M
GLUCOSE SERPL-MCNC: 116 MG/DL (ref 65–140)
GLUCOSE SERPL-MCNC: 148 MG/DL (ref 65–140)
GLUCOSE SERPL-MCNC: 153 MG/DL (ref 65–140)
GLUCOSE SERPL-MCNC: 163 MG/DL (ref 65–140)
GLUCOSE SERPL-MCNC: 312 MG/DL (ref 65–140)
HCT VFR BLD AUTO: 24.1 % (ref 36.5–49.3)
HGB BLD-MCNC: 7.3 G/DL (ref 12–17)
INR PPP: 1.73 (ref 0.84–1.19)
IRON SATN MFR SERPL: 17 %
IRON SERPL-MCNC: 29 UG/DL (ref 65–175)
MAGNESIUM SERPL-MCNC: 2.1 MG/DL (ref 1.6–2.6)
MCH RBC QN AUTO: 27.8 PG (ref 26.8–34.3)
MCHC RBC AUTO-ENTMCNC: 30.3 G/DL (ref 31.4–37.4)
MCV RBC AUTO: 92 FL (ref 82–98)
PHOSPHATE SERPL-MCNC: 3.8 MG/DL (ref 2.3–4.1)
PLATELET # BLD AUTO: 317 THOUSANDS/UL (ref 149–390)
PMV BLD AUTO: 10.9 FL (ref 8.9–12.7)
POTASSIUM SERPL-SCNC: 4.5 MMOL/L (ref 3.5–5.3)
PROTHROMBIN TIME: 20.2 SECONDS (ref 11.6–14.5)
PTH-INTACT SERPL-MCNC: 498.8 PG/ML (ref 18.4–80.1)
RBC # BLD AUTO: 2.63 MILLION/UL (ref 3.88–5.62)
SODIUM SERPL-SCNC: 133 MMOL/L (ref 136–145)
TIBC SERPL-MCNC: 169 UG/DL (ref 250–450)
WBC # BLD AUTO: 10.08 THOUSAND/UL (ref 4.31–10.16)

## 2020-07-24 PROCEDURE — 36247 INS CATH ABD/L-EXT ART 3RD: CPT | Performed by: RADIOLOGY

## 2020-07-24 PROCEDURE — 75625 CONTRAST EXAM ABDOMINL AORTA: CPT

## 2020-07-24 PROCEDURE — 76937 US GUIDE VASCULAR ACCESS: CPT | Performed by: RADIOLOGY

## 2020-07-24 PROCEDURE — 80048 BASIC METABOLIC PNL TOTAL CA: CPT | Performed by: PHYSICIAN ASSISTANT

## 2020-07-24 PROCEDURE — 99232 SBSQ HOSP IP/OBS MODERATE 35: CPT | Performed by: INTERNAL MEDICINE

## 2020-07-24 PROCEDURE — 76937 US GUIDE VASCULAR ACCESS: CPT

## 2020-07-24 PROCEDURE — 85027 COMPLETE CBC AUTOMATED: CPT | Performed by: PHYSICIAN ASSISTANT

## 2020-07-24 PROCEDURE — C1769 GUIDE WIRE: HCPCS

## 2020-07-24 PROCEDURE — C1887 CATHETER, GUIDING: HCPCS

## 2020-07-24 PROCEDURE — 83970 ASSAY OF PARATHORMONE: CPT | Performed by: NURSE PRACTITIONER

## 2020-07-24 PROCEDURE — 99232 SBSQ HOSP IP/OBS MODERATE 35: CPT | Performed by: SURGERY

## 2020-07-24 PROCEDURE — 99153 MOD SED SAME PHYS/QHP EA: CPT

## 2020-07-24 PROCEDURE — 85730 THROMBOPLASTIN TIME PARTIAL: CPT | Performed by: INTERNAL MEDICINE

## 2020-07-24 PROCEDURE — 99233 SBSQ HOSP IP/OBS HIGH 50: CPT | Performed by: INTERNAL MEDICINE

## 2020-07-24 PROCEDURE — 83735 ASSAY OF MAGNESIUM: CPT | Performed by: NURSE PRACTITIONER

## 2020-07-24 PROCEDURE — 86140 C-REACTIVE PROTEIN: CPT | Performed by: INTERNAL MEDICINE

## 2020-07-24 PROCEDURE — 75710 ARTERY X-RAYS ARM/LEG: CPT | Performed by: RADIOLOGY

## 2020-07-24 PROCEDURE — 85652 RBC SED RATE AUTOMATED: CPT | Performed by: INTERNAL MEDICINE

## 2020-07-24 PROCEDURE — 30233N1 TRANSFUSION OF NONAUTOLOGOUS RED BLOOD CELLS INTO PERIPHERAL VEIN, PERCUTANEOUS APPROACH: ICD-10-PCS | Performed by: INTERNAL MEDICINE

## 2020-07-24 PROCEDURE — 99152 MOD SED SAME PHYS/QHP 5/>YRS: CPT

## 2020-07-24 PROCEDURE — 99233 SBSQ HOSP IP/OBS HIGH 50: CPT | Performed by: NURSE PRACTITIONER

## 2020-07-24 PROCEDURE — 84100 ASSAY OF PHOSPHORUS: CPT | Performed by: NURSE PRACTITIONER

## 2020-07-24 PROCEDURE — 83550 IRON BINDING TEST: CPT | Performed by: NURSE PRACTITIONER

## 2020-07-24 PROCEDURE — C1760 CLOSURE DEV, VASC: HCPCS

## 2020-07-24 PROCEDURE — 99152 MOD SED SAME PHYS/QHP 5/>YRS: CPT | Performed by: RADIOLOGY

## 2020-07-24 PROCEDURE — 83540 ASSAY OF IRON: CPT | Performed by: NURSE PRACTITIONER

## 2020-07-24 PROCEDURE — 85610 PROTHROMBIN TIME: CPT | Performed by: PHYSICIAN ASSISTANT

## 2020-07-24 PROCEDURE — 82728 ASSAY OF FERRITIN: CPT | Performed by: NURSE PRACTITIONER

## 2020-07-24 PROCEDURE — P9016 RBC LEUKOCYTES REDUCED: HCPCS

## 2020-07-24 PROCEDURE — 36140 INTRO NDL ICATH UPR/LXTR ART: CPT

## 2020-07-24 PROCEDURE — C1894 INTRO/SHEATH, NON-LASER: HCPCS

## 2020-07-24 PROCEDURE — 82948 REAGENT STRIP/BLOOD GLUCOSE: CPT

## 2020-07-24 RX ORDER — MIDAZOLAM HYDROCHLORIDE 2 MG/2ML
INJECTION, SOLUTION INTRAMUSCULAR; INTRAVENOUS CODE/TRAUMA/SEDATION MEDICATION
Status: COMPLETED | OUTPATIENT
Start: 2020-07-24 | End: 2020-07-24

## 2020-07-24 RX ORDER — FERROUS SULFATE 325(65) MG
325 TABLET ORAL
Status: DISCONTINUED | OUTPATIENT
Start: 2020-07-25 | End: 2020-08-07 | Stop reason: HOSPADM

## 2020-07-24 RX ORDER — CALCITRIOL 0.25 UG/1
0.25 CAPSULE, LIQUID FILLED ORAL 3 TIMES WEEKLY
Status: DISCONTINUED | OUTPATIENT
Start: 2020-07-24 | End: 2020-08-07 | Stop reason: HOSPADM

## 2020-07-24 RX ORDER — FENTANYL CITRATE 50 UG/ML
INJECTION, SOLUTION INTRAMUSCULAR; INTRAVENOUS CODE/TRAUMA/SEDATION MEDICATION
Status: COMPLETED | OUTPATIENT
Start: 2020-07-24 | End: 2020-07-24

## 2020-07-24 RX ADMIN — CEFAZOLIN SODIUM 1000 MG: 1 SOLUTION INTRAVENOUS at 11:30

## 2020-07-24 RX ADMIN — SODIUM BICARBONATE 650 MG TABLET 650 MG: at 09:02

## 2020-07-24 RX ADMIN — PRAVASTATIN SODIUM 40 MG: 20 TABLET ORAL at 17:11

## 2020-07-24 RX ADMIN — SODIUM CHLORIDE 60 ML/HR: 0.9 INJECTION, SOLUTION INTRAVENOUS at 19:27

## 2020-07-24 RX ADMIN — MIDAZOLAM 1 MG: 1 INJECTION INTRAMUSCULAR; INTRAVENOUS at 14:08

## 2020-07-24 RX ADMIN — ACETYLCYSTEINE 1200 MG: 200 SOLUTION ORAL; RESPIRATORY (INHALATION) at 08:56

## 2020-07-24 RX ADMIN — CEFAZOLIN SODIUM 1000 MG: 1 SOLUTION INTRAVENOUS at 04:20

## 2020-07-24 RX ADMIN — PRIMIDONE 50 MG: 50 TABLET ORAL at 09:01

## 2020-07-24 RX ADMIN — SODIUM CHLORIDE 60 ML/HR: 0.9 INJECTION, SOLUTION INTRAVENOUS at 11:10

## 2020-07-24 RX ADMIN — FLUOXETINE 40 MG: 20 CAPSULE ORAL at 09:02

## 2020-07-24 RX ADMIN — CEFAZOLIN SODIUM 1000 MG: 1 SOLUTION INTRAVENOUS at 21:03

## 2020-07-24 RX ADMIN — MIDAZOLAM 0.5 MG: 1 INJECTION INTRAMUSCULAR; INTRAVENOUS at 14:20

## 2020-07-24 RX ADMIN — FINASTERIDE 5 MG: 5 TABLET, FILM COATED ORAL at 08:56

## 2020-07-24 RX ADMIN — INSULIN LISPRO 1 UNITS: 100 INJECTION, SOLUTION INTRAVENOUS; SUBCUTANEOUS at 06:40

## 2020-07-24 RX ADMIN — IODIXANOL 30 ML: 320 INJECTION, SOLUTION INTRAVASCULAR at 15:10

## 2020-07-24 RX ADMIN — HEPARIN SODIUM AND DEXTROSE 17 UNITS/KG/HR: 10000; 5 INJECTION INTRAVENOUS at 04:40

## 2020-07-24 RX ADMIN — FENTANYL CITRATE 50 MCG: 50 INJECTION, SOLUTION INTRAMUSCULAR; INTRAVENOUS at 14:56

## 2020-07-24 RX ADMIN — MIDAZOLAM 0.5 MG: 1 INJECTION INTRAMUSCULAR; INTRAVENOUS at 13:40

## 2020-07-24 RX ADMIN — ACETYLCYSTEINE 1200 MG: 200 SOLUTION ORAL; RESPIRATORY (INHALATION) at 17:15

## 2020-07-24 RX ADMIN — HEPARIN SODIUM 3400 UNITS: 1000 INJECTION INTRAVENOUS; SUBCUTANEOUS at 02:39

## 2020-07-24 RX ADMIN — SODIUM BICARBONATE 650 MG TABLET 650 MG: at 17:14

## 2020-07-24 RX ADMIN — FENTANYL CITRATE 50 MCG: 50 INJECTION, SOLUTION INTRAMUSCULAR; INTRAVENOUS at 14:43

## 2020-07-24 RX ADMIN — CLOPIDOGREL BISULFATE 75 MG: 75 TABLET ORAL at 08:56

## 2020-07-24 RX ADMIN — VANCOMYCIN HYDROCHLORIDE 1000 MG: 1 INJECTION, SOLUTION INTRAVENOUS at 17:11

## 2020-07-24 RX ADMIN — CALCITRIOL 0.25 MCG: 0.25 CAPSULE, LIQUID FILLED ORAL at 17:14

## 2020-07-24 RX ADMIN — FENTANYL CITRATE 50 MCG: 50 INJECTION, SOLUTION INTRAMUSCULAR; INTRAVENOUS at 13:41

## 2020-07-24 RX ADMIN — MEMANTINE 5 MG: 5 TABLET ORAL at 08:59

## 2020-07-24 RX ADMIN — FENTANYL CITRATE 50 MCG: 50 INJECTION, SOLUTION INTRAMUSCULAR; INTRAVENOUS at 14:08

## 2020-07-24 RX ADMIN — TAMSULOSIN HYDROCHLORIDE 0.4 MG: 0.4 CAPSULE ORAL at 17:11

## 2020-07-24 RX ADMIN — INSULIN LISPRO 5 UNITS: 100 INJECTION, SOLUTION INTRAVENOUS; SUBCUTANEOUS at 21:05

## 2020-07-24 RX ADMIN — AMLODIPINE BESYLATE 5 MG: 5 TABLET ORAL at 08:59

## 2020-07-24 NOTE — PROGRESS NOTES
Podiatry - Progress Note  Patient: Walt Rivera 80 y o  male   MRN: [id number]  PCP: Otis Ozuna MD  Unit/Bed#: MS William-21 Encounter: [id number]  Date Of Visit: 20    ASSESSMENT:    Walt Rivera is a 80 y o  male with:    1  Left foot heel wound - Gacria 3 - POA  2  Left foot submetatarsal 5 wound - Garcia 1 - POA  3  CKD Stage IV  4  DM2  5  PVD      PLAN:    -Pt not a good surgical candidate due to known PAD and pt is not cardiac stable for any intervention at this time    -Continue local wound care with Dermagran and DSD  -LEADS:       -R: 1 2,88,52  50-75% stenosis of prox-mid SFA, >75 % stenosis in proximal calf bypass graft, sukhdev grade stenosis vs occlusion of distal SFA artery/stent  -L: 7 46,13,74 with occlusion of distal SFA and above knee popliteal artery/stent       -LILIAN b/l could be unreliable secondary to multiple collaterals noted  -LLE angiogram with possible intervention with IR today   -Pt currently on ancef and vancomycin  -VSS, afebrile, non toxic in appearance, no leukocytosis noted  -Rest of medical care per primary team   -Plan will be discussed with my attending  SUBJECTIVE:     The patient was seen, evaluated, and assessed at bedside today  The patient was awake, alert, and in no acute distress  No acute events overnight  The patient reports he is doing ok  Patient denies N/V/F/chills/SOB/CP  OBJECTIVE:     Vitals:   /50   Pulse 71   Temp 97 5 °F (36 4 °C) (Oral)   Resp 18   Ht 6' (1 829 m)   Wt 89 8 kg (198 lb)   SpO2 99%   BMI 26 85 kg/m²     Temp (24hrs), Av 8 °F (36 6 °C), Min:97 5 °F (36 4 °C), Max:98 2 °F (36 8 °C)      Physical Exam:     General:  Alert, cooperative, and in no distress  Lower extremity exam:  Cardiovascular status at baseline  Neurological status at baseline  Musculoskeletal status at baseline  No calf tenderness noted     Lower extremity wound(s) as noted below:    Wound #: 1 Small sinus tract ulcer   Location: left plantar heel  Length 0 6cm: Width 0 5cm: Depth 2 0cm:   Deepest Tissue Noted in Base: periosteum   Probe to Bone: No  Peripheral Skin Description: rolled edge and calloused  Superficial abrasion with fibrous area throughout  Granulation: 10% Fibrotic Tissue: 90% Necrotic Tissue: 0%   Drainage Amount: mild serous  Signs of Infection: Yes, erythema, edema, and warmth        Wound #: 2  Location: left submetatarsal 5   Length 1cm: Width 0 5cm: Depth 0 1cm:   Deepest Tissue Noted in Base: subcutaneous  Probe to Bone: No  Peripheral Skin Description: Attached, rolled edge and caloused  Granulation: 4% Fibrotic Tissue: 60% Necrotic Tissue: 0%   Drainage Amount: minimal  Signs of Infection: No      Clinical Images 07/24/20: Additional Data:     Labs:    Results from last 7 days   Lab Units 07/24/20  0451  07/21/20  1337   WBC Thousand/uL 10 08   < > 9 98   HEMOGLOBIN g/dL 7 3*   < > 7 7*   HEMATOCRIT % 24 1*   < > 26 0*   PLATELETS Thousands/uL 317   < > 271   NEUTROS PCT %  --   --  72   LYMPHS PCT %  --   --  14   MONOS PCT %  --   --  11   EOS PCT %  --   --  2    < > = values in this interval not displayed  Results from last 7 days   Lab Units 07/24/20  0451  07/21/20  1337   POTASSIUM mmol/L 4 5   < > 4 2   CHLORIDE mmol/L 103   < > 107   CO2 mmol/L 22   < > 22   BUN mg/dL 47*   < > 43*   CREATININE mg/dL 3 05*   < > 2 95*   CALCIUM mg/dL 8 0*   < > 7 4*   ALK PHOS U/L  --   --  296*   ALT U/L  --   --  12   AST U/L  --   --  8    < > = values in this interval not displayed  Results from last 7 days   Lab Units 07/24/20  0451   INR  1 73*       * I Have Reviewed All Lab Data Listed Above  Recent Cultures (last 7 days):     Results from last 7 days   Lab Units 07/21/20  1337   BLOOD CULTURE  No Growth at 48 hrs  No Growth at 48 hrs             Imaging: I have personally reviewed pertinent films in PACS  EKG, Pathology, and Other Studies: I have personally reviewed pertinent reports  ** Please Note: Portions of the record may have been created with voice recognition software  Occasional wrong word or "sound a like" substitutions may have occurred due to the inherent limitations of voice recognition software  Read the chart carefully and recognize, using context, where substitutions have occurred   **

## 2020-07-24 NOTE — PROGRESS NOTES
Progress Note - Vascular Surgery   Dameon Rosado 80 y o  male MRN: [id number]  Unit/Bed#: -01 Encounter: [id number]    Assessment:  79 y/o M w/ PAD and chronic left foot wounds     Plan:  - LLE angiogram and possible Intervention with IR today  - NPO status confirmed  - Heparin gtt  - Continue Plavix/Statin  - Appreciate Nephro recs  - ID following for antibiotic guidance  - Local wound care LLE per podiatry  - Rest of care per primary team    Subjective/Objective     Subjective: Awake, alert, no acute distress  NPO since midnight  Denies n/v/f/sob/cp  Objective:     Blood pressure 137/74, pulse 75, temperature 98 2 °F (36 8 °C), resp  rate 18, height 6' (1 829 m), weight 89 8 kg (198 lb), SpO2 100 %  ,Body mass index is 26 85 kg/m²  Intake/Output Summary (Last 24 hours) at 7/24/2020 3575  Last data filed at 7/24/2020 0515  Gross per 24 hour   Intake 927 ml   Output 650 ml   Net 277 ml       Invasive Devices     Peripheral Intravenous Line            Peripheral IV 07/22/20 Left;Proximal;Ventral (anterior) Forearm 1 day    Peripheral IV 07/23/20 Distal;Left;Ventral (anterior) Forearm less than 1 day                Physical Exam: General appearance: alert and oriented, in no acute distress  Head: Normocephalic, without obvious abnormality, atraumatic  Lungs: clear to auscultation bilaterally  Heart: regular rate and rhythm, S1, S2 normal, no murmur, click, rub or gallop  Abdomen: soft, non-tender; bowel sounds normal; no masses,  no organomegaly  Extremities: palp fem arteries BL, doppl DP/PT BL; lLLE dressings CDI with boot in place; gross sensation diminished, protective sensation absent; motor intact    Lab, Imaging and other studies:I have personally reviewed pertinent lab results      VTE Pharmacologic Prophylaxis: Heparin  VTE Mechanical Prophylaxis: sequential compression device

## 2020-07-24 NOTE — ASSESSMENT & PLAN NOTE
· Baseline creatinine in the high 2s to low 3s  · Nephrology consulted in view of requirement for angiogram  · On Mucomyst, IV fluids starting overnight  · Lasix held   · Avoid hypotension, nephrotoxic medications

## 2020-07-24 NOTE — OCCUPATIONAL THERAPY NOTE
Occupational Therapy         Patient Name: Damir Patel  BRANDON Date: 7/24/2020     Pt off floor at IR - will defer    Jennifer Raza, OT

## 2020-07-24 NOTE — ASSESSMENT & PLAN NOTE
· LEADS - RLE - LILIAN 1 2/88/52, 50-75% stenosis of prox-mid SFA, >75% stenosis in proximal calf bypass graft, high grade stenosis vs occlusion of distal SFA artery/stent; LLE - LILIAN 0 78/86/32 with occlusion of the distal SFA and above knee popliteal artery/stent    · Left lower extremity angiogram plan per vascular  · Nephrology consulted in view of CKD 4  · Coumadin held and begun on heparin drip per vascular  · Continue Plavix, statin

## 2020-07-24 NOTE — PROGRESS NOTES
Progress note- Nephrology   Kurt Ibrahim 80 y o  male MRN: [id number]  Unit/Bed#: -01 Encounter: [id number]    ASSESSMENT and PLAN:  Chronic kidney disease IV :  Suspect secondary to diabetic nephropathy, hypertensive nephrosclerosis, arterial nephrosclerosis, and age-related nephron loss   -after review medical records baseline creatinine between high two range to mid three range dating back to 2017 with EGFR 16-19  -patient follows with Dr Faith Boo and was last seen via telemedicine 4/28/2020  -patient not on Ace/Arb/NSAIDs  -patient renal function remains within baseline at 3 05  -patient can be on a higher potassium and current potassium is 4 5-stable  -renal function stable and okay from renal standpoint for IR angiogram-patient does remain at risk for acute kidney injury from contrast dye in the setting of advanced kidney disease-patient verbally understood and is aware of risks and benefits  -currently on IV fluids 60 cc an hour, Mucomyst 1200 mg b i d  For four doses,, a m   Lasix held this morning    Blood pressure/hypertension:  BP remains acceptable  -maximize hemodynamics to maintain MAP >65  -avoid hypotension or fluctuations in blood pressure  -will continue to trend     H&H/anemia:  Likely of Chronic Kidney Disease  -current hemoglobin 7 7 and appears stable  -iron 29, ferritin 189, iron saturation 17%, TIBC 169-  -will start oral iron  -transfuse if hemoglobin less than 7 0     Acid-base:  Current bicarb 22  -remains on oral sodium bicarbonate tablets 650 mg 2 times daily  -will continue trend     Diabetes:  Per primary team  -need adequate blood sugar control  -last A1c 7 1 on 07/22/2020     Bone mineral disease Chronic Kidney Disease:  - 8  -phosphorus 3 8  -consider starting calcitriol-will discuss with Dr amaral  -will check vitamin-D level    Volume status:  - Clinically patient appears to be euvolemic with IV fluids  -patient without respiratory distress     PAD:  With nonhealing left foot ulcer  -currently on IV antibiotics per Infectious Disease  -patient for arteriogram with CO2-today  -as above-please review recommendations  -vascular following        SUBJECTIVE:  Patient seen examined  Lying flat  Denies chest pain, shortness of breath, dizziness, lightheadedness, cough, congestion, nausea vomiting  Patient waiting for IR procedure    OBJECTIVE:  Current Weight: Weight - Scale: 89 8 kg (198 lb)  Vitals:    07/23/20 0713 07/23/20 1452 07/23/20 2311 07/24/20 0734   BP: 128/61 106/50 137/74 141/50   Pulse: 71 75  71   Resp: 19 18 18 18   Temp: 99 4 °F (37 4 °C) 97 6 °F (36 4 °C) 98 2 °F (36 8 °C) 97 5 °F (36 4 °C)   TempSrc:    Oral   SpO2: 99% 100%  99%   Weight:       Height:           Intake/Output Summary (Last 24 hours) at 7/24/2020 1121  Last data filed at 7/24/2020 1005  Gross per 24 hour   Intake 979 75 ml   Output 1000 ml   Net -20 25 ml     General: conscious, cooperative, not in acute distress, lying in bed  Skin: no rash, warm and dry  Eyes: pale conjunctivae, anicteric sclerae  ENT: moist lips and mucous membranes  Neck: supple, no JVD noted  Chest: clear breath sounds without crackles, rhonchi, wheezes  CVS: normal rate, regular rhythm without rub, + murmur  Abdomen: soft, non-tender, non-distended, normoactive bowel sounds  Extremities: no significant edema of both legs    Left foot wrapped in boot, slight erythema noted on left catheterization  Neuro: awake, alert, oriented, no gross deficits  Psych: appropriate affect       Medications:    Current Facility-Administered Medications:     acetaminophen (TYLENOL) tablet 650 mg, 650 mg, Oral, Q6H PRN, Queenie Cervantes MD    acetylcysteine (MUCOMYST) 200 mg/mL oral solution 1,200 mg, 1,200 mg, Oral, BID, Roberto Carlos [place], CRNP, 1,200 mg at 07/24/20 0856    amLODIPine (NORVASC) tablet 5 mg, 5 mg, Oral, Daily, Roberto Carlos [place], CRNP, 5 mg at 07/24/20 0859    ceFAZolin (ANCEF) IVPB (premix) 1,000 mg 50 mL, 1,000 mg, Intravenous, Q8H, Denia Esquivel MD, Stopped at 07/24/20 0450    clopidogrel (PLAVIX) tablet 75 mg, 75 mg, Oral, Daily, Fredy Rodney MD, 75 mg at 07/24/20 0856    finasteride (PROSCAR) tablet 5 mg, 5 mg, Oral, Daily, Fredy Rodney MD, 5 mg at 07/24/20 0856    FLUoxetine (PROzac) capsule 40 mg, 40 mg, Oral, Daily, Fredy Rodney MD, 40 mg at 07/24/20 0902    heparin (porcine) 25,000 units in 250 mL infusion (premix), 3-30 Units/kg/hr (Order-Specific), Intravenous, Titrated, Jose Devries PA-C, Stopped at 07/24/20 1000    heparin (porcine) injection 3,400 Units, 3,400 Units, Intravenous, Q1H PRN, Jose Devries PA-C, 3,400 Units at 07/24/20 0239    heparin (porcine) injection 6,800 Units, 6,800 Units, Intravenous, Q1H PRN, Jose Devries PA-C    insulin lispro (HumaLOG) 100 units/mL subcutaneous injection 1-6 Units, 1-6 Units, Subcutaneous, 4x Daily (AC & HS), 1 Units at 07/24/20 0640 **AND** Fingerstick Glucose (POCT), , , 4x Daily AC and at bedtime, Fredy Rodney MD    memantine [facility]) tablet 5 mg, 5 mg, Oral, Daily, Fredy Rodney MD, 5 mg at 07/24/20 0859    ondansetron (ZOFRAN) injection 4 mg, 4 mg, Intravenous, Q4H PRN, Fredy Rodney MD    pravastatin (PRAVACHOL) tablet 40 mg, 40 mg, Oral, Daily With Adrain Phalen, MD, 40 mg at 07/23/20 1624    primidone (MYSOLINE) tablet 50 mg, 50 mg, Oral, Daily, Fredy Rodney MD, 50 mg at 07/24/20 0901    sodium bicarbonate tablet 650 mg, 650 mg, Oral, BID after meals, Fredy Rodney MD, 650 mg at 07/24/20 0902    sodium chloride 0 9 % infusion, 60 mL/hr, Intravenous, Continuous, COLLEEN [place], Last Rate: 60 mL/hr at 07/24/20 1110, 60 mL/hr at 07/24/20 1110    tamsulosin (FLOMAX) capsule 0 4 mg, 0 4 mg, Oral, Daily With Adrain Phalen, MD, 0 4 mg at 07/23/20 1624    vancomycin (VANCOCIN) IVPB (premix) 1,000 mg 200 mL, 10 mg/kg, Intravenous, Q24H, Fredy Rodney MD, Stopped at 07/23/20 1724    Laboratory Results:  Results from last 7 days Lab Units 07/24/20  0451 07/23/20  0536 07/21/20  1337   WBC Thousand/uL 10 08 14 17* 9 98   HEMOGLOBIN g/dL 7 3* 7 7* 7 7*   HEMATOCRIT % 24 1* 25 7* 26 0*   PLATELETS Thousands/uL 317 286 271   SODIUM mmol/L 133* 135* 136   POTASSIUM mmol/L 4 5 4 8 4 2   CHLORIDE mmol/L 103 105 107   CO2 mmol/L 22 22 22   BUN mg/dL 47* 39* 43*   CREATININE mg/dL 3 05* 2 81* 2 95*   CALCIUM mg/dL 8 0* 8 0* 7 4*   MAGNESIUM mg/dL 2 1  --   --    PHOSPHORUS mg/dL 3 8  --   -- yes

## 2020-07-24 NOTE — PLAN OF CARE
Problem: Potential for Falls  Goal: Patient will remain free of falls  Description  INTERVENTIONS:  - Assess patient frequently for physical needs  -  Identify cognitive and physical deficits and behaviors that affect risk of falls    -  [place] fall precautions as indicated by assessment   - Educate patient/family on patient safety including physical limitations  - Instruct patient to call for assistance with activity based on assessment  - Modify environment to reduce risk of injury  - Consider OT/PT consult to assist with strengthening/mobility  Outcome: Progressing     Problem: PAIN - ADULT  Goal: Verbalizes/displays adequate comfort level or baseline comfort level  Description  Interventions:  - Encourage patient to monitor pain and request assistance  - Assess pain using appropriate pain scale  - Administer analgesics based on type and severity of pain and evaluate response  - Implement non-pharmacological measures as appropriate and evaluate response  - Consider cultural and social influences on pain and pain management  - Notify physician/advanced practitioner if interventions unsuccessful or patient reports new pain  Outcome: Progressing     Problem: INFECTION - ADULT  Goal: Absence or prevention of progression during hospitalization  Description  INTERVENTIONS:  - Assess and monitor for signs and symptoms of infection  - Monitor lab/diagnostic results  - Monitor all insertion sites, i e  indwelling lines, tubes, and drains  - Monitor endotracheal if appropriate and nasal secretions for changes in amount and color  - [place] appropriate cooling/warming therapies per order  - Administer medications as ordered  - Instruct and encourage patient and family to use good hand hygiene technique  - Identify and instruct in appropriate isolation precautions for identified infection/condition  Outcome: Progressing  Goal: Absence of fever/infection during neutropenic period  Description  INTERVENTIONS:  - Monitor WBC    Outcome: Progressing     Problem: Nutrition/Hydration-ADULT  Goal: Nutrient/Hydration intake appropriate for improving, restoring or maintaining nutritional needs  Description  Monitor and assess patient's nutrition/hydration status for malnutrition  Collaborate with interdisciplinary team and initiate plan and interventions as ordered  Monitor patient's weight and dietary intake as ordered or per policy  Utilize nutrition screening tool and intervene as necessary  Determine patient's food preferences and provide high-protein, high-caloric foods as appropriate       INTERVENTIONS:  - Monitor oral intake, urinary output, labs, and treatment plans  - Assess nutrition and hydration status and recommend course of action  - Evaluate amount of meals eaten  - Assist patient with eating if necessary   - Allow adequate time for meals  - Recommend/ encourage appropriate diets, oral nutritional supplements, and vitamin/mineral supplements  - Order, calculate, and assess calorie counts as needed  - Recommend, monitor, and adjust tube feedings and TPN/PPN based on assessed needs  - Assess need for intravenous fluids  - Provide specific nutrition/hydration education as appropriate  - Include patient/family/caregiver in decisions related to nutrition  Outcome: Progressing

## 2020-07-24 NOTE — PHYSICAL THERAPY NOTE
Physical Therapy Cancellation Note    The pt  Is off of the floor at IR for arteriogram  Will continue to follow and re-attempt as able      Aric Melendez, DIVINA

## 2020-07-24 NOTE — ASSESSMENT & PLAN NOTE
On Lasix 40 mg daily and Amlodipine 5 mg daily at home  Lasix held in view of CKD 4 with plan for left lower extremity angiogram  BP acceptable

## 2020-07-24 NOTE — ASSESSMENT & PLAN NOTE
· Polymicrobial wound cultures positive for MRSA, Klebsiella, Enterococcus - unclear which is pathogen  · Discussed with Dr Kristin Beaulieu - input appreciated  · On vancomycin, cefazolin  · Possible surgical intervention after left lower extremity vascular optimization

## 2020-07-24 NOTE — PROGRESS NOTES
Vancomycin IV Pharmacy-to-Dose Consultation    Armando Obrien is a 80 y o  male who is currently receiving Vancomycin IV with management by the Pharmacy Consult service  Assessment/Plan:  The patient was reviewed  Dosing adjustments could not be made  Vanco trough was not drawn prior to 4th dose this afternoon   Based on todays assessment, continue current vancomycin (day # 4) dosing of 1000mg q24hrs  with a plan for trough to be drawn at 1600 on 7/25 prior to the 5th dose  We will continue to follow the patients culture results and clinical progress daily      Soraida Mack, Pharmacist

## 2020-07-24 NOTE — BRIEF OP NOTE (RAD/CATH)
IR LOWER EXTREMITY / INTERVENTION Procedure Note    PATIENT NAME: Reinhold Hatchet  : 1930  MRN: [id number]    Pre-op Diagnosis:   1  Wound infection    2  Low hemoglobin    3  Skin ulcer of left heel with necrosis of muscle ([facility] [address] )    4  Ambulatory dysfunction    5  PVD (peripheral vascular disease)    6  Left foot wound/infection    7  Peripheral arterial disease (HCC)      Post-op Diagnosis:   1  Wound infection    2  Low hemoglobin    3  Skin ulcer of left heel with necrosis of muscle ([address] )    4  Ambulatory dysfunction    5  PVD (peripheral vascular disease)    6  Left foot wound/infection    7  Peripheral arterial disease ([facility] [address] )        Surgeon:   Eunice Monique MD  Assistants:     No qualified resident was available, Resident is only observing    Estimated Blood Loss: minimal  Findings:     Patent aorta, left pelvic inflow including common iliac artery, external iliac artery  The left profunda is patent  The proximal left superficial femoral artery is patent to the level of previously paste SFA stents  A large collateral arises from the stent that perfuses small branches in the upper calf  There is robust single-vessel dominant posterior tibial runoff as seen on imaging in   The zabala for the prior bypass was identified several cm proximal to the SFA stents  It was probed repeatedly and eventually the wire would not course into the bypass  The distal anastomosis was not identified  It is most likely that this bypass has been occluded for some period of time  Final contrast runoff imaging was performed to identify suitable anatomy for future intervention      CO2 utilized, contrast minimized    R groin access pro glide closure    Specimens: none    Complications:  None immediate    Anesthesia: Conscious sedation    Eunice Monique MD     Date: 2020  Time: 3:11 PM

## 2020-07-24 NOTE — INTERVAL H&P NOTE
Update: (This section must be completed if the H&P was completed greater than 24 hrs to procedure or admission)    H&P reviewed  After examining the patient, I find no changed to the H&P since it had been written  89M with left lower extremity critical limb ischemia and a nonhealing wound  His left heel wound has been present for years and he has a prior bypass in 2015 from above need to below-knee on the left after failed endovascular intervention  Angiography and possible endovascular therapy is indicated as duplex is concerning for a proximal bypass stenosis  Risks were him include bleeding and given that his creatinine is 3 they also include worsening renal failure  In 2015 he had single-vessel PT runoff following bypass    The patient did have some degree of difficulty lying still for his duplex, we will attempt to proceed with gentle moderate sedation  Unfortunately he lives alone which is somewhat concerning  I discussed the risks benefits and alternatives to the procedure with this patient and he gave his verbal ascent  I also discussed all of the above with the patient's daughter who has given permission to proceed understanding that there risks of renal failure or vascular damage that worsens his condition  We will use CO2 to reduce contrast load    MP2 ASA3    Patient re-evaluated   Accept as history and physical     Haile Hall MD/July 24, 2020/1:22 PM

## 2020-07-24 NOTE — ASSESSMENT & PLAN NOTE
Lab Results   Component Value Date    HGBA1C 7 1 (H) 07/22/2020       · Controlled for patient's age as evidenced by A1c  · Glipizide held  · Hyperglycemic at present  · Continue SSI  · NPO after midnight today  · Begin Lantus hs after diet ordered post procedure

## 2020-07-24 NOTE — ASSESSMENT & PLAN NOTE
· LEADS - RLE - LILIAN 1 2/88/52, 50-75% stenosis of prox-mid SFA, >75% stenosis in proximal calf bypass graft, high grade stenosis vs occlusion of distal SFA artery/stent; LLE - LILIAN 0 78/86/32 with occlusion of the distal SFA and above knee popliteal artery/stent    · Left lower extremity angiogram today - completely occluded above-the-knee to below-the-knee bypass - likely occluded for a long time  · Awaiting vascular review  · Nephrology following in view of CKD 4  · Coumadin held - on heparin drip   · Continue Plavix, statin

## 2020-07-24 NOTE — PROGRESS NOTES
Progress Note - Infectious Disease   Karina Dejesus 80 y o  male MRN: [id number]  Unit/Bed#: -01 Encounter: [id number]      Impression/Plan:  1  Probable left foot osteomyelitis-wound cultures polymicrobial but unclear which of the organism is a pathogen as this was not a deep operative culture   The cultures grown MRSA, Klebsiella, and Enterococcus at an outside lab  Klebsiella is sensitive to cephalosporins but resistant to ampicillin  Fortunately the patient is not systemically ill, is hemodynamically stable, nontoxic   There is no definitive plan at this time for any surgical intervention for now               -continue vancomycin and cefazolin for now while waiting additional data  -pharmacy follow-up for vancomycin trough management  -vascular workup as below  -local wound care  -close podiatry follow-up  -recheck CBC with diff and BMP  -if no plan for any resect of surgery, the likelihood of any cure with medical therapy alone would be extraordinarily low and the risk of renal toxicity would be relatively high  Therefore would favor an attempt at oral suppressive antibiotics with doxycycline rather than any prolonged course of intravenous antibiotics      2  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-the heel wound is been present for about 2 years and the metatarsal ulceration over the last few months   -local wound care  -close podiatry follow-up     3  Chronic kidney disease-stage IV   The renal function is quite abnormal and a bit worse   -dose adjusted antibiotics as above  -recheck BMP  -volume management     4  Diabetes mellitus type 2-with hyperglycemia   Very poorly controlled   -tighten diabetic control to improve leukocyte function     5  Peripheral arterial disease-patient now with continued abnormal leads  -patient for angiogram and possible PTA  -vascular follow-up    Have discussed the above management plan in detail with the primary service    Will see the patient again 7/27/2020  Please call if questions    Antibiotics:  Vancomycin 4  Cefazolin 3     Subjective:  Patient has no fever, chills, sweats; no nausea, vomiting, diarrhea; no cough, shortness of breath; no pain but he does have neuropathy  No new symptoms  Objective:  Vitals:  Temp:  [97 5 °F (36 4 °C)-98 2 °F (36 8 °C)] 97 5 °F (36 4 °C)  HR:  [71-75] 71  Resp:  [18] 18  BP: (106-141)/(50-74) 141/50  SpO2:  [99 %-100 %] 99 %  Temp (24hrs), Av 8 °F (36 6 °C), Min:97 5 °F (36 4 °C), Max:98 2 °F (36 8 °C)  Current: Temperature: 97 5 °F (36 4 °C)    Physical Exam:   General Appearance:  Alert, interactive, nontoxic, no acute distress  Throat: Oropharynx moist without lesions  Lungs:   Clear to auscultation bilaterally; no wheezes, rhonchi or rales; respirations unlabored   Heart:  RRR; no murmur, rub or gallop   Abdomen:   Soft, non-tender, non-distended, positive bowel sounds  Extremities: No clubbing, cyanosis  Left leg and foot with edema and faint erythema  Skin: No new rashes or lesions  No draining wounds noted  Labs, Imaging, & Other studies:   All pertinent labs and imaging studies were personally reviewed  Results from last 7 days   Lab Units 20  0451 20  0536 20  1337   WBC Thousand/uL 10 08 14 17* 9 98   HEMOGLOBIN g/dL 7 3* 7 7* 7 7*   PLATELETS Thousands/uL 317 286 271     Results from last 7 days   Lab Units 20  0451 20  0536 20  1337   SODIUM mmol/L 133* 135* 136   POTASSIUM mmol/L 4 5 4 8 4 2   CHLORIDE mmol/L 103 105 107   CO2 mmol/L 22 22 22   BUN mg/dL 47* 39* 43*   CREATININE mg/dL 3 05* 2 81* 2 95*   EGFR ml/min/1 73sq m 17 19 18   CALCIUM mg/dL 8 0* 8 0* 7 4*   AST U/L  --   --  8   ALT U/L  --   --  12   ALK PHOS U/L  --   --  296*     Results from last 7 days   Lab Units 20  1337   BLOOD CULTURE  No Growth at 48 hrs  No Growth at 48 hrs           Results from last 7 days   Lab Units 20  0451   CRP mg/L 110 0*     Results from last 7 days Lab Units 07/24/20  0451   FERRITIN ng/mL 189

## 2020-07-24 NOTE — PLAN OF CARE
Problem: Potential for Falls  Goal: Patient will remain free of falls  Description  INTERVENTIONS:  - Assess patient frequently for physical needs  -  Identify cognitive and physical deficits and behaviors that affect risk of falls    -  [place] fall precautions as indicated by assessment   - Educate patient/family on patient safety including physical limitations  - Instruct patient to call for assistance with activity based on assessment  - Modify environment to reduce risk of injury  - Consider OT/PT consult to assist with strengthening/mobility  7/24/2020 0727 by Laurie Chin RN  Outcome: Progressing  7/24/2020 0725 by Laurie Chin RN  Outcome: Progressing     Problem: PAIN - ADULT  Goal: Verbalizes/displays adequate comfort level or baseline comfort level  Description  Interventions:  - Encourage patient to monitor pain and request assistance  - Assess pain using appropriate pain scale  - Administer analgesics based on type and severity of pain and evaluate response  - Implement non-pharmacological measures as appropriate and evaluate response  - Consider cultural and social influences on pain and pain management  - Notify physician/advanced practitioner if interventions unsuccessful or patient reports new pain  7/24/2020 0727 by Laurie Chin RN  Outcome: Progressing  7/24/2020 0725 by Laurie Chin RN  Outcome: Progressing     Problem: INFECTION - ADULT  Goal: Absence or prevention of progression during hospitalization  Description  INTERVENTIONS:  - Assess and monitor for signs and symptoms of infection  - Monitor lab/diagnostic results  - Monitor all insertion sites, i e  indwelling lines, tubes, and drains  - Monitor endotracheal if appropriate and nasal secretions for changes in amount and color  - [place] appropriate cooling/warming therapies per order  - Administer medications as ordered  - Instruct and encourage patient and family to use good hand hygiene technique  - Identify and instruct in appropriate isolation precautions for identified infection/condition  7/24/2020 0727 by Sheila Bhatti RN  Outcome: Progressing  7/24/2020 0725 by Sheila Bhatti RN  Outcome: Progressing  Goal: Absence of fever/infection during neutropenic period  Description  INTERVENTIONS:  - Monitor WBC    7/24/2020 0727 by Sheila Bhatti RN  Outcome: Progressing  7/24/2020 0725 by Sheila Bhatti RN  Outcome: Progressing     Problem: Nutrition/Hydration-ADULT  Goal: Nutrient/Hydration intake appropriate for improving, restoring or maintaining nutritional needs  Description  Monitor and assess patient's nutrition/hydration status for malnutrition  Collaborate with interdisciplinary team and initiate plan and interventions as ordered  Monitor patient's weight and dietary intake as ordered or per policy  Utilize nutrition screening tool and intervene as necessary  Determine patient's food preferences and provide high-protein, high-caloric foods as appropriate       INTERVENTIONS:  - Monitor oral intake, urinary output, labs, and treatment plans  - Assess nutrition and hydration status and recommend course of action  - Evaluate amount of meals eaten  - Assist patient with eating if necessary   - Allow adequate time for meals  - Recommend/ encourage appropriate diets, oral nutritional supplements, and vitamin/mineral supplements  - Order, calculate, and assess calorie counts as needed  - Recommend, monitor, and adjust tube feedings and TPN/PPN based on assessed needs  - Assess need for intravenous fluids  - Provide specific nutrition/hydration education as appropriate  - Include patient/family/caregiver in decisions related to nutrition  7/24/2020 0727 by Sheila Bhatti RN  Outcome: Progressing  7/24/2020 0725 by Sheila Bhatti RN  Outcome: Progressing

## 2020-07-24 NOTE — ASSESSMENT & PLAN NOTE
· Baseline creatinine in the high 2s to low 3s  · Nephrology consulted in view of requirement for angiogram  · On Mucomyst, IV fluids   · Lasix held   · Avoid hypotension, nephrotoxic medications

## 2020-07-24 NOTE — ASSESSMENT & PLAN NOTE
· Follows with neurology as outpatient  · Had been recommended Zonisamide which he has not been using for the past few weeks at home

## 2020-07-25 LAB
25(OH)D3 SERPL-MCNC: 57.5 NG/ML (ref 30–100)
ABO GROUP BLD BPU: NORMAL
ANION GAP SERPL CALCULATED.3IONS-SCNC: 8 MMOL/L (ref 4–13)
APTT PPP: 110 SECONDS (ref 23–37)
APTT PPP: 118 SECONDS (ref 23–37)
APTT PPP: 155 SECONDS (ref 23–37)
BPU ID: NORMAL
BUN SERPL-MCNC: 43 MG/DL (ref 5–25)
CALCIUM SERPL-MCNC: 7.4 MG/DL (ref 8.3–10.1)
CHLORIDE SERPL-SCNC: 104 MMOL/L (ref 100–108)
CO2 SERPL-SCNC: 20 MMOL/L (ref 21–32)
CREAT SERPL-MCNC: 2.68 MG/DL (ref 0.6–1.3)
CROSSMATCH: NORMAL
ERYTHROCYTE [DISTWIDTH] IN BLOOD BY AUTOMATED COUNT: 14.6 % (ref 11.6–15.1)
GFR SERPL CREATININE-BSD FRML MDRD: 20 ML/MIN/1.73SQ M
GLUCOSE SERPL-MCNC: 121 MG/DL (ref 65–140)
GLUCOSE SERPL-MCNC: 123 MG/DL (ref 65–140)
GLUCOSE SERPL-MCNC: 182 MG/DL (ref 65–140)
GLUCOSE SERPL-MCNC: 218 MG/DL (ref 65–140)
GLUCOSE SERPL-MCNC: 229 MG/DL (ref 65–140)
HCT VFR BLD AUTO: 28.2 % (ref 36.5–49.3)
HGB BLD-MCNC: 8.8 G/DL (ref 12–17)
INR PPP: 1.46 (ref 0.84–1.19)
MCH RBC QN AUTO: 28.9 PG (ref 26.8–34.3)
MCHC RBC AUTO-ENTMCNC: 31.2 G/DL (ref 31.4–37.4)
MCV RBC AUTO: 93 FL (ref 82–98)
PLATELET # BLD AUTO: 312 THOUSANDS/UL (ref 149–390)
PMV BLD AUTO: 10.1 FL (ref 8.9–12.7)
POTASSIUM SERPL-SCNC: 4.5 MMOL/L (ref 3.5–5.3)
PROTHROMBIN TIME: 17.7 SECONDS (ref 11.6–14.5)
RBC # BLD AUTO: 3.05 MILLION/UL (ref 3.88–5.62)
SODIUM SERPL-SCNC: 132 MMOL/L (ref 136–145)
UNIT DISPENSE STATUS: NORMAL
UNIT PRODUCT CODE: NORMAL
UNIT RH: NORMAL
VANCOMYCIN TROUGH SERPL-MCNC: 24.5 UG/ML (ref 10–20)
WBC # BLD AUTO: 6.83 THOUSAND/UL (ref 4.31–10.16)

## 2020-07-25 PROCEDURE — 99232 SBSQ HOSP IP/OBS MODERATE 35: CPT | Performed by: INTERNAL MEDICINE

## 2020-07-25 PROCEDURE — 99232 SBSQ HOSP IP/OBS MODERATE 35: CPT | Performed by: SURGERY

## 2020-07-25 PROCEDURE — 82948 REAGENT STRIP/BLOOD GLUCOSE: CPT

## 2020-07-25 PROCEDURE — 85610 PROTHROMBIN TIME: CPT | Performed by: INTERNAL MEDICINE

## 2020-07-25 PROCEDURE — 80048 BASIC METABOLIC PNL TOTAL CA: CPT | Performed by: INTERNAL MEDICINE

## 2020-07-25 PROCEDURE — 85730 THROMBOPLASTIN TIME PARTIAL: CPT | Performed by: INTERNAL MEDICINE

## 2020-07-25 PROCEDURE — 85027 COMPLETE CBC AUTOMATED: CPT | Performed by: INTERNAL MEDICINE

## 2020-07-25 PROCEDURE — 82306 VITAMIN D 25 HYDROXY: CPT | Performed by: INTERNAL MEDICINE

## 2020-07-25 PROCEDURE — 80202 ASSAY OF VANCOMYCIN: CPT | Performed by: INTERNAL MEDICINE

## 2020-07-25 RX ORDER — INSULIN GLARGINE 100 [IU]/ML
6 INJECTION, SOLUTION SUBCUTANEOUS EVERY MORNING
Status: DISCONTINUED | OUTPATIENT
Start: 2020-07-26 | End: 2020-07-26

## 2020-07-25 RX ADMIN — FERROUS SULFATE TAB 325 MG (65 MG ELEMENTAL FE) 325 MG: 325 (65 FE) TAB at 08:11

## 2020-07-25 RX ADMIN — INSULIN LISPRO 2 UNITS: 100 INJECTION, SOLUTION INTRAVENOUS; SUBCUTANEOUS at 16:45

## 2020-07-25 RX ADMIN — MEMANTINE 5 MG: 5 TABLET ORAL at 08:11

## 2020-07-25 RX ADMIN — SODIUM BICARBONATE 650 MG TABLET 650 MG: at 08:14

## 2020-07-25 RX ADMIN — INSULIN LISPRO 2 UNITS: 100 INJECTION, SOLUTION INTRAVENOUS; SUBCUTANEOUS at 22:59

## 2020-07-25 RX ADMIN — SODIUM BICARBONATE 650 MG TABLET 650 MG: at 16:44

## 2020-07-25 RX ADMIN — FINASTERIDE 5 MG: 5 TABLET, FILM COATED ORAL at 08:11

## 2020-07-25 RX ADMIN — CLOPIDOGREL BISULFATE 75 MG: 75 TABLET ORAL at 08:11

## 2020-07-25 RX ADMIN — FLUOXETINE 40 MG: 20 CAPSULE ORAL at 08:14

## 2020-07-25 RX ADMIN — CEFAZOLIN SODIUM 1000 MG: 1 SOLUTION INTRAVENOUS at 11:50

## 2020-07-25 RX ADMIN — CEFAZOLIN SODIUM 1000 MG: 1 SOLUTION INTRAVENOUS at 04:46

## 2020-07-25 RX ADMIN — PRAVASTATIN SODIUM 40 MG: 20 TABLET ORAL at 16:44

## 2020-07-25 RX ADMIN — HEPARIN SODIUM AND DEXTROSE 13 UNITS/KG/HR: 10000; 5 INJECTION INTRAVENOUS at 11:47

## 2020-07-25 RX ADMIN — VANCOMYCIN HYDROCHLORIDE 1000 MG: 1 INJECTION, SOLUTION INTRAVENOUS at 16:25

## 2020-07-25 RX ADMIN — AMLODIPINE BESYLATE 5 MG: 5 TABLET ORAL at 08:12

## 2020-07-25 RX ADMIN — INSULIN LISPRO 1 UNITS: 100 INJECTION, SOLUTION INTRAVENOUS; SUBCUTANEOUS at 11:41

## 2020-07-25 RX ADMIN — CEFAZOLIN SODIUM 1000 MG: 1 SOLUTION INTRAVENOUS at 20:23

## 2020-07-25 RX ADMIN — TAMSULOSIN HYDROCHLORIDE 0.4 MG: 0.4 CAPSULE ORAL at 16:44

## 2020-07-25 NOTE — PROGRESS NOTES
Vancomycin Assessment    Karina Dejesus is a 80 y o  male who is currently receiving vancomycin 1000 mg q 24 hr for osteomyelitis     Relevant clinical data and objective history reviewed:  Creatinine   Date Value Ref Range Status   07/25/2020 2 68 (H) 0 60 - 1 30 mg/dL Final     Comment:     Standardized to IDMS reference method   07/24/2020 3 05 (H) 0 60 - 1 30 mg/dL Final     Comment:     Standardized to IDMS reference method   07/23/2020 2 81 (H) 0 60 - 1 30 mg/dL Final     Comment:     Standardized to IDMS reference method   09/21/2015 3 08 (H) 0 60 - 1 30 mg/dL Final     Comment:     Standardized to IDMS reference method   06/08/2015 1 61 (H) 0 60 - 1 30 mg/dL Final     Comment:     Standardized to IDMS reference method   06/06/2015 1 55 (H) 0 60 - 1 30 mg/dL Final     Comment:     Standardized to IDMS reference method     /59   Pulse 68   Temp 97 6 °F (36 4 °C)   Resp 18   Ht 6' (1 829 m)   Wt 89 8 kg (198 lb)   SpO2 96%   BMI 26 85 kg/m²   I/O last 3 completed shifts: In: 1994 8 [I V :1469 8; Blood:350; IV Piggyback:175]  Out: 1700 [Urine:1700]  Lab Results   Component Value Date/Time    BUN 43 (H) 07/25/2020 08:16 AM    BUN 46 (H) 09/21/2015 04:43 PM    WBC 6 83 07/25/2020 08:16 AM    WBC 5 62 09/21/2015 04:43 PM    HGB 8 8 (L) 07/25/2020 08:16 AM    HGB 8 9 (L) 09/21/2015 04:43 PM    HCT 28 2 (L) 07/25/2020 08:16 AM    HCT 27 6 (L) 09/21/2015 04:43 PM    MCV 93 07/25/2020 08:16 AM    MCV 90 09/21/2015 04:43 PM     07/25/2020 08:16 AM     09/21/2015 04:43 PM     Temp Readings from Last 3 Encounters:   07/25/20 97 6 °F (36 4 °C)   07/07/20 98 6 °F (37 °C) (Tympanic)   07/06/20 97 7 °F (36 5 °C) (Oral)     Vancomycin Days of Therapy: 5    Assessment/Plan  The patient is currently on vancomycin utilizing scheduled dosing  Baseline risks associated with therapy include: pre-existing renal impairment    The patient is receiving 1000 mg q 24 hr with the most recent vancomycin level being 24 5 and not at steady-state and supratherapeutic  Based on a goal of 15-20 (appropriate for most indications) and after clinical evaluation, the dose will be changed to 750 mg q 24 hr 32 hr from last dose   Pharmacy will continue to follow closely for s/sx of nephrotoxicity, infusion reactions and appropriateness of therapy  BMP and CBC will be ordered per protocol  Plan for trough on Wed 7/29 at 0000 as patient approaches steady state, prior to the 3rd dose due on Wed 7/29 at approximately 0030  Pharmacy will continue to follow the patients culture results and clinical progress daily      Valiant Number, Pharmacist

## 2020-07-25 NOTE — PROGRESS NOTES
NEPHROLOGY PROGRESS NOTE   Wakullacarine Billy 80 y o  male MRN: [id number]  Unit/Bed#: -01 Encounter: [id number]      ASSESSMENT & PLAN:  1  Chronic kidney disease stage 4 with baseline creatinine in the high 2s to low 3s  Renal function is stable  Okay to stop intravenously fluids today  Complete Mucomyst 4 doses total   Okay to resume diuresis if not further intervention needed or planned  Follow daily labs  Avoid hypotension    2  Peripheral artery disease with nonhealing left foot ulcer, Podiatry ID and vascular on board  Status post CO2 arteriogram yesterday of the left lower extremity showing completely occluded below-the-knee bypass likely chronic, robust single-vessel dominant PT runoff  Further plan as per vascular    3  Hypertension, blood pressure acceptable, no changes in medication at this moment  4  Secondary hyperparathyroidism, Calcitrol was started    5  Anemia multifactorial secondary to CKD as well as iron deficiency, continue with iron tablets        SUBJECTIVE:  Patient seen and examined, denies any complaint, no chest pain, no shortness of breath, nausea, no vomiting  Status post CO2 arteriogram left lower extremity yesterday        OBJECTIVE:  Current Weight: Weight - Scale: 89 8 kg (198 lb)  Vitals:    07/25/20 0813   BP: 133/59   Pulse: 73   Resp:    Temp:    SpO2: 96%       Intake/Output Summary (Last 24 hours) at 7/25/2020 0950  Last data filed at 7/25/2020 0813  Gross per 24 hour   Intake 1447 ml   Output 950 ml   Net 497 ml     General: conscious, cooperative, in not acute distress  Eyes: conjunctivae pale, anicteric sclerae  ENT: lips and mucous membranes moist  Neck: supple, no JVD  Chest: clear breath sounds bilateral, no crackles, ronchus or wheezings  CVS: distinct S1 & S2, normal rate, regular rhythm  Abdomen: soft, non-tender, non-distended, normoactive bowel sounds  Extremities: minimal edema of both legs  Skin: demarcated erythematous area over left leg  Neuro: awake, alert, oriented        Medications:    Current Facility-Administered Medications:     acetaminophen (TYLENOL) tablet 650 mg, 650 mg, Oral, Q6H PRN, Sushil Martins MD    amLODIPine (NORVASC) tablet 5 mg, 5 mg, Oral, Daily, COLLEEN Guzman, 5 mg at 07/25/20 3177    calcitriol (ROCALTROL) capsule 0 25 mcg, 0 25 mcg, Oral, Once per day on Mon Wed Fri, Kraina Tirado MD, 0 25 mcg at 07/24/20 1714    ceFAZolin (ANCEF) IVPB (premix) 1,000 mg 50 mL, 1,000 mg, Intravenous, Q8H, Sharifa Sewell MD, Last Rate: 100 mL/hr at 07/25/20 0446, 1,000 mg at 07/25/20 0446    clopidogrel (PLAVIX) tablet 75 mg, 75 mg, Oral, Daily, Sushil Martins MD, 75 mg at 07/25/20 8651    ferrous sulfate tablet 325 mg, 325 mg, Oral, Daily With Breakfast, COLLEEN Guzman, 325 mg at 07/25/20 1291    finasteride (PROSCAR) tablet 5 mg, 5 mg, Oral, Daily, Sushil Martins MD, 5 mg at 07/25/20 9437    FLUoxetine (PROzac) capsule 40 mg, 40 mg, Oral, Daily, Sushil Martins MD, 40 mg at 07/25/20 0814    heparin (porcine) 25,000 units in 250 mL infusion (premix), 3-30 Units/kg/hr (Order-Specific), Intravenous, Titrated, Km Fraire PA-C, Last Rate: 11 1 mL/hr at 07/25/20 0933, 13 Units/kg/hr at 07/25/20 0933    heparin (porcine) injection 3,400 Units, 3,400 Units, Intravenous, Q1H PRN, Otila Yee PA-C, 3,400 Units at 07/24/20 0239    heparin (porcine) injection 6,800 Units, 6,800 Units, Intravenous, Q1H PRN, Otila Yee PA-C    insulin lispro (HumaLOG) 100 units/mL subcutaneous injection 1-6 Units, 1-6 Units, Subcutaneous, 4x Daily (AC & HS), 5 Units at 07/24/20 2105 **AND** Fingerstick Glucose (POCT), , , 4x Daily AC and at bedtime, Sushil Martins MD    memantine [facility]) tablet 5 mg, 5 mg, Oral, Daily, Sushil Martins MD, 5 mg at 07/25/20 0811    ondansetron (ZOFRAN) injection 4 mg, 4 mg, Intravenous, Q4H PRN, Sushil Martins MD    pravastatin (PRAVACHOL) tablet 40 mg, 40 mg, Oral, Daily With Dinner, Sushil Martins, MD, 40 mg at 07/24/20 1711    sodium bicarbonate tablet 650 mg, 650 mg, Oral, BID after meals, Sarah Beth Wilder MD, 650 mg at 07/25/20 0814    tamsulosin (FLOMAX) capsule 0 4 mg, 0 4 mg, Oral, Daily With Janina Joyce MD, 0 4 mg at 07/24/20 1711    vancomycin (VANCOCIN) IVPB (premix) 1,000 mg 200 mL, 10 mg/kg, Intravenous, Q24H, Sarah Beth Wilder MD, Last Rate: 200 mL/hr at 07/24/20 1711, 1,000 mg at 07/24/20 1711    Invasive Devices:        Lab Results:   Results from last 7 days   Lab Units 07/25/20  0816 07/24/20  0451 07/23/20  0536 07/21/20  1337   WBC Thousand/uL 6 83 10 08 14 17* 9 98   HEMOGLOBIN g/dL 8 8* 7 3* 7 7* 7 7*   HEMATOCRIT % 28 2* 24 1* 25 7* 26 0*   PLATELETS Thousands/uL 312 317 286 271   SODIUM mmol/L 132* 133* 135* 136   POTASSIUM mmol/L 4 5 4 5 4 8 4 2   CHLORIDE mmol/L 104 103 105 107   CO2 mmol/L 20* 22 22 22   BUN mg/dL 43* 47* 39* 43*   CREATININE mg/dL 2 68* 3 05* 2 81* 2 95*   CALCIUM mg/dL 7 4* 8 0* 8 0* 7 4*   MAGNESIUM mg/dL  --  2 1  --   --    PHOSPHORUS mg/dL  --  3 8  --   --    ALK PHOS U/L  --   --   --  296*   ALT U/L  --   --   --  12   AST U/L  --   --   --  8       Previous work up:  See previous notes      Portions of the record may have been created with voice recognition software  Occasional wrong word or "sound a like" substitutions may have occurred due to the inherent limitations of voice recognition software  Read the chart carefully and recognize, using context, where substitutions have occurred  If you have any questions, please contact the dictating provider

## 2020-07-25 NOTE — PROGRESS NOTES
Progress Note - Salvatore Collazo 6/28/1930, 80 y o  male MRN: [id number]    Unit/Bed#: [facility]8-[facility] Encounter: [id number]    Primary Care Provider: Anjali Stone MD   Date and time admitted to hospital: 7/21/2020 12:32 PM        * 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  Assessment & Plan  · Polymicrobial wound cultures positive for MRSA, Klebsiella, Enterococcus - unclear which is pathogen  · Discussed with Dr Ayanna Mendez - input appreciated  · On vancomycin, cefazolin  · Possible surgical intervention after left lower extremity vascular optimization    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  Assessment & Plan  · Seen by Podiatry - input appreciated - local wound care recommended      Peripheral arterial disease ([address] )  Assessment & Plan  · LEADS - RLE - LILIAN 1 2/88/52, 50-75% stenosis of prox-mid SFA, >75% stenosis in proximal calf bypass graft, high grade stenosis vs occlusion of distal SFA artery/stent; LLE - LILIAN 0 78/86/32 with occlusion of the distal SFA and above knee popliteal artery/stent    · Left lower extremity angiogram today - completely occluded above-the-knee to below-the-knee bypass - likely occluded for a long time  · Awaiting vascular review  · Nephrology following in view of CKD 4  · Coumadin held - on heparin drip   · Continue Plavix, statin    Tremor  Assessment & Plan  · Follows with neurology as outpatient  · Had been recommended Zonisamide which he has not been using for the past few weeks at home    Essential hypertension  Assessment & Plan  On Lasix 40 mg daily and Amlodipine 5 mg daily at home  Lasix held in view of CKD 4 with plan for left lower extremity angiogram  BP acceptable    Chronic kidney disease stage 4  Assessment & Plan  · Baseline creatinine in the high 2s to low 3s  · Nephrology consulted in view of requirement for angiogram  · On Mucomyst, IV fluids   · Lasix held   · Avoid hypotension, nephrotoxic medications    Atrial fibrillation   Assessment & Plan  · Coumadin held for angiogram and begun on heparin drip    Diabetes mellitus type 2  Assessment & Plan  Lab Results   Component Value Date    HGBA1C 7 1 (H) 2020       · Controlled for patient's age as evidenced by A1c  · Glipizide held  · Hyperglycemic at present  · Continue SSI  · NPO after midnight today  · Begin Lantus hs after diet ordered post procedure    Anemia of chronic disease  Assessment & Plan  · Drop to 7 3 today  · Transfuse 1 PRBC today  · Monitor hemoglobin and transfuse PRN      VTE Pharmacologic Prophylaxis:   Pharmacologic: Heparin Drip  Mechanical VTE Prophylaxis in Place: Yes    Patient Centered Rounds: I have performed bedside rounds with nursing staff today  Education and Discussions with Family / Patient:  Discussed with patient and discussed with daughter on the phone    Time Spent for Care: 20 minutes  More than 50% of total time spent on counseling and coordination of care as described above  Current Length of Stay: 3 day(s)    Current Patient Status: Inpatient   Certification Statement: The patient will continue to require additional inpatient hospital stay due to Left foot osteomyelitis, PAD    Code Status: Level 1 - Full Code    Subjective:   Underwent angiogram today  No intervention could be performed  No pain  Objective:     Vitals:   Temp (24hrs), Av 6 °F (36 4 °C), Min:97 3 °F (36 3 °C), Max:98 2 °F (36 8 °C)    Temp:  [97 3 °F (36 3 °C)-98 2 °F (36 8 °C)] 98 1 °F (36 7 °C)  HR:  [67-76] 68  Resp:  [18-20] 20  BP: (105-153)/(50-74) 126/60  SpO2:  [95 %-100 %] 95 %  Body mass index is 26 85 kg/m²  Physical Exam:     Physical Exam   Constitutional: He is oriented to person, place, and time  HENT:   Head: Normocephalic and atraumatic  Eyes: Pupils are equal, round, and reactive to light  EOM are normal    Neck: Normal range of motion  Neck supple  Cardiovascular: Normal rate and regular rhythm  Pulmonary/Chest: Effort normal and breath sounds normal    Abdominal: Soft  Bowel sounds are normal    Musculoskeletal: He exhibits no edema  Neurological: He is alert and oriented to person, place, and time  Skin:   Left leg dressing dry   Psychiatric: He has a normal mood and affect  Additional Data:     Labs:    Results from last 7 days   Lab Units 07/24/20  0451  07/21/20  1337   WBC Thousand/uL 10 08   < > 9 98   HEMOGLOBIN g/dL 7 3*   < > 7 7*   HEMATOCRIT % 24 1*   < > 26 0*   PLATELETS Thousands/uL 317   < > 271   NEUTROS PCT %  --   --  72   LYMPHS PCT %  --   --  14   MONOS PCT %  --   --  11   EOS PCT %  --   --  2    < > = values in this interval not displayed  Results from last 7 days   Lab Units 07/24/20  0451  07/21/20  1337   SODIUM mmol/L 133*   < > 136   POTASSIUM mmol/L 4 5   < > 4 2   CHLORIDE mmol/L 103   < > 107   CO2 mmol/L 22   < > 22   BUN mg/dL 47*   < > 43*   CREATININE mg/dL 3 05*   < > 2 95*   ANION GAP mmol/L 8   < > 7   CALCIUM mg/dL 8 0*   < > 7 4*   ALBUMIN g/dL  --   --  2 4*   TOTAL BILIRUBIN mg/dL  --   --  0 34   ALK PHOS U/L  --   --  296*   ALT U/L  --   --  12   AST U/L  --   --  8   GLUCOSE RANDOM mg/dL 153*   < > 346*    < > = values in this interval not displayed  Results from last 7 days   Lab Units 07/24/20  0451   INR  1 73*     Results from last 7 days   Lab Units 07/24/20  2051 07/24/20  1615 07/24/20  1046 07/24/20  0622 07/23/20  2113 07/23/20  1604 07/23/20  1048 07/23/20  0609 07/22/20  2102 07/22/20  1607 07/22/20  1038 07/22/20  0551   POC GLUCOSE mg/dl 312* 148* 116 163* 209* 231* 285* 160* 289* 216* 215* 97     Results from last 7 days   Lab Units 07/22/20  0442   HEMOGLOBIN A1C % 7 1*       * I Have Reviewed All Lab Data Listed Above  * Additional Pertinent Lab Tests Reviewed: [address] Admission Reviewed    Recent Cultures (last 7 days):     Results from last 7 days   Lab Units 07/21/20  7182   BLOOD CULTURE  No Growth at 72 hrs  No Growth at 72 hrs         Last 24 Hours Medication List: Current Facility-Administered Medications:  acetaminophen 650 mg Oral Q6H PRN Sara Luna MD    amLODIPine 5 mg Oral Daily COLLEEN Chappell    calcitriol 0 25 mcg Oral Once per day on Mon Wed Fri Mary Chapman MD    cefazolin 1,000 mg Intravenous Q8H Cele Sanz MD Last Rate: 1,000 mg (07/24/20 2103)   clopidogrel 75 mg Oral Daily Sara Luna MD    Syliva Linea ON 7/25/2020] ferrous sulfate 325 mg Oral Daily With Breakfast COLLEEN Chappell    finasteride 5 mg Oral Daily Sara Luna MD    FLUoxetine 40 mg Oral Daily Sara Luna MD    heparin (porcine) 3-30 Units/kg/hr (Order-Specific) Intravenous Titrated Roxianne Bun, PA-C Last Rate: 18 Units/kg/hr (07/24/20 1850)   heparin (porcine) 3,400 Units Intravenous Q1H PRN Roxianne Bun, PA-C    heparin (porcine) 6,800 Units Intravenous Q1H PRN Roxjay Bun, PA-C    insulin lispro 1-6 Units Subcutaneous 4x Daily (AC & HS) Sara Luna MD    memantine 5 mg Oral Daily Sara Luna MD    ondansetron 4 mg Intravenous Q4H PRN Sara Luna MD    pravastatin 40 mg Oral Daily With Michael Douglas MD    sodium bicarbonate 650 mg Oral BID after meals Sara Luna MD    sodium chloride 60 mL/hr Intravenous Continuous COLLEEN Chappell Last Rate: 60 mL/hr (07/24/20 1927)   tamsulosin 0 4 mg Oral Daily With Michael Douglas MD    vancomycin 10 mg/kg Intravenous Q24H Sara Luna MD Last Rate: 1,000 mg (07/24/20 1711)        Today, Patient Was Seen By: Samuel Ferris MD    ** Please Note: Dictation voice to text software may have been used in the creation of this document   **

## 2020-07-25 NOTE — PROGRESS NOTES
Progress Note - Vascular Surgery   Benito Fisher 80 y o  male MRN: [id number]  Unit/Bed#: -01 Encounter: [id number]    Assessment:  Pt is a 79 yo M w/ PAD and hx of B fem-PT bypasses (2012 and 2014)  presents with L heel and plantar midfoot wound  LEADs:  R: 1 2/88/52 w/ >75% stenosis of the proximal calf bypass graft; L: 0 78/86/32 w/ low flow at the distal anastomosis    LLE angio: completely occluded below the knee bypass likely chornic  Robust single-vessel dominant PT runoff  Labs pending this morning    R:Palp fem  Dopp dp/pt/pop  L: Palp fem  Dopp pop/AT/PT  Plan:  -Hep gtt  -Plavix/statin  -ID following: vanc/ancef  -Podiatry-local wound care  -nephro on board    Subjective/Objective   Chief Complaint: Left leg pain    Subjective: No acute events over night  Denies any subjective fevers or chills  No n/v/ SOB or chest pain  Objective: Physical Exam   Constitutional: He is oriented to person, place, and time  No distress  HENT:   Head: Normocephalic and atraumatic  Eyes: Conjunctivae are normal    Cardiovascular: Normal rate and regular rhythm  B/l palp fems  Dopp pop/dp/pt R  Dopp pop/at/PT   Pulmonary/Chest: Effort normal and breath sounds normal    Abdominal: Soft  He exhibits no distension  There is no tenderness  Musculoskeletal:   Numbness b/l lower extremities-chronic  L globophed shoe  Neurological: He is alert and oriented to person, place, and time  Skin: Skin is warm  Nursing note and vitals reviewed  Blood pressure 120/66, pulse 72, temperature 98 4 °F (36 9 °C), resp  rate 20, height 6' (1 829 m), weight 89 8 kg (198 lb), SpO2 95 %  ,Body mass index is 26 85 kg/m²        Intake/Output Summary (Last 24 hours) at 7/25/2020 0709  Last data filed at 7/25/2020 0001  Gross per 24 hour   Intake 1267 ml   Output 1150 ml   Net 117 ml       Invasive Devices     Peripheral Intravenous Line            Peripheral IV 07/22/20 Left;Proximal;Ventral (anterior) Forearm 2 days Peripheral IV 07/23/20 Distal;Left;Ventral (anterior) Forearm 1 day                    Lab, Imaging and other studies:I have personally reviewed pertinent lab results      VTE Pharmacologic Prophylaxis: Heparin  VTE Mechanical Prophylaxis: sequential compression device

## 2020-07-26 LAB
ANION GAP SERPL CALCULATED.3IONS-SCNC: 8 MMOL/L (ref 4–13)
APTT PPP: 117 SECONDS (ref 23–37)
APTT PPP: 54 SECONDS (ref 23–37)
APTT PPP: 59 SECONDS (ref 23–37)
BACTERIA BLD CULT: NORMAL
BACTERIA BLD CULT: NORMAL
BUN SERPL-MCNC: 43 MG/DL (ref 5–25)
CALCIUM SERPL-MCNC: 7.4 MG/DL (ref 8.3–10.1)
CHLORIDE SERPL-SCNC: 105 MMOL/L (ref 100–108)
CO2 SERPL-SCNC: 22 MMOL/L (ref 21–32)
CREAT SERPL-MCNC: 2.85 MG/DL (ref 0.6–1.3)
GFR SERPL CREATININE-BSD FRML MDRD: 19 ML/MIN/1.73SQ M
GLUCOSE SERPL-MCNC: 108 MG/DL (ref 65–140)
GLUCOSE SERPL-MCNC: 135 MG/DL (ref 65–140)
GLUCOSE SERPL-MCNC: 149 MG/DL (ref 65–140)
GLUCOSE SERPL-MCNC: 222 MG/DL (ref 65–140)
GLUCOSE SERPL-MCNC: 294 MG/DL (ref 65–140)
HCT VFR BLD AUTO: 26.9 % (ref 36.5–49.3)
HGB BLD-MCNC: 8.6 G/DL (ref 12–17)
POTASSIUM SERPL-SCNC: 4.7 MMOL/L (ref 3.5–5.3)
SODIUM SERPL-SCNC: 135 MMOL/L (ref 136–145)

## 2020-07-26 PROCEDURE — 82948 REAGENT STRIP/BLOOD GLUCOSE: CPT

## 2020-07-26 PROCEDURE — 99232 SBSQ HOSP IP/OBS MODERATE 35: CPT | Performed by: INTERNAL MEDICINE

## 2020-07-26 PROCEDURE — 85730 THROMBOPLASTIN TIME PARTIAL: CPT | Performed by: INTERNAL MEDICINE

## 2020-07-26 PROCEDURE — 85018 HEMOGLOBIN: CPT | Performed by: INTERNAL MEDICINE

## 2020-07-26 PROCEDURE — 99232 SBSQ HOSP IP/OBS MODERATE 35: CPT | Performed by: SURGERY

## 2020-07-26 PROCEDURE — 85014 HEMATOCRIT: CPT | Performed by: INTERNAL MEDICINE

## 2020-07-26 PROCEDURE — 80048 BASIC METABOLIC PNL TOTAL CA: CPT | Performed by: INTERNAL MEDICINE

## 2020-07-26 RX ORDER — INSULIN GLARGINE 100 [IU]/ML
8 INJECTION, SOLUTION SUBCUTANEOUS EVERY MORNING
Status: DISCONTINUED | OUTPATIENT
Start: 2020-07-27 | End: 2020-08-02

## 2020-07-26 RX ADMIN — HEPARIN SODIUM 3400 UNITS: 1000 INJECTION INTRAVENOUS; SUBCUTANEOUS at 10:49

## 2020-07-26 RX ADMIN — HEPARIN SODIUM 3400 UNITS: 1000 INJECTION INTRAVENOUS; SUBCUTANEOUS at 02:03

## 2020-07-26 RX ADMIN — FINASTERIDE 5 MG: 5 TABLET, FILM COATED ORAL at 08:11

## 2020-07-26 RX ADMIN — INSULIN LISPRO 3 UNITS: 100 INJECTION, SOLUTION INTRAVENOUS; SUBCUTANEOUS at 22:28

## 2020-07-26 RX ADMIN — CEFAZOLIN SODIUM 1000 MG: 1 SOLUTION INTRAVENOUS at 11:42

## 2020-07-26 RX ADMIN — INSULIN LISPRO 2 UNITS: 100 INJECTION, SOLUTION INTRAVENOUS; SUBCUTANEOUS at 16:29

## 2020-07-26 RX ADMIN — CEFAZOLIN SODIUM 1000 MG: 1 SOLUTION INTRAVENOUS at 19:49

## 2020-07-26 RX ADMIN — SODIUM BICARBONATE 650 MG TABLET 650 MG: at 16:31

## 2020-07-26 RX ADMIN — FERROUS SULFATE TAB 325 MG (65 MG ELEMENTAL FE) 325 MG: 325 (65 FE) TAB at 08:11

## 2020-07-26 RX ADMIN — PRAVASTATIN SODIUM 40 MG: 20 TABLET ORAL at 16:30

## 2020-07-26 RX ADMIN — CLOPIDOGREL BISULFATE 75 MG: 75 TABLET ORAL at 08:11

## 2020-07-26 RX ADMIN — HEPARIN SODIUM AND DEXTROSE 12 UNITS/KG/HR: 10000; 5 INJECTION INTRAVENOUS at 04:55

## 2020-07-26 RX ADMIN — CEFAZOLIN SODIUM 1000 MG: 1 SOLUTION INTRAVENOUS at 04:55

## 2020-07-26 RX ADMIN — TAMSULOSIN HYDROCHLORIDE 0.4 MG: 0.4 CAPSULE ORAL at 16:31

## 2020-07-26 RX ADMIN — SODIUM BICARBONATE 650 MG TABLET 650 MG: at 08:14

## 2020-07-26 RX ADMIN — AMLODIPINE BESYLATE 5 MG: 5 TABLET ORAL at 08:11

## 2020-07-26 RX ADMIN — MEMANTINE 5 MG: 5 TABLET ORAL at 08:11

## 2020-07-26 RX ADMIN — INSULIN GLARGINE 6 UNITS: 100 INJECTION, SOLUTION SUBCUTANEOUS at 08:13

## 2020-07-26 RX ADMIN — FLUOXETINE 40 MG: 20 CAPSULE ORAL at 08:14

## 2020-07-26 NOTE — PROGRESS NOTES
Progress Note - Walt Rivera 6/28/1930, 80 y o  male MRN: [id number]    Unit/Bed#: -[place] Encounter: [id number]    Primary Care Provider: Otis Ozuna MD   Date and time admitted to hospital: 7/21/2020 12:32 PM        * 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  Assessment & Plan  · Polymicrobial wound cultures positive for MRSA, Klebsiella, Enterococcus - unclear which is pathogen  · On Vancomycin, cefazolin   · MRI ordered by Podiatry  · Possible surgical intervention after left lower extremity vascular optimization    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  Assessment & Plan  · Seen by Podiatry - input appreciated - local wound care recommended      Peripheral arterial disease ([facility] [address] )  Assessment & Plan  · LEADS - RLE - LILIAN 1 2/88/52, 50-75% stenosis of prox-mid SFA, >75% stenosis in proximal calf bypass graft, high grade stenosis vs occlusion of distal SFA artery/stent; LLE - LILIAN 0 78/86/32 with occlusion of the distal SFA and above knee popliteal artery/stent    · Left lower extremity angiogram today - completely occluded above-the-knee to below-the-knee bypass - likely occluded for a long time  · Not a candidate for 3rd time redo bypass due to age, co-morbidities and his wishes  · Options discussed with daughter by vascular - she and patient opted for another endovascular attempt  · Nephrology following in view of CKD 4  · Coumadin held - on heparin drip for potential intervention  · Continue Plavix, statin    Tremor  Assessment & Plan  · Follows with neurology as outpatient  · Had been recommended Zonisamide which he has not been using for the past few weeks at home    Essential hypertension  Assessment & Plan  On Lasix 40 mg daily and Amlodipine 5 mg daily at home  Lasix held in view of CKD 4 with plan for left lower extremity angiogram  BP acceptable    Chronic kidney disease stage 4  Assessment & Plan  · Baseline creatinine in the high 2s to low 3s  · Nephrology consulted in view of requirement for angiogram  · Creatinine at baseline 48 hours post angiogram  · Lasix held - ct to hold for repeat endovascular attempt  · Avoid hypotension, nephrotoxic medications    Atrial fibrillation   Assessment & Plan  · Coumadin held for possible intervention   · On heparin drip    Diabetes mellitus type 2  Assessment & Plan  Lab Results   Component Value Date    HGBA1C 7 1 (H) 2020       · Controlled for patient's age as evidenced by A1c  · Glipizide held  · Increase Lantus to 8 units in am  · Continue SSI  · Monitor blood sugars and adjust insulin as needed      Anemia of chronic disease  Assessment & Plan  · Dropped to 7 3 on   · S/p 1 PRBC on   · Monitor hemoglobin and transfuse PRN      VTE Pharmacologic Prophylaxis:   Pharmacologic: Heparin Drip  Mechanical VTE Prophylaxis in Place: Yes    Patient Centered Rounds: I have performed bedside rounds with nursing staff today  Discussions with Specialists or Other Care Team Provider:  Discussed with vascular    Education and Discussions with Family / Patient:  Discussed with patient and discussed with daughter at the bedside    Time Spent for Care: 20 minutes  More than 50% of total time spent on counseling and coordination of care as described above  Current Length of Stay: 5 day(s)    Current Patient Status: Inpatient   Certification Statement: The patient will continue to require additional inpatient hospital stay due to PAD and probable osteomyelitis awaiting evaluation    Code Status: Level 1 - Full Code    Subjective:   Agreeable to repeat endovascular attempt  No pain  No fever  No nausea, vomiting or diarrhea    Objective:     Vitals:   Temp (24hrs), Av 8 °F (36 6 °C), Min:97 5 °F (36 4 °C), Max:98 4 °F (36 9 °C)    Temp:  [97 5 °F (36 4 °C)-98 4 °F (36 9 °C)] 97 6 °F (36 4 °C)  HR:  [71-75] 71  Resp:  [17-18] 18  BP: (129-149)/(60-73) 130/62  SpO2:  [97 %-100 %] 100 %  Body mass index is 26 85 kg/m²       Physical Exam:     Physical Exam Constitutional: He is oriented to person, place, and time  HENT:   Head: Normocephalic and atraumatic  Eyes: Pupils are equal, round, and reactive to light  EOM are normal    Neck: Normal range of motion  Neck supple  Cardiovascular: Normal rate and regular rhythm  Pulmonary/Chest: Effort normal and breath sounds normal    Abdominal: Soft  Bowel sounds are normal    Musculoskeletal: He exhibits no edema  Neurological: He is alert and oriented to person, place, and time  Skin: Skin is warm and dry  Psychiatric: He has a normal mood and affect  Additional Data:     Labs:    Results from last 7 days   Lab Units 07/26/20  0823 07/25/20  0816  07/21/20  1337   WBC Thousand/uL  --  6 83   < > 9 98   HEMOGLOBIN g/dL 8 6* 8 8*   < > 7 7*   HEMATOCRIT % 26 9* 28 2*   < > 26 0*   PLATELETS Thousands/uL  --  312   < > 271   NEUTROS PCT %  --   --   --  72   LYMPHS PCT %  --   --   --  14   MONOS PCT %  --   --   --  11   EOS PCT %  --   --   --  2    < > = values in this interval not displayed  Results from last 7 days   Lab Units 07/26/20  0823  07/21/20  1337   SODIUM mmol/L 135*   < > 136   POTASSIUM mmol/L 4 7   < > 4 2   CHLORIDE mmol/L 105   < > 107   CO2 mmol/L 22   < > 22   BUN mg/dL 43*   < > 43*   CREATININE mg/dL 2 85*   < > 2 95*   ANION GAP mmol/L 8   < > 7   CALCIUM mg/dL 7 4*   < > 7 4*   ALBUMIN g/dL  --   --  2 4*   TOTAL BILIRUBIN mg/dL  --   --  0 34   ALK PHOS U/L  --   --  296*   ALT U/L  --   --  12   AST U/L  --   --  8   GLUCOSE RANDOM mg/dL 108   < > 346*    < > = values in this interval not displayed       Results from last 7 days   Lab Units 07/25/20  0816   INR  1 46*     Results from last 7 days   Lab Units 07/26/20  1558 07/26/20  1103 07/26/20  0636 07/25/20 2051 07/25/20  1559 07/25/20  1102 07/25/20  0705 07/24/20 2051 07/24/20  1615 07/24/20  1046 07/24/20  0622 07/23/20  2113   POC GLUCOSE mg/dl 222* 149* 135 229* 218* 182* 123 312* 148* 116 163* 209*     Results from last 7 days   Lab Units 07/22/20  0442   HEMOGLOBIN A1C % 7 1*     * I Have Reviewed All Lab Data Listed Above  * Additional Pertinent Lab Tests Reviewed: Torey 66 Admission Reviewed    Recent Cultures (last 7 days):     Results from last 7 days   Lab Units 07/21/20  1337   BLOOD CULTURE  No Growth After 5 Days  No Growth After 5 Days  Last 24 Hours Medication List:     Current Facility-Administered Medications:  acetaminophen 650 mg Oral Q6H PRN Araceli Burdick MD    amLODIPine 5 mg Oral Daily COLLEEN Tai    calcitriol 0 25 mcg Oral Once per day on Mon Wed Fri Sebastián Hollins MD    cefazolin 1,000 mg Intravenous Q8H Jb Mcpherson MD Last Rate: 1,000 mg (07/26/20 1142)   clopidogrel 75 mg Oral Daily Araceli Burdick MD    ferrous sulfate 325 mg Oral Daily With Breakfast COLLEEN Tai    finasteride 5 mg Oral Daily Araceli Burdick MD    FLUoxetine 40 mg Oral Daily Araceli Burdick MD    heparin (porcine) 3-30 Units/kg/hr (Order-Specific) Intravenous Titrated Chris Navid, PA-C Last Rate: 11 Units/kg/hr (07/26/20 1652)   heparin (porcine) 3,400 Units Intravenous Q1H PRN Chris Navid PA-C    heparin (porcine) 6,800 Units Intravenous Q1H PRN Chris Navid, PA-C    [START ON 7/27/2020] insulin glargine 8 Units Subcutaneous QAM Eduardo Glass MD    insulin lispro 1-6 Units Subcutaneous 4x Daily (AC & HS) Araceli Burdick MD    memantine 5 mg Oral Daily Araceli Burdick MD    ondansetron 4 mg Intravenous Q4H PRN Araceli Burdick MD    pravastatin 40 mg Oral Daily With Karly Moeller MD    sodium bicarbonate 650 mg Oral BID after meals Araceli Burdick MD    tamsulosin 0 4 mg Oral Daily With Karly Moeller MD    Pato Presume ON 7/27/2020] vancomycin 750 mg Intravenous Q24H Araceli Burdick MD         Today, Patient Was Seen By: Eduardo Glass MD    ** Please Note: Dictation voice to text software may have been used in the creation of this document  **

## 2020-07-26 NOTE — PROGRESS NOTES
Vancomycin IV Pharmacy-to-Dose Consultation    Katina Barrios is a 80 y o  male who is currently receiving Vancomycin IV with management by the Pharmacy Consult service  Vancomycin Assessment:  Indication: L foot osteomyelitis  Status: stable  Cultures:  7/21 blood cultures x 2: no growth after 4 days  7/21 L foot wound culture: no result listed in EPIC  Renal Function: Renal function is stable with Scr of 2 85 this AM, which is baseline; UOP 0 5 ml/kg/hr  Potential Nephrotoxicity Factors:  Medications: none  Patient Factors: elderly, renal dysfunction  Days of Therapy: 6  Current Dose: 750 mg q24h (starting on 7/27 @ 0030)  Goal Trough: 15-20  Last Level: 24 5 (7/25 @ 1623)     Vancomycin Plan:  Dosing: Will continue with 750 mg q24h starting ~ 32 hours after last dose  Next Level: 7/29 @ 0000, prior to 3rd dose of new regimen  Renal Function Monitoring: Will continue to monitor BMP and UOP  Pharmacy will continue to follow closely for s/sx of nephrotoxicity, infusion reactions and appropriateness of therapy  BMP and CBC will be ordered per protocol  We will continue to follow the patients culture results and clinical progress daily      Tracey Stevens, PharmD, [address] [facility] [facility] Clinical Pharmacist  [phone] or via [facility]

## 2020-07-26 NOTE — PROGRESS NOTES
NEPHROLOGY PROGRESS NOTE   Juniorcarine Mosqueda 80 y o  male MRN: [id number]  Unit/Bed#: MS [facility]-01 Encounter: [id number]      ASSESSMENT & PLAN:  1  Chronic kidney disease stage 4 with baseline creatinine in the high 2s to low 3s  Renal function is stable 48 hours after CO2 arteriogram  Okay to resume diuresis if not further vascular intervention is planned  Follow daily labs  Avoid hypotension    2  Peripheral artery disease with nonhealing left foot ulcer, Podiatry ID and vascular on board  Status post CO2 arteriogram 07/24 of the left lower extremity showing completely occluded below-the-knee bypass likely chronic, robust single-vessel dominant PT runoff  Further plan as per vascular    3  Hypertension, blood pressure acceptable, no changes in medication at this moment  4  Secondary hyperparathyroidism, Calcitrol was started    5   Anemia multifactorial secondary to CKD as well as iron deficiency, continue with iron tablets        SUBJECTIVE:  Patient seen and examined, no complaints, chest pain, shortness of breath, no vomiting, no urinary problems      OBJECTIVE:  Current Weight: Weight - Scale: 89 8 kg (198 lb)  Vitals:    07/26/20 0813   BP: 130/60   Pulse:    Resp:    Temp:    SpO2:        Intake/Output Summary (Last 24 hours) at 7/26/2020 0947  Last data filed at 7/26/2020 0759  Gross per 24 hour   Intake 1275 ml   Output 1050 ml   Net 225 ml     General: conscious, cooperative, in not acute distress  Eyes: conjunctivae pale, anicteric sclerae  ENT: lips and mucous membranes moist  Neck: supple, no JVD  Chest: clear breath sounds bilateral, no crackles, ronchus or wheezings  CVS: distinct S1 & S2, normal rate, regular rhythm  Abdomen: soft, non-tender, non-distended, normoactive bowel sounds  Extremities:  Erythematous area over left leg  Skin: no rash  Neuro: awake, alert, oriented      Medications:    Current Facility-Administered Medications:     acetaminophen (TYLENOL) tablet 650 mg, 650 mg, Oral, Q6H PRN, Sara Luna MD    amLODIPine (NORVASC) tablet 5 mg, 5 mg, Oral, Daily, COLLEEN Chappell, 5 mg at 07/26/20 7100    calcitriol (ROCALTROL) capsule 0 25 mcg, 0 25 mcg, Oral, Once per day on Mon Wed Fri, Mary Chapman MD, 0 25 mcg at 07/24/20 1714    ceFAZolin (ANCEF) IVPB (premix) 1,000 mg 50 mL, 1,000 mg, Intravenous, Q8H, Cele Sanz MD, Last Rate: 100 mL/hr at 07/26/20 0455, 1,000 mg at 07/26/20 0455    clopidogrel (PLAVIX) tablet 75 mg, 75 mg, Oral, Daily, Sara Luna MD, 75 mg at 07/26/20 2028    ferrous sulfate tablet 325 mg, 325 mg, Oral, Daily With Breakfast, COLLEEN Chappell, 325 mg at 07/26/20 0973    finasteride (PROSCAR) tablet 5 mg, 5 mg, Oral, Daily, Sara Luna MD, 5 mg at 07/26/20 4284    FLUoxetine (PROzac) capsule 40 mg, 40 mg, Oral, Daily, Sara Luna MD, 40 mg at 07/26/20 0814    heparin (porcine) 25,000 units in 250 mL infusion (premix), 3-30 Units/kg/hr (Order-Specific), Intravenous, Titrated, Orion Osorio PA-C, Last Rate: 11 9 mL/hr at 07/26/20 0905, 14 Units/kg/hr at 07/26/20 0905    heparin (porcine) injection 3,400 Units, 3,400 Units, Intravenous, Q1H PRN, Orion Bun, PA-C, 3,400 Units at 07/26/20 0203    heparin (porcine) injection 6,800 Units, 6,800 Units, Intravenous, Q1H PRN, Orion Bun PA-C    insulin glargine (LANTUS) subcutaneous injection 6 Units 0 06 mL, 6 Units, Subcutaneous, QAM, Samuel Ferris MD, 6 Units at 07/26/20 0813    insulin lispro (HumaLOG) 100 units/mL subcutaneous injection 1-6 Units, 1-6 Units, Subcutaneous, 4x Daily (AC & HS), 2 Units at 07/25/20 2259 **AND** Fingerstick Glucose (POCT), , , 4x Daily AC and at bedtime, Sara Luna MD    memantine [facility]) tablet 5 mg, 5 mg, Oral, Daily, Sara Luna MD, 5 mg at 07/26/20 0811    ondansetron (ZOFRAN) injection 4 mg, 4 mg, Intravenous, Q4H PRN, Sara Luna MD    pravastatin (PRAVACHOL) tablet 40 mg, 40 mg, Oral, Daily With Dinner, Sara Luna, MD, 40 mg at 07/25/20 1644    sodium bicarbonate tablet 650 mg, 650 mg, Oral, BID after meals, Esteban Escamilla MD, 650 mg at 07/26/20 0814    tamsulosin (FLOMAX) capsule 0 4 mg, 0 4 mg, Oral, Daily With Lily Hutchins MD, 0 4 mg at 07/25/20 1644    [START ON 7/27/2020] vancomycin (VANCOCIN) IVPB (premix) 750 mg 150 mL, 750 mg, Intravenous, Q24H, Esteban Escamilla MD    Invasive Devices:        Lab Results:   Results from last 7 days   Lab Units 07/26/20  0823 07/25/20  0816 07/24/20  0451 07/23/20  0536 07/21/20  1337   WBC Thousand/uL  --  6 83 10 08 14 17* 9 98   HEMOGLOBIN g/dL 8 6* 8 8* 7 3* 7 7* 7 7*   HEMATOCRIT % 26 9* 28 2* 24 1* 25 7* 26 0*   PLATELETS Thousands/uL  --  312 317 286 271   SODIUM mmol/L 135* 132* 133* 135* 136   POTASSIUM mmol/L 4 7 4 5 4 5 4 8 4 2   CHLORIDE mmol/L 105 104 103 105 107   CO2 mmol/L 22 20* 22 22 22   BUN mg/dL 43* 43* 47* 39* 43*   CREATININE mg/dL 2 85* 2 68* 3 05* 2 81* 2 95*   CALCIUM mg/dL 7 4* 7 4* 8 0* 8 0* 7 4*   MAGNESIUM mg/dL  --   --  2 1  --   --    PHOSPHORUS mg/dL  --   --  3 8  --   --    ALK PHOS U/L  --   --   --   --  296*   ALT U/L  --   --   --   --  12   AST U/L  --   --   --   --  8       Previous work up:  See previous notes      Portions of the record may have been created with voice recognition software  Occasional wrong word or "sound a like" substitutions may have occurred due to the inherent limitations of voice recognition software  Read the chart carefully and recognize, using context, where substitutions have occurred  If you have any questions, please contact the dictating provider

## 2020-07-26 NOTE — PROGRESS NOTES
Progress Note - Katina Barrios 6/28/1930, 80 y o  male MRN: [id number]    Unit/Bed#: [facility] Encounter: [id number]    Primary Care Provider: Avni Nolen MD   Date and time admitted to hospital: 7/21/2020 12:32 PM        * 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  Assessment & Plan  · Polymicrobial wound cultures positive for MRSA, Klebsiella, Enterococcus - unclear which is pathogen  · Discussed with Dr Tanvi Sewell on 7/24 - input appreciated  · On vancomycin, cefazolin   · MRI ordered by Podiatry  · Possible surgical intervention after left lower extremity vascular optimization    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  Assessment & Plan  · Seen by Podiatry - input appreciated - local wound care recommended      Peripheral arterial disease ([address] )  Assessment & Plan  · LEADS - RLE - LILIAN 1 2/88/52, 50-75% stenosis of prox-mid SFA, >75% stenosis in proximal calf bypass graft, high grade stenosis vs occlusion of distal SFA artery/stent; LLE - LILIAN 0 78/86/32 with occlusion of the distal SFA and above knee popliteal artery/stent    · Left lower extremity angiogram today - completely occluded above-the-knee to below-the-knee bypass - likely occluded for a long time  · Options discussed with daughter by vascular - she will discuss with him and inform vascular about decision  · Nephrology following in view of CKD 4  · Coumadin held - on heparin drip for potential intervention  · Continue Plavix, statin    Tremor  Assessment & Plan  · Follows with neurology as outpatient  · Had been recommended Zonisamide which he has not been using for the past few weeks at home    Essential hypertension  Assessment & Plan  On Lasix 40 mg daily and Amlodipine 5 mg daily at home  Lasix held in view of CKD 4 with plan for left lower extremity angiogram  BP acceptable    Chronic kidney disease stage 4  Assessment & Plan  · Baseline creatinine in the high 2s to low 3s  · Nephrology consulted in view of requirement for angiogram  · Creatinine at baseline post angiogram  · IV fluids discontinued  · Lasix held   · Avoid hypotension, nephrotoxic medications    Atrial fibrillation   Assessment & Plan  · Coumadin held for possible intervention   · On heparin drip    Diabetes mellitus type 2  Assessment & Plan  Lab Results   Component Value Date    HGBA1C 7 1 (H) 2020       · Controlled for patient's age as evidenced by A1c  · Glipizide held  · Begin Lantus in a m  · Continue SSI      Anemia of chronic disease  Assessment & Plan  · Dropped to 7 3 on   · S/p 1 PRBC on   · Monitor hemoglobin and transfuse PRN    VTE Pharmacologic Prophylaxis:   Pharmacologic: Heparin Drip  Mechanical VTE Prophylaxis in Place: Yes    Patient Centered Rounds: Discussed with RN    Education and Discussions with Family / Patient:  Discussed with patient and  discussed with daughter on the phone    Time Spent for Care: 20 minutes  More than 50% of total time spent on counseling and coordination of care as described above  Current Length of Stay: 4 day(s)    Current Patient Status: Inpatient   Certification Statement: The patient will continue to require additional inpatient hospital stay due to PAD    Code Status: Level 1 - Full Code    Subjective:   No pain over left lower extremity  Feels well    Objective:     Vitals:   Temp (24hrs), Av 1 °F (36 7 °C), Min:97 6 °F (36 4 °C), Max:98 4 °F (36 9 °C)    Temp:  [97 6 °F (36 4 °C)-98 4 °F (36 9 °C)] 97 6 °F (36 4 °C)  HR:  [68-74] 68  Resp:  [18-20] 18  BP: (120-136)/(59-70) 132/59  SpO2:  [95 %-98 %] 98 %  Body mass index is 26 85 kg/m²  Physical Exam:     Physical Exam   Constitutional: He is oriented to person, place, and time  HENT:   Head: Normocephalic and atraumatic  Eyes: Pupils are equal, round, and reactive to light  EOM are normal    Neck: Normal range of motion  Neck supple  Cardiovascular: Normal rate and regular rhythm     Pulmonary/Chest: Effort normal and breath sounds normal  Abdominal: Soft  Bowel sounds are normal    Musculoskeletal: He exhibits no edema  Neurological: He is alert and oriented to person, place, and time  Psychiatric: He has a normal mood and affect  Additional Data:     Labs:    Results from last 7 days   Lab Units 07/25/20  0816  07/21/20  1337   WBC Thousand/uL 6 83   < > 9 98   HEMOGLOBIN g/dL 8 8*   < > 7 7*   HEMATOCRIT % 28 2*   < > 26 0*   PLATELETS Thousands/uL 312   < > 271   NEUTROS PCT %  --   --  72   LYMPHS PCT %  --   --  14   MONOS PCT %  --   --  11   EOS PCT %  --   --  2    < > = values in this interval not displayed  Results from last 7 days   Lab Units 07/25/20  0816  07/21/20  1337   SODIUM mmol/L 132*   < > 136   POTASSIUM mmol/L 4 5   < > 4 2   CHLORIDE mmol/L 104   < > 107   CO2 mmol/L 20*   < > 22   BUN mg/dL 43*   < > 43*   CREATININE mg/dL 2 68*   < > 2 95*   ANION GAP mmol/L 8   < > 7   CALCIUM mg/dL 7 4*   < > 7 4*   ALBUMIN g/dL  --   --  2 4*   TOTAL BILIRUBIN mg/dL  --   --  0 34   ALK PHOS U/L  --   --  296*   ALT U/L  --   --  12   AST U/L  --   --  8   GLUCOSE RANDOM mg/dL 121   < > 346*    < > = values in this interval not displayed  Results from last 7 days   Lab Units 07/25/20  0816   INR  1 46*     Results from last 7 days   Lab Units 07/25/20  1559 07/25/20  1102 07/25/20  0705 07/24/20  2051 07/24/20  1615 07/24/20  1046 07/24/20  0622 07/23/20  2113 07/23/20  1604 07/23/20  1048 07/23/20  0609 07/22/20  2102   POC GLUCOSE mg/dl 218* 182* 123 312* 148* 116 163* 209* 231* 285* 160* 289*     Results from last 7 days   Lab Units 07/22/20  0442   HEMOGLOBIN A1C % 7 1*               * I Have Reviewed All Lab Data Listed Above  * Additional Pertinent Lab Tests Reviewed: [address] Admission Reviewed      Recent Cultures (last 7 days):     Results from last 7 days   Lab Units 07/21/20  2007   BLOOD CULTURE  No Growth After 4 Days  No Growth After 4 Days         Last 24 Hours Medication List: Current Facility-Administered Medications:  acetaminophen 650 mg Oral Q6H PRN Esteban Escamilla MD    amLODIPine 5 mg Oral Daily COLLEEN Deleon    calcitriol 0 25 mcg Oral Once per day on Mon Wed Fri Harish Childers MD    cefazolin 1,000 mg Intravenous Q8H Nicky Maaz MD Last Rate: 1,000 mg (07/25/20 2023)   clopidogrel 75 mg Oral Daily Esteban Escamilla MD    ferrous sulfate 325 mg Oral Daily With Breakfast COLLEEN Deleon    finasteride 5 mg Oral Daily Esteban Escamilla MD    FLUoxetine 40 mg Oral Daily Esteban Escamilla MD    heparin (porcine) 3-30 Units/kg/hr (Order-Specific) Intravenous Titrated [place], PA-C Last Rate: 10 Units/kg/hr (07/25/20 1800)   heparin (porcine) 3,400 Units Intravenous Q1H PRN [place], PA-C    heparin (porcine) 6,800 Units Intravenous Q1H PRN [place], PA-C    [START ON 7/26/2020] insulin glargine 6 Units Subcutaneous QAM Janine Hernandez MD    insulin lispro 1-6 Units Subcutaneous 4x Daily (AC & HS) Esteban Escamilla MD    memantine 5 mg Oral Daily Esteban Escamilla MD    ondansetron 4 mg Intravenous Q4H PRN Esteban Ecsamilla MD    pravastatin 40 mg Oral Daily With Manjit Brooks MD    sodium bicarbonate 650 mg Oral BID after meals Esteban Escamilla MD    tamsulosin 0 4 mg Oral Daily With MD Lesly Maldonadoce Aleena ON 7/27/2020] vancomycin 750 mg Intravenous Q24H Esteban Escamilla MD         Today, Patient Was Seen By: Janine Hernandez MD    ** Please Note: Dictation voice to text software may have been used in the creation of this document   **

## 2020-07-26 NOTE — PROGRESS NOTES
Progress Note - VascularSurgery   Lonnie Martinez 80 y o  male MRN: [id number]  Unit/Bed#: -01 Encounter: [id number]    Assessment:  Pt is a 81 yo M w/ PAD and hx of B fem-PT bypasses (2012 and 2014)  presents with L heel and plantar midfoot wound  LEADs:  R: 1 2/88/52 w/ >75% stenosis of the proximal calf bypass graft; L: 0 78/86/32 w/ low flow at the distal anastomosis     LLE angio: completely occluded below the knee bypass likely chornic  Robust single-vessel dominant PT runoff  Patient is not a candidate for a 3rd time bypass revision  Could consider endovascular  Initially patient did not want any intervention; however, this AM patient wants an endovascular procedure  Plan:  -hep gtt, plavix/statin  -ID: marco antonio/ancef  -podiatry- local wound care  -nephro: appreciate recs  -endovascular procedure this week  Subjective/Objective   Chief Complaint:     Subjective: KATE  Denies subjective fevers and chills  No nausea vomiting, chest pain, or SOBN  Objective:   Constitutional: He is oriented to person, place, and time  No distress  HENT:   Head: Normocephalic and atraumatic  Eyes: Conjunctivae are normal    Cardiovascular: Normal rate and regular rhythm  B/l palp fems  Dopp pop/dp/pt R  Dopp pop/at/PT   Pulmonary/Chest: Effort normal and breath sounds normal    Abdominal: Soft  He exhibits no distension  There is no tenderness  Musculoskeletal:   Numbness b/l lower extremities-chronic  L globophed shoe  Neurological: He is alert and oriented to person, place, and time  Skin: Skin is warm  Skin has overlying erythema on left lower extremity  Nursing note and vitals reviewed  Blood pressure 149/73, pulse 75, temperature 97 5 °F (36 4 °C), resp  rate 18, height 6' (1 829 m), weight 89 8 kg (198 lb), SpO2 98 %  ,Body mass index is 26 85 kg/m²        Intake/Output Summary (Last 24 hours) at 7/26/2020 0004  Last data filed at 7/25/2020 2300  Gross per 24 hour   Intake 1225 ml   Output 400 ml Net 825 ml       Invasive Devices     Peripheral Intravenous Line            Peripheral IV 07/23/20 Distal;Left;Ventral (anterior) Forearm 2 days    Peripheral IV 07/25/20 Dorsal (posterior); Left Forearm less than 1 day                    Lab, Imaging and other studies:I have personally reviewed pertinent lab results      VTE Pharmacologic Prophylaxis: Heparin  VTE Mechanical Prophylaxis: sequential compression device

## 2020-07-26 NOTE — ASSESSMENT & PLAN NOTE
Lab Results   Component Value Date    HGBA1C 7 1 (H) 07/22/2020       · Controlled for patient's age as evidenced by A1c  · Glipizide held  · Begin Lantus in a m    · Continue SSI

## 2020-07-26 NOTE — ASSESSMENT & PLAN NOTE
· Polymicrobial wound cultures positive for MRSA, Klebsiella, Enterococcus - unclear which is pathogen  · Discussed with Dr Amrit Pepper on 7/24 - input appreciated  · On vancomycin, cefazolin   · MRI ordered by Podiatry  · Possible surgical intervention after left lower extremity vascular optimization

## 2020-07-26 NOTE — PLAN OF CARE
Problem: Potential for Falls  Goal: Patient will remain free of falls  Description  INTERVENTIONS:  - Assess patient frequently for physical needs  -  Identify cognitive and physical deficits and behaviors that affect risk of falls    -  Estill fall precautions as indicated by assessment   - Educate patient/family on patient safety including physical limitations  - Instruct patient to call for assistance with activity based on assessment  - Modify environment to reduce risk of injury  - Consider OT/PT consult to assist with strengthening/mobility  Outcome: Progressing     Problem: PAIN - ADULT  Goal: Verbalizes/displays adequate comfort level or baseline comfort level  Description  Interventions:  - Encourage patient to monitor pain and request assistance  - Assess pain using appropriate pain scale  - Administer analgesics based on type and severity of pain and evaluate response  - Implement non-pharmacological measures as appropriate and evaluate response  - Consider cultural and social influences on pain and pain management  - Notify physician/advanced practitioner if interventions unsuccessful or patient reports new pain  Outcome: Progressing     Problem: INFECTION - ADULT  Goal: Absence or prevention of progression during hospitalization  Description  INTERVENTIONS:  - Assess and monitor for signs and symptoms of infection  - Monitor lab/diagnostic results  - Monitor all insertion sites, i e  indwelling lines, tubes, and drains  - Monitor endotracheal if appropriate and nasal secretions for changes in amount and color  - Estill appropriate cooling/warming therapies per order  - Administer medications as ordered  - Instruct and encourage patient and family to use good hand hygiene technique  - Identify and instruct in appropriate isolation precautions for identified infection/condition  Outcome: Progressing  Goal: Absence of fever/infection during neutropenic period  Description  INTERVENTIONS:  - Monitor WBC    Outcome: Progressing     Problem: Nutrition/Hydration-ADULT  Goal: Nutrient/Hydration intake appropriate for improving, restoring or maintaining nutritional needs  Description  Monitor and assess patient's nutrition/hydration status for malnutrition  Collaborate with interdisciplinary team and initiate plan and interventions as ordered  Monitor patient's weight and dietary intake as ordered or per policy  Utilize nutrition screening tool and intervene as necessary  Determine patient's food preferences and provide high-protein, high-caloric foods as appropriate       INTERVENTIONS:  - Monitor oral intake, urinary output, labs, and treatment plans  - Assess nutrition and hydration status and recommend course of action  - Evaluate amount of meals eaten  - Assist patient with eating if necessary   - Allow adequate time for meals  - Recommend/ encourage appropriate diets, oral nutritional supplements, and vitamin/mineral supplements  - Order, calculate, and assess calorie counts as needed  - Recommend, monitor, and adjust tube feedings and TPN/PPN based on assessed needs  - Assess need for intravenous fluids  - Provide specific nutrition/hydration education as appropriate  - Include patient/family/caregiver in decisions related to nutrition  Outcome: Progressing     Problem: HEMATOLOGIC - ADULT  Goal: Maintains hematologic stability  Description  INTERVENTIONS  - Assess for signs and symptoms of bleeding or hemorrhage  - Monitor labs  - Administer supportive blood products/factors as ordered and appropriate  Outcome: Progressing     Problem: MUSCULOSKELETAL - ADULT  Goal: Maintain or return mobility to safest level of function  Description  INTERVENTIONS:  - Assess patient's ability to carry out ADLs; assess patient's baseline for ADL function and identify physical deficits which impact ability to perform ADLs (bathing, care of mouth/teeth, toileting, grooming, dressing, etc )  - Assess/evaluate cause of self-care deficits   - Assess range of motion  - Assess patient's mobility  - Assess patient's need for assistive devices and provide as appropriate  - Encourage maximum independence but intervene and supervise when necessary  - Involve family in performance of ADLs  - Assess for home care needs following discharge   - Consider OT consult to assist with ADL evaluation and planning for discharge  - Provide patient education as appropriate  Outcome: Progressing

## 2020-07-26 NOTE — ASSESSMENT & PLAN NOTE
Lab Results   Component Value Date    HGBA1C 7 1 (H) 07/22/2020       · Controlled for patient's age as evidenced by A1c  · Glipizide held  · Increase Lantus to 8 units in am  · Continue SSI  · Monitor blood sugars and adjust insulin as needed

## 2020-07-26 NOTE — ASSESSMENT & PLAN NOTE
· Baseline creatinine in the high 2s to low 3s  · Nephrology consulted in view of requirement for angiogram  · Creatinine at baseline 48 hours post angiogram  · Lasix held - ct to hold for repeat endovascular attempt  · Avoid hypotension, nephrotoxic medications

## 2020-07-26 NOTE — ASSESSMENT & PLAN NOTE
· Baseline creatinine in the high 2s to low 3s  · Nephrology consulted in view of requirement for angiogram  · Creatinine at baseline post angiogram  · IV fluids discontinued  · Lasix held   · Avoid hypotension, nephrotoxic medications

## 2020-07-26 NOTE — ASSESSMENT & PLAN NOTE
· Polymicrobial wound cultures positive for MRSA, Klebsiella, Enterococcus - unclear which is pathogen  · On Vancomycin, cefazolin   · MRI ordered by Podiatry  · Possible surgical intervention after left lower extremity vascular optimization

## 2020-07-26 NOTE — ASSESSMENT & PLAN NOTE
· LEADS - RLE - LILIAN 1 2/88/52, 50-75% stenosis of prox-mid SFA, >75% stenosis in proximal calf bypass graft, high grade stenosis vs occlusion of distal SFA artery/stent; LLE - LILIAN 0 78/86/32 with occlusion of the distal SFA and above knee popliteal artery/stent    · Left lower extremity angiogram today - completely occluded above-the-knee to below-the-knee bypass - likely occluded for a long time  · Not a candidate for 3rd time redo bypass due to age, co-morbidities and his wishes  · Options discussed with daughter by vascular - she and patient opted for another endovascular attempt  · Nephrology following in view of CKD 4  · Coumadin held - on heparin drip for potential intervention  · Continue Plavix, statin

## 2020-07-26 NOTE — ASSESSMENT & PLAN NOTE
· LEADS - RLE - LILIAN 1 2/88/52, 50-75% stenosis of prox-mid SFA, >75% stenosis in proximal calf bypass graft, high grade stenosis vs occlusion of distal SFA artery/stent; LLE - LILIAN 0 78/86/32 with occlusion of the distal SFA and above knee popliteal artery/stent    · Left lower extremity angiogram today - completely occluded above-the-knee to below-the-knee bypass - likely occluded for a long time  · Options discussed with daughter by vascular - she will discuss with him and inform vascular about decision  · Nephrology following in view of CKD 4  · Coumadin held - on heparin drip for potential intervention  · Continue Plavix, statin

## 2020-07-27 ENCOUNTER — APPOINTMENT (INPATIENT)
Dept: RADIOLOGY | Facility: HOSPITAL | Age: 85
DRG: 540 | End: 2020-07-27
Payer: MEDICARE

## 2020-07-27 PROBLEM — N25.81 SECONDARY HYPERPARATHYROIDISM OF RENAL ORIGIN (HCC): Status: ACTIVE | Noted: 2020-07-27

## 2020-07-27 LAB
ANION GAP SERPL CALCULATED.3IONS-SCNC: 8 MMOL/L (ref 4–13)
APTT PPP: 66 SECONDS (ref 23–37)
APTT PPP: 70 SECONDS (ref 23–37)
BUN SERPL-MCNC: 43 MG/DL (ref 5–25)
CALCIUM SERPL-MCNC: 8.3 MG/DL (ref 8.3–10.1)
CHLORIDE SERPL-SCNC: 105 MMOL/L (ref 100–108)
CO2 SERPL-SCNC: 21 MMOL/L (ref 21–32)
CREAT SERPL-MCNC: 2.77 MG/DL (ref 0.6–1.3)
GFR SERPL CREATININE-BSD FRML MDRD: 19 ML/MIN/1.73SQ M
GLUCOSE SERPL-MCNC: 126 MG/DL (ref 65–140)
GLUCOSE SERPL-MCNC: 142 MG/DL (ref 65–140)
GLUCOSE SERPL-MCNC: 156 MG/DL (ref 65–140)
GLUCOSE SERPL-MCNC: 181 MG/DL (ref 65–140)
GLUCOSE SERPL-MCNC: 224 MG/DL (ref 65–140)
POTASSIUM SERPL-SCNC: 4.8 MMOL/L (ref 3.5–5.3)
SODIUM SERPL-SCNC: 134 MMOL/L (ref 136–145)
VANCOMYCIN TROUGH SERPL-MCNC: 27.9 UG/ML (ref 10–20)

## 2020-07-27 PROCEDURE — 99232 SBSQ HOSP IP/OBS MODERATE 35: CPT | Performed by: SURGERY

## 2020-07-27 PROCEDURE — 99232 SBSQ HOSP IP/OBS MODERATE 35: CPT | Performed by: INTERNAL MEDICINE

## 2020-07-27 PROCEDURE — 85730 THROMBOPLASTIN TIME PARTIAL: CPT | Performed by: INTERNAL MEDICINE

## 2020-07-27 PROCEDURE — 80202 ASSAY OF VANCOMYCIN: CPT | Performed by: INTERNAL MEDICINE

## 2020-07-27 PROCEDURE — 99222 1ST HOSP IP/OBS MODERATE 55: CPT | Performed by: NURSE PRACTITIONER

## 2020-07-27 PROCEDURE — 80048 BASIC METABOLIC PNL TOTAL CA: CPT | Performed by: PHYSICIAN ASSISTANT

## 2020-07-27 PROCEDURE — 82948 REAGENT STRIP/BLOOD GLUCOSE: CPT

## 2020-07-27 PROCEDURE — 73721 MRI JNT OF LWR EXTRE W/O DYE: CPT

## 2020-07-27 PROCEDURE — 99233 SBSQ HOSP IP/OBS HIGH 50: CPT | Performed by: INTERNAL MEDICINE

## 2020-07-27 RX ORDER — VANCOMYCIN HYDROCHLORIDE 500 MG/100ML
500 INJECTION, SOLUTION INTRAVENOUS EVERY 24 HOURS
Status: DISCONTINUED | OUTPATIENT
Start: 2020-07-28 | End: 2020-07-27

## 2020-07-27 RX ORDER — DIPHENHYDRAMINE HYDROCHLORIDE, ZINC ACETATE 2; .1 G/100G; G/100G
CREAM TOPICAL 3 TIMES DAILY PRN
Status: DISCONTINUED | OUTPATIENT
Start: 2020-07-27 | End: 2020-08-07 | Stop reason: HOSPADM

## 2020-07-27 RX ORDER — VANCOMYCIN HYDROCHLORIDE 500 MG/100ML
500 INJECTION, SOLUTION INTRAVENOUS DAILY PRN
Status: DISCONTINUED | OUTPATIENT
Start: 2020-07-28 | End: 2020-07-29

## 2020-07-27 RX ADMIN — SODIUM BICARBONATE 650 MG TABLET 650 MG: at 16:49

## 2020-07-27 RX ADMIN — CEFAZOLIN SODIUM 1000 MG: 1 SOLUTION INTRAVENOUS at 04:30

## 2020-07-27 RX ADMIN — FINASTERIDE 5 MG: 5 TABLET, FILM COATED ORAL at 08:15

## 2020-07-27 RX ADMIN — INSULIN GLARGINE 8 UNITS: 100 INJECTION, SOLUTION SUBCUTANEOUS at 08:15

## 2020-07-27 RX ADMIN — SODIUM BICARBONATE 650 MG TABLET 650 MG: at 08:16

## 2020-07-27 RX ADMIN — MEMANTINE 5 MG: 5 TABLET ORAL at 08:16

## 2020-07-27 RX ADMIN — INSULIN LISPRO 1 UNITS: 100 INJECTION, SOLUTION INTRAVENOUS; SUBCUTANEOUS at 11:55

## 2020-07-27 RX ADMIN — HEPARIN SODIUM AND DEXTROSE 11 UNITS/KG/HR: 10000; 5 INJECTION INTRAVENOUS at 04:30

## 2020-07-27 RX ADMIN — CEFAZOLIN SODIUM 1000 MG: 1 SOLUTION INTRAVENOUS at 21:54

## 2020-07-27 RX ADMIN — PRAVASTATIN SODIUM 40 MG: 20 TABLET ORAL at 16:48

## 2020-07-27 RX ADMIN — FLUOXETINE 40 MG: 20 CAPSULE ORAL at 08:15

## 2020-07-27 RX ADMIN — CLOPIDOGREL BISULFATE 75 MG: 75 TABLET ORAL at 08:14

## 2020-07-27 RX ADMIN — TAMSULOSIN HYDROCHLORIDE 0.4 MG: 0.4 CAPSULE ORAL at 16:48

## 2020-07-27 RX ADMIN — CALCITRIOL 0.25 MCG: 0.25 CAPSULE, LIQUID FILLED ORAL at 08:14

## 2020-07-27 RX ADMIN — INSULIN LISPRO 1 UNITS: 100 INJECTION, SOLUTION INTRAVENOUS; SUBCUTANEOUS at 16:49

## 2020-07-27 RX ADMIN — VANCOMYCIN HYDROCHLORIDE 750 MG: 750 INJECTION, SOLUTION INTRAVENOUS at 00:27

## 2020-07-27 RX ADMIN — CEFAZOLIN SODIUM 1000 MG: 1 SOLUTION INTRAVENOUS at 11:56

## 2020-07-27 RX ADMIN — AMLODIPINE BESYLATE 5 MG: 5 TABLET ORAL at 08:14

## 2020-07-27 RX ADMIN — INSULIN LISPRO 2 UNITS: 100 INJECTION, SOLUTION INTRAVENOUS; SUBCUTANEOUS at 21:53

## 2020-07-27 RX ADMIN — FERROUS SULFATE TAB 325 MG (65 MG ELEMENTAL FE) 325 MG: 325 (65 FE) TAB at 08:14

## 2020-07-27 NOTE — PROGRESS NOTES
Vascular Surgery    Progress Note - Susan Cooney 6/28/1930, 80 y o  male MRN: [id number]    Unit/Bed#: -01 Encounter: [id number]    Primary Care Provider: Farzad Green MD   Date and time admitted to hospital: 7/21/2020 12:32 PM    Peripheral arterial disease [facility])  Assessment & Plan  79 yo M w/ PAD and hx of B fem-PT bypasses ([facility], '12 and '14 c/b L occlusion and s/p L SFA-PT bypass w/ cadaver vein in '15 ([place]) presents with persistent L heel and plantar midfoot wound and infection     LEADs:  R: 1 2/88/52 w/ >75% stenosis of the proximal calf bypass graft; L: 0 78/86/32 w/ low flow at the distal anastomosis    S/P Agram LLE 7/24- Chronically occluded bypass graft    Plan:  Plan for repeat attempt at endovascular revascularization w/ anesthesia  Will need nephrology clearance and preparation prior  Continue Heparin gtt  Coumadin held (Afib)            Subjective:  No events overnight    Vitals:  /65   Pulse 70   Temp 98 1 °F (36 7 °C)   Resp 20   Ht 6' (1 829 m)   Wt 89 8 kg (198 lb)   SpO2 96%   BMI 26 85 kg/m²     I/Os:  I/O last 3 completed shifts: In: 1188 [P O :958; I V :130; IV Piggyback:100]  Out: 1650 [Urine:1650]  No intake/output data recorded  Lab Results and Cultures:   Lab Results   Component Value Date    WBC 6 83 07/25/2020    HGB 8 6 (L) 07/26/2020    HCT 26 9 (L) 07/26/2020    MCV 93 07/25/2020     07/25/2020     Lab Results   Component Value Date    GLUCOSE 174 (H) 07/06/2017    CALCIUM 7 4 (L) 07/26/2020     09/21/2015    K 4 7 07/26/2020    CO2 22 07/26/2020     07/26/2020    BUN 43 (H) 07/26/2020    CREATININE 2 85 (H) 07/26/2020     Lab Results   Component Value Date    INR 1 46 (H) 07/25/2020    INR 1 73 (H) 07/24/2020    INR 1 76 (H) 07/23/2020    PROTIME 17 7 (H) 07/25/2020    PROTIME 20 2 (H) 07/24/2020    PROTIME 20 5 (H) 07/23/2020        Blood Culture:   Lab Results   Component Value Date    BLOODCX No Growth After 5 Days  07/21/2020    BLOODCX No Growth After 5 Days  07/21/2020   ,   Urinalysis:   Lab Results   Component Value Date    COLORU Yellow 07/21/2020    COLORU Yellow 07/18/2017    CLARITYU Clear 07/21/2020    CLARITYU Transparent 07/18/2017    SPECGRAV 1 017 07/21/2020    SPECGRAV 1 010 07/18/2017    PHUR 6 0 07/21/2020    PHUR 6 0 10/09/2018    PHUR 6 0 07/18/2017    LEUKOCYTESUR (A) 07/21/2020     Elevated glucose may cause decreased leukocyte values   See urine microscopic for [facility] result/    LEUKOCYTESUR NEG 07/18/2017    NITRITE Negative 07/21/2020    NITRITE NEG 07/18/2017    PROTEINUA +30 07/18/2017    GLUCOSEU >=1000 (1%) (A) 07/21/2020    GLUCOSEU 100 (1/10%) (A) 05/28/2015    KETONESU Negative 07/21/2020    KETONESU NEG 07/18/2017    BILIRUBINUR Negative 07/21/2020    BILIRUBINUR NEG 07/18/2017    BLOODU Small (A) 07/21/2020    BLOODU NEG 07/18/2017   ,   Urine Culture:   Lab Results   Component Value Date    URINECX 40,000-49,000 cfu/ml Escherichia coli (A) 07/08/2020   ,   Wound Culure: No results found for: WOUNDCULT    Medications:  Current Facility-Administered Medications   Medication Dose Route Frequency    acetaminophen (TYLENOL) tablet 650 mg  650 mg Oral Q6H PRN    amLODIPine (NORVASC) tablet 5 mg  5 mg Oral Daily    calcitriol (ROCALTROL) capsule 0 25 mcg  0 25 mcg Oral Once per day on Mon Wed Fri    ceFAZolin (ANCEF) IVPB (premix) 1,000 mg 50 mL  1,000 mg Intravenous Q8H    clopidogrel (PLAVIX) tablet 75 mg  75 mg Oral Daily    ferrous sulfate tablet 325 mg  325 mg Oral Daily With Breakfast    finasteride (PROSCAR) tablet 5 mg  5 mg Oral Daily    FLUoxetine (PROzac) capsule 40 mg  40 mg Oral Daily    heparin (porcine) 25,000 units in 250 mL infusion (premix)  3-30 Units/kg/hr (Order-Specific) Intravenous Titrated    heparin (porcine) injection 3,400 Units  3,400 Units Intravenous Q1H PRN    heparin (porcine) injection 6,800 Units  6,800 Units Intravenous Q1H PRN    insulin glargine (LANTUS) subcutaneous injection 8 Units 0 08 mL  8 Units Subcutaneous QAM    insulin lispro (HumaLOG) 100 units/mL subcutaneous injection 1-6 Units  1-6 Units Subcutaneous 4x Daily (AC & HS)    memantine (NAMENDA) tablet 5 mg  5 mg Oral Daily    ondansetron (ZOFRAN) injection 4 mg  4 mg Intravenous Q4H PRN    pravastatin (PRAVACHOL) tablet 40 mg  40 mg Oral Daily With Dinner    sodium bicarbonate tablet 650 mg  650 mg Oral BID after meals    tamsulosin (FLOMAX) capsule 0 4 mg  0 4 mg Oral Daily With Dinner    vancomycin (VANCOCIN) IVPB (premix) 750 mg 150 mL  750 mg Intravenous Q24H       Physical Exam:    General appearance: alert and oriented, in no acute distress  Lungs: clear to auscultation bilaterally  Heart: regular rate and rhythm, S1, S2 normal, no murmur, click, rub or gallop  Abdomen: soft, non-tender; bowel sounds normal; no masses,  no organomegaly  Extremities: Feet warm, M/S intact, Left foot wrapped    Wound/Incision:  Right groin puncture incision clean, dry, and intact    Pulse exam:  Peroneal: Left: doppler signal  PT: Left: doppler signal      Pedro Marquez PA-C  7/27/2020

## 2020-07-27 NOTE — ASSESSMENT & PLAN NOTE
· LEADS - RLE - LILIAN 1 2/88/52, 50-75% stenosis of prox-mid SFA, >75% stenosis in proximal calf bypass graft, high grade stenosis vs occlusion of distal SFA artery/stent; LLE - LILIAN 0 78/86/32 with occlusion of the distal SFA and above knee popliteal artery/stent    · Left lower extremity angiogram - completely occluded above-the-knee to below-the-knee bypass - likely occluded for a long time  · Not a candidate for 3rd time redo bypass due to age, co-morbidities and his wishes  · Options discussed with daughter by vascular - she and patient opted for another endovascular attempt  · Nephrology following in view of CKD 4  · Coumadin held - on heparin drip for potential intervention  · Continue Plavix, statin

## 2020-07-27 NOTE — PROGRESS NOTES
NEPHROLOGY PROGRESS NOTE   Melvi Friedman 80 y o  male MRN: [id number]  Unit/Bed#: -01 Encounter: [id number]      ASSESSMENT/PLAN:  1  CKD IV: baseline creatinine high 2's to low 3's and follows with Dr Leo Cooper  Creatinine slowly trending up yesterday to 2 8 which could be related to elevated vanco level (27 9 today)   · Check today's BMP   · Had another dose of vanco this am and then dose was lowered for tomorrow--would recommend repeating vanco level tomorrow and holding tomorrow's dose if supratherapeutic   · Needs another intravascular procedure--vascular discussing timing with IR  · Discussed with patient risk of worsening renal function possibly needing dialysis with dye procedures  · Continue to hold home lasix for now   · Check am BMP   2  PAD with nonhealing LLE ulcer: s/p CO2 agram with only 30cc IV dye on 7/24 with occluded below the knee bypass which is likely chronic  On vancomycin per pharmacy   3  Hypertension: BP acceptable   4  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   5  Anemia of CKD + iron deficiency: continue oral iron therapy    Plan Summary:    Discussed with pharmacy--will check random vanco level tomorrow and hold am dose until level is resulted    Check today's BMP    Will need gentle IVF prior to vascular intervention--previously tolerated 60cc/h  Awaiting plan for timing of procedure     SUBJECTIVE:  Feeling tired  No chest pain or shortness of breath       OBJECTIVE:  Current Weight: Weight - Scale: 89 8 kg (198 lb)  Vitals:    07/27/20 0706   BP: 132/65   Pulse: 70   Resp: 20   Temp: 98 1 °F (36 7 °C)   SpO2: 96%       Intake/Output Summary (Last 24 hours) at 7/27/2020 1007  Last data filed at 7/27/2020 6616  Gross per 24 hour   Intake 1020 ml   Output 900 ml   Net 120 ml       General:  No acute distress  Skin:  No rash  Eyes:  Sclerae anicteric  ENT:  Moist mucous membranes  Neck:  Trachea midline with no JVD  Chest:  Clear to auscultation bilaterally  CVS:  Regular rate and rhythm  Abdomen:  Soft, nontender, nondistended  Extremities:  + LE edema L>R   Neuro:  Awake and alert  Psych:  Appropriate affect        Medications:  Scheduled Meds:  Current Facility-Administered Medications:  acetaminophen 650 mg Oral Q6H PRN Sara Luna MD    amLODIPine 5 mg Oral Daily COLLEEN Chappell    calcitriol 0 25 mcg Oral Once per day on Mon Wed Fri Mary Chapman MD    cefazolin 1,000 mg Intravenous Q8H Cele Sanz MD Last Rate: 1,000 mg (07/27/20 0430)   clopidogrel 75 mg Oral Daily Sara Luna MD    ferrous sulfate 325 mg Oral Daily With Breakfast COLLEEN Chappell    finasteride 5 mg Oral Daily Sara Luna MD    FLUoxetine 40 mg Oral Daily Sara Luna MD    heparin (porcine) 3-30 Units/kg/hr (Order-Specific) Intravenous Titrated Roxianne Devon, PA-C Last Rate: 11 Units/kg/hr (07/27/20 0430)   heparin (porcine) 3,400 Units Intravenous Q1H PRN Judieianemily Osorio, PA-C    heparin (porcine) 6,800 Units Intravenous Q1H PRN Roxianemily Osorio, PA-C    insulin glargine 8 Units Subcutaneous QAM Samuel Ferris MD    insulin lispro 1-6 Units Subcutaneous 4x Daily (AC & HS) Sara Luna MD    memantine 5 mg Oral Daily Sara Luna MD    ondansetron 4 mg Intravenous Q4H PRN Sara Luna MD    pravastatin 40 mg Oral Daily With Michael Douglas MD    sodium bicarbonate 650 mg Oral BID after meals Saar Luna MD    tamsulosin 0 4 mg Oral Daily With Michael Douglas MD    Syliva Linea ON 7/28/2020] vancomycin 500 mg Intravenous Q24H Samuel Ferris MD        PRN Meds:   acetaminophen    heparin (porcine)    heparin (porcine)    ondansetron    Continuous Infusions:  heparin (porcine) 3-30 Units/kg/hr (Order-Specific) Last Rate: 11 Units/kg/hr (07/27/20 0430)       Laboratory Results:  Results from last 7 days   Lab Units 07/26/20  0823 07/25/20  0816 07/24/20  0451 07/23/20  0536 07/21/20  1337   WBC Thousand/uL  --  6 83 10 08 14 17* 9 98   HEMOGLOBIN g/dL 8 6* 8 8* 7  3* 7 7* 7 7*   HEMATOCRIT % 26 9* 28 2* 24 1* 25 7* 26 0*   PLATELETS Thousands/uL  --  312 317 286 271   SODIUM mmol/L 135* 132* 133* 135* 136   POTASSIUM mmol/L 4 7 4 5 4 5 4 8 4 2   CHLORIDE mmol/L 105 104 103 105 107   CO2 mmol/L 22 20* 22 22 22   BUN mg/dL 43* 43* 47* 39* 43*   CREATININE mg/dL 2 85* 2 68* 3 05* 2 81* 2 95*   CALCIUM mg/dL 7 4* 7 4* 8 0* 8 0* 7 4*   MAGNESIUM mg/dL  --   --  2 1  --   --    PHOSPHORUS mg/dL  --   --  3 8  --   --

## 2020-07-27 NOTE — PLAN OF CARE
Problem: Potential for Falls  Goal: Patient will remain free of falls  Description  INTERVENTIONS:  - Assess patient frequently for physical needs  -  Identify cognitive and physical deficits and behaviors that affect risk of falls    -  [place] fall precautions as indicated by assessment   - Educate patient/family on patient safety including physical limitations  - Instruct patient to call for assistance with activity based on assessment  - Modify environment to reduce risk of injury  - Consider OT/PT consult to assist with strengthening/mobility  Outcome: Progressing     Problem: PAIN - ADULT  Goal: Verbalizes/displays adequate comfort level or baseline comfort level  Description  Interventions:  - Encourage patient to monitor pain and request assistance  - Assess pain using appropriate pain scale  - Administer analgesics based on type and severity of pain and evaluate response  - Implement non-pharmacological measures as appropriate and evaluate response  - Consider cultural and social influences on pain and pain management  - Notify physician/advanced practitioner if interventions unsuccessful or patient reports new pain  Outcome: Progressing     Problem: INFECTION - ADULT  Goal: Absence or prevention of progression during hospitalization  Description  INTERVENTIONS:  - Assess and monitor for signs and symptoms of infection  - Monitor lab/diagnostic results  - Monitor all insertion sites, i e  indwelling lines, tubes, and drains  - Monitor endotracheal if appropriate and nasal secretions for changes in amount and color  - [place] appropriate cooling/warming therapies per order  - Administer medications as ordered  - Instruct and encourage patient and family to use good hand hygiene technique  - Identify and instruct in appropriate isolation precautions for identified infection/condition  Outcome: Progressing  Goal: Absence of fever/infection during neutropenic period  Description  INTERVENTIONS:  - Monitor WBC    Outcome: Progressing     Problem: Nutrition/Hydration-ADULT  Goal: Nutrient/Hydration intake appropriate for improving, restoring or maintaining nutritional needs  Description  Monitor and assess patient's nutrition/hydration status for malnutrition  Collaborate with interdisciplinary team and initiate plan and interventions as ordered  Monitor patient's weight and dietary intake as ordered or per policy  Utilize nutrition screening tool and intervene as necessary  Determine patient's food preferences and provide high-protein, high-caloric foods as appropriate       INTERVENTIONS:  - Monitor oral intake, urinary output, labs, and treatment plans  - Assess nutrition and hydration status and recommend course of action  - Evaluate amount of meals eaten  - Assist patient with eating if necessary   - Allow adequate time for meals  - Recommend/ encourage appropriate diets, oral nutritional supplements, and vitamin/mineral supplements  - Order, calculate, and assess calorie counts as needed  - Recommend, monitor, and adjust tube feedings and TPN/PPN based on assessed needs  - Assess need for intravenous fluids  - Provide specific nutrition/hydration education as appropriate  - Include patient/family/caregiver in decisions related to nutrition  Outcome: Progressing     Problem: HEMATOLOGIC - ADULT  Goal: Maintains hematologic stability  Description  INTERVENTIONS  - Assess for signs and symptoms of bleeding or hemorrhage  - Monitor labs  - Administer supportive blood products/factors as ordered and appropriate  Outcome: Progressing     Problem: MUSCULOSKELETAL - ADULT  Goal: Maintain or return mobility to safest level of function  Description  INTERVENTIONS:  - Assess patient's ability to carry out ADLs; assess patient's baseline for ADL function and identify physical deficits which impact ability to perform ADLs (bathing, care of mouth/teeth, toileting, grooming, dressing, etc )  - Assess/evaluate cause of self-care deficits   - Assess range of motion  - Assess patient's mobility  - Assess patient's need for assistive devices and provide as appropriate  - Encourage maximum independence but intervene and supervise when necessary  - Involve family in performance of ADLs  - Assess for home care needs following discharge   - Consider OT consult to assist with ADL evaluation and planning for discharge  - Provide patient education as appropriate  Outcome: Progressing

## 2020-07-27 NOTE — PROGRESS NOTES
Progress Note - Podiatry  Elda Daniels 80 y o  male MRN: [id number]  Unit/Bed#: -72 Encounter: [id number]    Assessment:  Elda Daniels is a 80 y o  male with:     1  Left foot heel wound w/ MRI suspicious for calcaneal OM - Garcia 3 - POA  2  Left foot submetatarsal 5 wound - Garcia 1 - POA  3  PAD  - s/p L fem-PT (2012, 2014) & SFA-PT (2015) bypass  - s/p agram LLE (DOS 7/24/20) - noted with chronically occluded bypass graft   4  CKD Stage IV  5  DM2    Plan:  - Left foot MRI reviewed: reveals findings suspicious for mild OM of calcaneus however less promient compared to Feb 2020 MR  Discussed case with Dr Patricia Rendon over phone, podiatry will continue [facility] until final vascular surgery plan/patient decision on revasc vs palliative care (no podiatric plan for debridement at this time as perfusion needs to be improved prior to such)  - Abx per ID: vancomycin and cefazolin  Reviewed note; if no plans for surgical resection, then rec po doxycycline as suppressive therapy   - Vascular surgery note reviewed: not a candidate for redo bypass (3rd), patient would like to try endovascular intervention however palliative care consulted for further discussion  - WBAT heel unloading shoe  - Rest of care per primary and consulting teams      Subjective/Objective   Chief Complaint:   Chief Complaint   Patient presents with    Wound Infection     Per pt's family member, pt is to be admitted to the hospital because the wound on his foot needs an Abx that requires his PTT to be monitored  Subjective: 80 y o  male was seen and evaluated at bedside  Denies foot pain  Blood pressure 132/65, pulse 70, temperature 98 1 °F (36 7 °C), resp  rate 20, height 6' (1 829 m), weight 89 8 kg (198 lb), SpO2 96 %  Body mass index is 26 85 kg/m²        Physical Exam:   General: Alert, cooperative and no distress  Lower Extremities: Neurovascular status at baseline bilaterally, musculoskeletal function at baseline bilaterally, no calf tenderness bilaterally  Left submet 5 ulceration and heel ulcerations appear baseline and stable (see below)  Wound #: 1  Location: left heel, plantar  Length 0 5cm: Width 0 5cm: Depth 2 0cm:   Deepest Tissue Noted in Base: periosteum, sinus track deep  Probe to Bone: unsure, deep (possibly bone vs periosteum)  Peripheral Skin Description: Attached  Granulation: 10% Fibrotic Tissue: 90% Necrotic Tissue: 0%   Drainage Amount: minimal, serous   No signs of active infection: no purulence, no malodor, no ascending erythema, no crepitus, no fluctuance  Wound #:   Location: submetatarsal 5  Length 1 0cm: Width 0 5cm: Depth 0 1cm:   Deepest Tissue Noted in Base: subcutaneous   Probe to Bone: No  Peripheral Skin Description: Attached  Granulation: 40% Fibrotic Tissue: 60% Necrotic Tissue: 0%   Drainage Amount: minimal, serous  No signs of active infection: no purulence, no malodor, no ascending erythema, no crepitus, no fluctuance  Lab, Imaging and other studies:   Results from last 7 days   Lab Units 07/26/20  0823 07/25/20  0816  07/21/20  1337   WBC Thousand/uL  --  6 83   < > 9 98   HEMOGLOBIN g/dL 8 6* 8 8*   < > 7 7*   HEMATOCRIT % 26 9* 28 2*   < > 26 0*   PLATELETS Thousands/uL  --  312   < > 271   NEUTROS PCT %  --   --   --  72   LYMPHS PCT %  --   --   --  14   MONOS PCT %  --   --   --  11   EOS PCT %  --   --   --  2    < > = values in this interval not displayed  Results from last 7 days   Lab Units 07/27/20  1057  07/21/20  1337   POTASSIUM mmol/L 4 8   < > 4 2   CHLORIDE mmol/L 105   < > 107   CO2 mmol/L 21   < > 22   BUN mg/dL 43*   < > 43*   CREATININE mg/dL 2 77*   < > 2 95*   CALCIUM mg/dL 8 3   < > 7 4*   ALK PHOS U/L  --   --  296*   ALT U/L  --   --  12   AST U/L  --   --  8    < > = values in this interval not displayed       Results from last 7 days   Lab Units 07/25/20  0816   INR  1 46*       Results from last 7 days   Lab Units 07/21/20  1337   BLOOD CULTURE  No Growth After 5 Days  No Growth After 5 Days  Portions of the record may have been created with voice recognition software  Occasional wrong word or "sound a like" substitutions may have occurred due to the inherent limitations of voice recognition software  Read the chart carefully and recognize, using context, where substitutions have occurred      Bronwyn Reina DPM

## 2020-07-27 NOTE — PROGRESS NOTES
Progress Note - Infectious Disease   Brook Eisenmenger 80 y o  male MRN: [id number]  Unit/Bed#: -01 Encounter: [id number]      Impression/Plan:  1  Left calcaneal osteomyelitis-wound culture is polymicrobial but unclear which of the organism is a pathogen as this was not a deep operative culture   The cultures grew MRSA, Klebsiella, and Enterococcus at an outside lab  Klebsiella is sensitive to cephalosporins but resistant to ampicillin  Fortunately the patient is not systemically ill, is hemodynamically stable, nontoxic  MRI is suggestive of mild calcaneal osteomyelitis  Patient is refusing amputation at this time               -continue vancomycin and cefazolin for now awaiting final operative plans  -pharmacy follow-up for vancomycin trough management  -local wound care  -followup further recommendations by Podiatry  -if no plan for any resect of surgery, the likelihood of any cure with medical therapy alone would be extraordinarily low and the risk of renal toxicity would be relatively high  Therefore would favor an attempt at oral suppressive antibiotics with doxycycline rather than any prolonged course of intravenous antibiotics      2  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-the heel wound is been present for about 2 years and the metatarsal ulceration over the last few months   -local wound care  -close podiatry follow-up     3  Chronic kidney disease-stage IV   The renal function is quite abnormal and a bit worse   -dose adjusted antibiotics as above  -recheck BMP  -volume management     4  Diabetes mellitus type 2-with hyperglycemia   Very poorly controlled   -tighten diabetic control to improve leukocyte function     5  Peripheral arterial disease-patient now with continued abnormal leads  Now status post angiogram with chronic occlusion of LLE bypass  Vascular input noted  Patient is not a candidate for redo bypass    Patient is refusing amputation   -vascular follow-up ongoing        Antibiotics:  Vancomycin 7  Cefazolin 6      Subjective:  Patient has no focal complaints  Denies pain, fevers, chills  Objective:  Vitals:  Temp:  [97 6 °F (36 4 °C)-98 1 °F (36 7 °C)] 98 1 °F (36 7 °C)  HR:  [70-76] 70  Resp:  [18-20] 20  BP: (130-137)/(62-73) 132/65  SpO2:  [96 %-100 %] 96 %  Temp (24hrs), Av 8 °F (36 6 °C), Min:97 6 °F (36 4 °C), Max:98 1 °F (36 7 °C)  Current: Temperature: 98 1 °F (36 7 °C)    Physical Exam:   General:  No acute distress, elderly and debilitated  HEENT:  Atraumatic normocephalic  Psychiatric:  Awake and alert  Pulmonary:  Normal respiratory excursion without accessory muscle use  Abdomen:  Soft, nontender  Extremities:  Mild edema  Foot dressing intact with no ascending erythema  Skin:  No rashes  Neuro: Moves all extremities spontaneously    Lab Results:  I have personally reviewed pertinent labs  Results from last 7 days   Lab Units 20  0823 20  0816 20  0451  20  1337   POTASSIUM mmol/L 4 7 4 5 4 5   < > 4 2   CHLORIDE mmol/L 105 104 103   < > 107   CO2 mmol/L 22 20* 22   < > 22   BUN mg/dL 43* 43* 47*   < > 43*   CREATININE mg/dL 2 85* 2 68* 3 05*   < > 2 95*   EGFR ml/min/1 73sq m 19 20 17   < > 18   CALCIUM mg/dL 7 4* 7 4* 8 0*   < > 7 4*   AST U/L  --   --   --   --  8   ALT U/L  --   --   --   --  12   ALK PHOS U/L  --   --   --   --  296*    < > = values in this interval not displayed  Results from last 7 days   Lab Units 20  0823 20  0816 20  0451 20  0536   WBC Thousand/uL  --  6 83 10 08 14 17*   HEMOGLOBIN g/dL 8 6* 8 8* 7 3* 7 7*   PLATELETS Thousands/uL  --  312 317 286     Results from last 7 days   Lab Units 20  1337   BLOOD CULTURE  No Growth After 5 Days  No Growth After 5 Days  Imaging Studies:   I have personally reviewed pertinent imaging study reports and images in PACS      MRI of left foot shows trace plantar calcaneal subcortical edema suspicious for mild osteomyelitis  No abscess  EKG, Pathology, and Other Studies:   I have personally reviewed pertinent reports

## 2020-07-27 NOTE — ASSESSMENT & PLAN NOTE
81 yo M w/ PAD and hx of B fem-PT bypasses ([facility], '12 and '14 c/b L occlusion and s/p L SFA-PT bypass w/ cadaver vein in '15 ([place]) presents with persistent L heel and plantar midfoot wound and infection     LEADs:  R: 1 2/88/52 w/ >75% stenosis of the proximal calf bypass graft; L: 0 78/86/32 w/ low flow at the distal anastomosis    S/P Agram LLE 7/24- Chronically occluded bypass graft    Plan:  Plan for repeat attempt at endovascular revascularization w/ anesthesia  Will need nephrology clearance and preparation prior  Continue Heparin gtt  Coumadin held (Afib)

## 2020-07-27 NOTE — ASSESSMENT & PLAN NOTE
· Baseline creatinine in the high 2s to low 3s  · Nephrology consulted in view of requirement for angiogram  · Creatinine at baseline   · Lasix held - ct to hold for repeat endovascular attempt  · Avoid hypotension, nephrotoxic medications

## 2020-07-27 NOTE — PROGRESS NOTES
Vancomycin Pharmacy Consult     William Lainez is an 80 y o  male who is currently receiving IV vancomycin for probable left foot OM  Vancomycin Assessment:  1  ID Consult: Yes  2  Cultures:   ·  Urine: 40-49K E  Coli  ·  Blood 2/: NG  ·  Wound Left Foot: no result  3  Procalcitonin:   · n/a  4  Renal Function: unstable (baseline Scr: high 2s - low 3s)  · SCr: 2 85  · CrCl: 18 mL/min  · UOP: 0 4 mL/kg/hr  5  Days of Therapy: 7  6  Current Dose: 750 mg IV q24h  7  Last Level:  9 (trough  at 0026)  8  Goal AUC(24h): 400-600  9  Goal Random/Trough: 15-20     Vancomycin Plan:  1  Evaluation: renal function unstable and patient to get dye today, thus, will dose by level  2  New Dosin mg IV prn level <20 (to start )  · Predicted Trough / AUC(24h): dose by level  3  Next Level: random  at [facility] will continue to follow closely for s/sx of nephrotoxicity, infusion reactions, and appropriateness of therapy  BMP and CBC will be ordered per protocol  We will continue to follow the patients culture results and clinical progress daily  Leonela Rosenberg, BelemD  Internal Medicine Clinical Pharmacist Specialist  [phone]  [facility]/[employer]

## 2020-07-27 NOTE — CONSULTS
Consultation - Palliative and Supportive Care   Carlos Morales 80 y o  male [id number]    Assessment:    -   Patient Active Problem List   Diagnosis    Acute renal failure superimposed on stage 3 chronic kidney disease ([facility]    PVD (peripheral vascular disease)    Diabetes mellitus type 2    Essential hypertension    HLD (hyperlipidemia)    Atherosclerosis of artery of extremity with ulceration (HCC)    Transaminitis    Chronic anemia    Depression    Ulcer of left heel (HCC)    Enlarged prostate    Incomplete bladder emptying    Chronic kidney disease stage 4    Tremor    Osteomyelitis of right foot (HCC)    Other chronic osteomyelitis, multiple sites ([facility] )    Acute venous stasis dermatitis    Atherosclerosis of native artery of extremity (HCC)    Diastolic dysfunction    Edema of extremities    Other complications due to other vascular device, implant, and graft    Wound, surgical, nonhealing    Hyperlipidemia    Hypertension    Osteomyelitis of left foot ([facility]    Peripheral arterial disease ([facility] )    Diabetes mellitus with neuropathy ([facility]    Type II diabetes mellitus with foot ulcer ([facility] )    Hyperkalemia    Coagulopathy ([facility]    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    Atrial fibrillation     Anemia of chronic disease    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    Secondary hyperparathyroidism of renal origin ([facility] )         Plan:  1  Symptom management   · No acute symptoms    2  Goals   · Disease focused cares with limits possibly being set on no amputation  Code Status:Full Code - Level 1   Decisional apparatus:  Patient is not competent on my exam today  Patient has two adult daughter's patient states Ritchie Escobar should be medical decisions maker     Advance Directive / Living Will / POLST:  None on file       I have reviewed the patient's controlled substance dispensing history in the Prescription Drug Monitoring Program in compliance with the [facility] regulations before prescribing any controlled substances  We appreciate the invitation to be involved in this patient's care  We will continue to follow  Please do not hesitate to reach our on call provider through our clinic answering service at  should you have acute symptom control concerns  COLLEEN Oakes  Palliative and Supportive Care  Clinic/Answering Service: [phone]  You can find me on [employer]! IDENTIFICATION:  Inpatient consult to Palliative Care  Consult performed by: COLLEEN Gottlieb  Consult ordered by: Gallito Baird PA-C        Physician Requesting Consult: Sam Councilman, MD  Reason for Consult / Principal Problem: Goals of care, Osteomyelitis  Hx and PE limited by: patient limited understanding of medical condition    HISTORY OF PRESENT ILLNESS:       Walt Rivera is a 80 y o  male with a past medical history of DM2, CKD 4, PVD Afib, parkinsons who presents with osteomyelitis of his left calcaneus  Revascularization of the foot would need to occur for healing however per vascular  The patient is not a candidate for 3rd bypass procedure  The patient has demonstrated poor insight to his condition and his daughter Marguerite Miranda has been designated medical decision maker  After discussion with vascular, Marguerite Miranda is leaning toward conservative management  Tentatively, the patient is scheduled for angiogram later this week  Review of Systems   Constitution: Positive for malaise/fatigue  All other systems reviewed and are negative        Past Medical History:   Diagnosis Date    A-fib 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[facility] )     Memory loss     Osteomyelitis of right foot ([facility]     Renal disorder      Past Surgical History:   Procedure Laterality Date    APPENDECTOMY  194    FEMORAL ARTERY - TIBIAL ARTERY BYPASS GRAFT Right 2014    R SFA- PT w/ nonrev GSV, [facility]- Balshi    FEMORAL ARTERY - TIBIAL ARTERY BYPASS GRAFT Left 2015    L SFA- PT w/ Cryovein- Ivan/ Balshi    FOOT SURGERY  2013    I&D with vac    IR LOWER EXTREMITY / INTERVENTION  2020    WOUND DEBRIDEMENT Right 2014    R thigh wound washout, VAC- Balshi     Social History     Socioeconomic History    Marital status:       Spouse name: Not on file    Number of children: Not on file    Years of education: Not on file    Highest education level: Not on file   Occupational History    Occupation: Retired   Social Needs    Financial resource strain: Not on file    Food insecurity:     Worry: Not on file     Inability: Not on file   [employer] needs:     Medical: Not on file     Non-medical: Not on file   Tobacco Use    Smoking status: Never Smoker    Smokeless tobacco: Never Used   Substance and Sexual Activity    Alcohol use: Yes     Comment: one beer on his birthday    Drug use: No    Sexual activity: Never   Lifestyle    Physical activity:     Days per week: Not on file     Minutes per session: Not on file    Stress: Not on file   Relationships    Social connections:     Talks on phone: Not on file     Gets together: Not on file     Attends Scientologist service: Not on file     Active member of club or organization: Not on file     Attends meetings of clubs or organizations: Not on file     Relationship status: Not on file    Intimate partner violence:     Fear of current or ex partner: Not on file     Emotionally abused: Not on file     Physically abused: Not on file     Forced sexual activity: Not on file   Other Topics Concern    Not on file   Social History Narrative    · Most recent tobacco use screenin2019      · Do you currently or have you served in the [employer]:    Yes      · Were you activated, into active duty, as a member of the [employer] or as a Reservist:   Yes        · Exercise level:   Occasional      · Diet:   Regular      · Caffeine intake:   Occasional      · Sunscreen used routinely:   Yes      · Smoke alarm in home:   Yes      Family History   Problem Relation Age of Onset    Diabetes Mother     Heart failure Mother     Hypertension Mother     Cancer Father     Hypertension Sister     Hyperthyroidism Sister     Stroke Brother     Diabetes Maternal Grandmother     Clotting disorder Maternal Grandmother     No Known Problems Maternal Grandfather     No Known Problems Paternal Grandmother     No Known Problems Paternal Grandfather     Diabetes Brother     Arthritis Brother     Glaucoma Brother     Cataracts Brother     No Known Problems Son     Hyperthyroidism Daughter     Kidney disease Daughter     COPD Daughter     Diabetes Brother     Cancer Brother        MEDICATIONS / ALLERGIES:    current meds:   Current Facility-Administered Medications   Medication Dose Route Frequency    acetaminophen (TYLENOL) tablet 650 mg  650 mg Oral Q6H PRN    amLODIPine (NORVASC) tablet 5 mg  5 mg Oral Daily    calcitriol (ROCALTROL) capsule 0 25 mcg  0 25 mcg Oral Once per day on Mon Wed Fri    ceFAZolin (ANCEF) IVPB (premix) 1,000 mg 50 mL  1,000 mg Intravenous Q8H    clopidogrel (PLAVIX) tablet 75 mg  75 mg Oral Daily    ferrous sulfate tablet 325 mg  325 mg Oral Daily With Breakfast    finasteride (PROSCAR) tablet 5 mg  5 mg Oral Daily    FLUoxetine (PROzac) capsule 40 mg  40 mg Oral Daily    heparin (porcine) 25,000 units in 250 mL infusion (premix)  3-30 Units/kg/hr (Order-Specific) Intravenous Titrated    heparin (porcine) injection 3,400 Units  3,400 Units Intravenous Q1H PRN    heparin (porcine) injection 6,800 Units  6,800 Units Intravenous Q1H PRN    insulin glargine (LANTUS) subcutaneous injection 8 Units 0 08 mL  8 Units Subcutaneous QAM    insulin lispro (HumaLOG) 100 units/mL subcutaneous injection 1-6 Units  1-6 Units Subcutaneous 4x Daily (AC & HS)    memantine (NAMENDA) tablet 5 mg  5 mg Oral Daily    ondansetron (ZOFRAN) injection 4 mg  4 mg Intravenous Q4H PRN    pravastatin (PRAVACHOL) tablet 40 mg  40 mg Oral Daily With Dinner    sodium bicarbonate tablet 650 mg  650 mg Oral BID after meals    tamsulosin (FLOMAX) capsule 0 4 mg  0 4 mg Oral Daily With Dinner    [START ON 7/28/2020] vancomycin (VANCOCIN) IVPB (premix) 500 mg 100 mL  500 mg Intravenous Daily PRN       Allergies   Allergen Reactions    Metoprolol Bradycardia    Unasyn [Ampicillin-Sulbactam Sodium] Rash       OBJECTIVE:    Physical Exam  Physical Exam   Constitutional: He appears well-developed and well-nourished  He is cooperative  HENT:   Head: Normocephalic and atraumatic  Eyes: Pupils are equal, round, and reactive to light  EOM and lids are normal    Neck: Normal range of motion  Abdominal: Soft  Bowel sounds are normal    Musculoskeletal:   Left foot dressing intact   Neurological: He is alert  He is disoriented  Oriented to place and time, not situation  Skin: Skin is warm and dry  There is pallor  Psychiatric: He has a normal mood and affect  He exhibits abnormal remote memory  Lab Results:   I have personally reviewed pertinent labs  , CBC: No results found for: WBC, HGB, HCT, MCV, PLT, ADJUSTEDWBC, MCH, MCHC, RDW, MPV, NRBC, CMP:   Lab Results   Component Value Date    SODIUM 134 (L) 07/27/2020    K 4 8 07/27/2020     07/27/2020    CO2 21 07/27/2020    BUN 43 (H) 07/27/2020    CREATININE 2 77 (H) 07/27/2020    CALCIUM 8 3 07/27/2020    EGFR 19 07/27/2020   , PT/PTT:  Lab Results   Component Value Date    PTT 70 (H) 07/27/2020     Imaging Studies: MRI:  Hindfoot subcutaneous edema extending superiorly and distally outside the field-of-view may represent cellulitis in the appropriate clinical context   This is more prominent compared to February 2020  No loculated fluid collection to suggest abscess      Trace plantar calcaneal subcortical edema with minimal ill-defined T1 hyperintensity suspicious for mild osteomyelitis  Counseling / Coordination of Care    Total floor / unit time spent today 35+ minutes  Greater than 50% of total time was spent with the patient and / or family counseling and / or coordination of care   A description of the counseling / coordination of care:

## 2020-07-27 NOTE — PROGRESS NOTES
Pastoral care via tele-visit  Patient says he is doing well   Just asked if we would keep him in prayer     07/27/20 1000   Clinical Encounter Type   Visited With Patient   Routine Visit Introduction   Continue Visiting No

## 2020-07-27 NOTE — ASSESSMENT & PLAN NOTE
· Polymicrobial wound cultures positive for MRSA, Klebsiella, Enterococcus - unclear which is pathogen  · MRI - suspicion of mild plantar osteomyelitis but less prominent compared to 2/2020  · Discussed with podiatry - no surgery planned at present  F/u attempt at endovascular intervention to determine if perfusion can be improved    · Patient refuses amputation   · On Vancomycin, Cefazolin (D#6) per ID   · Per ID if no resection done very low likelihood of cure with medical therapy and has increased risk of renal toxicity hence need oral suppression with Doxycycline

## 2020-07-28 PROBLEM — E87.1 HYPONATREMIA: Status: ACTIVE | Noted: 2020-07-28

## 2020-07-28 LAB
ANION GAP SERPL CALCULATED.3IONS-SCNC: 8 MMOL/L (ref 4–13)
APTT PPP: 71 SECONDS (ref 23–37)
BUN SERPL-MCNC: 46 MG/DL (ref 5–25)
CALCIUM SERPL-MCNC: 7.9 MG/DL (ref 8.3–10.1)
CHLORIDE SERPL-SCNC: 103 MMOL/L (ref 100–108)
CO2 SERPL-SCNC: 22 MMOL/L (ref 21–32)
CREAT SERPL-MCNC: 3 MG/DL (ref 0.6–1.3)
ERYTHROCYTE [DISTWIDTH] IN BLOOD BY AUTOMATED COUNT: 15.1 % (ref 11.6–15.1)
GFR SERPL CREATININE-BSD FRML MDRD: 17 ML/MIN/1.73SQ M
GLUCOSE SERPL-MCNC: 106 MG/DL (ref 65–140)
GLUCOSE SERPL-MCNC: 106 MG/DL (ref 65–140)
GLUCOSE SERPL-MCNC: 148 MG/DL (ref 65–140)
GLUCOSE SERPL-MCNC: 169 MG/DL (ref 65–140)
GLUCOSE SERPL-MCNC: 239 MG/DL (ref 65–140)
HCT VFR BLD AUTO: 27.3 % (ref 36.5–49.3)
HGB BLD-MCNC: 8.5 G/DL (ref 12–17)
MCH RBC QN AUTO: 28.8 PG (ref 26.8–34.3)
MCHC RBC AUTO-ENTMCNC: 31.1 G/DL (ref 31.4–37.4)
MCV RBC AUTO: 93 FL (ref 82–98)
PLATELET # BLD AUTO: 307 THOUSANDS/UL (ref 149–390)
PMV BLD AUTO: 10.4 FL (ref 8.9–12.7)
POTASSIUM SERPL-SCNC: 5 MMOL/L (ref 3.5–5.3)
RBC # BLD AUTO: 2.95 MILLION/UL (ref 3.88–5.62)
SODIUM SERPL-SCNC: 133 MMOL/L (ref 136–145)
VANCOMYCIN SERPL-MCNC: 30.8 UG/ML
WBC # BLD AUTO: 8.18 THOUSAND/UL (ref 4.31–10.16)

## 2020-07-28 PROCEDURE — 99233 SBSQ HOSP IP/OBS HIGH 50: CPT | Performed by: INTERNAL MEDICINE

## 2020-07-28 PROCEDURE — 85730 THROMBOPLASTIN TIME PARTIAL: CPT | Performed by: INTERNAL MEDICINE

## 2020-07-28 PROCEDURE — 80048 BASIC METABOLIC PNL TOTAL CA: CPT | Performed by: PHYSICIAN ASSISTANT

## 2020-07-28 PROCEDURE — 85027 COMPLETE CBC AUTOMATED: CPT | Performed by: INTERNAL MEDICINE

## 2020-07-28 PROCEDURE — 97535 SELF CARE MNGMENT TRAINING: CPT

## 2020-07-28 PROCEDURE — 99232 SBSQ HOSP IP/OBS MODERATE 35: CPT | Performed by: SURGERY

## 2020-07-28 PROCEDURE — 99233 SBSQ HOSP IP/OBS HIGH 50: CPT | Performed by: NURSE PRACTITIONER

## 2020-07-28 PROCEDURE — 99232 SBSQ HOSP IP/OBS MODERATE 35: CPT | Performed by: INTERNAL MEDICINE

## 2020-07-28 PROCEDURE — 80202 ASSAY OF VANCOMYCIN: CPT | Performed by: INTERNAL MEDICINE

## 2020-07-28 PROCEDURE — 82948 REAGENT STRIP/BLOOD GLUCOSE: CPT

## 2020-07-28 RX ORDER — DIPHENHYDRAMINE HCL 25 MG
25 TABLET ORAL EVERY 6 HOURS PRN
Status: DISCONTINUED | OUTPATIENT
Start: 2020-07-28 | End: 2020-08-07 | Stop reason: HOSPADM

## 2020-07-28 RX ADMIN — FERROUS SULFATE TAB 325 MG (65 MG ELEMENTAL FE) 325 MG: 325 (65 FE) TAB at 09:08

## 2020-07-28 RX ADMIN — HEPARIN SODIUM AND DEXTROSE 11 UNITS/KG/HR: 10000; 5 INJECTION INTRAVENOUS at 05:02

## 2020-07-28 RX ADMIN — CEFAZOLIN SODIUM 1000 MG: 1 SOLUTION INTRAVENOUS at 05:02

## 2020-07-28 RX ADMIN — SODIUM BICARBONATE 650 MG TABLET 650 MG: at 17:15

## 2020-07-28 RX ADMIN — INSULIN GLARGINE 8 UNITS: 100 INJECTION, SOLUTION SUBCUTANEOUS at 09:08

## 2020-07-28 RX ADMIN — CEFAZOLIN SODIUM 1000 MG: 1 SOLUTION INTRAVENOUS at 11:46

## 2020-07-28 RX ADMIN — CLOPIDOGREL BISULFATE 75 MG: 75 TABLET ORAL at 09:08

## 2020-07-28 RX ADMIN — TAMSULOSIN HYDROCHLORIDE 0.4 MG: 0.4 CAPSULE ORAL at 17:15

## 2020-07-28 RX ADMIN — FINASTERIDE 5 MG: 5 TABLET, FILM COATED ORAL at 09:08

## 2020-07-28 RX ADMIN — INSULIN LISPRO 3 UNITS: 100 INJECTION, SOLUTION INTRAVENOUS; SUBCUTANEOUS at 21:52

## 2020-07-28 RX ADMIN — DIPHENHYDRAMINE HYDROCHLORIDE, ZINC ACETATE: 2; .1 CREAM TOPICAL at 14:44

## 2020-07-28 RX ADMIN — PRAVASTATIN SODIUM 40 MG: 20 TABLET ORAL at 17:15

## 2020-07-28 RX ADMIN — DIPHENHYDRAMINE HCL 25 MG: 25 TABLET ORAL at 14:44

## 2020-07-28 RX ADMIN — MEMANTINE 5 MG: 5 TABLET ORAL at 09:08

## 2020-07-28 RX ADMIN — FLUOXETINE 40 MG: 20 CAPSULE ORAL at 09:09

## 2020-07-28 RX ADMIN — AMLODIPINE BESYLATE 5 MG: 5 TABLET ORAL at 09:08

## 2020-07-28 RX ADMIN — INSULIN LISPRO 1 UNITS: 100 INJECTION, SOLUTION INTRAVENOUS; SUBCUTANEOUS at 17:15

## 2020-07-28 RX ADMIN — SODIUM BICARBONATE 650 MG TABLET 650 MG: at 09:09

## 2020-07-28 RX ADMIN — CEFAZOLIN SODIUM 1000 MG: 1 SOLUTION INTRAVENOUS at 21:52

## 2020-07-28 NOTE — PROGRESS NOTES
Progress Note - Britton Matias 6/28/1930, 80 y o  male MRN: [id number]    Unit/Bed#: MS Dodd8-01 Encounter: [id number]    Primary Care Provider: Rayshawn Moreno MD   Date and time admitted to hospital: 7/21/2020 12:32 PM        * 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  Assessment & Plan  · Polymicrobial wound cultures positive for MRSA, Klebsiella, Enterococcus - unclear which is pathogen  · MRI - suspicion of mild plantar osteomyelitis but less prominent compared to 2/2020  · Discussed with podiatry - no surgery planned at present  F/u attempt at endovascular intervention to determine if perfusion can be improved  · Patient refuses amputation   · On Vancomycin, Cefazolin (D#6) per ID   · Per ID if no resection done very low likelihood of cure with medical therapy and has increased risk of renal toxicity hence need oral suppression with Doxycycline    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  Assessment & Plan  · Wound care     Peripheral arterial disease (HCC)  Assessment & Plan  · LEADS - RLE - LILIAN 1 2/88/52, 50-75% stenosis of prox-mid SFA, >75% stenosis in proximal calf bypass graft, high grade stenosis vs occlusion of distal SFA artery/stent; LLE - LILIAN 0 78/86/32 with occlusion of the distal SFA and above knee popliteal artery/stent    · Left lower extremity angiogram - completely occluded above-the-knee to below-the-knee bypass - likely occluded for a long time  · Not a candidate for 3rd time redo bypass due to age, co-morbidities and his wishes  · Options discussed with daughter by vascular - she and patient opted for another endovascular attempt  · Nephrology following in view of CKD 4  · Coumadin held - on heparin drip for potential intervention  · Continue Plavix, statin    Tremor  Assessment & Plan  · Follows with neurology as outpatient  · Had been recommended Zonisamide which he has not been using for the past few weeks at home    Essential hypertension  Assessment & Plan  On Lasix 40 mg daily and Amlodipine 5 mg daily at home  Lasix held in view of CKD 4 with plan for left lower extremity angiogram  BP acceptable    Chronic kidney disease stage 4  Assessment & Plan  · Baseline creatinine in the high 2s to low 3s  · Nephrology consulted in view of requirement for angiogram  · Creatinine at baseline   · Lasix held - ct to hold for repeat endovascular attempt  · Avoid hypotension, nephrotoxic medications    Atrial fibrillation   Assessment & Plan  · Coumadin held for possible intervention   · On heparin drip    Diabetes mellitus type 2  Assessment & Plan  Lab Results   Component Value Date    HGBA1C 7 1 (H) 2020       · Controlled for patient's age as evidenced by A1c  · Glipizide held  · Ct Lantus 8 units in am, SSI  · Monitor blood sugars and adjust insulin as needed      Anemia of chronic disease  Assessment & Plan  · Dropped to 7 3 on   · S/p 1 PRBC on   · Hb stable in 8's at present  · Monitor hemoglobin and transfuse PRN      VTE Pharmacologic Prophylaxis:   Pharmacologic: Heparin Drip  Mechanical VTE Prophylaxis in Place: Yes    Patient Centered Rounds: I have performed bedside rounds with nursing staff today  Discussions with Specialists or Other Care Team Provider: Discussed with podiatry    Education and Discussions with Family / Patient:  Discussed with patient and discussed with daughter on the phone    Time Spent for Care: 20 minutes  More than 50% of total time spent on counseling and coordination of care as described above  Current Length of Stay: 6 day(s)    Current Patient Status: Inpatient   Certification Statement: The patient will continue to require additional inpatient hospital stay due to Awaiting endovascular intervention to left lower extremity    Code Status: Level 3 - DNAR and DNI    Subjective:   No pain in left lower extremity    Feels well    Objective:     Vitals:   Temp (24hrs), Av °F (36 7 °C), Min:97 6 °F (36 4 °C), Max:98 3 °F (36 8 °C)    Temp:  [97 6 °F (36 4 °C)-98 3 °F (36 8 °C)] 98 3 °F (36 8 °C)  HR:  [70-76] 72  Resp:  [18-20] 18  BP: (128-137)/(62-73) 128/62  SpO2:  [96 %-100 %] 97 %  Body mass index is 26 85 kg/m²  Physical Exam:     Physical Exam   Constitutional: He is oriented to person, place, and time  HENT:   Head: Normocephalic and atraumatic  Eyes: Pupils are equal, round, and reactive to light  EOM are normal    Neck: Normal range of motion  Neck supple  Cardiovascular: Normal rate and regular rhythm  Pulmonary/Chest: Effort normal and breath sounds normal    Abdominal: Soft  Bowel sounds are normal    Musculoskeletal:   Bilateral lower extremity edema left greater than right   Neurological: He is alert and oriented to person, place, and time  Skin: Skin is warm and dry  Psychiatric: He has a normal mood and affect  Additional Data:     Labs:    Results from last 7 days   Lab Units 07/26/20  0823 07/25/20  0816  07/21/20  1337   WBC Thousand/uL  --  6 83   < > 9 98   HEMOGLOBIN g/dL 8 6* 8 8*   < > 7 7*   HEMATOCRIT % 26 9* 28 2*   < > 26 0*   PLATELETS Thousands/uL  --  312   < > 271   NEUTROS PCT %  --   --   --  72   LYMPHS PCT %  --   --   --  14   MONOS PCT %  --   --   --  11   EOS PCT %  --   --   --  2    < > = values in this interval not displayed  Results from last 7 days   Lab Units 07/27/20  1057  07/21/20  1337   SODIUM mmol/L 134*   < > 136   POTASSIUM mmol/L 4 8   < > 4 2   CHLORIDE mmol/L 105   < > 107   CO2 mmol/L 21   < > 22   BUN mg/dL 43*   < > 43*   CREATININE mg/dL 2 77*   < > 2 95*   ANION GAP mmol/L 8   < > 7   CALCIUM mg/dL 8 3   < > 7 4*   ALBUMIN g/dL  --   --  2 4*   TOTAL BILIRUBIN mg/dL  --   --  0 34   ALK PHOS U/L  --   --  296*   ALT U/L  --   --  12   AST U/L  --   --  8   GLUCOSE RANDOM mg/dL 142*   < > 346*    < > = values in this interval not displayed       Results from last 7 days   Lab Units 07/25/20  0816   INR  1 46*     Results from last 7 days   Lab Units 07/27/20  1602 07/27/20  1028 07/27/20  0636 07/26/20  2106 07/26/20  1558 07/26/20  1103 07/26/20  0636 07/25/20  2051 07/25/20  1559 07/25/20  1102 07/25/20  0705 07/24/20 2051   POC GLUCOSE mg/dl 181* 156* 126 294* 222* 149* 135 229* 218* 182* 123 312*     Results from last 7 days   Lab Units 07/22/20  0442   HEMOGLOBIN A1C % 7 1*       * I Have Reviewed All Lab Data Listed Above  * Additional Pertinent Lab Tests Reviewed: Torey [facility] Admission Reviewed    Recent Cultures (last 7 days):     Results from last 7 days   Lab Units 07/21/20  1337   BLOOD CULTURE  No Growth After 5 Days  No Growth After 5 Days         Last 24 Hours Medication List:     Current Facility-Administered Medications:  acetaminophen 650 mg Oral Q6H PRN Araceli Burdick MD    amLODIPine 5 mg Oral Daily COLLEEN Tai    calcitriol 0 25 mcg Oral Once per day on Mon Wed Fri Sebastián Hollins MD    cefazolin 1,000 mg Intravenous Q8H Jb Mcpherson MD Last Rate: 1,000 mg (07/27/20 1156)   clopidogrel 75 mg Oral Daily Araceli Burdick MD    diphenhydrAMINE-zinc acetate  Topical TID PRN Eduardo Glass MD    ferrous sulfate 325 mg Oral Daily With Breakfast COLLEEN Tai    finasteride 5 mg Oral Daily Araceli Burdick MD    FLUoxetine 40 mg Oral Daily Araceli Burdick MD    heparin (porcine) 3-30 Units/kg/hr (Order-Specific) Intravenous Titrated Chris Shoemaker PA-C Last Rate: 11 Units/kg/hr (07/27/20 0430)   heparin (porcine) 3,400 Units Intravenous Q1H PRN Chris Shoemaker PA-C    heparin (porcine) 6,800 Units Intravenous Q1H PRN Chris Shoemaker PA-C    insulin glargine 8 Units Subcutaneous QAHERVE Glass MD    insulin lispro 1-6 Units Subcutaneous 4x Daily (AC & HS) Araceli Burdick MD    memantine 5 mg Oral Daily Araceli Burdick MD    ondansetron 4 mg Intravenous Q4H PRN Araceli Burdick MD    pravastatin 40 mg Oral Daily With Karly Moeller MD    sodium bicarbonate 650 mg Oral BID after meals Araceli Burdick MD tamsulosin 0 4 mg Oral Daily With MD Josiane Salguero ON 7/28/2020] vancomycin 500 mg Intravenous Daily PRN Prince Barrera MD         Today, Patient Was Seen By: Kimi Young MD    ** Please Note: Dictation voice to text software may have been used in the creation of this document   **

## 2020-07-28 NOTE — ASSESSMENT & PLAN NOTE
· Dropped to 7 3 on 7/24  · S/p 1 PRBC on 7/24  · Hb stable in 8's at present  · Monitor hemoglobin and transfuse PRN

## 2020-07-28 NOTE — PROGRESS NOTES
Progress note - Palliative and Supportive Care   Tabitha Serrano 80 y o  male [id number]    Assessment:    -   Patient Active Problem List   Diagnosis    Acute renal failure superimposed on stage 3 chronic kidney disease ([facility] )    PVD (peripheral vascular disease)    Diabetes mellitus type 2    Essential hypertension    HLD (hyperlipidemia)    Atherosclerosis of artery of extremity with ulceration (HCC)    Transaminitis    Chronic anemia    Depression    Ulcer of left heel (HCC)    Enlarged prostate    Incomplete bladder emptying    Chronic kidney disease stage 4    Tremor    Osteomyelitis of right foot (HCC)    Other chronic osteomyelitis, multiple sites ([facility] )    Acute venous stasis dermatitis    Atherosclerosis of native artery of extremity (HCC)    Diastolic dysfunction    Edema of extremities    Other complications due to other vascular device, implant, and graft    Wound, surgical, nonhealing    Hyperlipidemia    Hypertension    Osteomyelitis of left foot ([facility]    Peripheral arterial disease ([facility] )    Diabetes mellitus with neuropathy ([facility]    Type II diabetes mellitus with foot ulcer ([facility] )    Hyperkalemia    [facility]    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    Atrial fibrillation     Anemia of chronic disease    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    Secondary hyperparathyroidism of renal origin ([facility] )       Plan:  1  Symptom management  No Acute symptoms    2  Goals  · Spoke with patient and daughter together  Patient states he would not want to have amputation  Patient states he would not want aggressive measures including hemodialysis  · They would like to proceed with minimally invasive interventions  I e  Arteriogram  But would place limits on aggressive measures  Code Status: DNAR/DNI - Level 3     Decisional apparatus:  Patient is competent on my exam today    Patient is forgetful and repetitive but demonstrated adequate understanding of cause/effect  He has poor insight into his health but is able to effectively communicate what is acceptable to him as far as quality of life and goals  If competence is lost, patient's substitute decision maker would default to daughter, Amarjit [place]  Advance Directive / Living Will / POLST:  None on file    Interval history:      Patient resting comfortably no acute changes from yesterday        MEDICATIONS / ALLERGIES:     current meds:   Current Facility-Administered Medications   Medication Dose Route Frequency    acetaminophen (TYLENOL) tablet 650 mg  650 mg Oral Q6H PRN    amLODIPine (NORVASC) tablet 5 mg  5 mg Oral Daily    calcitriol (ROCALTROL) capsule 0 25 mcg  0 25 mcg Oral Once per day on Mon Wed Fri    ceFAZolin (ANCEF) IVPB (premix) 1,000 mg 50 mL  1,000 mg Intravenous Q8H    clopidogrel (PLAVIX) tablet 75 mg  75 mg Oral Daily    diphenhydrAMINE-zinc acetate (BENADRYL) 2-0 1 % cream   Topical TID PRN    ferrous sulfate tablet 325 mg  325 mg Oral Daily With Breakfast    finasteride (PROSCAR) tablet 5 mg  5 mg Oral Daily    FLUoxetine (PROzac) capsule 40 mg  40 mg Oral Daily    heparin (porcine) 25,000 units in 250 mL infusion (premix)  3-30 Units/kg/hr (Order-Specific) Intravenous Titrated    heparin (porcine) injection 3,400 Units  3,400 Units Intravenous Q1H PRN    heparin (porcine) injection 6,800 Units  6,800 Units Intravenous Q1H PRN    insulin glargine (LANTUS) subcutaneous injection 8 Units 0 08 mL  8 Units Subcutaneous QAM    insulin lispro (HumaLOG) 100 units/mL subcutaneous injection 1-6 Units  1-6 Units Subcutaneous 4x Daily (AC & HS)    memantine (NAMENDA) tablet 5 mg  5 mg Oral Daily    ondansetron (ZOFRAN) injection 4 mg  4 mg Intravenous Q4H PRN    pravastatin (PRAVACHOL) tablet 40 mg  40 mg Oral Daily With Dinner    sodium bicarbonate tablet 650 mg  650 mg Oral BID after meals    tamsulosin (FLOMAX) capsule 0 4 mg  0 4 mg Oral Daily With [employer] vancomycin (VANCOCIN) IVPB (premix) 500 mg 100 mL  500 mg Intravenous Daily PRN       Allergies   Allergen Reactions    Metoprolol Bradycardia    Unasyn [Ampicillin-Sulbactam Sodium] Rash       OBJECTIVE:    Physical Exam  Physical Exam   Constitutional: He appears well-developed and well-nourished  He is cooperative  HENT:   Head: Normocephalic and atraumatic  Cardiovascular: Normal rate  Pulmonary/Chest: Effort normal  No respiratory distress  Abdominal: Soft  Normal appearance  Neurological: He is alert  Oriented to place and time, not situation  B/l peripheral neuropathy   Skin: Skin is warm and dry  There is pallor  Lab Results:   CBC:   Lab Results   Component Value Date    WBC 8 18 07/28/2020    HGB 8 5 (L) 07/28/2020    HCT 27 3 (L) 07/28/2020    MCV 93 07/28/2020     07/28/2020    MCH 28 8 07/28/2020    MCHC 31 1 (L) 07/28/2020    RDW 15 1 07/28/2020    MPV 10 4 07/28/2020   , CMP:   Lab Results   Component Value Date    SODIUM 133 (L) 07/28/2020    K 5 0 07/28/2020     07/28/2020    CO2 22 07/28/2020    BUN 46 (H) 07/28/2020    CREATININE 3 00 (H) 07/28/2020    CALCIUM 7 9 (L) 07/28/2020    EGFR 17 07/28/2020   , PT/PTT:  Lab Results   Component Value Date    PTT 71 (H) 07/28/2020       Counseling / Coordination of Care    Total floor / unit time spent today 35+  minutes  Greater than 50% of total time was spent with the patient and / or family counseling and / or coordination of care  A description of the counseling / coordination of care: change of code status late yesterday, review of symptoms, supportive listening

## 2020-07-28 NOTE — PLAN OF CARE
Problem: Potential for Falls  Goal: Patient will remain free of falls  Description  INTERVENTIONS:  - Assess patient frequently for physical needs  -  Identify cognitive and physical deficits and behaviors that affect risk of falls    -  [place] fall precautions as indicated by assessment   - Educate patient/family on patient safety including physical limitations  - Instruct patient to call for assistance with activity based on assessment  - Modify environment to reduce risk of injury  - Consider OT/PT consult to assist with strengthening/mobility  Outcome: Progressing     Problem: PAIN - ADULT  Goal: Verbalizes/displays adequate comfort level or baseline comfort level  Description  Interventions:  - Encourage patient to monitor pain and request assistance  - Assess pain using appropriate pain scale  - Administer analgesics based on type and severity of pain and evaluate response  - Implement non-pharmacological measures as appropriate and evaluate response  - Consider cultural and social influences on pain and pain management  - Notify physician/advanced practitioner if interventions unsuccessful or patient reports new pain  Outcome: Progressing     Problem: INFECTION - ADULT  Goal: Absence or prevention of progression during hospitalization  Description  INTERVENTIONS:  - Assess and monitor for signs and symptoms of infection  - Monitor lab/diagnostic results  - Monitor all insertion sites, i e  indwelling lines, tubes, and drains  - Monitor endotracheal if appropriate and nasal secretions for changes in amount and color  - [place] appropriate cooling/warming therapies per order  - Administer medications as ordered  - Instruct and encourage patient and family to use good hand hygiene technique  - Identify and instruct in appropriate isolation precautions for identified infection/condition  Outcome: Progressing  Goal: Absence of fever/infection during neutropenic period  Description  INTERVENTIONS:  - Monitor WBC    Outcome: Progressing     Problem: Nutrition/Hydration-ADULT  Goal: Nutrient/Hydration intake appropriate for improving, restoring or maintaining nutritional needs  Description  Monitor and assess patient's nutrition/hydration status for malnutrition  Collaborate with interdisciplinary team and initiate plan and interventions as ordered  Monitor patient's weight and dietary intake as ordered or per policy  Utilize nutrition screening tool and intervene as necessary  Determine patient's food preferences and provide high-protein, high-caloric foods as appropriate       INTERVENTIONS:  - Monitor oral intake, urinary output, labs, and treatment plans  - Assess nutrition and hydration status and recommend course of action  - Evaluate amount of meals eaten  - Assist patient with eating if necessary   - Allow adequate time for meals  - Recommend/ encourage appropriate diets, oral nutritional supplements, and vitamin/mineral supplements  - Order, calculate, and assess calorie counts as needed  - Recommend, monitor, and adjust tube feedings and TPN/PPN based on assessed needs  - Assess need for intravenous fluids  - Provide specific nutrition/hydration education as appropriate  - Include patient/family/caregiver in decisions related to nutrition  Outcome: Progressing     Problem: HEMATOLOGIC - ADULT  Goal: Maintains hematologic stability  Description  INTERVENTIONS  - Assess for signs and symptoms of bleeding or hemorrhage  - Monitor labs  - Administer supportive blood products/factors as ordered and appropriate  Outcome: Progressing     Problem: MUSCULOSKELETAL - ADULT  Goal: Maintain or return mobility to safest level of function  Description  INTERVENTIONS:  - Assess patient's ability to carry out ADLs; assess patient's baseline for ADL function and identify physical deficits which impact ability to perform ADLs (bathing, care of mouth/teeth, toileting, grooming, dressing, etc )  - Assess/evaluate cause of self-care deficits   - Assess range of motion  - Assess patient's mobility  - Assess patient's need for assistive devices and provide as appropriate  - Encourage maximum independence but intervene and supervise when necessary  - Involve family in performance of ADLs  - Assess for home care needs following discharge   - Consider OT consult to assist with ADL evaluation and planning for discharge  - Provide patient education as appropriate  Outcome: Progressing

## 2020-07-28 NOTE — PROGRESS NOTES
NEPHROLOGY PROGRESS NOTE   Junior Plaster 80 y o  male MRN: [id number]  Unit/Bed#: -01 Encounter: [id number]      ASSESSMENT/PLAN:  1  CKD IV: baseline creatinine high 2's to low 3's and follows with Dr Wicho Miranda  Creatinine worsened to 3 today, possibly related to elevated vanco level    · Would continue to hold vanco until level improves, pharmacy on board (random level 30 8 this am)   · Would wait for repeat agram until creatinine stable   · Continue to hold home lasix for now   · Check am BMP   2  PAD with nonhealing LLE ulcer: s/p CO2 agram with only 30cc IV dye on 7/24 with occluded below the knee bypass which is likely chronic  Needs repeat endovascular procedure   · On vancomycin per pharmacy with dose being adjusted per level   3  Hypertension: BP acceptable   4  Hyponatremia: sodium slowly trending down to 133 today but has been off his home lasix  Will add 1500cc/day oral fluid restriction   5  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   6  Anemia of CKD + iron deficiency: continue oral iron therapy  hgb stable at 8 5 today     Plan Summary:    Will add 1500cc/day oral fluid restriction    Follow up repeat vanco level tomorrow    Check am BMP     SUBJECTIVE:  No chest pain, shortness of breath, nausea, vomiting or diarrhea       OBJECTIVE:  Current Weight: Weight - Scale: 89 8 kg (198 lb)  Vitals:    07/28/20 0645   BP: 139/74   Pulse: 72   Resp: 18   Temp: 97 6 °F (36 4 °C)   SpO2: 98%       Intake/Output Summary (Last 24 hours) at 7/28/2020 1210  Last data filed at 7/28/2020 1052  Gross per 24 hour   Intake 1121 04 ml   Output 1471 ml   Net -349 96 ml     General:  No acute distress  Skin:  No rash  Eyes:  Sclerae anicteric  ENT:  Moist mucous membranes  Neck:  Trachea midline with no JVD  Chest:  Clear to auscultation bilaterally  CVS:  Regular rate and rhythm  Abdomen:  Soft, nontender, nondistended  Extremities:  Bilateral LE edema L>R   Neuro:  Awake and alert  Psych:  Appropriate affect    Medications:  Scheduled Meds:    Current Facility-Administered Medications:  acetaminophen 650 mg Oral Q6H PRN Smitha Shin MD    amLODIPine 5 mg Oral Daily COLLEEN Hidalgo    calcitriol 0 25 mcg Oral Once per day on Mon Wed Fri Bam Abbasi MD    cefazolin 1,000 mg Intravenous Q8H Kevin Santiago MD Last Rate: 1,000 mg (07/28/20 1146)   clopidogrel 75 mg Oral Daily Smitha Shin MD    diphenhydrAMINE-zinc acetate  Topical TID PRN Makenzie Pires MD    ferrous sulfate 325 mg Oral Daily With Breakfast COLLEEN Hidalgo    finasteride 5 mg Oral Daily Smitha Shin MD    FLUoxetine 40 mg Oral Daily Smitha Shin MD    heparin (porcine) 3-30 Units/kg/hr (Order-Specific) Intravenous Titrated Rock Sensing, PA-C Last Rate: 11 Units/kg/hr (07/28/20 0502)   heparin (porcine) 3,400 Units Intravenous Q1H PRN Rock Sensing, PA-C    heparin (porcine) 6,800 Units Intravenous Q1H PRN Rock Sensing, PA-C    insulin glargine 8 Units Subcutaneous QAM Makenzie Pires MD    insulin lispro 1-6 Units Subcutaneous 4x Daily (AC & HS) Smitha Shin MD    memantine 5 mg Oral Daily Smitha Shin MD    ondansetron 4 mg Intravenous Q4H PRN Smitha Shin MD    pravastatin 40 mg Oral Daily With Aneta Beverly MD    sodium bicarbonate 650 mg Oral BID after meals Smitha Shin MD    tamsulosin 0 4 mg Oral Daily With Aneta Beverly MD    vancomycin 500 mg Intravenous Daily PRN Kevin Santiago MD        PRN Meds:   acetaminophen    diphenhydrAMINE-zinc acetate    heparin (porcine)    heparin (porcine)    ondansetron    vancomycin    Continuous Infusions:    heparin (porcine) 3-30 Units/kg/hr (Order-Specific) Last Rate: 11 Units/kg/hr (07/28/20 0502)       Laboratory Results:  Results from last 7 days   Lab Units 07/28/20  0532 07/28/20  0531 07/27/20  1057 07/26/20  0823 07/25/20  0816 07/24/20  0451 07/23/20  0536 07/21/20  1337   WBC Thousand/uL 8 18  --   --   --  6 83 10 08 14 17* 9 98 HEMOGLOBIN g/dL 8 5*  --   --  8 6* 8 8* 7 3* 7 7* 7 7*   HEMATOCRIT % 27 3*  --   --  26 9* 28 2* 24 1* 25 7* 26 0*   PLATELETS Thousands/uL 307  --   --   --  312 317 286 271   SODIUM mmol/L  --  133* 134* 135* 132* 133* 135* 136   POTASSIUM mmol/L  --  5 0 4 8 4 7 4 5 4 5 4 8 4 2   CHLORIDE mmol/L  --  103 105 105 104 103 105 107   CO2 mmol/L  --  22 21 22 20* 22 22 22   BUN mg/dL  --  46* 43* 43* 43* 47* 39* 43*   CREATININE mg/dL  --  3 00* 2 77* 2 85* 2 68* 3 05* 2 81* 2 95*   CALCIUM mg/dL  --  7 9* 8 3 7 4* 7 4* 8 0* 8 0* 7 4*   MAGNESIUM mg/dL  --   --   --   --   --  2 1  --   --    PHOSPHORUS mg/dL  --   --   --   --   --  3 8  --   --

## 2020-07-28 NOTE — PROGRESS NOTES
Progress Note - Armando Holes 6/28/1930, 80 y o  male MRN: [id number]    Unit/Bed#: [facility] Encounter: [id number]    Primary Care Provider: Stephanie Song MD   Date and time admitted to hospital: 7/21/2020 12:32 PM        * 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  Assessment & Plan  · Polymicrobial wound cultures positive for MRSA, Klebsiella, Enterococcus - unclear which is pathogen  · MRI - suspicion of mild plantar osteomyelitis but less prominent compared to 2/2020  · Discussed with podiatry - no surgery planned at present  F/u attempt at endovascular intervention to determine if perfusion can be improved  · Patient refuses amputation   · On Vancomycin, Cefazolin  per ID   · Per ID if no resection done very low likelihood of cure with medical therapy and has increased risk of renal toxicity hence need oral suppression with Doxycycline    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  Assessment & Plan  · Wound care     Peripheral arterial disease (HCC)  Assessment & Plan  · LEADS - RLE - LILIAN 1 2/88/52, 50-75% stenosis of prox-mid SFA, >75% stenosis in proximal calf bypass graft, high grade stenosis vs occlusion of distal SFA artery/stent; LLE - LILIAN 0 78/86/32 with occlusion of the distal SFA and above knee popliteal artery/stent    · Left lower extremity angiogram - completely occluded above-the-knee to below-the-knee bypass - likely occluded for a long time  · Not a candidate for 3rd time redo bypass due to age, co-morbidities and his wishes  · Options discussed with daughter by vascular - she and patient opted for another endovascular attempt  · Nephrology following in view of CKD 4  · Coumadin held - on heparin drip for potential intervention  · Continue Plavix, statin  · Palliative Care is following    Anemia of chronic disease  Assessment & Plan  · Dropped to 7 3 on 7/24  · S/p 1 PRBC on 7/24  · Monitor hemoglobin and transfuse PRN    Atrial fibrillation   Assessment & Plan  · Coumadin held for possible intervention   · On heparin drip    Tremor  Assessment & Plan  · Follows with neurology as outpatient  · Had been recommended Zonisamide which he has not been using for the past few weeks at home    Chronic kidney disease stage 4  Assessment & Plan  · Baseline creatinine in the high 2s to low 3s  · Nephrology consulted in view of requirement for angiogram  · Lasix held - ct to hold for repeat endovascular attempt  · Avoid hypotension, nephrotoxic medications  · Mild worsening creatinine today    Essential hypertension  Assessment & Plan  On Lasix 40 mg daily and Amlodipine 5 mg daily at home  Lasix held in view of CKD 4 with plan for left lower extremity angiogram  BP acceptable    Diabetes mellitus type 2  Assessment & Plan  Lab Results   Component Value Date    HGBA1C 7 1 (H) 2020       · Controlled for patient's age as evidenced by A1c  · Glipizide held  · Ct Lantus 8 units in am, SSI  · Monitor blood sugars and adjust insulin as needed        VTE Pharmacologic Prophylaxis:   Pharmacologic: Heparin Drip  Mechanical VTE Prophylaxis in Place: Yes    Patient Centered Rounds: I have performed bedside rounds with nursing staff today  Discussions with Specialists or Other Care Team Provider:     Education and Discussions with Family / Patient:  Patient    Time Spent for Care: 30 minutes  More than 50% of total time spent on counseling and coordination of care as described above      Current Length of Stay: 7 day(s)    Current Patient Status: Inpatient   Certification Statement: The patient will continue to require additional inpatient hospital stay due to Pending endovascular intervention attempt under anesthesia    Discharge Plan / Estimated Discharge Date: TBD    Code Status: Level 3 - DNAR and DNI      Subjective:   Patient seen and examined  Comfortable in bed  No chest pain or shortness of breath  No nausea vomiting or diarrhea    Objective:     Vitals:   Temp (24hrs), Av 9 °F (36 6 °C), Min:97 6 °F (36 4 °C), Max:98 3 °F (36 8 °C)    Temp:  [97 6 °F (36 4 °C)-98 3 °F (36 8 °C)] 97 6 °F (36 4 °C)  HR:  [70-72] 72  Resp:  [18-20] 18  BP: (128-139)/(62-78) 139/74  SpO2:  [97 %-100 %] 98 %  Body mass index is 26 85 kg/m²  Input and Output Summary (last 24 hours): Intake/Output Summary (Last 24 hours) at 7/28/2020 0809  Last data filed at 7/28/2020 0700  Gross per 24 hour   Intake 1301 04 ml   Output 2471 ml   Net -1169 96 ml       Physical Exam:     Physical Exam  Patient is awake alert in no acute distress  Lung clear to auscultation bilateral anteriorly  Heart positive Z1-Q0 with systolic murmur  Abdomen soft nontender  Bilateral lower extremity venous stasis edema    Additional Data:     Labs:    Results from last 7 days   Lab Units 07/28/20  0532  07/21/20  1337   WBC Thousand/uL 8 18   < > 9 98   HEMOGLOBIN g/dL 8 5*   < > 7 7*   HEMATOCRIT % 27 3*   < > 26 0*   PLATELETS Thousands/uL 307   < > 271   NEUTROS PCT %  --   --  72   LYMPHS PCT %  --   --  14   MONOS PCT %  --   --  11   EOS PCT %  --   --  2    < > = values in this interval not displayed  Results from last 7 days   Lab Units 07/28/20  0531  07/21/20  1337   POTASSIUM mmol/L 5 0   < > 4 2   CHLORIDE mmol/L 103   < > 107   CO2 mmol/L 22   < > 22   BUN mg/dL 46*   < > 43*   CREATININE mg/dL 3 00*   < > 2 95*   CALCIUM mg/dL 7 9*   < > 7 4*   ALK PHOS U/L  --   --  296*   ALT U/L  --   --  12   AST U/L  --   --  8    < > = values in this interval not displayed  Results from last 7 days   Lab Units 07/25/20  0816   INR  1 46*       * I Have Reviewed All Lab Data Listed Above  * Additional Pertinent Lab Tests Reviewed: Torey 66 Admission Reviewed    Imaging:    Imaging Reports Reviewed Today Include:   Imaging Personally Reviewed by Myself Includes:      Recent Cultures (last 7 days):     Results from last 7 days   Lab Units 07/21/20  1277   BLOOD CULTURE  No Growth After 5 Days  No Growth After 5 Days  Last 24 Hours Medication List:     Current Facility-Administered Medications:  acetaminophen 650 mg Oral Q6H PRN Sushil Martins MD    amLODIPine 5 mg Oral Daily COLLEEN Guzman    calcitriol 0 25 mcg Oral Once per day on Mon Wed Fri Karina Tirado MD    cefazolin 1,000 mg Intravenous Q8H Sharifa Sewell MD Last Rate: 1,000 mg (07/28/20 0502)   clopidogrel 75 mg Oral Daily Sushil Martins MD    diphenhydrAMINE-zinc acetate  Topical TID PRN Cindy Welsh MD    ferrous sulfate 325 mg Oral Daily With Breakfast COLLEEN Guzman    finasteride 5 mg Oral Daily Sushil Martins MD    FLUoxetine 40 mg Oral Daily Sushil Martins MD    heparin (porcine) 3-30 Units/kg/hr (Order-Specific) Intravenous Titrated Otila Cords, PA-C Last Rate: 11 Units/kg/hr (07/28/20 0502)   heparin (porcine) 3,400 Units Intravenous Q1H PRN Otila Cords, PA-C    heparin (porcine) 6,800 Units Intravenous Q1H PRN Otila Cords, PA-C    insulin glargine 8 Units Subcutaneous QAM Cindy Welsh MD    insulin lispro 1-6 Units Subcutaneous 4x Daily (AC & HS) Sushil Martins MD    memantine 5 mg Oral Daily Sushil Martins MD    ondansetron 4 mg Intravenous Q4H PRN Sushil Martins MD    pravastatin 40 mg Oral Daily With Kristen Salmon MD    sodium bicarbonate 650 mg Oral BID after meals Sushil Martins MD    tamsulosin 0 4 mg Oral Daily With Kristen Salmon MD    vancomycin 500 mg Intravenous Daily PRN Sharifa Sewell MD         Today, Patient Was Seen By: Veda Núñez DO    ** Please Note: This note has been constructed using a voice recognition system   **

## 2020-07-28 NOTE — PLAN OF CARE
Problem: OCCUPATIONAL THERAPY ADULT  Goal: Performs self-care activities at highest level of function for planned discharge setting  See evaluation for individualized goals  Description  Treatment Interventions: ADL retraining, Functional transfer training, Endurance training, Cognitive reorientation, Patient/family training, Compensatory technique education, Activityengagement, Energy conservation          See flowsheet documentation for full assessment, interventions and recommendations  Outcome: Progressing  Note:   Limitation: Decreased ADL status, Decreased Safe judgement during ADL, Decreased cognition, Decreased endurance, Decreased self-care trans, Decreased high-level ADLs  Prognosis: Good  Assessment: Pt participated in occupational therapy with focus on activity tolerance, functional transfers/mob, standing balance and tolerance for pt engagement in functional self-care task  Pt cleared by RN/Molly for pt participation in occupational therapy  Pt received sitting out of bed to bedside chair and agreeable to therapy following pt Identifiers confirmed  Pt daughter Martha Rosa present and supportive throughout session  Pt required assist for functional transfers/mob with RW and grooming task in stance 2* pt coordination and pt balance deficits  Pt will require post acute rehab services to continue to address these above noted pt deficits which currently impair pt ADL and functional mob  Pt chair alarm active post session all needs within reach  OT Discharge Recommendation: Post-Acute Rehabilitation Services  OT - OK to Discharge:  Yes

## 2020-07-28 NOTE — PROGRESS NOTES
Vascular Surgery    Progress Note - Isiah Elias 6/28/1930, 80 y o  male MRN: [id number]    Unit/Bed#: -01 Encounter: [id number]    Primary Care Provider: Erika Lepe MD   Date and time admitted to hospital: 7/21/2020 12:32 PM    Peripheral arterial disease [facility])  Assessment & Plan  81 yo M w/ PAD and hx of B fem-PT bypasses (Flakita Pallas, '12 and '14 c/b L occlusion and s/p L SFA-PT bypass w/ cadaver vein in '15 ([facility]) presents with persistent L heel and plantar midfoot wound and infection     LEADs:  R: 1 2/88/52 w/ >75% stenosis of the proximal calf bypass graft; L: 0 78/86/32 w/ low flow at the distal anastomosis    S/P Agram LLE 7/24- Chronically occluded bypass graft    Plan:  Plan for repeat attempt at endovascular revascularization w/ anesthesia if patient and daughter remain agreeable  Palliative care eval yesterday- goals of care to remain symptom free, pt currently asymptomatic  Will need nephrology clearance and preparation prior to agram once scheduled   Continue Heparin gtt  Coumadin held (Afib)    CKD Stage 4  Creatinine 3 0    Plan:  Nephrology following      * 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  Assessment & Plan  Plan:  Podiatry following  ABX as per ID      Subjective:  Pt resting comfortably    Vitals:  /78   Pulse 70   Temp 97 7 °F (36 5 °C)   Resp 20   Ht 6' (1 829 m)   Wt 89 8 kg (198 lb)   SpO2 100%   BMI 26 85 kg/m²     I/Os:  I/O last 3 completed shifts: In: 1678 [P O :1678]  Out: 2371 [Urine:2370; Stool:1]  I/O this shift:   In: 463 [I V :463]  Out: 800 [Urine:800]    Lab Results and Cultures:   Lab Results   Component Value Date    WBC 6 83 07/25/2020    HGB 8 6 (L) 07/26/2020    HCT 26 9 (L) 07/26/2020    MCV 93 07/25/2020     07/25/2020     Lab Results   Component Value Date    GLUCOSE 174 (H) 07/06/2017    CALCIUM 7 9 (L) 07/28/2020     09/21/2015    K 5 0 07/28/2020    CO2 22 07/28/2020     07/28/2020    BUN 46 (H) 07/28/2020    CREATININE 3 00 (H) 07/28/2020     Lab Results   Component Value Date    INR 1 46 (H) 07/25/2020    INR 1 73 (H) 07/24/2020    INR 1 76 (H) 07/23/2020    PROTIME 17 7 (H) 07/25/2020    PROTIME 20 2 (H) 07/24/2020    PROTIME 20 5 (H) 07/23/2020        Blood Culture:   Lab Results   Component Value Date    BLOODCX No Growth After 5 Days  07/21/2020    BLOODCX No Growth After 5 Days  07/21/2020   ,   Urinalysis:   Lab Results   Component Value Date    COLORU Yellow 07/21/2020    COLORU Yellow 07/18/2017    CLARITYU Clear 07/21/2020    CLARITYU Transparent 07/18/2017    SPECGRAV 1 017 07/21/2020    SPECGRAV 1 010 07/18/2017    PHUR 6 0 07/21/2020    PHUR 6 0 10/09/2018    PHUR 6 0 07/18/2017    LEUKOCYTESUR (A) 07/21/2020     Elevated glucose may cause decreased leukocyte values   See urine microscopic for [facility] result/    LEUKOCYTESUR NEG 07/18/2017    NITRITE Negative 07/21/2020    NITRITE NEG 07/18/2017    PROTEINUA +30 07/18/2017    GLUCOSEU >=1000 (1%) (A) 07/21/2020    GLUCOSEU 100 (1/10%) (A) 05/28/2015    KETONESU Negative 07/21/2020    KETONESU NEG 07/18/2017    BILIRUBINUR Negative 07/21/2020    BILIRUBINUR NEG 07/18/2017    BLOODU Small (A) 07/21/2020    BLOODU NEG 07/18/2017   ,   Urine Culture:   Lab Results   Component Value Date    URINECX 40,000-49,000 cfu/ml Escherichia coli (A) 07/08/2020   ,   Wound Culure: No results found for: WOUNDCULT    Medications:  Current Facility-Administered Medications   Medication Dose Route Frequency    acetaminophen (TYLENOL) tablet 650 mg  650 mg Oral Q6H PRN    amLODIPine (NORVASC) tablet 5 mg  5 mg Oral Daily    calcitriol (ROCALTROL) capsule 0 25 mcg  0 25 mcg Oral Once per day on Mon Wed Fri    ceFAZolin (ANCEF) IVPB (premix) 1,000 mg 50 mL  1,000 mg Intravenous Q8H    clopidogrel (PLAVIX) tablet 75 mg  75 mg Oral Daily    diphenhydrAMINE-zinc acetate (BENADRYL) 2-0 1 % cream   Topical TID PRN    ferrous sulfate tablet 325 mg  325 mg Oral Daily With Breakfast    finasteride (PROSCAR) tablet 5 mg  5 mg Oral Daily    FLUoxetine (PROzac) capsule 40 mg  40 mg Oral Daily    heparin (porcine) 25,000 units in 250 mL infusion (premix)  3-30 Units/kg/hr (Order-Specific) Intravenous Titrated    heparin (porcine) injection 3,400 Units  3,400 Units Intravenous Q1H PRN    heparin (porcine) injection 6,800 Units  6,800 Units Intravenous Q1H PRN    insulin glargine (LANTUS) subcutaneous injection 8 Units 0 08 mL  8 Units Subcutaneous QAM    insulin lispro (HumaLOG) 100 units/mL subcutaneous injection 1-6 Units  1-6 Units Subcutaneous 4x Daily (AC & HS)    memantine (NAMENDA) tablet 5 mg  5 mg Oral Daily    ondansetron (ZOFRAN) injection 4 mg  4 mg Intravenous Q4H PRN    pravastatin (PRAVACHOL) tablet 40 mg  40 mg Oral Daily With Dinner    sodium bicarbonate tablet 650 mg  650 mg Oral BID after meals    tamsulosin (FLOMAX) capsule 0 4 mg  0 4 mg Oral Daily With Dinner    vancomycin (VANCOCIN) IVPB (premix) 500 mg 100 mL  500 mg Intravenous Daily PRN       Physical Exam:    General appearance: alert and oriented, in no acute distress  Lungs: clear to auscultation bilaterally  Heart: regular rate and rhythm, S1, S2 normal, no murmur, click, rub or gallop  Abdomen: soft, non-tender; bowel sounds normal; no masses,  no organomegaly  Extremities: Feet warm, M/S intact, foot wound dressing CDI    Wound/Incision:  Right groin incision clean, dry, and intact    Pulse exam:  Peroneal:  Left: doppler signal  PT:  Left: doppler signal      Susan De La Garza PA-C  7/28/2020

## 2020-07-28 NOTE — OCCUPATIONAL THERAPY NOTE
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   Pain Assessment   Pain Assessment Tool 0-10   ADL   Where Assessed Standing at sink   Grooming Assistance 4  Minimal Assistance   Grooming Deficit Teeth care;Setup; Increased time to complete;Standing with assistive device   LB Bathing Assistance 2  Maximal Assistance   LB Bathing Deficit Buttocks   Transfers   Sit to Stand 4  Minimal assistance   Additional items Assist x 1   Stand to Sit 4  Minimal assistance   Additional items Assist x 1   Functional Mobility   Functional Mobility 4  Minimal assistance   Additional items Rolling walker   Cognition   Overall Cognitive Status Impaired   Arousal/Participation Cooperative   Attention Attends with cues to redirect   Orientation Level Oriented X4   Memory Unable to assess   Following Commands Follows one step commands with increased time or repetition   Activity Tolerance   Activity Tolerance Patient tolerated treatment well   Assessment   Assessment Pt participated in occupational therapy with focus on activity tolerance, functional transfers/mob, standing balance and tolerance for pt engagement in functional self-care task  Pt cleared by RN/Molly for pt participation in occupational therapy  Pt received sitting out of bed to bedside chair and agreeable to therapy following pt Identifiers confirmed  Pt daughter Sapphire Jaimes present and supportive throughout session    Pt required assist for functional transfers/mob with RW and grooming task in stance 2* pt coordination and pt balance deficits  Pt will require post acute rehab services to continue to address these above noted pt deficits which currently impair pt ADL and functional mob  Pt chair alarm active post session all needs within reach     Plan   Treatment Interventions ADL retraining   Goal Expiration Date 08/05/20   OT Frequency 3-5x/wk   Recommendation   OT Discharge Recommendation Post-Acute Rehabilitation Services   Barthel Index   Feeding 10   Bathing 0   Grooming Score 5   Dressing Score 5   Bladder Score 10   Bowels Score 10   Toilet Use Score 5   Transfers (Bed/Chair) Score 5   Mobility (Level Surface) Score 0   Stairs Score 0   Barthel Index Score 50   Modified Micah Scale   Modified Micah Scale 4     Ja Quick  GONZALEZ/L

## 2020-07-28 NOTE — PROGRESS NOTES
Vancomycin IV Pharmacy-to-Dose Consultation    Sebastián Paul is a 80 y o  male who is currently receiving Vancomycin IV with management by the Pharmacy Consult service for the treatment of osteomyelitis  Assessment/Plan:    The patient's chart was reviewed  Renal function is stable  There are no signs or symptoms of nephrotoxicity and/or infusion reactions documented  The following nephrotoxicity factors are present:  Patient-Factors: elderly, renal dysfunction    Random level this morning was 30 8  Patient will not receive a dose of vancomycin today  Based on today's assessment, will continue current vancomycin (Day # 8) dosing of 500 mg daily PRN for level <20, with a plan for another random to be drawn at 0600 on 7/29/20  We will continue to follow the patient's culture results and clinical progress daily        Harvey Peck, PharmD  Pharmacist

## 2020-07-28 NOTE — PROGRESS NOTES
Progress Note - Infectious Disease   Nayla Correa 80 y o  male MRN: [id number]  Unit/Bed#: -01 Encounter: [id number]      Impression/Plan:  1  Left calcaneal osteomyelitis-wound culture is polymicrobial but unclear which of the organism is a pathogen as this was not a deep operative culture   The cultures grew MRSA, Klebsiella, and Enterococcus at an outside lab  Hank Limbo is sensitive to cephalosporins but resistant to ampicillin  Fortunately the patient is not systemically ill, is hemodynamically stable, nontoxic  MRI is suggestive of mild calcaneal osteomyelitis  Patient refused amputation  Attending repeat attempt at endovascular revascularization               -continue vancomycin and cefazolin for now awaiting final operative plans  -pharmacy follow-up for vancomycin trough management  Level is high at 30  Repeat level ordered for tomorrow   -local wound care  -followup further recommendations by Podiatry, to be determined after repeat attempt at endovascular revascularization   -if no plan for any resect of surgery, the likelihood of any cure with medical therapy alone would be extraordinarily low and the risk of renal toxicity would be relatively high   Therefore would favor an attempt at oral suppressive antibiotics with doxycycline rather than any prolonged course of intravenous antibiotics      2  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-the heel wound is been present for about 2 years and the metatarsal ulceration over the last few months   -local wound care  -close podiatry follow-up     3  Chronic kidney disease-stage IV   The renal function is quite abnormal and a bit worse   -dose adjusted antibiotics as above  -recheck BMP  -volume management     4  Diabetes mellitus type 2-with hyperglycemia   Very poorly controlled   -tighten diabetic control to improve leukocyte function     5  Peripheral arterial disease-patient now with continued abnormal leads    Now status post angiogram with chronic occlusion of LLE bypass  Vascular input noted  Patient is not a candidate for redo bypass  Patient is refusing amputation   -vascular follow-up ongoing        Antibiotics:  Vancomycin 8  Cefazolin 7          Subjective:  No acute complaints  Denies fevers, chills, or sweats  Denies nausea, vomiting, or diarrhea  Objective:  Vitals:  Temp:  [97 6 °F (36 4 °C)-98 3 °F (36 8 °C)] 97 6 °F (36 4 °C)  HR:  [70-72] 72  Resp:  [18-20] 18  BP: (128-139)/(62-78) 139/74  SpO2:  [97 %-100 %] 98 %  Temp (24hrs), Av 9 °F (36 6 °C), Min:97 6 °F (36 4 °C), Max:98 3 °F (36 8 °C)  Current: Temperature: 97 6 °F (36 4 °C)    Physical Exam:   General:  No acute distress, elderly  HEENT:  Atraumatic normocephalic  Psychiatric:  Awake and alert  Pulmonary:  Normal respiratory excursion without accessory muscle use  Abdomen:  Soft, nontender  Extremities:  Stable edema  Foot dressing intact  Skin:  No rashes    Lab Results:  I have personally reviewed pertinent labs  Results from last 7 days   Lab Units 20  0531 20  1057 20  0823  20  1337   POTASSIUM mmol/L 5 0 4 8 4 7   < > 4 2   CHLORIDE mmol/L 103 105 105   < > 107   CO2 mmol/L 22 21 22   < > 22   BUN mg/dL 46* 43* 43*   < > 43*   CREATININE mg/dL 3 00* 2 77* 2 85*   < > 2 95*   EGFR ml/min/1 73sq m 17 19 19   < > 18   CALCIUM mg/dL 7 9* 8 3 7 4*   < > 7 4*   AST U/L  --   --   --   --  8   ALT U/L  --   --   --   --  12   ALK PHOS U/L  --   --   --   --  296*    < > = values in this interval not displayed  Results from last 7 days   Lab Units 20  0532 20  0823 20  0816 20  0451   WBC Thousand/uL 8 18  --  6 83 10 08   HEMOGLOBIN g/dL 8 5* 8 6* 8 8* 7 3*   PLATELETS Thousands/uL 307  --  312 317     Results from last 7 days   Lab Units 20  1337   BLOOD CULTURE  No Growth After 5 Days  No Growth After 5 Days         Imaging Studies:   I have personally reviewed pertinent imaging study reports and images in PACS  EKG, Pathology, and Other Studies:   I have personally reviewed pertinent reports

## 2020-07-28 NOTE — SOCIAL WORK
Cm reviewed patient's chart  Cm informed that patient is not medically stable  Patient to have a repeat endovascular procedure  Patient and daughter refusing amputation and HD  Cm provided [facility] with update

## 2020-07-28 NOTE — ASSESSMENT & PLAN NOTE
· Baseline creatinine in the high 2s to low 3s  · Nephrology consulted in view of requirement for angiogram  · Lasix held - ct to hold for repeat endovascular attempt  · Avoid hypotension, nephrotoxic medications  · Mild worsening creatinine today

## 2020-07-28 NOTE — ASSESSMENT & PLAN NOTE
81 yo M w/ PAD and hx of B fem-PT bypasses (Eugenio Hanley, '12 and '14 c/b L occlusion and s/p L SFA-PT bypass w/ cadaver vein in '15 ([facility]) presents with persistent L heel and plantar midfoot wound and infection     LEADs:  R: 1 2/88/52 w/ >75% stenosis of the proximal calf bypass graft; L: 0 78/86/32 w/ low flow at the distal anastomosis    S/P Agram LLE 7/24- Chronically occluded bypass graft    Plan:  Plan for repeat attempt at endovascular revascularization w/ anesthesia if patient and daughter remain agreeable  Palliative care eval yesterday- goals of care to remain symptom free, pt currently asymptomatic  Will need nephrology clearance and preparation prior to agram once scheduled   Continue Heparin gtt  Coumadin held (Afib)

## 2020-07-28 NOTE — ASSESSMENT & PLAN NOTE
· Polymicrobial wound cultures positive for MRSA, Klebsiella, Enterococcus - unclear which is pathogen  · MRI - suspicion of mild plantar osteomyelitis but less prominent compared to 2/2020  · Discussed with podiatry - no surgery planned at present  F/u attempt at endovascular intervention to determine if perfusion can be improved    · Patient refuses amputation   · On Vancomycin, Cefazolin  per ID   · Per ID if no resection done very low likelihood of cure with medical therapy and has increased risk of renal toxicity hence need oral suppression with Doxycycline

## 2020-07-28 NOTE — ASSESSMENT & PLAN NOTE
· LEADS - RLE - LILIAN 1 2/88/52, 50-75% stenosis of prox-mid SFA, >75% stenosis in proximal calf bypass graft, high grade stenosis vs occlusion of distal SFA artery/stent; LLE - LILIAN 0 78/86/32 with occlusion of the distal SFA and above knee popliteal artery/stent    · Left lower extremity angiogram - completely occluded above-the-knee to below-the-knee bypass - likely occluded for a long time  · Not a candidate for 3rd time redo bypass due to age, co-morbidities and his wishes  · Options discussed with daughter by vascular - she and patient opted for another endovascular attempt  · Nephrology following in view of CKD 4  · Coumadin held - on heparin drip for potential intervention  · Continue Plavix, statin  · Palliative Care is following

## 2020-07-28 NOTE — ASSESSMENT & PLAN NOTE
Lab Results   Component Value Date    HGBA1C 7 1 (H) 07/22/2020       · Controlled for patient's age as evidenced by A1c  · Glipizide held  · Ct Lantus 8 units in am, SSI  · Monitor blood sugars and adjust insulin as needed

## 2020-07-29 LAB
ANION GAP SERPL CALCULATED.3IONS-SCNC: 10 MMOL/L (ref 4–13)
APTT PPP: 70 SECONDS (ref 23–37)
BUN SERPL-MCNC: 49 MG/DL (ref 5–25)
CALCIUM SERPL-MCNC: 7.6 MG/DL (ref 8.3–10.1)
CHLORIDE SERPL-SCNC: 107 MMOL/L (ref 100–108)
CO2 SERPL-SCNC: 19 MMOL/L (ref 21–32)
CREAT SERPL-MCNC: 3.05 MG/DL (ref 0.6–1.3)
GFR SERPL CREATININE-BSD FRML MDRD: 17 ML/MIN/1.73SQ M
GLUCOSE SERPL-MCNC: 102 MG/DL (ref 65–140)
GLUCOSE SERPL-MCNC: 106 MG/DL (ref 65–140)
GLUCOSE SERPL-MCNC: 156 MG/DL (ref 65–140)
GLUCOSE SERPL-MCNC: 234 MG/DL (ref 65–140)
GLUCOSE SERPL-MCNC: 257 MG/DL (ref 65–140)
INR PPP: 1.14 (ref 0.84–1.19)
POTASSIUM SERPL-SCNC: 5 MMOL/L (ref 3.5–5.3)
PROTHROMBIN TIME: 14.6 SECONDS (ref 11.6–14.5)
SODIUM SERPL-SCNC: 136 MMOL/L (ref 136–145)
VANCOMYCIN SERPL-MCNC: 25.8 UG/ML

## 2020-07-29 PROCEDURE — 80202 ASSAY OF VANCOMYCIN: CPT | Performed by: INTERNAL MEDICINE

## 2020-07-29 PROCEDURE — 85730 THROMBOPLASTIN TIME PARTIAL: CPT | Performed by: INTERNAL MEDICINE

## 2020-07-29 PROCEDURE — 99232 SBSQ HOSP IP/OBS MODERATE 35: CPT | Performed by: SURGERY

## 2020-07-29 PROCEDURE — 80048 BASIC METABOLIC PNL TOTAL CA: CPT | Performed by: INTERNAL MEDICINE

## 2020-07-29 PROCEDURE — 99233 SBSQ HOSP IP/OBS HIGH 50: CPT | Performed by: INTERNAL MEDICINE

## 2020-07-29 PROCEDURE — 97116 GAIT TRAINING THERAPY: CPT

## 2020-07-29 PROCEDURE — 99232 SBSQ HOSP IP/OBS MODERATE 35: CPT | Performed by: INTERNAL MEDICINE

## 2020-07-29 PROCEDURE — 85610 PROTHROMBIN TIME: CPT | Performed by: INTERNAL MEDICINE

## 2020-07-29 PROCEDURE — 82948 REAGENT STRIP/BLOOD GLUCOSE: CPT

## 2020-07-29 RX ORDER — WARFARIN SODIUM 5 MG/1
5 TABLET ORAL
Status: DISCONTINUED | OUTPATIENT
Start: 2020-07-29 | End: 2020-07-29

## 2020-07-29 RX ORDER — DOXYCYCLINE HYCLATE 100 MG/1
100 CAPSULE ORAL EVERY 12 HOURS SCHEDULED
Status: DISCONTINUED | OUTPATIENT
Start: 2020-07-29 | End: 2020-08-07 | Stop reason: HOSPADM

## 2020-07-29 RX ORDER — VANCOMYCIN HYDROCHLORIDE 1 G/200ML
12.5 INJECTION, SOLUTION INTRAVENOUS
Status: DISCONTINUED | OUTPATIENT
Start: 2020-07-31 | End: 2020-07-29

## 2020-07-29 RX ORDER — WARFARIN SODIUM 5 MG/1
5 TABLET ORAL
Status: DISCONTINUED | OUTPATIENT
Start: 2020-07-29 | End: 2020-08-06

## 2020-07-29 RX ADMIN — DIPHENHYDRAMINE HCL 25 MG: 25 TABLET ORAL at 08:29

## 2020-07-29 RX ADMIN — CEFAZOLIN SODIUM 1000 MG: 1 SOLUTION INTRAVENOUS at 04:15

## 2020-07-29 RX ADMIN — TAMSULOSIN HYDROCHLORIDE 0.4 MG: 0.4 CAPSULE ORAL at 15:38

## 2020-07-29 RX ADMIN — CALCITRIOL 0.25 MCG: 0.25 CAPSULE, LIQUID FILLED ORAL at 08:23

## 2020-07-29 RX ADMIN — FINASTERIDE 5 MG: 5 TABLET, FILM COATED ORAL at 08:23

## 2020-07-29 RX ADMIN — INSULIN LISPRO 3 UNITS: 100 INJECTION, SOLUTION INTRAVENOUS; SUBCUTANEOUS at 17:20

## 2020-07-29 RX ADMIN — MEMANTINE 5 MG: 5 TABLET ORAL at 08:23

## 2020-07-29 RX ADMIN — CLOPIDOGREL BISULFATE 75 MG: 75 TABLET ORAL at 08:23

## 2020-07-29 RX ADMIN — INSULIN LISPRO 1 UNITS: 100 INJECTION, SOLUTION INTRAVENOUS; SUBCUTANEOUS at 12:10

## 2020-07-29 RX ADMIN — PRAVASTATIN SODIUM 40 MG: 20 TABLET ORAL at 15:38

## 2020-07-29 RX ADMIN — SODIUM BICARBONATE 650 MG TABLET 650 MG: at 08:24

## 2020-07-29 RX ADMIN — INSULIN LISPRO 3 UNITS: 100 INJECTION, SOLUTION INTRAVENOUS; SUBCUTANEOUS at 22:16

## 2020-07-29 RX ADMIN — FERROUS SULFATE TAB 325 MG (65 MG ELEMENTAL FE) 325 MG: 325 (65 FE) TAB at 08:23

## 2020-07-29 RX ADMIN — WARFARIN SODIUM 5 MG: 5 TABLET ORAL at 17:22

## 2020-07-29 RX ADMIN — CEFAZOLIN SODIUM 1000 MG: 1 SOLUTION INTRAVENOUS at 12:10

## 2020-07-29 RX ADMIN — SODIUM BICARBONATE 650 MG TABLET 650 MG: at 17:20

## 2020-07-29 RX ADMIN — FLUOXETINE 40 MG: 20 CAPSULE ORAL at 08:23

## 2020-07-29 RX ADMIN — INSULIN GLARGINE 8 UNITS: 100 INJECTION, SOLUTION SUBCUTANEOUS at 08:24

## 2020-07-29 RX ADMIN — AMLODIPINE BESYLATE 5 MG: 5 TABLET ORAL at 08:23

## 2020-07-29 RX ADMIN — HEPARIN SODIUM AND DEXTROSE 11 UNITS/KG/HR: 10000; 5 INJECTION INTRAVENOUS at 04:16

## 2020-07-29 NOTE — PROGRESS NOTES
Podiatry - Progress Note  Patient: Jerilyn Reyes 80 y o  male   MRN: [id number]  PCP: Demetrius Joyce MD  Unit/Bed#: MS 26699 Encounter: [id number]  Date Of Visit: 20    ASSESSMENT:    Jerilyn Reyes is a 80 y o  male with:    1  Left foot heel wound w/ MRI suspicious for calcaneal OM - Garcia 3 - POA  2  Left foot submetatarsal 5 wound - Garcia 1 - POA  3  PAD  - s/p L fem-PT (, ) & SFA-PT () bypass  - s/p agram LLE (DOS 20) - noted with chronically occluded bypass graft   4  CKD Stage IV  5  DM2       PLAN:    -No podiatry plan for debridement at this time due to poor LLE perfusion   -Continue local wound care with [place] and DSD  -After discussion with palliative care and family pt has decided not to pursue with any more surgical intervention at this time per vascular    -Antibiotic per ID:          -Cefazolin for now, Vancomycin on hold for the past 2 days level has been elevated           -Attempt oral suppressive antibiotic with doxycycline opposed to prolonged course of IV antibiotics  -VSS, no leukocytosis, atoxic in appearance   -Weight bearing status: WBAT heel unloading shoes  -Rest of medical care per primary team        SUBJECTIVE:     The patient was seen, evaluated, and assessed at bedside today  The patient was awake, alert, and in no acute distress  No acute events overnight  The patient reports he is doing ok this morning  Patient denies N/V/F/chills/SOB/CP  OBJECTIVE:     Vitals:   /71   Pulse 72   Temp 97 5 °F (36 4 °C)   Resp 18   Ht 6' (1 829 m)   Wt 89 8 kg (198 lb)   SpO2 98%   BMI 26 85 kg/m²     Temp (24hrs), Av 6 °F (36 4 °C), Min:97 5 °F (36 4 °C), Max:97 7 °F (36 5 °C)      Physical Exam:     General:  Alert, cooperative, and in no distress  Lower extremity exam:  Cardiovascular status at baseline  Neurological status at baseline  Musculoskeletal status at baseline  No calf tenderness noted     Lower extremity wound(s) as noted below:    Wound #: 1  Location: left plantar heel   Length 0 5cm: Width 0 5cm: Depth 2cm:   Deepest Tissue Noted in Base: periosteum, sinus track deep  Probe to Bone: possibly bone vs periosteum  Peripheral Skin Description: Attached  Granulation: 10% Fibrotic Tissue: 90% Necrotic Tissue: 0%   Drainage Amount: minimal, serous  Signs of active Infection: No purulence, no crepitus, no malodor, no ascending erythema  Wound #: 2  Location: submetatarsal 5   Length 1cm: Width 0 5cm: Depth 0 1cm:   Deepest Tissue Noted in Base: subcutanesou  Probe to Bone: No  Peripheral Skin Description: Attached  Granulation: 40% Fibrotic Tissue: 60% Necrotic Tissue: 0%   Drainage Amount: minimal, serous  Signs of active Infection: No purulence, no ascending erythema, no crepituc, no malodor      Clinical Images 07/28/20:              Additional Data:     Labs:    Results from last 7 days   Lab Units 07/28/20  0532   WBC Thousand/uL 8 18   HEMOGLOBIN g/dL 8 5*   HEMATOCRIT % 27 3*   PLATELETS Thousands/uL 307     Results from last 7 days   Lab Units 07/28/20  0531   POTASSIUM mmol/L 5 0   CHLORIDE mmol/L 103   CO2 mmol/L 22   BUN mg/dL 46*   CREATININE mg/dL 3 00*   CALCIUM mg/dL 7 9*     Results from last 7 days   Lab Units 07/25/20  0816   INR  1 46*       * I Have Reviewed All Lab Data Listed Above  Recent Cultures (last 7 days):               Imaging: I have personally reviewed pertinent films in PACS  EKG, Pathology, and Other Studies: I have personally reviewed pertinent reports  ** Please Note: Portions of the record may have been created with voice recognition software  Occasional wrong word or "sound a like" substitutions may have occurred due to the inherent limitations of voice recognition software  Read the chart carefully and recognize, using context, where substitutions have occurred   **

## 2020-07-29 NOTE — PROGRESS NOTES
NEPHROLOGY PROGRESS NOTE   Gina Lee 80 y o  male MRN: [id number]  Unit/Bed#: -01 Encounter: [id number]      ASSESSMENT/PLAN:  1  CKD IV: baseline creatinine high 2's to low 3's and follows with Dr Anderson Tena  Creatinine today stable at 3 05  · Vancomycin has been on hold past few days as level has been elevated but is improving down to 25 8 today  · Continue to hold home lasix for now but if creatinine stable/improving tomorrow could consider restarting since he does have significant edema  · Check am BMP   2  PAD with nonhealing LLE ulcer: s/p CO2 agram with only 30cc IV dye on 7/24 with occluded below the knee bypass which is likely chronic  Per vascular note and my discussion patient today he does not want to pursue any more interventions  · On vancomycin per pharmacy with dose being adjusted per level   3  Hypertension: BP acceptable   4  Hyponatremia: sodium up to 136 today  Continue 1500 cc per day oral fluid restriction  5  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   Anemia of CKD + iron deficiency: continue oral iron therapy  hgb stable at 8 5 yesterday  Plan Summary:    Consider restarting home diuretics tomorrow   Check am BMP     SUBJECTIVE:  Feeling okay  Has decided he does not want to do any further interventions on his leg  No current chest pain or shortness of breath      OBJECTIVE:  Current Weight: Weight - Scale: 89 8 kg (198 lb)  Vitals:    07/29/20 0657   BP: 144/67   Pulse: 71   Resp: 18   Temp: (!) 97 3 °F (36 3 °C)   SpO2: 98%       Intake/Output Summary (Last 24 hours) at 7/29/2020 1018  Last data filed at 7/29/2020 0800  Gross per 24 hour   Intake 740 ml   Output 950 ml   Net -210 ml     General:  No acute distress  Skin:  LE erythema   Eyes:  Sclerae anicteric  ENT:  Moist mucous membranes  Neck:  Trachea midline with no JVD  Chest:  Clear to auscultation bilaterally  CVS:  Regular rate and rhythm, murmur  Abdomen:  Soft, nontender, nondistended  Extremities:  Bilateral LE edema L>R  Neuro:  Awake and alert  Psych:  Appropriate affect    Medications:  Scheduled Meds:    Current Facility-Administered Medications:  acetaminophen 650 mg Oral Q6H PRN Araceli Burdick MD    amLODIPine 5 mg Oral Daily COLLEEN Tai    calcitriol 0 25 mcg Oral Once per day on Mon Wed Fri Sebastián Hollins MD    cefazolin 1,000 mg Intravenous Q8H Jb Mcpherson MD Last Rate: 1,000 mg (07/29/20 0415)   clopidogrel 75 mg Oral Daily Araceli Burdick MD    diphenhydrAMINE 25 mg Oral Q6H PRN Zita Button, DO    diphenhydrAMINE-zinc acetate  Topical TID PRN Eduardo Glass MD    ferrous sulfate 325 mg Oral Daily With Breakfast COLLEEN Tai    finasteride 5 mg Oral Daily Araceli Burdick MD    FLUoxetine 40 mg Oral Daily Araceli Burdick MD    heparin (porcine) 3-30 Units/kg/hr (Order-Specific) Intravenous Titrated Chris Navid PA-C Last Rate: 11 Units/kg/hr (07/29/20 0416)   heparin (porcine) 3,400 Units Intravenous Q1H PRN Chris Navid PA-C    heparin (porcine) 6,800 Units Intravenous Q1H PRN Chris Shoemaker PA-C    insulin glargine 8 Units Subcutaneous QAM Eduardo Glass MD    insulin lispro 1-6 Units Subcutaneous 4x Daily (AC & HS) Araceli Burdick MD    memantine 5 mg Oral Daily Araceli Burdick MD    ondansetron 4 mg Intravenous Q4H PRN Araceli Burdick MD    pravastatin 40 mg Oral Daily With Karly Moeller MD    sodium bicarbonate 650 mg Oral BID after meals Araceli Burdick MD    tamsulosin 0 4 mg Oral Daily With Karly Moeller MD    [START ON 7/31/2020] vancomycin 12 5 mg/kg Intravenous Q72H Zita Button, DO        PRN Meds:   acetaminophen    diphenhydrAMINE    diphenhydrAMINE-zinc acetate    heparin (porcine)    heparin (porcine)    ondansetron    Continuous Infusions:    heparin (porcine) 3-30 Units/kg/hr (Order-Specific) Last Rate: 11 Units/kg/hr (07/29/20 0416)       Laboratory Results:  Results from last 7 days   Lab Units 07/29/20  1714 07/28/20  1468 07/28/20  0531 07/27/20  1057 07/26/20  0823 07/25/20  0816 07/24/20  0451 07/23/20  0536   WBC Thousand/uL  --  8 18  --   --   --  6 83 10 08 14 17*   HEMOGLOBIN g/dL  --  8 5*  --   --  8 6* 8 8* 7 3* 7 7*   HEMATOCRIT %  --  27 3*  --   --  26 9* 28 2* 24 1* 25 7*   PLATELETS Thousands/uL  --  307  --   --   --  312 317 286   SODIUM mmol/L 136  --  133* 134* 135* 132* 133* 135*   POTASSIUM mmol/L 5 0  --  5 0 4 8 4 7 4 5 4 5 4 8   CHLORIDE mmol/L 107  --  103 105 105 104 103 105   CO2 mmol/L 19*  --  22 21 22 20* 22 22   BUN mg/dL 49*  --  46* 43* 43* 43* 47* 39*   CREATININE mg/dL 3 05*  --  3 00* 2 77* 2 85* 2 68* 3 05* 2 81*   CALCIUM mg/dL 7 6*  --  7 9* 8 3 7 4* 7 4* 8 0* 8 0*   MAGNESIUM mg/dL  --   --   --   --   --   --  2 1  --    PHOSPHORUS mg/dL  --   --   --   --   --   --  3 8  --

## 2020-07-29 NOTE — PHYSICAL THERAPY NOTE
Physical Therapy Progress Note     07/29/20 1535   Pain Assessment   Pain Assessment Tool 0-10   Pain Score No Pain   Hospital Pain Intervention(s) Medication (See MAR); Repositioned; Ambulation/increased activity   Restrictions/Precautions   Weight Bearing Precautions Per Order Yes   LLE Weight Bearing Per Order WBAT   Other Precautions Pain; Fall Risk;Multiple lines;WBS   General   Chart Reviewed Yes   Response to Previous Treatment Patient with no complaints from previous session  Family/Caregiver Present No   Cognition   Overall Cognitive Status WFL   Arousal/Participation Responsive; Alert; Cooperative   Attention Within functional limits   Orientation Level Oriented X4   Memory Within functional limits   Following Commands Follows all commands and directions without difficulty   Transfers   Sit to Stand 4  Minimal assistance   Additional items Assist x 1; Increased time required;Armrests; Verbal cues   Stand to Sit 4  Minimal assistance   Additional items Assist x 1; Increased time required;Armrests; Verbal cues   Ambulation/Elevation   Gait pattern Decreased foot clearance; Wide NOAH; Improper Weight shift; Excessively slow; Short stride   Gait Assistance 4  Minimal assist   Additional items Assist x 1;Verbal cues; Tactile cues   Assistive Device Rolling walker   Distance 60'x2   Endurance Deficit   Endurance Deficit Yes   Activity Tolerance   Activity Tolerance Patient limited by fatigue   Nurse Made Aware RN ok to see   Assessment   Prognosis Good   Problem List Decreased strength;Decreased range of motion;Decreased endurance; Impaired balance;Decreased mobility;Pain;Orthopedic restrictions   Assessment Pt with increased ambualtion distance this session, needing Min A x1 for sit to stand transfers as well as verbala and tcatile cues to stay within NOAH of RW  Pt will continue to benenfit from skilled PT to maximize fuctional mobility      Barriers to Discharge Inaccessible home environment;Decreased caregiver support Barriers to Discharge Comments   (lives alone + TONYA)   Goals   Patient Goals to go home   STG Expiration Date 08/05/20   Short Term Goal #1 STG 1: Pt will perform transfers at a MI level to return to baseline of function  STG 2: Pt will ambulate 300ft with RW at a MI level to reduce the level of assistance needed upon d/c home  STG 3: Pt will negotiate 2 steps with HR at a MI level to ensure safety with activity when able to ambulate 150ft at a S level  STG 4: Pt will perform bed mobility at a I to safety return to PLOF  Plan   Treatment/Interventions Functional transfer training;LE strengthening/ROM; Therapeutic exercise; Endurance training;Bed mobility;Gait training   Progress Progressing toward goals   PT Frequency   (3-5x/week)   Recommendation   PT Discharge Recommendation Post-Acute Rehabilitation Services   Equipment Recommended Walker   PT - OK to Discharge Yes  (to rehab when medically ready)     Jamie Pickard, PTA

## 2020-07-29 NOTE — SOCIAL WORK
Cm reviewed patient's chart  Cm informed that patient is not ready for dc at this time  Cm spoke with patient's daughter, who wanted update on location for patient's rehab once medically stable  Cm informed daughter, that [facility] continues to follow patient  Cm will send update to facility  Daughter is also requesting that patient be visited by a [place] [phone]  [facility]  Cm to contact [facility] care

## 2020-07-29 NOTE — PLAN OF CARE
Problem: Potential for Falls  Goal: Patient will remain free of falls  Description  INTERVENTIONS:  - Assess patient frequently for physical needs  -  Identify cognitive and physical deficits and behaviors that affect risk of falls    -  [place] fall precautions as indicated by assessment   - Educate patient/family on patient safety including physical limitations  - Instruct patient to call for assistance with activity based on assessment  - Modify environment to reduce risk of injury  - Consider OT/PT consult to assist with strengthening/mobility  Outcome: Progressing     Problem: PAIN - ADULT  Goal: Verbalizes/displays adequate comfort level or baseline comfort level  Description  Interventions:  - Encourage patient to monitor pain and request assistance  - Assess pain using appropriate pain scale  - Administer analgesics based on type and severity of pain and evaluate response  - Implement non-pharmacological measures as appropriate and evaluate response  - Consider cultural and social influences on pain and pain management  - Notify physician/advanced practitioner if interventions unsuccessful or patient reports new pain  Outcome: Progressing     Problem: INFECTION - ADULT  Goal: Absence or prevention of progression during hospitalization  Description  INTERVENTIONS:  - Assess and monitor for signs and symptoms of infection  - Monitor lab/diagnostic results  - Monitor all insertion sites, i e  indwelling lines, tubes, and drains  - Monitor endotracheal if appropriate and nasal secretions for changes in amount and color  - [place] appropriate cooling/warming therapies per order  - Administer medications as ordered  - Instruct and encourage patient and family to use good hand hygiene technique  - Identify and instruct in appropriate isolation precautions for identified infection/condition  Outcome: Progressing  Goal: Absence of fever/infection during neutropenic period  Description  INTERVENTIONS:  - Monitor WBC    Outcome: Progressing     Problem: Nutrition/Hydration-ADULT  Goal: Nutrient/Hydration intake appropriate for improving, restoring or maintaining nutritional needs  Description  Monitor and assess patient's nutrition/hydration status for malnutrition  Collaborate with interdisciplinary team and initiate plan and interventions as ordered  Monitor patient's weight and dietary intake as ordered or per policy  Utilize nutrition screening tool and intervene as necessary  Determine patient's food preferences and provide high-protein, high-caloric foods as appropriate       INTERVENTIONS:  - Monitor oral intake, urinary output, labs, and treatment plans  - Assess nutrition and hydration status and recommend course of action  - Evaluate amount of meals eaten  - Assist patient with eating if necessary   - Allow adequate time for meals  - Recommend/ encourage appropriate diets, oral nutritional supplements, and vitamin/mineral supplements  - Order, calculate, and assess calorie counts as needed  - Recommend, monitor, and adjust tube feedings and TPN/PPN based on assessed needs  - Assess need for intravenous fluids  - Provide specific nutrition/hydration education as appropriate  - Include patient/family/caregiver in decisions related to nutrition  Outcome: Progressing     Problem: HEMATOLOGIC - ADULT  Goal: Maintains hematologic stability  Description  INTERVENTIONS  - Assess for signs and symptoms of bleeding or hemorrhage  - Monitor labs  - Administer supportive blood products/factors as ordered and appropriate  Outcome: Progressing     Problem: MUSCULOSKELETAL - ADULT  Goal: Maintain or return mobility to safest level of function  Description  INTERVENTIONS:  - Assess patient's ability to carry out ADLs; assess patient's baseline for ADL function and identify physical deficits which impact ability to perform ADLs (bathing, care of mouth/teeth, toileting, grooming, dressing, etc )  - Assess/evaluate cause of self-care deficits   - Assess range of motion  - Assess patient's mobility  - Assess patient's need for assistive devices and provide as appropriate  - Encourage maximum independence but intervene and supervise when necessary  - Involve family in performance of ADLs  - Assess for home care needs following discharge   - Consider OT consult to assist with ADL evaluation and planning for discharge  - Provide patient education as appropriate  Outcome: Progressing

## 2020-07-29 NOTE — ASSESSMENT & PLAN NOTE
· LEADS - RLE - LILIAN 1 2/88/52, 50-75% stenosis of prox-mid SFA, >75% stenosis in proximal calf bypass graft, high grade stenosis vs occlusion of distal SFA artery/stent; LLE - LILIAN 0 78/86/32 with occlusion of the distal SFA and above knee popliteal artery/stent    · Left lower extremity angiogram - completely occluded above-the-knee to below-the-knee bypass - likely occluded for a long time  · Not a candidate for 3rd time redo bypass due to age, co-morbidities and his wishes  · Patient does not want amputation or any further interventions  · Restart Coumadin  · Continue Plavix, statin  · Palliative Care is following

## 2020-07-29 NOTE — PLAN OF CARE
Problem: PHYSICAL THERAPY ADULT  Goal: Performs mobility at highest level of function for planned discharge setting  See evaluation for individualized goals  Description  Treatment/Interventions: Functional transfer training, LE strengthening/ROM, Therapeutic exercise, Endurance training, Equipment eval/education, Bed mobility, Gait training, Elevations          See flowsheet documentation for full assessment, interventions and recommendations  Outcome: Progressing  Note:   Prognosis: Good  Problem List: Decreased strength, Decreased range of motion, Decreased endurance, Impaired balance, Decreased mobility, Pain, Orthopedic restrictions  Assessment: Pt with increased ambualtion distance this session, needing Min A x1 for sit to stand transfers as well as verbala and tcatile cues to stay within NOAH of RW  Pt will continue to benenfit from skilled PT to maximize fuctional mobility  Barriers to Discharge: Inaccessible home environment, Decreased caregiver support  Barriers to Discharge Comments: (lives alone + TONYA)  PT Discharge Recommendation: Post-Acute Rehabilitation Services     PT - OK to Discharge: Yes(to rehab when medically ready)    See flowsheet documentation for full assessment

## 2020-07-29 NOTE — PROGRESS NOTES
Progress Note - VascularSurgery   Nayla Correa 80 y o  male MRN: [id number]  Unit/Bed#: -01 Encounter: [id number]    Assessment:  Pt is a 79 yo M w/ PAD and hx of B fem-PT bypasses (2012 and 2014)  presents with L heel and plantar midfoot wound  LEADs:  R: 1 2/88/52 w/ >75% stenosis of the proximal calf bypass graft; L: 0 78/86/32 w/ low flow at the distal anastomosis      LLE angio: completely occluded below the knee bypass likely chornic  Robust single-vessel dominant PT runoff  After discussion with palliative and family patient has decided that he does not want to pursue any more surgical options    Plan:  -ID:Kianna/Ancef  - hep gtt      Subjective/Objective       Subjective: Patient doing well overnight  No complaints  He is ready to go home  Denies fevers, chills, abd pain, nausea, or vomiting  Objective: Physical Exam   Constitutional: He is oriented to person, place, and time  He appears well-developed  No distress  HENT:   Head: Normocephalic and atraumatic  Eyes: No scleral icterus  Cardiovascular: Normal rate and regular rhythm  R palp fem  Dopp pop/dp/pt  L: palp fem dop pop/at/pt    Groin site no hematoma  C/d/i   Pulmonary/Chest: Effort normal and breath sounds normal    Abdominal: Soft  He exhibits no distension  There is no tenderness  There is no rebound and no guarding  Neurological: He is alert and oriented to person, place, and time  Skin: Skin is warm  Capillary refill takes less than 2 seconds  Nursing note and vitals reviewed  Blood pressure 144/67, pulse 71, temperature (!) 97 3 °F (36 3 °C), resp  rate 18, height 6' (1 829 m), weight 89 8 kg (198 lb), SpO2 98 %  ,Body mass index is 26 85 kg/m²        Intake/Output Summary (Last 24 hours) at 7/29/2020 0723  Last data filed at 7/29/2020 0601  Gross per 24 hour   Intake 560 ml   Output 850 ml   Net -290 ml       Invasive Devices     Peripheral Intravenous Line            Peripheral IV 07/27/20 Left Forearm 2 days Lab, Imaging and other studies:I have personally reviewed pertinent lab results      VTE Pharmacologic Prophylaxis: Heparin  VTE Mechanical Prophylaxis: sequential compression device

## 2020-07-29 NOTE — PROGRESS NOTES
Vancomycin IV Pharmacy-to-Dose Consultation    Brook Eisenmenger is a 80 y o  male who is currently receiving Vancomycin IV with management by the Pharmacy Consult service  Relevant clinical data and objective / subjective history reviewed  Vancomycin Assessment:  Indication: MRSA confirmed, osteomyelitis  (also on Cefazolin)  Renal Function: stable with SCr ranging in high 2 to low 3 range; off/on oliguric including past 24hr (0 4 mL/kg/hr)  Potential Nephrotoxicity Factors (select ALL that apply):  Medications: none  Patient-Factors: elderly, renal dysfunction  Days of Therapy: 9  Current Dose: 500mg pulse-dosing when level < 20 (no doses given; last dose 750mg x 1 on 7/27 at 0027)  Goal Trough: 15-20 (appropriate for most indications)   Goal AUC(24h): 400-600  Last Level: random 30 8 with AM labs on 7/28 (0531) and 25 8 with AM labs on 7/29 (0414)  Pharmacokinetic Analysis: based on prior regimens, increased levels, and stable renal function, performed Bayesian analysis to determine accurate PK  Ke 0 0077, Vd 88 6, half-life 89 hours, which demonstrates patient was likely accumulating and will necessitate at least q72h regimen if not longer  Will take >24hr for current level of 25 8 to come down to 15-20 range  Vancomycin Plan:  New Dosing: change to 1000mg q72h (next dose: Friday, 7/31, at 0600 when level expected to be 17 5)  Predicted Trough / AUC: 17 1 / 537  Next Level: prior to 2nd new dose despite being prior to steady state on Mon, 8/3, at 0600  Renal Function Monitoring: at least 2-3x/wk with current renal dysfunction      Pharmacy will continue to follow closely for s/sx of nephrotoxicity, infusion reactions and appropriateness of therapy  BMP and CBC will be ordered per protocol  We will continue to follow the patients culture results and clinical progress daily  Belem Brooks , [facility]  Clinical Pharmacist, [address]  [phone] or [place]

## 2020-07-29 NOTE — PROGRESS NOTES
Family requesting Sacrament of the Sick   does not see that this has been done   Contacted Father Agustina Lino      07/29/20 5174   Clinical Encounter Type   Referral To Other (Comment)  (Father Francine Bullock)

## 2020-07-29 NOTE — PROGRESS NOTES
Progress Note - Damir Patel 6/28/1930, 80 y o  male MRN: [id number]    Unit/Bed#: MS Dodd8-Morris Encounter: [id number]    Primary Care Provider: Dennys Bertrand MD   Date and time admitted to hospital: 7/21/2020 12:32 PM        * 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  Assessment & Plan  · Polymicrobial wound cultures positive for MRSA, Klebsiella, Enterococcus - unclear which is pathogen  · MRI - suspicion of mild plantar osteomyelitis but less prominent compared to 2/2020  · Patient refuses amputation   · Per ID if no resection done very low likelihood of cure with medical therapy and has increased risk of renal toxicity hence need oral suppression with Doxycycline  · Patient started on oral doxycycline for indefinite suppression    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  Assessment & Plan  · Continue with local wound care  · Podiatry is following with dressing changes 3 times a week    Peripheral arterial disease ([facility] [address] )  Assessment & Plan  · LEADS - RLE - LILIAN 1 2/88/52, 50-75% stenosis of prox-mid SFA, >75% stenosis in proximal calf bypass graft, high grade stenosis vs occlusion of distal SFA artery/stent; LLE - LILIAN 0 78/86/32 with occlusion of the distal SFA and above knee popliteal artery/stent    · Left lower extremity angiogram - completely occluded above-the-knee to below-the-knee bypass - likely occluded for a long time  · Not a candidate for 3rd time redo bypass due to age, co-morbidities and his wishes  · Patient does not want amputation or any further interventions  · Restart Coumadin  · Continue Plavix, statin  · Palliative Care is following    Anemia of chronic disease  Assessment & Plan  · Dropped to 7 3 on 7/24  · S/p 1 PRBC on 7/24  · Monitor hemoglobin and transfuse PRN    Atrial fibrillation   Assessment & Plan  · HR controlled  · On heparin drip  · Restart Coumadin    Chronic kidney disease stage 4  Assessment & Plan  · Baseline creatinine in the high 2s to low 3s  · Nephrology consulted in view of requirement for angiogram  · Lasix on hold  · Patient would not want HD if needed  · Avoid hypotension, nephrotoxic medications  · Nephrology planning to restart Lasix tomorrow    Essential hypertension  Assessment & Plan  On Lasix 40 mg daily and Amlodipine 5 mg daily at home  BP acceptable    Diabetes mellitus type 2  Assessment & Plan  Lab Results   Component Value Date    HGBA1C 7 1 (H) 2020       · Controlled for patient's age as evidenced by A1c  · Glipizide held  · Ct Lantus 8 units in am, SSI  · Monitor blood sugars and adjust insulin as needed        VTE Pharmacologic Prophylaxis:   Pharmacologic: Heparin Drip  Mechanical VTE Prophylaxis in Place: Yes    Patient Centered Rounds: I have performed bedside rounds with nursing staff today  Discussions with Specialists or Other Care Team Provider:     Education and Discussions with Family / Patient:  Discussed with patient's daughter    Time Spent for Care: 45 minutes  More than 50% of total time spent on counseling and coordination of care as described above  Current Length of Stay: 8 day(s)    Current Patient Status: Inpatient   Certification Statement: The patient will continue to require additional inpatient hospital stay due to Above    Discharge Plan / Estimated Discharge Date:  Rehab when stable    Code Status: Level 3 - DNAR and DNI      Subjective:   Patient seen and examined  Comfortable in bed  No event overnight  Denied chest pain or shortness of breath  Patient does not want any further intervention to his leg    Objective:     Vitals:   Temp (24hrs), Av 2 °F (36 2 °C), Min:96 7 °F (35 9 °C), Max:97 5 °F (36 4 °C)    Temp:  [96 7 °F (35 9 °C)-97 5 °F (36 4 °C)] 96 7 °F (35 9 °C)  HR:  [71-76] 74  Resp:  [17-18] 18  BP: (118-144)/(65-71) 118/65  SpO2:  [96 %-99 %] 98 %  Body mass index is 26 85 kg/m²  Input and Output Summary (last 24 hours):        Intake/Output Summary (Last 24 hours) at 2020 1835  Last data filed at 7/29/2020 1230  Gross per 24 hour   Intake 560 ml   Output 950 ml   Net -390 ml       Physical Exam:     Physical Exam  Patient is awake alert in no acute distress  Lung clear to auscultation bilateral  Heart positive S1-S2 no murmur  Abdomen soft nontender  Bilateral lower extremity edema L>R      Additional Data:     Labs:    Results from last 7 days   Lab Units 07/28/20  0532   WBC Thousand/uL 8 18   HEMOGLOBIN g/dL 8 5*   HEMATOCRIT % 27 3*   PLATELETS Thousands/uL 307     Results from last 7 days   Lab Units 07/29/20  0414   POTASSIUM mmol/L 5 0   CHLORIDE mmol/L 107   CO2 mmol/L 19*   BUN mg/dL 49*   CREATININE mg/dL 3 05*   CALCIUM mg/dL 7 6*     Results from last 7 days   Lab Units 07/29/20 0414   INR  1 14       * I Have Reviewed All Lab Data Listed Above  * Additional Pertinent Lab Tests Reviewed:  Jayingclaudio 66 Admission Reviewed    Imaging:    Imaging Reports Reviewed Today Include:   Imaging Personally Reviewed by Myself Includes:      Recent Cultures (last 7 days):           Last 24 Hours Medication List:     Current Facility-Administered Medications:  acetaminophen 650 mg Oral Q6H PRN Araceli Burdick MD    amLODIPine 5 mg Oral Daily COLLEEN Tai    calcitriol 0 25 mcg Oral Once per day on Mon Wed Fri Sebastián Hollins MD    clopidogrel 75 mg Oral Daily Araceli Burdick MD    diphenhydrAMINE 25 mg Oral Q6H PRN Zita Button, DO    diphenhydrAMINE-zinc acetate  Topical TID PRN Eduardo Glass MD    doxycycline hyclate 100 mg Oral Q12H [facility] Evelyn Becker, DO    ferrous sulfate 325 mg Oral Daily With Breakfast COLLEEN Tai    finasteride 5 mg Oral Daily Araceli Burdick MD    FLUoxetine 40 mg Oral Daily Araceli Burdick MD    heparin (porcine) 3-30 Units/kg/hr (Order-Specific) Intravenous Titrated [facility] Last Rate: 11 Units/kg/hr (07/29/20 0416)   heparin (porcine) 3,400 Units Intravenous Q1H PRN Chris Navid PA-C    heparin (porcine) 6,800 Units Intravenous Q1H PRN Jose Devries PA-C    insulin glargine 8 Units Subcutaneous QAM Krista Rivers MD    insulin lispro 1-6 Units Subcutaneous 4x Daily (AC & HS) Fredy Rodney MD    memantine 5 mg Oral Daily Fredy Rodney MD    ondansetron 4 mg Intravenous Q4H PRN Fredy Rodney MD    pravastatin 40 mg Oral Daily With Yanni Smith MD    sodium bicarbonate 650 mg Oral BID after meals Fredy Rodney MD    tamsulosin 0 4 mg Oral Daily With Yanni Smith MD    warfarin 5 mg Oral Daily (warfarin) Torie Carlos DO         Today, Patient Was Seen By: Torie Carlos DO    ** Please Note: This note has been constructed using a voice recognition system   **

## 2020-07-29 NOTE — ASSESSMENT & PLAN NOTE
· Baseline creatinine in the high 2s to low 3s  · Nephrology consulted in view of requirement for angiogram  · Lasix on hold  · Patient would not want HD if needed  · Avoid hypotension, nephrotoxic medications  · Nephrology planning to restart Lasix tomorrow

## 2020-07-29 NOTE — ASSESSMENT & PLAN NOTE
· Polymicrobial wound cultures positive for MRSA, Klebsiella, Enterococcus - unclear which is pathogen  · MRI - suspicion of mild plantar osteomyelitis but less prominent compared to 2/2020  · Patient refuses amputation   · Per ID if no resection done very low likelihood of cure with medical therapy and has increased risk of renal toxicity hence need oral suppression with Doxycycline  · Patient started on oral doxycycline for indefinite suppression

## 2020-07-29 NOTE — PROGRESS NOTES
Progress Note - Infectious Disease   Rosalinda Beltran 80 y o  male MRN: [id number]  Unit/Bed#: -18 Encounter: [id number]      Impression/Plan:  1  Left calcaneal osteomyelitis-wound culture is polymicrobial but unclear which of the organism is a pathogen as this was not a deep operative culture   The cultures grew MRSA, Klebsiella, and Enterococcus at an outside lab  Fady Cooper is sensitive to cephalosporins but resistant to ampicillin  Fortunately the patient is not systemically ill, is hemodynamically stable, nontoxic   MRI is suggestive of mild calcaneal osteomyelitis   Patient refused amputation and any further interventions  As no plan for resection, the likelihood of any cure with medical therapy alone is extremely low  Risk of renal toxicity with prolonged course of IV antibiotic is relatively high  High fever attempt at oral suppressive antibiotic rather than prolonged course of IV antibiotic    -discontinue vancomycin and cefazolin  -discontinue vancomycin and cefazolin  -start oral doxycycline on 100 mg b i d  for indefinite suppression  -ongoing local wound care and close podiatry follow-up     2  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-the heel wound is been present for about 2 years and the metatarsal ulceration over the last few months   -local wound care  -close podiatry follow-up     3  Chronic kidney disease-stage IV   Creatinine remains elevated but stable around 3    -no dose adjustment indicated for doxycycline  -volume management     4  Diabetes mellitus type 2-with hyperglycemia   Very poorly controlled   -tighten diabetic control to improve leukocyte function    5  Peripheral arterial disease-patient now with continued abnormal leads   Now status post angiogram with chronic occlusion of LLE bypass   Vascular input noted   Patient is not a candidate for redo bypass    Patient has refused amputation or any further interventions         Antibiotics:  Vancomycin 9  Cefazolin 8    I discussed above plan with patient, and with Podiatry service           Subjective:  Patient underwent dressing changed today and foot remains stable  Denies fevers, chills or systemic complaints  He is refusing any further intervention including repeat angiogram     Objective:  Vitals:  Temp:  [97 3 °F (36 3 °C)-97 5 °F (36 4 °C)] 97 3 °F (36 3 °C)  HR:  [71-76] 71  Resp:  [17-18] 18  BP: (136-144)/(67-71) 144/67  SpO2:  [96 %-99 %] 98 %  Temp (24hrs), Av 4 °F (36 3 °C), Min:97 3 °F (36 3 °C), Max:97 5 °F (36 4 °C)  Current: Temperature: (!) 97 3 °F (36 3 °C)    Physical Exam:   General:  No acute distress  Psychiatric:  Awake and alert  Pulmonary:  Normal respiratory excursion without accessory muscle use  Abdomen:  Soft, nontender  Extremities:  Stable edema  Foot dressing intact with no ascending erythema  Images reviewed  Skin:  No rashes    Lab Results:  I have personally reviewed pertinent labs  Results from last 7 days   Lab Units 20  0414 20  0531 20  1057   POTASSIUM mmol/L 5 0 5 0 4 8   CHLORIDE mmol/L 107 103 105   CO2 mmol/L 19* 22 21   BUN mg/dL 49* 46* 43*   CREATININE mg/dL 3 05* 3 00* 2 77*   EGFR ml/min/1 73sq m 17 17 19   CALCIUM mg/dL 7 6* 7 9* 8 3     Results from last 7 days   Lab Units 20  0532 20  0823 20  0816 20  0451   WBC Thousand/uL 8 18  --  6 83 10 08   HEMOGLOBIN g/dL 8 5* 8 6* 8 8* 7 3*   PLATELETS Thousands/uL 307  --  312 317           Imaging Studies:   I have personally reviewed pertinent imaging study reports and images in PACS  EKG, Pathology, and Other Studies:   I have personally reviewed pertinent reports

## 2020-07-30 LAB
ANION GAP SERPL CALCULATED.3IONS-SCNC: 7 MMOL/L (ref 4–13)
APTT PPP: 104 SECONDS (ref 23–37)
APTT PPP: 199 SECONDS (ref 23–37)
APTT PPP: 50 SECONDS (ref 23–37)
APTT PPP: 66 SECONDS (ref 23–37)
BASOPHILS # BLD AUTO: 0.07 THOUSANDS/ΜL (ref 0–0.1)
BASOPHILS NFR BLD AUTO: 1 % (ref 0–1)
BUN SERPL-MCNC: 50 MG/DL (ref 5–25)
CALCIUM SERPL-MCNC: 7.7 MG/DL (ref 8.3–10.1)
CHLORIDE SERPL-SCNC: 108 MMOL/L (ref 100–108)
CO2 SERPL-SCNC: 20 MMOL/L (ref 21–32)
CREAT SERPL-MCNC: 2.89 MG/DL (ref 0.6–1.3)
EOSINOPHIL # BLD AUTO: 0.53 THOUSAND/ΜL (ref 0–0.61)
EOSINOPHIL NFR BLD AUTO: 8 % (ref 0–6)
ERYTHROCYTE [DISTWIDTH] IN BLOOD BY AUTOMATED COUNT: 15.3 % (ref 11.6–15.1)
GFR SERPL CREATININE-BSD FRML MDRD: 18 ML/MIN/1.73SQ M
GLUCOSE SERPL-MCNC: 161 MG/DL (ref 65–140)
GLUCOSE SERPL-MCNC: 187 MG/DL (ref 65–140)
GLUCOSE SERPL-MCNC: 200 MG/DL (ref 65–140)
GLUCOSE SERPL-MCNC: 79 MG/DL (ref 65–140)
GLUCOSE SERPL-MCNC: 92 MG/DL (ref 65–140)
HCT VFR BLD AUTO: 25.4 % (ref 36.5–49.3)
HGB BLD-MCNC: 7.7 G/DL (ref 12–17)
IMM GRANULOCYTES # BLD AUTO: 0.1 THOUSAND/UL (ref 0–0.2)
IMM GRANULOCYTES NFR BLD AUTO: 2 % (ref 0–2)
INR PPP: 1.12 (ref 0.84–1.19)
LYMPHOCYTES # BLD AUTO: 1.79 THOUSANDS/ΜL (ref 0.6–4.47)
LYMPHOCYTES NFR BLD AUTO: 26 % (ref 14–44)
MCH RBC QN AUTO: 28.4 PG (ref 26.8–34.3)
MCHC RBC AUTO-ENTMCNC: 30.3 G/DL (ref 31.4–37.4)
MCV RBC AUTO: 94 FL (ref 82–98)
MONOCYTES # BLD AUTO: 0.74 THOUSAND/ΜL (ref 0.17–1.22)
MONOCYTES NFR BLD AUTO: 11 % (ref 4–12)
NEUTROPHILS # BLD AUTO: 3.58 THOUSANDS/ΜL (ref 1.85–7.62)
NEUTS SEG NFR BLD AUTO: 52 % (ref 43–75)
NRBC BLD AUTO-RTO: 0 /100 WBCS
PLATELET # BLD AUTO: 271 THOUSANDS/UL (ref 149–390)
PMV BLD AUTO: 10.5 FL (ref 8.9–12.7)
POTASSIUM SERPL-SCNC: 5 MMOL/L (ref 3.5–5.3)
PROTHROMBIN TIME: 14.4 SECONDS (ref 11.6–14.5)
RBC # BLD AUTO: 2.71 MILLION/UL (ref 3.88–5.62)
SODIUM SERPL-SCNC: 135 MMOL/L (ref 136–145)
WBC # BLD AUTO: 6.81 THOUSAND/UL (ref 4.31–10.16)

## 2020-07-30 PROCEDURE — 85025 COMPLETE CBC W/AUTO DIFF WBC: CPT | Performed by: INTERNAL MEDICINE

## 2020-07-30 PROCEDURE — 85610 PROTHROMBIN TIME: CPT | Performed by: INTERNAL MEDICINE

## 2020-07-30 PROCEDURE — 82948 REAGENT STRIP/BLOOD GLUCOSE: CPT

## 2020-07-30 PROCEDURE — 80048 BASIC METABOLIC PNL TOTAL CA: CPT | Performed by: INTERNAL MEDICINE

## 2020-07-30 PROCEDURE — 99232 SBSQ HOSP IP/OBS MODERATE 35: CPT | Performed by: INTERNAL MEDICINE

## 2020-07-30 PROCEDURE — 99233 SBSQ HOSP IP/OBS HIGH 50: CPT | Performed by: INTERNAL MEDICINE

## 2020-07-30 PROCEDURE — 85730 THROMBOPLASTIN TIME PARTIAL: CPT | Performed by: INTERNAL MEDICINE

## 2020-07-30 RX ORDER — POLYETHYLENE GLYCOL 3350 17 G/17G
17 POWDER, FOR SOLUTION ORAL DAILY
Status: DISCONTINUED | OUTPATIENT
Start: 2020-07-30 | End: 2020-08-07 | Stop reason: HOSPADM

## 2020-07-30 RX ORDER — FUROSEMIDE 40 MG/1
40 TABLET ORAL DAILY
Status: DISCONTINUED | OUTPATIENT
Start: 2020-07-30 | End: 2020-08-07 | Stop reason: HOSPADM

## 2020-07-30 RX ADMIN — PRAVASTATIN SODIUM 40 MG: 20 TABLET ORAL at 17:35

## 2020-07-30 RX ADMIN — POLYETHYLENE GLYCOL 3350 17 G: 17 POWDER, FOR SOLUTION ORAL at 14:30

## 2020-07-30 RX ADMIN — DOXYCYCLINE 100 MG: 100 CAPSULE ORAL at 21:36

## 2020-07-30 RX ADMIN — FINASTERIDE 5 MG: 5 TABLET, FILM COATED ORAL at 08:55

## 2020-07-30 RX ADMIN — FUROSEMIDE 40 MG: 40 TABLET ORAL at 11:25

## 2020-07-30 RX ADMIN — DOXYCYCLINE 100 MG: 100 CAPSULE ORAL at 11:25

## 2020-07-30 RX ADMIN — INSULIN GLARGINE 8 UNITS: 100 INJECTION, SOLUTION SUBCUTANEOUS at 08:55

## 2020-07-30 RX ADMIN — SODIUM BICARBONATE 650 MG TABLET 650 MG: at 17:33

## 2020-07-30 RX ADMIN — SODIUM BICARBONATE 650 MG TABLET 650 MG: at 08:56

## 2020-07-30 RX ADMIN — DOXYCYCLINE 100 MG: 100 CAPSULE ORAL at 00:19

## 2020-07-30 RX ADMIN — TAMSULOSIN HYDROCHLORIDE 0.4 MG: 0.4 CAPSULE ORAL at 17:33

## 2020-07-30 RX ADMIN — MEMANTINE 5 MG: 5 TABLET ORAL at 08:55

## 2020-07-30 RX ADMIN — HEPARIN SODIUM 3400 UNITS: 1000 INJECTION INTRAVENOUS; SUBCUTANEOUS at 07:31

## 2020-07-30 RX ADMIN — FERROUS SULFATE TAB 325 MG (65 MG ELEMENTAL FE) 325 MG: 325 (65 FE) TAB at 08:55

## 2020-07-30 RX ADMIN — INSULIN LISPRO 1 UNITS: 100 INJECTION, SOLUTION INTRAVENOUS; SUBCUTANEOUS at 17:34

## 2020-07-30 RX ADMIN — WARFARIN SODIUM 5 MG: 5 TABLET ORAL at 17:33

## 2020-07-30 RX ADMIN — CLOPIDOGREL BISULFATE 75 MG: 75 TABLET ORAL at 08:55

## 2020-07-30 RX ADMIN — HEPARIN SODIUM AND DEXTROSE 11 UNITS/KG/HR: 10000; 5 INJECTION INTRAVENOUS at 07:29

## 2020-07-30 RX ADMIN — INSULIN LISPRO 1 UNITS: 100 INJECTION, SOLUTION INTRAVENOUS; SUBCUTANEOUS at 11:25

## 2020-07-30 RX ADMIN — FLUOXETINE 40 MG: 20 CAPSULE ORAL at 08:56

## 2020-07-30 RX ADMIN — INSULIN LISPRO 2 UNITS: 100 INJECTION, SOLUTION INTRAVENOUS; SUBCUTANEOUS at 21:36

## 2020-07-30 NOTE — PROGRESS NOTES
Progress Note - Infectious Disease   Britton Matias 80 y o  male MRN: [id number]  Unit/Bed#: -95 Encounter: [id number]    Impression/Plan:  1  Left calcaneal osteomyelitis-wound culture is polymicrobial but unclear which of the organism is a pathogen as this was not a deep operative culture   The cultures grew MRSA, Klebsiella, and Enterococcus at an outside lab  Angel Matthews is sensitive to cephalosporins but resistant to ampicillin  Fortunately the patient is not systemically ill, is hemodynamically stable, nontoxic   MRI is suggestive of mild calcaneal osteomyelitis   Patient refused amputation and any further interventions  As no plan for resection, the likelihood of any cure with medical therapy alone is extremely low  Risk of renal toxicity with prolonged course of IV antibiotic is relatively high  High fever attempt at oral suppressive antibiotic rather than prolonged course of IV antibiotic  Patient doing well on doxycycline suppression   -continue oral doxycycline 100 mg b i d  for indefinite suppression  -outpatient ID follow-up in 3-4 weeks  -ongoing local wound care and close podiatry follow-up as outpatient     2  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-the heel wound is been present for about 2 years and the metatarsal ulceration over the last few months   -local wound care  -close podiatry follow-up     3  Chronic kidney disease-stage IV   Creatinine remains elevated but stable around 3    -no dose adjustment indicated for doxycycline  -volume management     4  Diabetes mellitus type 2-with hyperglycemia   Very poorly controlled   -tighten diabetic control to improve leukocyte function     5  Peripheral arterial disease-patient now with continued abnormal leads   Now status post angiogram with chronic occlusion of LLE bypass   Vascular input noted   Patient is not a candidate for redo bypass    Patient has refused amputation or any further interventions         Antibiotics:  Off IV antibiotic 1  Doxycycline suppression 2     I discussed above plan with patient  No ongoing acute ID issues  We will sign off  Please call us with any new questions  Subjective:  Patient feels well  He is eager to be discharged  Tolerating oral intake  Denies fevers, chills, or sweats  Denies nausea, vomiting, or diarrhea  Objective:  Vitals:  Temp:  [96 7 °F (35 9 °C)-98 9 °F (37 2 °C)] 98 9 °F (37 2 °C)  HR:  [71-78] 71  Resp:  [17-18] 17  BP: (118-124)/(62-65) 124/62  SpO2:  [95 %-99 %] 98 %  Temp (24hrs), Av 8 °F (36 6 °C), Min:96 7 °F (35 9 °C), Max:98 9 °F (37 2 °C)  Current: Temperature: 98 9 °F (37 2 °C)    Physical Exam:   General:  No acute distress, elderly, nontoxic  Psychiatric:  Awake and alert  Pulmonary:  Normal respiratory excursion without accessory muscle use  Abdomen:  Soft, nontender  Extremities:  Bilateral lower extremity edema with venous stasis changes  Foot dressing intact with no ascending erythema  Skin:  No rashes    Lab Results:  I have personally reviewed pertinent labs  Results from last 7 days   Lab Units 20  0432 20  0414 20  0531   POTASSIUM mmol/L 5 0 5 0 5 0   CHLORIDE mmol/L 108 107 103   CO2 mmol/L 20* 19* 22   BUN mg/dL 50* 49* 46*   CREATININE mg/dL 2 89* 3 05* 3 00*   EGFR ml/min/1 73sq m 18 17 17   CALCIUM mg/dL 7 7* 7 6* 7 9*     Results from last 7 days   Lab Units 20  0432 20  0532 20  0823 20  0816   WBC Thousand/uL 6 81 8 18  --  6 83   HEMOGLOBIN g/dL 7 7* 8 5* 8 6* 8 8*   PLATELETS Thousands/uL 271 307  --  312           Imaging Studies:   I have personally reviewed pertinent imaging study reports and images in PACS  EKG, Pathology, and Other Studies:   I have personally reviewed pertinent reports

## 2020-07-30 NOTE — PROGRESS NOTES
Progress Note - Carlos Morales 6/28/1930, 80 y o  male MRN: [id number]    Unit/Bed#: MS Dodd8-01 Encounter: [id number]    Primary Care Provider: Manuel Beatty MD   Date and time admitted to hospital: 7/21/2020 12:32 PM        * 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  Assessment & Plan  · Polymicrobial wound cultures positive for MRSA, Klebsiella, Enterococcus - unclear which is pathogen  · MRI - suspicion of mild plantar osteomyelitis but less prominent compared to 2/2020  · Patient refuses amputation   · Per ID if no resection done very low likelihood of cure with medical therapy and has increased risk of renal toxicity hence need oral suppression with Doxycycline  · Patient started on oral doxycycline for indefinite suppression    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  Assessment & Plan  · Continue with local wound care  · Podiatry is following with dressing changes 3 times a week    Peripheral arterial disease ([facility] [address] )  Assessment & Plan  · LEADS - RLE - LILIAN 1 2/88/52, 50-75% stenosis of prox-mid SFA, >75% stenosis in proximal calf bypass graft, high grade stenosis vs occlusion of distal SFA artery/stent; LLE - LILIAN 0 78/86/32 with occlusion of the distal SFA and above knee popliteal artery/stent    · Left lower extremity angiogram - completely occluded above-the-knee to below-the-knee bypass - likely occluded for a long time  · Not a candidate for 3rd time redo bypass due to age, co-morbidities and his wishes  · Patient does not want amputation or any further interventions  · Continue Coumadin, Plavix and statin  · Palliative Care is following    Anemia of chronic disease  Assessment & Plan  · Dropped to 7 3 on 7/24  · S/p 1 PRBC on 7/24  · Monitor hemoglobin and transfuse PRN    Atrial fibrillation   Assessment & Plan  · HR controlled  · On heparin drip  · Restarted on Coumadin  · Monitor PT INR    Tremor  Assessment & Plan  · Follows with neurology as outpatient  · Had been recommended Zonisamide which he has not been using for the past few weeks at home    Chronic kidney disease stage 4  Assessment & Plan  · Baseline creatinine in the high 2s to low 3s  · Nephrology consulted in view of requirement for angiogram  · Patient would not want HD if needed  · Avoid hypotension, nephrotoxic medications  · Nephrology is following  · Restarted on Lasix    Essential hypertension  Assessment & Plan  On Lasix 40 mg daily and Amlodipine 5 mg daily at home  BP acceptable    Diabetes mellitus type 2  Assessment & Plan  Lab Results   Component Value Date    HGBA1C 7 1 (H) 2020       · Controlled for patient's age as evidenced by A1c  · Glipizide held  · Ct Lantus 8 units in am, SSI  · Monitor blood sugars and adjust insulin as needed              VTE Pharmacologic Prophylaxis:   Pharmacologic: Warfarin (Coumadin)  Mechanical VTE Prophylaxis in Place: Yes    Patient Centered Rounds: I have performed bedside rounds with nursing staff today  Discussions with Specialists or Other Care Team Provider:      Education and Discussions with Family / Patient:  Patient    Time Spent for Care: 35 minutes  More than 50% of total time spent on counseling and coordination of care as described above      Current Length of Stay: 9 day(s)    Current Patient Status: Inpatient   Certification Statement: The patient will continue to require additional inpatient hospital stay due to Above    Discharge Plan / Estimated Discharge Date:  Rehab when INR at goal    Code Status: Level 3 - DNAR and DNI      Subjective:   Patient seen and examined  Comfortable in bed  No event overnight  No chest pain or shortness of breath  No nausea vomiting or diarrhea    Objective:     Vitals:   Temp (24hrs), Av 8 °F (36 6 °C), Min:96 7 °F (35 9 °C), Max:98 9 °F (37 2 °C)    Temp:  [96 7 °F (35 9 °C)-98 9 °F (37 2 °C)] 98 9 °F (37 2 °C)  HR:  [71-78] 71  Resp:  [17-18] 17  BP: (118-124)/(62-65) 124/62  SpO2:  [95 %-99 %] 98 %  Body mass index is 26 85 kg/m²  Input and Output Summary (last 24 hours): Intake/Output Summary (Last 24 hours) at 7/30/2020 1148  Last data filed at 7/30/2020 0852  Gross per 24 hour   Intake 1024 49 ml   Output 1500 ml   Net -475 51 ml       Physical Exam:     Physical Exam  Patient is awake alert in no acute distress  Lung clear to auscultation bilateral anteriorly  Heart positive S1-S2 no murmur  Abdomen soft nontender  Bilateral lower extremities edema    Additional Data:     Labs:    Results from last 7 days   Lab Units 07/30/20  0432   WBC Thousand/uL 6 81   HEMOGLOBIN g/dL 7 7*   HEMATOCRIT % 25 4*   PLATELETS Thousands/uL 271   NEUTROS PCT % 52   LYMPHS PCT % 26   MONOS PCT % 11   EOS PCT % 8*     Results from last 7 days   Lab Units 07/30/20  0432   POTASSIUM mmol/L 5 0   CHLORIDE mmol/L 108   CO2 mmol/L 20*   BUN mg/dL 50*   CREATININE mg/dL 2 89*   CALCIUM mg/dL 7 7*     Results from last 7 days   Lab Units 07/30/20  0850   INR  1 12       * I Have Reviewed All Lab Data Listed Above  * Additional Pertinent Lab Tests Reviewed:  Torey English Admission Reviewed    Imaging:    Imaging Reports Reviewed Today Include:   Imaging Personally Reviewed by Myself Includes:     Recent Cultures (last 7 days):           Last 24 Hours Medication List:     Current Facility-Administered Medications:  acetaminophen 650 mg Oral Q6H PRN Fredy Rodney MD    amLODIPine 5 mg Oral Daily Kateryna , CRNP    calcitriol 0 25 mcg Oral Once per day on Mon Wed Fri Vivi Hooker MD    clopidogrel 75 mg Oral Daily Fredy Rodney MD    diphenhydrAMINE 25 mg Oral Q6H PRN Torie Carlos DO    diphenhydrAMINE-zinc acetate  Topical TID PRN Krista Rivers MD    doxycycline hyclate 100 mg Oral Q12H [address] Evelyn Aldea, DO    ferrous sulfate 325 mg Oral Daily With Breakfast Kateryna , CRNP    finasteride 5 mg Oral Daily Fredy Rodney MD    FLUoxetine 40 mg Oral Daily Fredy Rodney MD    furosemide 40 mg Oral Daily Joycelyn Bolanos PA-C heparin (porcine) 3-30 Units/kg/hr (Order-Specific) Intravenous Titrated Cliffton Tobin, PA-C Last Rate: 13 Units/kg/hr (07/30/20 0731)   heparin (porcine) 3,400 Units Intravenous Q1H PRN Cliffton Tobin, PA-C    heparin (porcine) 6,800 Units Intravenous Q1H PRN Cliffton Tobin, PA-C    insulin glargine 8 Units Subcutaneous QAHERVE Dos Santos MD    insulin lispro 1-6 Units Subcutaneous 4x Daily (AC & HS) Shavon Chu MD    memantine 5 mg Oral Daily Shavon Chu MD    ondansetron 4 mg Intravenous Q4H PRN Shavon Chu MD    pravastatin 40 mg Oral Daily With Ricci Archibald MD    sodium bicarbonate 650 mg Oral BID after meals Shavon Chu MD    tamsulosin 0 4 mg Oral Daily With Ricci Archibald MD    warfarin 5 mg Oral Daily (warfarin) Chet Busby DO         Today, Patient Was Seen By: Chet Busby DO    ** Please Note: This note has been constructed using a voice recognition system   **

## 2020-07-30 NOTE — CONSULTS
Vancomycin IV Pharmacy-to-Dose Consultation    Kurt Ibrahim is a 80 y o  male who was receiving Vancomycin IV with management by the Pharmacy Consult service for treatment of MRSA confirmed, osteomyelitis  The patient's Vancomycin therapy has been discontinued  Thank you for allowing us to take part in this patient's care  Pharmacy will sign-off now; please call or re-consult if there are any questions          Sajan Donovan, PharmD  Pharmacist

## 2020-07-30 NOTE — ASSESSMENT & PLAN NOTE
· Baseline creatinine in the high 2s to low 3s  · Nephrology consulted in view of requirement for angiogram  · Patient would not want HD if needed  · Avoid hypotension, nephrotoxic medications  · Nephrology is following  · Restarted on Lasix

## 2020-07-30 NOTE — SOCIAL WORK
Cm reviewed patient's chart  Cm informed that patient will need to have a therapeutic INR prior to dc  Cm provided updated to [facility] who continues to follow patient  Patient will also need another COVID test as requested by facility  Provider is aware

## 2020-07-30 NOTE — DISCHARGE INSTRUCTIONS
ARTERIOGRAM    WHAT YOU SHOULD KNOW:   An angiogram is a procedure to look at arteries in your body  Arteries are the blood vessels that carry blood from your heart to your body  AFTER YOU LEAVE:     Self-care:   · Limit activity: Rest for the remainder of the day of your procedure  Have some one with you until the next morning  Keep your arm or leg straight as much as possible  Rest as much as possible, sitting lying or reclining  Walk only to go to the bathroom, to bed or to eat  If the angiogram catheter was put in your leg, use the stairs as little as possible  No driving  · Keep your wound clean and dry  You may shower 24 hours after your procedure  The bandage you have on should fall off in 2-3 days  If there is any drainage from the puncture site, you should put on a clean bandage  · Watch for bleeding and bruising: It is normal to have a bruise and soreness where the angiogram catheter went in  · Diet:   · You may resume your regular diet, Sips of flat soda will help with mild nausea  · Drink more liquids than usual for the next 24 hours      · IMMEDIATELY Contact Interventional Radiology at [phone] Melissa PATIENTS: Contact Interventional Radiology at [phone]) Meena Miller PATIENTS: Contact Interventional Radiology at [phone]) if any of the following occur:  · If your bruise gets larger or if you notice any active bleeding  APPLY DIRECT PRESSURE TO THE BLEEDING SITE  · If you notice increased swelling or have increased pain at the puncture site   · If you have any numbness or pain in the extremity of the puncture site   · If that extremity seems cold or pale      · You have fever greater than 101  · Persistent nausea or vomitting    Follow up with your primary healthcare provider  as directed: Write down your questions so you remember to ask them during your visits        Discharge Instructions - Podiatry    Weight Bearing Status: Weightbearing to heal                     Pain: Continue analgesics as directed    Follow-up appointment instructions: Please make an appointment within one week of discharge with Dr Tim Peralta  Contact sooner if any increase in pain, or signs of infection occur    Wound Care: Leave dressings clean, dry, and intact between professional dressing changes    Nursing Instructions: Please apply Valma Porteous  Then cover with Gauze and secure with Kerlix and tape   Please change dressing every Monday, Wednesday, and Friday   ]

## 2020-07-30 NOTE — PROGRESS NOTES
NEPHROLOGY PROGRESS NOTE   Muscogeecarine Billy 80 y o  male MRN: [id number]  Unit/Bed#: -01 Encounter: [id number]      ASSESSMENT/PLAN:  1  CKD IV: baseline creatinine high 2's to low 3's and follows with Dr Marti Negro  Creatinine today slightly improved at 2 89  · Now off vancomycin  · Will restart home Lasix 40 mg daily as there is no further plan for vascular procedure  · Check am BMP   2  PAD with nonhealing LLE ulcer: s/p CO2 agram with only 30cc IV dye on 7/24 with occluded below the knee bypass which is likely chronic  Per vascular note, he does not want to pursue any more interventions  · On vancomycin per pharmacy with dose being adjusted per level   3  Hypertension: BP acceptable   4  Hyponatremia: sodium stable around 135 today  Continue 1500 cc per day oral fluid restriction  5  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   6  Anemia of CKD + iron deficiency: continue oral iron therapy  hgb trending down to 7 7      Plan Summary:    Restart home Lasix 40 mg daily   Check am BMP     SUBJECTIVE:  Feeling fine  No chest pain, shortness of breath, nausea, vomiting or diarrhea      OBJECTIVE:  Current Weight: Weight - Scale: 89 8 kg (198 lb)  Vitals:    07/30/20 0703   BP: 124/63   Pulse: 74   Resp: 17   Temp: 98 9 °F (37 2 °C)   SpO2: 95%       Intake/Output Summary (Last 24 hours) at 7/30/2020 1107  Last data filed at 7/30/2020 0852  Gross per 24 hour   Intake 1024 49 ml   Output 1500 ml   Net -475 51 ml     General:  No acute distress  Skin:  No rash  Eyes:  Sclerae anicteric  ENT:  Moist mucous membranes  Neck:  Trachea midline with no JVD  Chest:  Clear to auscultation bilaterally  CVS:  Regular rate and rhythm, murmur  Abdomen:  Soft, nontender, nondistended  Extremities: bilateral LE edema L>R   Neuro:  Awake and alert  Psych:  Appropriate affect      Medications:  Scheduled Meds:    Current Facility-Administered Medications:  acetaminophen 650 mg Oral Q6H PRN Fredy Rodney MD    amLODIPine 5 mg Oral Daily COLLEEN [place]    calcitriol 0 25 mcg Oral Once per day on Mon Wed Fri Sarah Baldwin MD    clopidogrel 75 mg Oral Daily Paul Acharya MD    diphenhydrAMINE 25 mg Oral Q6H PRN Oriana Valle DO    diphenhydrAMINE-zinc acetate  Topical TID PRN Jada Bonilla MD    doxycycline hyclate 100 mg Oral Q12H [address] Evelyn Aldea, DO    ferrous sulfate 325 mg Oral Daily With Breakfast AnjanaottoCOLLEEN De La Torre    finasteride 5 mg Oral Daily Paul Acharya MD    FLUoxetine 40 mg Oral Daily Paul Acharya MD    heparin (porcine) 3-30 Units/kg/hr (Order-Specific) Intravenous Titrated Marley De La Torre PA-C Last Rate: 13 Units/kg/hr (07/30/20 0731)   heparin (porcine) 3,400 Units Intravenous Q1H PRN Marley De La Torre PA-C    heparin (porcine) 6,800 Units Intravenous Q1H PRN Marley De La Torre PA-C    insulin glargine 8 Units Subcutaneous QAM Jada Bonilla MD    insulin lispro 1-6 Units Subcutaneous 4x Daily (AC & HS) Paul Acharya MD    memantine 5 mg Oral Daily Paul Acharya MD    ondansetron 4 mg Intravenous Q4H PRN Paul Acharya MD    pravastatin 40 mg Oral Daily With Kim Crockett MD    sodium bicarbonate 650 mg Oral BID after meals Paul Acharya MD    tamsulosin 0 4 mg Oral Daily With Kim Crockett MD    warfarin 5 mg Oral Daily (warfarin) Oriana Valle DO        PRN Meds:   acetaminophen    diphenhydrAMINE    diphenhydrAMINE-zinc acetate    heparin (porcine)    heparin (porcine)    ondansetron    Continuous Infusions:    heparin (porcine) 3-30 Units/kg/hr (Order-Specific) Last Rate: 13 Units/kg/hr (07/30/20 0731)       Laboratory Results:  Results from last 7 days   Lab Units 07/30/20  0432 07/29/20  0414 07/28/20  0532 07/28/20  0531 07/27/20  1057 07/26/20  0823 07/25/20  0816 07/24/20  0451   WBC Thousand/uL 6 81  --  8 18  --   --   --  6 83 10 08   HEMOGLOBIN g/dL 7 7*  --  8 5*  --   --  8 6* 8 8* 7 3*   HEMATOCRIT % 25 4*  --  27 3*  --   --  26 9* 28 2* 24 1*   PLATELETS Thousands/uL 271  --  307  --   --   --  312 317   SODIUM mmol/L 135* 136  --  133* 134* 135* 132* 133*   POTASSIUM mmol/L 5 0 5 0  --  5 0 4 8 4 7 4 5 4 5   CHLORIDE mmol/L 108 107  --  103 105 105 104 103   CO2 mmol/L 20* 19*  --  22 21 22 20* 22   BUN mg/dL 50* 49*  --  46* 43* 43* 43* 47*   CREATININE mg/dL 2 89* 3 05*  --  3 00* 2 77* 2 85* 2 68* 3 05*   CALCIUM mg/dL 7 7* 7 6*  --  7 9* 8 3 7 4* 7 4* 8 0*   MAGNESIUM mg/dL  --   --   --   --   --   --   --  2 1   PHOSPHORUS mg/dL  --   --   --   --   --   --   --  3 8

## 2020-07-30 NOTE — ASSESSMENT & PLAN NOTE
· LEADS - RLE - LILIAN 1 2/88/52, 50-75% stenosis of prox-mid SFA, >75% stenosis in proximal calf bypass graft, high grade stenosis vs occlusion of distal SFA artery/stent; LLE - LILIAN 0 78/86/32 with occlusion of the distal SFA and above knee popliteal artery/stent    · Left lower extremity angiogram - completely occluded above-the-knee to below-the-knee bypass - likely occluded for a long time  · Not a candidate for 3rd time redo bypass due to age, co-morbidities and his wishes  · Patient does not want amputation or any further interventions  · Continue Coumadin, Plavix and statin  · Palliative Care is following

## 2020-07-30 NOTE — ASSESSMENT & PLAN NOTE
· Follows with Dr Samson Fernandes of Vascular Surgery as outpatient  · Continue Plavix/statin  · With chronic OM

## 2020-07-31 LAB
ANION GAP SERPL CALCULATED.3IONS-SCNC: 5 MMOL/L (ref 4–13)
APTT PPP: 65 SECONDS (ref 23–37)
BASOPHILS # BLD AUTO: 0.06 THOUSANDS/ΜL (ref 0–0.1)
BASOPHILS NFR BLD AUTO: 1 % (ref 0–1)
BUN SERPL-MCNC: 55 MG/DL (ref 5–25)
CALCIUM SERPL-MCNC: 8.1 MG/DL (ref 8.3–10.1)
CHLORIDE SERPL-SCNC: 107 MMOL/L (ref 100–108)
CO2 SERPL-SCNC: 23 MMOL/L (ref 21–32)
CREAT SERPL-MCNC: 3.05 MG/DL (ref 0.6–1.3)
EOSINOPHIL # BLD AUTO: 0.55 THOUSAND/ΜL (ref 0–0.61)
EOSINOPHIL NFR BLD AUTO: 8 % (ref 0–6)
ERYTHROCYTE [DISTWIDTH] IN BLOOD BY AUTOMATED COUNT: 15.6 % (ref 11.6–15.1)
GFR SERPL CREATININE-BSD FRML MDRD: 17 ML/MIN/1.73SQ M
GLUCOSE SERPL-MCNC: 112 MG/DL (ref 65–140)
GLUCOSE SERPL-MCNC: 118 MG/DL (ref 65–140)
GLUCOSE SERPL-MCNC: 146 MG/DL (ref 65–140)
GLUCOSE SERPL-MCNC: 243 MG/DL (ref 65–140)
GLUCOSE SERPL-MCNC: 74 MG/DL (ref 65–140)
HCT VFR BLD AUTO: 25.7 % (ref 36.5–49.3)
HGB BLD-MCNC: 7.9 G/DL (ref 12–17)
IMM GRANULOCYTES # BLD AUTO: 0.09 THOUSAND/UL (ref 0–0.2)
IMM GRANULOCYTES NFR BLD AUTO: 1 % (ref 0–2)
INR PPP: 1.07 (ref 0.84–1.19)
LYMPHOCYTES # BLD AUTO: 2.02 THOUSANDS/ΜL (ref 0.6–4.47)
LYMPHOCYTES NFR BLD AUTO: 29 % (ref 14–44)
MCH RBC QN AUTO: 28.7 PG (ref 26.8–34.3)
MCHC RBC AUTO-ENTMCNC: 30.7 G/DL (ref 31.4–37.4)
MCV RBC AUTO: 94 FL (ref 82–98)
MONOCYTES # BLD AUTO: 0.83 THOUSAND/ΜL (ref 0.17–1.22)
MONOCYTES NFR BLD AUTO: 12 % (ref 4–12)
NEUTROPHILS # BLD AUTO: 3.49 THOUSANDS/ΜL (ref 1.85–7.62)
NEUTS SEG NFR BLD AUTO: 49 % (ref 43–75)
NRBC BLD AUTO-RTO: 0 /100 WBCS
PLATELET # BLD AUTO: 266 THOUSANDS/UL (ref 149–390)
PMV BLD AUTO: 10 FL (ref 8.9–12.7)
POTASSIUM SERPL-SCNC: 5.2 MMOL/L (ref 3.5–5.3)
PROTHROMBIN TIME: 13.9 SECONDS (ref 11.6–14.5)
RBC # BLD AUTO: 2.75 MILLION/UL (ref 3.88–5.62)
SODIUM SERPL-SCNC: 135 MMOL/L (ref 136–145)
WBC # BLD AUTO: 7.04 THOUSAND/UL (ref 4.31–10.16)

## 2020-07-31 PROCEDURE — 85730 THROMBOPLASTIN TIME PARTIAL: CPT | Performed by: INTERNAL MEDICINE

## 2020-07-31 PROCEDURE — 85610 PROTHROMBIN TIME: CPT | Performed by: INTERNAL MEDICINE

## 2020-07-31 PROCEDURE — 80048 BASIC METABOLIC PNL TOTAL CA: CPT | Performed by: INTERNAL MEDICINE

## 2020-07-31 PROCEDURE — 82948 REAGENT STRIP/BLOOD GLUCOSE: CPT

## 2020-07-31 PROCEDURE — 99233 SBSQ HOSP IP/OBS HIGH 50: CPT | Performed by: INTERNAL MEDICINE

## 2020-07-31 PROCEDURE — 85025 COMPLETE CBC W/AUTO DIFF WBC: CPT | Performed by: INTERNAL MEDICINE

## 2020-07-31 PROCEDURE — 99232 SBSQ HOSP IP/OBS MODERATE 35: CPT | Performed by: INTERNAL MEDICINE

## 2020-07-31 RX ORDER — NEOMYCIN SULFATE, POLYMYXIN B SULFATE AND BACITRACIN ZINC 3.5; 10000; 4 MG/G; [USP'U]/G; [USP'U]/G
0.5 OINTMENT OPHTHALMIC 4 TIMES DAILY
Qty: 3.5 G | Refills: 0 | OUTPATIENT
Start: 2020-07-31

## 2020-07-31 RX ADMIN — FUROSEMIDE 40 MG: 40 TABLET ORAL at 11:53

## 2020-07-31 RX ADMIN — DIPHENHYDRAMINE HCL 25 MG: 25 TABLET ORAL at 08:17

## 2020-07-31 RX ADMIN — MEMANTINE 5 MG: 5 TABLET ORAL at 08:16

## 2020-07-31 RX ADMIN — CLOPIDOGREL BISULFATE 75 MG: 75 TABLET ORAL at 08:16

## 2020-07-31 RX ADMIN — SODIUM BICARBONATE 650 MG TABLET 650 MG: at 08:16

## 2020-07-31 RX ADMIN — DOXYCYCLINE 100 MG: 100 CAPSULE ORAL at 20:19

## 2020-07-31 RX ADMIN — AMLODIPINE BESYLATE 5 MG: 5 TABLET ORAL at 08:16

## 2020-07-31 RX ADMIN — FERROUS SULFATE TAB 325 MG (65 MG ELEMENTAL FE) 325 MG: 325 (65 FE) TAB at 08:16

## 2020-07-31 RX ADMIN — HEPARIN SODIUM AND DEXTROSE 11.1 UNITS/KG/HR: 10000; 5 INJECTION INTRAVENOUS at 08:17

## 2020-07-31 RX ADMIN — DOXYCYCLINE 100 MG: 100 CAPSULE ORAL at 08:16

## 2020-07-31 RX ADMIN — WARFARIN SODIUM 5 MG: 5 TABLET ORAL at 17:15

## 2020-07-31 RX ADMIN — INSULIN GLARGINE 8 UNITS: 100 INJECTION, SOLUTION SUBCUTANEOUS at 08:16

## 2020-07-31 RX ADMIN — FINASTERIDE 5 MG: 5 TABLET, FILM COATED ORAL at 08:16

## 2020-07-31 RX ADMIN — FLUOXETINE 40 MG: 20 CAPSULE ORAL at 08:16

## 2020-07-31 RX ADMIN — TAMSULOSIN HYDROCHLORIDE 0.4 MG: 0.4 CAPSULE ORAL at 17:15

## 2020-07-31 RX ADMIN — CALCITRIOL 0.25 MCG: 0.25 CAPSULE, LIQUID FILLED ORAL at 08:16

## 2020-07-31 RX ADMIN — PRAVASTATIN SODIUM 40 MG: 20 TABLET ORAL at 17:15

## 2020-07-31 RX ADMIN — INSULIN LISPRO 3 UNITS: 100 INJECTION, SOLUTION INTRAVENOUS; SUBCUTANEOUS at 22:03

## 2020-07-31 RX ADMIN — SODIUM BICARBONATE 650 MG TABLET 650 MG: at 17:16

## 2020-07-31 NOTE — PLAN OF CARE
Problem: Potential for Falls  Goal: Patient will remain free of falls  Description  INTERVENTIONS:  - Assess patient frequently for physical needs  -  Identify cognitive and physical deficits and behaviors that affect risk of falls    -  [place] fall precautions as indicated by assessment   - Educate patient/family on patient safety including physical limitations  - Instruct patient to call for assistance with activity based on assessment  - Modify environment to reduce risk of injury  - Consider OT/PT consult to assist with strengthening/mobility  Outcome: Progressing     Problem: PAIN - ADULT  Goal: Verbalizes/displays adequate comfort level or baseline comfort level  Description  Interventions:  - Encourage patient to monitor pain and request assistance  - Assess pain using appropriate pain scale  - Administer analgesics based on type and severity of pain and evaluate response  - Implement non-pharmacological measures as appropriate and evaluate response  - Consider cultural and social influences on pain and pain management  - Notify physician/advanced practitioner if interventions unsuccessful or patient reports new pain  Outcome: Progressing     Problem: INFECTION - ADULT  Goal: Absence or prevention of progression during hospitalization  Description  INTERVENTIONS:  - Assess and monitor for signs and symptoms of infection  - Monitor lab/diagnostic results  - Monitor all insertion sites, i e  indwelling lines, tubes, and drains  - Monitor endotracheal if appropriate and nasal secretions for changes in amount and color  - [place] appropriate cooling/warming therapies per order  - Administer medications as ordered  - Instruct and encourage patient and family to use good hand hygiene technique  - Identify and instruct in appropriate isolation precautions for identified infection/condition  Outcome: Progressing  Goal: Absence of fever/infection during neutropenic period  Description  INTERVENTIONS:  - Monitor WBC    Outcome: Progressing     Problem: Nutrition/Hydration-ADULT  Goal: Nutrient/Hydration intake appropriate for improving, restoring or maintaining nutritional needs  Description  Monitor and assess patient's nutrition/hydration status for malnutrition  Collaborate with interdisciplinary team and initiate plan and interventions as ordered  Monitor patient's weight and dietary intake as ordered or per policy  Utilize nutrition screening tool and intervene as necessary  Determine patient's food preferences and provide high-protein, high-caloric foods as appropriate       INTERVENTIONS:  - Monitor oral intake, urinary output, labs, and treatment plans  - Assess nutrition and hydration status and recommend course of action  - Evaluate amount of meals eaten  - Assist patient with eating if necessary   - Allow adequate time for meals  - Recommend/ encourage appropriate diets, oral nutritional supplements, and vitamin/mineral supplements  - Order, calculate, and assess calorie counts as needed  - Recommend, monitor, and adjust tube feedings and TPN/PPN based on assessed needs  - Assess need for intravenous fluids  - Provide specific nutrition/hydration education as appropriate  - Include patient/family/caregiver in decisions related to nutrition  Outcome: Progressing     Problem: HEMATOLOGIC - ADULT  Goal: Maintains hematologic stability  Description  INTERVENTIONS  - Assess for signs and symptoms of bleeding or hemorrhage  - Monitor labs  - Administer supportive blood products/factors as ordered and appropriate  Outcome: Progressing     Problem: Prexisting or High Potential for Compromised Skin Integrity  Goal: Skin integrity is maintained or improved  Description  INTERVENTIONS:  - Identify patients at risk for skin breakdown  - Assess and monitor skin integrity  - Assess and monitor nutrition and hydration status  - Monitor labs   - Assess for incontinence   - Turn and reposition patient  - Assist with mobility/ambulation  - Relieve pressure over bony prominences  - Avoid friction and shearing  - Provide appropriate hygiene as needed including keeping skin clean and dry  - Evaluate need for skin moisturizer/barrier cream  - Collaborate with interdisciplinary team   - Patient/family teaching  - Consider wound care consult   Outcome: Progressing     Problem: MUSCULOSKELETAL - ADULT  Goal: Maintain or return mobility to safest level of function  Description  INTERVENTIONS:  - Assess patient's ability to carry out ADLs; assess patient's baseline for ADL function and identify physical deficits which impact ability to perform ADLs (bathing, care of mouth/teeth, toileting, grooming, dressing, etc )  - Assess/evaluate cause of self-care deficits   - Assess range of motion  - Assess patient's mobility  - Assess patient's need for assistive devices and provide as appropriate  - Encourage maximum independence but intervene and supervise when necessary  - Involve family in performance of ADLs  - Assess for home care needs following discharge   - Consider OT consult to assist with ADL evaluation and planning for discharge  - Provide patient education as appropriate  Outcome: Not Progressing

## 2020-07-31 NOTE — PROGRESS NOTES
Progress Note - Karina Dejesus 6/28/1930, 80 y o  male MRN: [id number]    Unit/Bed#: -[facility] Encounter: [id number]    Primary Care Provider: Edd Lopez MD   Date and time admitted to hospital: 7/21/2020 12:32 PM        * 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  Assessment & Plan  · Polymicrobial wound cultures positive for MRSA, Klebsiella, Enterococcus - unclear which is pathogen  · MRI - suspicion of mild plantar osteomyelitis but less prominent compared to 2/2020  · Patient refuses amputation   · Per ID if no resection done very low likelihood of cure with medical therapy and has increased risk of renal toxicity hence need oral suppression with Doxycycline  · Patient started on oral doxycycline for indefinite suppression    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  Assessment & Plan  · Continue with local wound care  · Podiatry is following with dressing changes 3 times a week    Peripheral arterial disease ([address] [address] )  Assessment & Plan  · LEADS - RLE - LILIAN 1 2/88/52, 50-75% stenosis of prox-mid SFA, >75% stenosis in proximal calf bypass graft, high grade stenosis vs occlusion of distal SFA artery/stent; LLE - LILIAN 0 78/86/32 with occlusion of the distal SFA and above knee popliteal artery/stent    · Left lower extremity angiogram - completely occluded above-the-knee to below-the-knee bypass - likely occluded for a long time  · Not a candidate for 3rd time redo bypass due to age, co-morbidities and his wishes  · Patient does not want amputation or any further interventions  · Continue Coumadin, Plavix and statin  · Palliative Care is following    Anemia of chronic disease  Assessment & Plan  · Dropped to 7 3 on 7/24  · S/p 1 PRBC on 7/24  · Monitor hemoglobin and transfuse PRN    Atrial fibrillation   Assessment & Plan  · HR controlled  · On heparin drip  · Restarted on Coumadin  · Monitor PT INR    Tremor  Assessment & Plan  · Follows with neurology as outpatient  · Had been recommended Zonisamide which he has not been using for the past few weeks at home    Chronic kidney disease stage 4  Assessment & Plan  · Baseline creatinine in the high 2s to low 3s  · Nephrology consulted in view of requirement for angiogram  · Patient would not want HD if needed  · Avoid hypotension, nephrotoxic medications  · Nephrology is following  · Restarted on Lasix  · Per patient's daughter, patient is supposed to be on medication to lower his potassium, he takes it twice a week, I discussed with Nephrology to address it    Essential hypertension  Assessment & Plan  On Lasix 40 mg daily and Amlodipine 5 mg daily at home  BP acceptable    Diabetes mellitus type 2  Assessment & Plan  Lab Results   Component Value Date    HGBA1C 7 1 (H) 2020       · Controlled for patient's age as evidenced by A1c  · Glipizide held  · Ct Lantus 8 units in am, SSI  · Monitor blood sugars and adjust insulin as needed           VTE Pharmacologic Prophylaxis:   Pharmacologic: Warfarin (Coumadin)  Mechanical VTE Prophylaxis in Place: Yes    Patient Centered Rounds: I have performed bedside rounds with nursing staff today  Discussions with Specialists or Other Care Team Provider:  Nephrology    Education and Discussions with Family / Patient:  Discussed with patient's daughter    Time Spent for Care: 45 minutes  More than 50% of total time spent on counseling and coordination of care as described above      Current Length of Stay: 10 day(s)    Current Patient Status: Inpatient   Certification Statement: The patient will continue to require additional inpatient hospital stay due to Above    Discharge Plan / Estimated Discharge Date:  Rehab when INR at goal    Code Status: Level 3 - DNAR and DNI      Subjective:   Patient seen and examined  Comfortable in bed  Mild back Skin itching  No chest pain or shortness of breath  No event overnight    Objective:     Vitals:   Temp (24hrs), Av 1 °F (36 7 °C), Min:97 5 °F (36 4 °C), Max:98 5 °F (36 9 °C)    Temp: [97 5 °F (36 4 °C)-98 5 °F (36 9 °C)] 98 2 °F (36 8 °C)  HR:  [72-78] 74  Resp:  [15-18] 18  BP: (126-133)/(61-64) 133/64  SpO2:  [96 %-100 %] 96 %  Body mass index is 26 85 kg/m²  Input and Output Summary (last 24 hours): Intake/Output Summary (Last 24 hours) at 7/31/2020 1424  Last data filed at 7/31/2020 1300  Gross per 24 hour   Intake 1300 67 ml   Output 1625 ml   Net -324 33 ml       Physical Exam:     Physical Exam     Patient is awake alert in no acute distress  Lung clear to auscultation bilateral anteriorly  Heart positive S1-S2 no murmur  Abdomen soft nontender  Bilateral lower extremities edema    Additional Data:     Labs:    Results from last 7 days   Lab Units 07/31/20  0356   WBC Thousand/uL 7 04   HEMOGLOBIN g/dL 7 9*   HEMATOCRIT % 25 7*   PLATELETS Thousands/uL 266   NEUTROS PCT % 49   LYMPHS PCT % 29   MONOS PCT % 12   EOS PCT % 8*     Results from last 7 days   Lab Units 07/31/20  0356   POTASSIUM mmol/L 5 2   CHLORIDE mmol/L 107   CO2 mmol/L 23   BUN mg/dL 55*   CREATININE mg/dL 3 05*   CALCIUM mg/dL 8 1*     Results from last 7 days   Lab Units 07/31/20  0356   INR  1 07       * I Have Reviewed All Lab Data Listed Above  * Additional Pertinent Lab Tests Reviewed:  [address] Admission Reviewed    Imaging:    Imaging Reports Reviewed Today Include:   Imaging Personally Reviewed by Myself Includes:     Recent Cultures (last 7 days):           Last 24 Hours Medication List:     Current Facility-Administered Medications:  acetaminophen 650 mg Oral Q6H PRN Ki Reyes MD    amLODIPine 5 mg Oral Daily COLLEEN [place]    calcitriol 0 25 mcg Oral Once per day on Mon Wed Fri Wong Haque MD    clopidogrel 75 mg Oral Daily Ki Reyes MD    diphenhydrAMINE 25 mg Oral Q6H PRN Ahmet Santana,     diphenhydrAMINE-zinc acetate  Topical TID PRN Anthony Ramirez MD    doxycycline hyclate 100 mg Oral Q12H [address] Evelyn Becker, DO    ferrous sulfate 325 mg Oral Daily With Breakfast COLLEEN Fitzgerald    finasteride 5 mg Oral Daily Amberly Cano MD    FLUoxetine 40 mg Oral Daily Amberly Cano MD    furosemide 40 mg Oral Daily Kristie Hinton PA-C    heparin (porcine) 3-30 Units/kg/hr (Order-Specific) Intravenous Titrated Gustavo Castorena PA-C Last Rate: 11 1 Units/kg/hr (07/31/20 0817)   heparin (porcine) 3,400 Units Intravenous Q1H PRN Gustavo Castorena PA-C    heparin (porcine) 6,800 Units Intravenous Q1H PRN Gustavo Castorena PA-C    insulin glargine 8 Units Subcutaneous QAM Anitha Reich MD    insulin lispro 1-6 Units Subcutaneous 4x Daily (AC & HS) Amberly Cano MD    memantine 5 mg Oral Daily Amberly Cano MD    ondansetron 4 mg Intravenous Q4H PRN Amberly Cano MD    polyethylene glycol 17 g Oral Daily Nevaeh Tellez DO    pravastatin 40 mg Oral Daily With Carlos Mancini MD    sodium bicarbonate 650 mg Oral BID after meals Amberly Cano MD    tamsulosin 0 4 mg Oral Daily With Carlos Mancini MD    warfarin 5 mg Oral Daily (warfarin) Nevaeh Tellez DO         Today, Patient Was Seen By: Nevaeh Tellez DO    ** Please Note: This note has been constructed using a voice recognition system   **

## 2020-07-31 NOTE — PROGRESS NOTES
NEPHROLOGY PROGRESS NOTE   Benito Fisher 80 y o  male MRN: [id number]  Unit/Bed#: -01 Encounter: [id number]      ASSESSMENT/PLAN:  1  CKD IV: baseline creatinine high 2's to low 3's and follows with Dr Dayanara Gonzalez  Yesterday Lasix was restarted so his creatinine is slightly higher at 3 05 today  · Now off vancomycin  · Continue Lasix 40 mg daily as he does have significant edema  · Check am BMP   2  PAD with nonhealing LLE ulcer: s/p CO2 agram with only 30cc IV dye on 7/24 with occluded below the knee bypass which is likely chronic  Per vascular note, he does not want to pursue any more interventions  · On suppressive doxycycline  3  Hypertension: BP acceptable   4  Hyponatremia: sodium stable around 135 today  Continue 1500 cc per day oral fluid restriction  5  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   6  Anemia of CKD + iron deficiency: continue oral iron therapy  hgb stable at 7 9  7  Hyponatremia:  Mild and likely related to volume overload  Continue oral Lasix      Plan Summary:    Continue home Lasix 40 mg daily   Check am BMP     SUBJECTIVE:  Doing okay  No chest pain, shortness of breath, nausea, vomiting or diarrhea      OBJECTIVE:  Current Weight: Weight - Scale: 89 8 kg (198 lb)  Vitals:    07/31/20 0712   BP: 133/64   Pulse: 74   Resp: 18   Temp: 98 2 °F (36 8 °C)   SpO2: 96%       Intake/Output Summary (Last 24 hours) at 7/31/2020 1058  Last data filed at 7/31/2020 0830  Gross per 24 hour   Intake 1120 67 ml   Output 1425 ml   Net -304 33 ml     General:  No acute distress  Skin:  No rash  Eyes:  Sclerae anicteric  ENT:  Moist mucous membranes  Neck:  Trachea midline with no JVD  Chest:  Clear to auscultation bilaterally  CVS:  Regular rate and rhythm, murmur  Abdomen:  Soft, nontender, nondistended  Extremities:  +bilateral LE edema   Neuro:  Awake and alert  Psych:  Appropriate affect    Medications:  Scheduled Meds:    Current Facility-Administered Medications:  acetaminophen 650 mg Oral Q6H PRN Sara Luna MD    amLODIPine 5 mg Oral Daily COLLEEN Chappell    calcitriol 0 25 mcg Oral Once per day on Mon Wed Fri Mary Chapman MD    clopidogrel 75 mg Oral Daily Sara Luna MD    diphenhydrAMINE 25 mg Oral Q6H PRN Dimitri Menon DO    diphenhydrAMINE-zinc acetate  Topical TID PRN Samuel Ferris MD    doxycycline hyclate 100 mg Oral Q12H [address] Evelyn Aldea, DO    ferrous sulfate 325 mg Oral Daily With Breakfast COLLEEN Chappell    finasteride 5 mg Oral Daily Sara Luna MD    FLUoxetine 40 mg Oral Daily Sara Luna MD    furosemide 40 mg Oral Daily Alvaro Alonso PA-C    heparin (porcine) 3-30 Units/kg/hr (Order-Specific) Intravenous Titrated Orion Osorio PA-C Last Rate: 11 1 Units/kg/hr (07/31/20 0817)   heparin (porcine) 3,400 Units Intravenous Q1H PRN Orion Osorio PA-C    heparin (porcine) 6,800 Units Intravenous Q1H PRN Orion Osorio PA-C    insulin glargine 8 Units Subcutaneous QAM Samuel Ferris MD    insulin lispro 1-6 Units Subcutaneous 4x Daily (AC & HS) Sara Luna MD    memantine 5 mg Oral Daily Sara Luna MD    ondansetron 4 mg Intravenous Q4H PRN Sara Luna MD    polyethylene glycol 17 g Oral Daily Dimitri Menon DO    pravastatin 40 mg Oral Daily With Michael Douglas MD    sodium bicarbonate 650 mg Oral BID after meals Sara Luna MD    tamsulosin 0 4 mg Oral Daily With Michael Douglas MD    warfarin 5 mg Oral Daily (warfarin) Dimitri Menon DO        PRN Meds:   acetaminophen    diphenhydrAMINE    diphenhydrAMINE-zinc acetate    heparin (porcine)    heparin (porcine)    ondansetron    Continuous Infusions:    heparin (porcine) 3-30 Units/kg/hr (Order-Specific) Last Rate: 11 1 Units/kg/hr (07/31/20 0817)       Laboratory Results:  Results from last 7 days   Lab Units 07/31/20  0356 07/30/20  0432 07/29/20  0414 07/28/20  0532 07/28/20  0531 07/27/20  1057 07/26/20  0823 07/25/20  0816   WBC Thousand/uL 7 04 6 81  -- 8 18  --   --   --  6 83   HEMOGLOBIN g/dL 7 9* 7 7*  --  8 5*  --   --  8 6* 8 8*   HEMATOCRIT % 25 7* 25 4*  --  27 3*  --   --  26 9* 28 2*   PLATELETS Thousands/uL 266 271  --  307  --   --   --  312   SODIUM mmol/L 135* 135* 136  --  133* 134* 135* 132*   POTASSIUM mmol/L 5 2 5 0 5 0  --  5 0 4 8 4 7 4 5   CHLORIDE mmol/L 107 108 107  --  103 105 105 104   CO2 mmol/L 23 20* 19*  --  22 21 22 20*   BUN mg/dL 55* 50* 49*  --  46* 43* 43* 43*   CREATININE mg/dL 3 05* 2 89* 3 05*  --  3 00* 2 77* 2 85* 2 68*   CALCIUM mg/dL 8 1* 7 7* 7 6*  --  7 9* 8 3 7 4* 7 4*

## 2020-07-31 NOTE — SOCIAL WORK
Cm reviewed patient's chart  Patient is not stable for dc at this time  Cm informed that patient will need to have INR therapeutic prior to dc  CM informed accepting facility that dc probably is not over the weekend

## 2020-07-31 NOTE — ASSESSMENT & PLAN NOTE
· Baseline creatinine in the high 2s to low 3s  · Nephrology consulted in view of requirement for angiogram  · Patient would not want HD if needed  · Avoid hypotension, nephrotoxic medications  · Nephrology is following  · Restarted on Lasix  · Per patient's daughter, patient is supposed to be on medication to lower his potassium, he takes it twice a week, I discussed with Nephrology to address it

## 2020-08-01 LAB
ANION GAP SERPL CALCULATED.3IONS-SCNC: 5 MMOL/L (ref 4–13)
APTT PPP: 69 SECONDS (ref 23–37)
BUN SERPL-MCNC: 52 MG/DL (ref 5–25)
CALCIUM SERPL-MCNC: 8.2 MG/DL (ref 8.3–10.1)
CHLORIDE SERPL-SCNC: 108 MMOL/L (ref 100–108)
CO2 SERPL-SCNC: 23 MMOL/L (ref 21–32)
CREAT SERPL-MCNC: 2.94 MG/DL (ref 0.6–1.3)
GFR SERPL CREATININE-BSD FRML MDRD: 18 ML/MIN/1.73SQ M
GLUCOSE SERPL-MCNC: 144 MG/DL (ref 65–140)
GLUCOSE SERPL-MCNC: 162 MG/DL (ref 65–140)
GLUCOSE SERPL-MCNC: 197 MG/DL (ref 65–140)
GLUCOSE SERPL-MCNC: 74 MG/DL (ref 65–140)
GLUCOSE SERPL-MCNC: 80 MG/DL (ref 65–140)
INR PPP: 1.2 (ref 0.84–1.19)
POTASSIUM SERPL-SCNC: 4.9 MMOL/L (ref 3.5–5.3)
PROTHROMBIN TIME: 15.2 SECONDS (ref 11.6–14.5)
SODIUM SERPL-SCNC: 136 MMOL/L (ref 136–145)

## 2020-08-01 PROCEDURE — 80048 BASIC METABOLIC PNL TOTAL CA: CPT | Performed by: INTERNAL MEDICINE

## 2020-08-01 PROCEDURE — 99232 SBSQ HOSP IP/OBS MODERATE 35: CPT | Performed by: INTERNAL MEDICINE

## 2020-08-01 PROCEDURE — 85610 PROTHROMBIN TIME: CPT | Performed by: INTERNAL MEDICINE

## 2020-08-01 PROCEDURE — 85730 THROMBOPLASTIN TIME PARTIAL: CPT | Performed by: INTERNAL MEDICINE

## 2020-08-01 PROCEDURE — 82948 REAGENT STRIP/BLOOD GLUCOSE: CPT

## 2020-08-01 RX ADMIN — DOXYCYCLINE 100 MG: 100 CAPSULE ORAL at 07:47

## 2020-08-01 RX ADMIN — POLYETHYLENE GLYCOL 3350 17 G: 17 POWDER, FOR SOLUTION ORAL at 07:48

## 2020-08-01 RX ADMIN — SODIUM BICARBONATE 650 MG TABLET 650 MG: at 07:46

## 2020-08-01 RX ADMIN — INSULIN GLARGINE 8 UNITS: 100 INJECTION, SOLUTION SUBCUTANEOUS at 07:48

## 2020-08-01 RX ADMIN — DOXYCYCLINE 100 MG: 100 CAPSULE ORAL at 21:12

## 2020-08-01 RX ADMIN — HEPARIN SODIUM AND DEXTROSE 11.1 UNITS/KG/HR: 10000; 5 INJECTION INTRAVENOUS at 07:46

## 2020-08-01 RX ADMIN — FUROSEMIDE 40 MG: 40 TABLET ORAL at 07:48

## 2020-08-01 RX ADMIN — WARFARIN SODIUM 5 MG: 5 TABLET ORAL at 17:08

## 2020-08-01 RX ADMIN — TAMSULOSIN HYDROCHLORIDE 0.4 MG: 0.4 CAPSULE ORAL at 17:08

## 2020-08-01 RX ADMIN — FERROUS SULFATE TAB 325 MG (65 MG ELEMENTAL FE) 325 MG: 325 (65 FE) TAB at 07:47

## 2020-08-01 RX ADMIN — MEMANTINE 5 MG: 5 TABLET ORAL at 07:47

## 2020-08-01 RX ADMIN — INSULIN LISPRO 2 UNITS: 100 INJECTION, SOLUTION INTRAVENOUS; SUBCUTANEOUS at 17:05

## 2020-08-01 RX ADMIN — FINASTERIDE 5 MG: 5 TABLET, FILM COATED ORAL at 07:47

## 2020-08-01 RX ADMIN — CLOPIDOGREL BISULFATE 75 MG: 75 TABLET ORAL at 07:47

## 2020-08-01 RX ADMIN — SODIUM BICARBONATE 650 MG TABLET 650 MG: at 17:08

## 2020-08-01 RX ADMIN — INSULIN LISPRO 1 UNITS: 100 INJECTION, SOLUTION INTRAVENOUS; SUBCUTANEOUS at 21:12

## 2020-08-01 RX ADMIN — AMLODIPINE BESYLATE 5 MG: 5 TABLET ORAL at 07:48

## 2020-08-01 RX ADMIN — FLUOXETINE 40 MG: 20 CAPSULE ORAL at 07:46

## 2020-08-01 RX ADMIN — PRAVASTATIN SODIUM 40 MG: 20 TABLET ORAL at 17:08

## 2020-08-01 NOTE — ASSESSMENT & PLAN NOTE
· Baseline creatinine in the high 2s to low 3s  · Nephrology consulted in view of requirement for angiogram  · Patient would not want HD if needed  · Avoid hypotension, nephrotoxic medications  · Nephrology is following  · Restarted on Lasix  · Krysten Toro as an outpatient

## 2020-08-01 NOTE — PROGRESS NOTES
Progress Note - Armando Holes 6/28/1930, 80 y o  male MRN: [id number]    Unit/Bed#: MS Dodd8-01 Encounter: [id number]    Primary Care Provider: Stephanie Song MD   Date and time admitted to hospital: 7/21/2020 12:32 PM        * 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  Assessment & Plan  · Polymicrobial wound cultures positive for MRSA, Klebsiella, Enterococcus - unclear which is pathogen  · MRI - suspicion of mild plantar osteomyelitis but less prominent compared to 2/2020  · Patient refuses amputation   · Per ID if no resection done very low likelihood of cure with medical therapy and has increased risk of renal toxicity hence need oral suppression with Doxycycline  · Patient started on oral doxycycline for indefinite suppression    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  Assessment & Plan  · Continue with local wound care  · Podiatry is following with dressing changes 3 times a week    Peripheral arterial disease ([facility] [address] )  Assessment & Plan  · LEADS - RLE - LILIAN 1 2/88/52, 50-75% stenosis of prox-mid SFA, >75% stenosis in proximal calf bypass graft, high grade stenosis vs occlusion of distal SFA artery/stent; LLE - LILIAN 0 78/86/32 with occlusion of the distal SFA and above knee popliteal artery/stent    · Left lower extremity angiogram - completely occluded above-the-knee to below-the-knee bypass - likely occluded for a long time  · Not a candidate for 3rd time redo bypass due to age, co-morbidities and his wishes  · Patient does not want amputation or any further interventions  · Continue Coumadin, Plavix and statin  · Palliative Care is following    Anemia of chronic disease  Assessment & Plan  · Dropped to 7 3 on 7/24  · S/p 1 PRBC on 7/24  · Monitor hemoglobin and transfuse PRN    Atrial fibrillation   Assessment & Plan  · HR controlled  · On heparin drip  · Restarted on Coumadin  · Awaiting therapeutic INR    Chronic kidney disease stage 4  Assessment & Plan  · Baseline creatinine in the high 2s to low 3s  · Nephrology consulted in view of requirement for angiogram  · Patient would not want HD if needed  · Avoid hypotension, nephrotoxic medications  · Nephrology is following  · Restarted on Lasix  · Danie Ra as an outpatient    Essential hypertension  Assessment & Plan  On Lasix 40 mg daily and Amlodipine 5 mg daily at home  BP acceptable    Diabetes mellitus type 2  Assessment & Plan  Lab Results   Component Value Date    HGBA1C 7 1 (H) 2020       · Controlled for patient's age as evidenced by A1c  · Glipizide held  · Ct Lantus 8 units in am, SSI  · Monitor blood sugars and adjust insulin as needed           VTE Pharmacologic Prophylaxis:   Pharmacologic: Warfarin (Coumadin)  Mechanical VTE Prophylaxis in Place: Yes    Patient Centered Rounds: I have performed bedside rounds with nursing staff today  Discussions with Specialists or Other Care Team Provider:  Nephrology    Education and Discussions with Family / Patient:  Patient    Time Spent for Care: 30 minutes  More than 50% of total time spent on counseling and coordination of care as described above  Current Length of Stay: 11 day(s)    Current Patient Status: Inpatient   Certification Statement: The patient will continue to require additional inpatient hospital stay due to Awaiting therapeutic INR    Discharge Plan / Estimated Discharge Date:  Rehab when INR goal    Code Status: Level 3 - DNAR and DNI      Subjective:   Patient seen and examined  Comfortable in bed  No event overnight  Denied pain    Objective:     Vitals:   Temp (24hrs), Av 9 °F (36 6 °C), Min:97 6 °F (36 4 °C), Max:98 2 °F (36 8 °C)    Temp:  [97 6 °F (36 4 °C)-98 2 °F (36 8 °C)] 97 6 °F (36 4 °C)  HR:  [72-87] 73  Resp:  [16-20] 20  BP: (138-142)/(65-71) 139/71  SpO2:  [94 %-99 %] 97 %  Body mass index is 26 85 kg/m²  Input and Output Summary (last 24 hours):        Intake/Output Summary (Last 24 hours) at 2020 1133  Last data filed at 2020 3544  Gross per 24 hour   Intake 620 06 ml   Output 900 ml   Net -279 94 ml       Physical Exam:     Physical Exam    Patient is awake alert in no acute distress  Lung clear to auscultation bilateral anteriorly  Heart positive S1-S2 no murmur  Abdomen soft nontender  Bilateral lower extremities edema    Additional Data:     Labs:    Results from last 7 days   Lab Units 07/31/20  0356   WBC Thousand/uL 7 04   HEMOGLOBIN g/dL 7 9*   HEMATOCRIT % 25 7*   PLATELETS Thousands/uL 266   NEUTROS PCT % 49   LYMPHS PCT % 29   MONOS PCT % 12   EOS PCT % 8*     Results from last 7 days   Lab Units 08/01/20  0626   POTASSIUM mmol/L 4 9   CHLORIDE mmol/L 108   CO2 mmol/L 23   BUN mg/dL 52*   CREATININE mg/dL 2 94*   CALCIUM mg/dL 8 2*     Results from last 7 days   Lab Units 08/01/20  0626   INR  1 20*       * I Have Reviewed All Lab Data Listed Above  * Additional Pertinent Lab Tests Reviewed:  Torey English Admission Reviewed    Imaging:    Imaging Reports Reviewed Today Include:   Imaging Personally Reviewed by Myself Includes:      Recent Cultures (last 7 days):           Last 24 Hours Medication List:     Current Facility-Administered Medications:  acetaminophen 650 mg Oral Q6H PRN Kylah Shoemaker MD    amLODIPine 5 mg Oral Daily COLLEEN Wall    calcitriol 0 25 mcg Oral Once per day on Mon Wed Fri Hai Chen MD    clopidogrel 75 mg Oral Daily Kylah Shoemaker MD    diphenhydrAMINE 25 mg Oral Q6H PRN Negrita Juarez DO    diphenhydrAMINE-zinc acetate  Topical TID PRN [place] MD    doxycycline hyclate 100 mg Oral Q12H [address] Evelyn Aldea,     ferrous sulfate 325 mg Oral Daily With Breakfast COLLEEN Wall    finasteride 5 mg Oral Daily Kylah Shoemaker MD    FLUoxetine 40 mg Oral Daily Kylah Shoemaker MD    furosemide 40 mg Oral Daily Bright Lomax PA-C    heparin (porcine) 3-30 Units/kg/hr (Order-Specific) Intravenous Titrated Jami Mahoney PA-C Last Rate: 11 1 Units/kg/hr (08/01/20 0746)   heparin (porcine) 3,400 Units Intravenous Q1H PRN Lobo Cooney PA-C    heparin (porcine) 6,800 Units Intravenous Q1H PRN Lobo Cooney PA-C    insulin glargine 8 Units Subcutaneous QAM Sam Councilman, MD    insulin lispro 1-6 Units Subcutaneous 4x Daily (AC & HS) Amalia Light MD    memantine 5 mg Oral Daily Amalia Light MD    ondansetron 4 mg Intravenous Q4H PRN Amalia Light MD    polyethylene glycol 17 g Oral Daily Fabienne Canales DO    pravastatin 40 mg Oral Daily With Cintia Valera MD    sodium bicarbonate 650 mg Oral BID after meals Amalia Light MD    tamsulosin 0 4 mg Oral Daily With Cintia Valera MD    warfarin 5 mg Oral Daily (warfarin) Fabienne Canales DO         Today, Patient Was Seen By: Fabienne Canales DO    ** Please Note: This note has been constructed using a voice recognition system   **

## 2020-08-01 NOTE — TREATMENT PLAN
Renal function remains at baseline on 40 mg Lasix daily  Continue to monitor BMP in setting of history of hyperkalemia  Recommendations provided for outpatient management in last note  Should see Dr Delia Cano upon discharge  Already has appt scheduled 9/8/20 at 1pm in [facility]

## 2020-08-01 NOTE — PLAN OF CARE
Problem: Potential for Falls  Goal: Patient will remain free of falls  Description  INTERVENTIONS:  - Assess patient frequently for physical needs  -  Identify cognitive and physical deficits and behaviors that affect risk of falls    -  [place] fall precautions as indicated by assessment   - Educate patient/family on patient safety including physical limitations  - Instruct patient to call for assistance with activity based on assessment  - Modify environment to reduce risk of injury  - Consider OT/PT consult to assist with strengthening/mobility  Outcome: Progressing     Problem: PAIN - ADULT  Goal: Verbalizes/displays adequate comfort level or baseline comfort level  Description  Interventions:  - Encourage patient to monitor pain and request assistance  - Assess pain using appropriate pain scale  - Administer analgesics based on type and severity of pain and evaluate response  - Implement non-pharmacological measures as appropriate and evaluate response  - Consider cultural and social influences on pain and pain management  - Notify physician/advanced practitioner if interventions unsuccessful or patient reports new pain  Outcome: Progressing     Problem: INFECTION - ADULT  Goal: Absence or prevention of progression during hospitalization  Description  INTERVENTIONS:  - Assess and monitor for signs and symptoms of infection  - Monitor lab/diagnostic results  - Monitor all insertion sites, i e  indwelling lines, tubes, and drains  - Monitor endotracheal if appropriate and nasal secretions for changes in amount and color  - [place] appropriate cooling/warming therapies per order  - Administer medications as ordered  - Instruct and encourage patient and family to use good hand hygiene technique  - Identify and instruct in appropriate isolation precautions for identified infection/condition  Outcome: Progressing  Goal: Absence of fever/infection during neutropenic period  Description  INTERVENTIONS:  - Monitor WBC    Outcome: Progressing     Problem: Nutrition/Hydration-ADULT  Goal: Nutrient/Hydration intake appropriate for improving, restoring or maintaining nutritional needs  Description  Monitor and assess patient's nutrition/hydration status for malnutrition  Collaborate with interdisciplinary team and initiate plan and interventions as ordered  Monitor patient's weight and dietary intake as ordered or per policy  Utilize nutrition screening tool and intervene as necessary  Determine patient's food preferences and provide high-protein, high-caloric foods as appropriate       INTERVENTIONS:  - Monitor oral intake, urinary output, labs, and treatment plans  - Assess nutrition and hydration status and recommend course of action  - Evaluate amount of meals eaten  - Assist patient with eating if necessary   - Allow adequate time for meals  - Recommend/ encourage appropriate diets, oral nutritional supplements, and vitamin/mineral supplements  - Order, calculate, and assess calorie counts as needed  - Recommend, monitor, and adjust tube feedings and TPN/PPN based on assessed needs  - Assess need for intravenous fluids  - Provide specific nutrition/hydration education as appropriate  - Include patient/family/caregiver in decisions related to nutrition  Outcome: Progressing     Problem: HEMATOLOGIC - ADULT  Goal: Maintains hematologic stability  Description  INTERVENTIONS  - Assess for signs and symptoms of bleeding or hemorrhage  - Monitor labs  - Administer supportive blood products/factors as ordered and appropriate  Outcome: Progressing     Problem: MUSCULOSKELETAL - ADULT  Goal: Maintain or return mobility to safest level of function  Description  INTERVENTIONS:  - Assess patient's ability to carry out ADLs; assess patient's baseline for ADL function and identify physical deficits which impact ability to perform ADLs (bathing, care of mouth/teeth, toileting, grooming, dressing, etc )  - Assess/evaluate cause of self-care deficits   - Assess range of motion  - Assess patient's mobility  - Assess patient's need for assistive devices and provide as appropriate  - Encourage maximum independence but intervene and supervise when necessary  - Involve family in performance of ADLs  - Assess for home care needs following discharge   - Consider OT consult to assist with ADL evaluation and planning for discharge  - Provide patient education as appropriate  Outcome: Progressing     Problem: Prexisting or High Potential for Compromised Skin Integrity  Goal: Skin integrity is maintained or improved  Description  INTERVENTIONS:  - Identify patients at risk for skin breakdown  - Assess and monitor skin integrity  - Assess and monitor nutrition and hydration status  - Monitor labs   - Assess for incontinence   - Turn and reposition patient  - Assist with mobility/ambulation  - Relieve pressure over bony prominences  - Avoid friction and shearing  - Provide appropriate hygiene as needed including keeping skin clean and dry  - Evaluate need for skin moisturizer/barrier cream  - Collaborate with interdisciplinary team   - Patient/family teaching  - Consider wound care consult   Outcome: Progressing

## 2020-08-01 NOTE — PLAN OF CARE
Problem: Potential for Falls  Goal: Patient will remain free of falls  Description  INTERVENTIONS:  - Assess patient frequently for physical needs  -  Identify cognitive and physical deficits and behaviors that affect risk of falls    -  [place] fall precautions as indicated by assessment   - Educate patient/family on patient safety including physical limitations  - Instruct patient to call for assistance with activity based on assessment  - Modify environment to reduce risk of injury  - Consider OT/PT consult to assist with strengthening/mobility  Outcome: Progressing     Problem: PAIN - ADULT  Goal: Verbalizes/displays adequate comfort level or baseline comfort level  Description  Interventions:  - Encourage patient to monitor pain and request assistance  - Assess pain using appropriate pain scale  - Administer analgesics based on type and severity of pain and evaluate response  - Implement non-pharmacological measures as appropriate and evaluate response  - Consider cultural and social influences on pain and pain management  - Notify physician/advanced practitioner if interventions unsuccessful or patient reports new pain  Outcome: Progressing     Problem: INFECTION - ADULT  Goal: Absence or prevention of progression during hospitalization  Description  INTERVENTIONS:  - Assess and monitor for signs and symptoms of infection  - Monitor lab/diagnostic results  - Monitor all insertion sites, i e  indwelling lines, tubes, and drains  - Monitor endotracheal if appropriate and nasal secretions for changes in amount and color  - [place] appropriate cooling/warming therapies per order  - Administer medications as ordered  - Instruct and encourage patient and family to use good hand hygiene technique  - Identify and instruct in appropriate isolation precautions for identified infection/condition  Outcome: Progressing  Goal: Absence of fever/infection during neutropenic period  Description  INTERVENTIONS:  - Monitor WBC    Outcome: Completed     Problem: Nutrition/Hydration-ADULT  Goal: Nutrient/Hydration intake appropriate for improving, restoring or maintaining nutritional needs  Description  Monitor and assess patient's nutrition/hydration status for malnutrition  Collaborate with interdisciplinary team and initiate plan and interventions as ordered  Monitor patient's weight and dietary intake as ordered or per policy  Utilize nutrition screening tool and intervene as necessary  Determine patient's food preferences and provide high-protein, high-caloric foods as appropriate       INTERVENTIONS:  - Monitor oral intake, urinary output, labs, and treatment plans  - Assess nutrition and hydration status and recommend course of action  - Evaluate amount of meals eaten  - Assist patient with eating if necessary   - Allow adequate time for meals  - Recommend/ encourage appropriate diets, oral nutritional supplements, and vitamin/mineral supplements  - Order, calculate, and assess calorie counts as needed  - Recommend, monitor, and adjust tube feedings and TPN/PPN based on assessed needs  - Assess need for intravenous fluids  - Provide specific nutrition/hydration education as appropriate  - Include patient/family/caregiver in decisions related to nutrition  Outcome: Progressing     Problem: HEMATOLOGIC - ADULT  Goal: Maintains hematologic stability  Description  INTERVENTIONS  - Assess for signs and symptoms of bleeding or hemorrhage  - Monitor labs  - Administer supportive blood products/factors as ordered and appropriate  Outcome: Progressing     Problem: MUSCULOSKELETAL - ADULT  Goal: Maintain or return mobility to safest level of function  Description  INTERVENTIONS:  - Assess patient's ability to carry out ADLs; assess patient's baseline for ADL function and identify physical deficits which impact ability to perform ADLs (bathing, care of mouth/teeth, toileting, grooming, dressing, etc )  - Assess/evaluate cause of self-care deficits   - Assess range of motion  - Assess patient's mobility  - Assess patient's need for assistive devices and provide as appropriate  - Encourage maximum independence but intervene and supervise when necessary  - Involve family in performance of ADLs  - Assess for home care needs following discharge   - Consider OT consult to assist with ADL evaluation and planning for discharge  - Provide patient education as appropriate  Outcome: Progressing     Problem: Prexisting or High Potential for Compromised Skin Integrity  Goal: Skin integrity is maintained or improved  Description  INTERVENTIONS:  - Identify patients at risk for skin breakdown  - Assess and monitor skin integrity  - Assess and monitor nutrition and hydration status  - Monitor labs   - Assess for incontinence   - Turn and reposition patient  - Assist with mobility/ambulation  - Relieve pressure over bony prominences  - Avoid friction and shearing  - Provide appropriate hygiene as needed including keeping skin clean and dry  - Evaluate need for skin moisturizer/barrier cream  - Collaborate with interdisciplinary team   - Patient/family teaching  - Consider wound care consult   Outcome: Progressing

## 2020-08-01 NOTE — NURSING NOTE
ptt 69, no change in current heparin gtt  Continue at 11 1units/kg/hr  ptt daily since he has had 3 ptt's without changes required

## 2020-08-02 LAB
ANION GAP SERPL CALCULATED.3IONS-SCNC: 7 MMOL/L (ref 4–13)
APTT PPP: 78 SECONDS (ref 23–37)
BUN SERPL-MCNC: 55 MG/DL (ref 5–25)
CALCIUM SERPL-MCNC: 7.8 MG/DL (ref 8.3–10.1)
CHLORIDE SERPL-SCNC: 110 MMOL/L (ref 100–108)
CO2 SERPL-SCNC: 19 MMOL/L (ref 21–32)
CREAT SERPL-MCNC: 2.8 MG/DL (ref 0.6–1.3)
ERYTHROCYTE [DISTWIDTH] IN BLOOD BY AUTOMATED COUNT: 15.7 % (ref 11.6–15.1)
GFR SERPL CREATININE-BSD FRML MDRD: 19 ML/MIN/1.73SQ M
GLUCOSE SERPL-MCNC: 123 MG/DL (ref 65–140)
GLUCOSE SERPL-MCNC: 128 MG/DL (ref 65–140)
GLUCOSE SERPL-MCNC: 183 MG/DL (ref 65–140)
GLUCOSE SERPL-MCNC: 68 MG/DL (ref 65–140)
GLUCOSE SERPL-MCNC: 72 MG/DL (ref 65–140)
HCT VFR BLD AUTO: 27 % (ref 36.5–49.3)
HGB BLD-MCNC: 8.4 G/DL (ref 12–17)
INR PPP: 1.37 (ref 0.84–1.19)
MCH RBC QN AUTO: 29.2 PG (ref 26.8–34.3)
MCHC RBC AUTO-ENTMCNC: 31.1 G/DL (ref 31.4–37.4)
MCV RBC AUTO: 94 FL (ref 82–98)
PLATELET # BLD AUTO: 223 THOUSANDS/UL (ref 149–390)
PMV BLD AUTO: 10 FL (ref 8.9–12.7)
POTASSIUM SERPL-SCNC: 5.1 MMOL/L (ref 3.5–5.3)
POTASSIUM SERPL-SCNC: 6.1 MMOL/L (ref 3.5–5.3)
PROTHROMBIN TIME: 16.8 SECONDS (ref 11.6–14.5)
RBC # BLD AUTO: 2.88 MILLION/UL (ref 3.88–5.62)
SODIUM SERPL-SCNC: 136 MMOL/L (ref 136–145)
WBC # BLD AUTO: 7.14 THOUSAND/UL (ref 4.31–10.16)

## 2020-08-02 PROCEDURE — 80048 BASIC METABOLIC PNL TOTAL CA: CPT | Performed by: INTERNAL MEDICINE

## 2020-08-02 PROCEDURE — 84132 ASSAY OF SERUM POTASSIUM: CPT | Performed by: INTERNAL MEDICINE

## 2020-08-02 PROCEDURE — 85610 PROTHROMBIN TIME: CPT | Performed by: INTERNAL MEDICINE

## 2020-08-02 PROCEDURE — 85730 THROMBOPLASTIN TIME PARTIAL: CPT | Performed by: INTERNAL MEDICINE

## 2020-08-02 PROCEDURE — 82948 REAGENT STRIP/BLOOD GLUCOSE: CPT

## 2020-08-02 PROCEDURE — 85027 COMPLETE CBC AUTOMATED: CPT | Performed by: INTERNAL MEDICINE

## 2020-08-02 PROCEDURE — 99232 SBSQ HOSP IP/OBS MODERATE 35: CPT | Performed by: INTERNAL MEDICINE

## 2020-08-02 RX ORDER — SODIUM POLYSTYRENE SULFONATE 4.1 MEQ/G
30 POWDER, FOR SUSPENSION ORAL; RECTAL ONCE
Status: DISCONTINUED | OUTPATIENT
Start: 2020-08-02 | End: 2020-08-02

## 2020-08-02 RX ORDER — SODIUM POLYSTYRENE SULFONATE 4.1 MEQ/G
15 POWDER, FOR SUSPENSION ORAL; RECTAL ONCE
Status: COMPLETED | OUTPATIENT
Start: 2020-08-02 | End: 2020-08-02

## 2020-08-02 RX ORDER — INSULIN GLARGINE 100 [IU]/ML
5 INJECTION, SOLUTION SUBCUTANEOUS EVERY MORNING
Status: DISCONTINUED | OUTPATIENT
Start: 2020-08-03 | End: 2020-08-07 | Stop reason: HOSPADM

## 2020-08-02 RX ADMIN — AMLODIPINE BESYLATE 5 MG: 5 TABLET ORAL at 08:06

## 2020-08-02 RX ADMIN — FLUOXETINE 40 MG: 20 CAPSULE ORAL at 08:07

## 2020-08-02 RX ADMIN — SODIUM POLYSTYRENE SULFONATE 15 G: 1 POWDER ORAL; RECTAL at 08:46

## 2020-08-02 RX ADMIN — MEMANTINE 5 MG: 5 TABLET ORAL at 08:07

## 2020-08-02 RX ADMIN — FUROSEMIDE 40 MG: 40 TABLET ORAL at 08:06

## 2020-08-02 RX ADMIN — SODIUM BICARBONATE 650 MG TABLET 650 MG: at 08:07

## 2020-08-02 RX ADMIN — WARFARIN SODIUM 5 MG: 5 TABLET ORAL at 17:03

## 2020-08-02 RX ADMIN — DOXYCYCLINE 100 MG: 100 CAPSULE ORAL at 21:31

## 2020-08-02 RX ADMIN — DOXYCYCLINE 100 MG: 100 CAPSULE ORAL at 08:07

## 2020-08-02 RX ADMIN — SODIUM BICARBONATE 650 MG TABLET 650 MG: at 17:03

## 2020-08-02 RX ADMIN — HEPARIN SODIUM AND DEXTROSE 11.1 UNITS/KG/HR: 10000; 5 INJECTION INTRAVENOUS at 10:54

## 2020-08-02 RX ADMIN — INSULIN GLARGINE 8 UNITS: 100 INJECTION, SOLUTION SUBCUTANEOUS at 08:08

## 2020-08-02 RX ADMIN — INSULIN LISPRO 1 UNITS: 100 INJECTION, SOLUTION INTRAVENOUS; SUBCUTANEOUS at 21:31

## 2020-08-02 RX ADMIN — TAMSULOSIN HYDROCHLORIDE 0.4 MG: 0.4 CAPSULE ORAL at 17:03

## 2020-08-02 RX ADMIN — PRAVASTATIN SODIUM 40 MG: 20 TABLET ORAL at 17:03

## 2020-08-02 RX ADMIN — CLOPIDOGREL BISULFATE 75 MG: 75 TABLET ORAL at 08:06

## 2020-08-02 RX ADMIN — FERROUS SULFATE TAB 325 MG (65 MG ELEMENTAL FE) 325 MG: 325 (65 FE) TAB at 08:06

## 2020-08-02 RX ADMIN — FINASTERIDE 5 MG: 5 TABLET, FILM COATED ORAL at 08:06

## 2020-08-02 NOTE — QUICK NOTE
Progress Note - Palliative & Supportive Care     Patient moving towards discharge once INR therapeutic  [facility] and [facility] at [facility]  Please notify on-call with provider with any further questions/concerns      Kaitlin Mart,   Palliative and Supportive Care  [phone]

## 2020-08-02 NOTE — ASSESSMENT & PLAN NOTE
Lab Results   Component Value Date    HGBA1C 7 1 (H) 07/22/2020       · Controlled for patient's age as evidenced by A1c  · Glipizide held  · Ct Lantus 5 units in am, SSI  · Monitor blood sugars and adjust insulin as needed

## 2020-08-02 NOTE — ASSESSMENT & PLAN NOTE
· Baseline creatinine in the high 2s to low 3s  · Nephrology consulted in view of requirement for angiogram  · Patient would not want HD if needed  · Avoid hypotension, nephrotoxic medications  · Nephrology is following  · Continue Lasix  · Nati Chadwick as an outpatient for hyperkalemia

## 2020-08-02 NOTE — PROGRESS NOTES
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  Assessment & Plan  · Polymicrobial wound cultures positive for MRSA, Klebsiella, Enterococcus - unclear which is pathogen  · MRI - suspicion of mild plantar osteomyelitis but less prominent compared to 2/2020  · Patient refuses amputation   · Per ID if no resection done very low likelihood of cure with medical therapy and has increased risk of renal toxicity hence need oral suppression with Doxycycline  · Patient started on oral doxycycline for indefinite suppression    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  Assessment & Plan  · Continue with local wound care  · Podiatry is following with dressing changes 3 times a week    Peripheral arterial disease ([facility] [address] )  Assessment & Plan  · LEADS - RLE - LILIAN 1 2/88/52, 50-75% stenosis of prox-mid SFA, >75% stenosis in proximal calf bypass graft, high grade stenosis vs occlusion of distal SFA artery/stent; LLE - LILIAN 0 78/86/32 with occlusion of the distal SFA and above knee popliteal artery/stent    · Left lower extremity angiogram - completely occluded above-the-knee to below-the-knee bypass - likely occluded for a long time  · Not a candidate for 3rd time redo bypass due to age, co-morbidities and his wishes  · Patient does not want amputation or any further interventions  · Continue Coumadin, Plavix and statin  · Palliative Care is following    Anemia of chronic disease  Assessment & Plan  · Dropped to 7 3 on 7/24  · S/p 1 PRBC on 7/24  · Monitor hemoglobin and transfuse PRN    Atrial fibrillation   Assessment & Plan  · HR controlled  · On heparin drip  · Restarted on Coumadin  · Awaiting therapeutic INR    Chronic kidney disease stage 4  Assessment & Plan  · Baseline creatinine in the high 2s to low 3s  · Nephrology consulted in view of requirement for angiogram  · Patient would not want HD if needed  · Avoid hypotension, nephrotoxic medications  · Nephrology is following  · Continue Lasix  · Celester Parent as an outpatient for hyperkalemia    Essential hypertension  Assessment & Plan  On Lasix 40 mg daily and Amlodipine 5 mg daily at home  BP acceptable    Diabetes mellitus type 2  Assessment & Plan  Lab Results   Component Value Date    HGBA1C 7 1 (H) 2020       · Controlled for patient's age as evidenced by A1c  · Glipizide held  · Ct Lantus 5 units in am, SSI  · Monitor blood sugars and adjust insulin as needed           VTE Pharmacologic Prophylaxis:   Pharmacologic: Warfarin (Coumadin)  Mechanical VTE Prophylaxis in Place: Yes    Patient Centered Rounds: I have performed bedside rounds with nursing staff today  Discussions with Specialists or Other Care Team Provider:     Education and Discussions with Family / Patient:  Patient    Time Spent for Care: 30 minutes  More than 50% of total time spent on counseling and coordination of care as described above  Current Length of Stay: 12 day(s)    Current Patient Status: Inpatient   Certification Statement: The patient will continue to require additional inpatient hospital stay due to above    Discharge Plan / Estimated Discharge Date:  When INR at goal    Code Status: Level 3 - DNAR and DNI      Subjective:   Patient seen and examined  Comfortable in bed  No event overnight  No chest pain shortness of breath    Objective:     Vitals:   Temp (24hrs), Av 7 °F (36 5 °C), Min:97 3 °F (36 3 °C), Max:98 7 °F (37 1 °C)    Temp:  [97 3 °F (36 3 °C)-98 7 °F (37 1 °C)] 97 5 °F (36 4 °C)  HR:  [72-76] 72  Resp:  [18-20] 18  BP: (118-132)/(50-65) 125/61  SpO2:  [96 %-99 %] 99 %  Body mass index is 26 85 kg/m²  Input and Output Summary (last 24 hours):        Intake/Output Summary (Last 24 hours) at 2020 5529  Last data filed at 2020 0800  Gross per 24 hour   Intake 831 24 ml   Output 1350 ml   Net -518 76 ml       Physical Exam:     Physical Exam  Patient is awake alert oriented in no acute distress  Reading newspaper  Lung clear to auscultation bilateral anteriorly  Heart positive S1-S2 no murmur  Abdomen soft nontender  Bilateral lower extremity venous stasis edema    Additional Data:     Labs:    Results from last 7 days   Lab Units 07/31/20  0356   WBC Thousand/uL 7 04   HEMOGLOBIN g/dL 7 9*   HEMATOCRIT % 25 7*   PLATELETS Thousands/uL 266   NEUTROS PCT % 49   LYMPHS PCT % 29   MONOS PCT % 12   EOS PCT % 8*     Results from last 7 days   Lab Units 08/02/20  0801 08/02/20  0550   POTASSIUM mmol/L 5 1 6 1*   CHLORIDE mmol/L  --  110*   CO2 mmol/L  --  19*   BUN mg/dL  --  55*   CREATININE mg/dL  --  2 80*   CALCIUM mg/dL  --  7 8*     Results from last 7 days   Lab Units 08/02/20  0550   INR  1 37*       * I Have Reviewed All Lab Data Listed Above  * Additional Pertinent Lab Tests Reviewed:  [address] Admission Reviewed    Imaging:    Imaging Reports Reviewed Today Include:   Imaging Personally Reviewed by Myself Includes:      Recent Cultures (last 7 days):           Last 24 Hours Medication List:   acetaminophen, 650 mg, Oral, Q6H PRN, Frandy Corey MD  amLODIPine, 5 mg, Oral, Daily, COLLEEN Baugh  calcitriol, 0 25 mcg, Oral, Once per day on Mon Wed Fri, Severiano Civil, MD  clopidogrel, 75 mg, Oral, Daily, Frandy Corey MD  diphenhydrAMINE, 25 mg, Oral, Q6H PRN, Clyde Lu DO  diphenhydrAMINE-zinc acetate, , Topical, TID PRN, Lou Rubi MD  doxycycline hyclate, 100 mg, Oral, Q12H [address], Evelyn Aldea, DO  ferrous sulfate, 325 mg, Oral, Daily With Breakfast, COLLEEN Baugh  finasteride, 5 mg, Oral, Daily, Frandy Corey MD  FLUoxetine, 40 mg, Oral, Daily, Frandy Corey MD  furosemide, 40 mg, Oral, Daily, Marilyn Mccord PA-C  heparin (porcine), 3-30 Units/kg/hr (Order-Specific), Intravenous, Titrated, Jami Mahoney PA-C, Last Rate: 11 1 Units/kg/hr (08/02/20 1054)  heparin (porcine), 3,400 Units, Intravenous, Q1H PRN, Jami Mahoney PA-C  heparin (porcine), 6,800 Units, Intravenous, Q1H PRN, Jami Mahoney PA-C  [START ON 8/3/2020] insulin glargine, 5 Units, Subcutaneous, QAM, Negrita Juarez DO  insulin lispro, 1-6 Units, Subcutaneous, 4x Daily (AC & HS), Kylah Shoemaker MD  memantine, 5 mg, Oral, Daily, Kylah Shoemaker MD  ondansetron, 4 mg, Intravenous, Q4H PRN, Kylah Shoemaker MD  polyethylene glycol, 17 g, Oral, Daily, Negrita Juarez DO  pravastatin, 40 mg, Oral, Daily With Dinner, Kylah Shoemaker MD  sodium bicarbonate, 650 mg, Oral, BID after meals, Kylah Shoemaker MD  tamsulosin, 0 4 mg, Oral, Daily With Juno Randall MD  warfarin, 5 mg, Oral, Daily (warfarin), Negrita Juarez DO         Today, Patient Was Seen By: Negrita Juarez DO    ** Please Note: This note has been constructed using a voice recognition system   **

## 2020-08-02 NOTE — PLAN OF CARE
Problem: Potential for Falls  Goal: Patient will remain free of falls  Description: INTERVENTIONS:  - Assess patient frequently for physical needs  -  Identify cognitive and physical deficits and behaviors that affect risk of falls    -  [place] fall precautions as indicated by assessment   - Educate patient/family on patient safety including physical limitations  - Instruct patient to call for assistance with activity based on assessment  - Modify environment to reduce risk of injury  - Consider OT/PT consult to assist with strengthening/mobility  Outcome: Progressing     Problem: PAIN - ADULT  Goal: Verbalizes/displays adequate comfort level or baseline comfort level  Description: Interventions:  - Encourage patient to monitor pain and request assistance  - Assess pain using appropriate pain scale  - Administer analgesics based on type and severity of pain and evaluate response  - Implement non-pharmacological measures as appropriate and evaluate response  - Consider cultural and social influences on pain and pain management  - Notify physician/advanced practitioner if interventions unsuccessful or patient reports new pain  Outcome: Progressing     Problem: INFECTION - ADULT  Goal: Absence or prevention of progression during hospitalization  Description: INTERVENTIONS:  - Assess and monitor for signs and symptoms of infection  - Monitor lab/diagnostic results  - Monitor all insertion sites, i e  indwelling lines, tubes, and drains  - Monitor endotracheal if appropriate and nasal secretions for changes in amount and color  - [place] appropriate cooling/warming therapies per order  - Administer medications as ordered  - Instruct and encourage patient and family to use good hand hygiene technique  - Identify and instruct in appropriate isolation precautions for identified infection/condition  Outcome: Progressing     Problem: Nutrition/Hydration-ADULT  Goal: Nutrient/Hydration intake appropriate for improving, restoring or maintaining nutritional needs  Description: Monitor and assess patient's nutrition/hydration status for malnutrition  Collaborate with interdisciplinary team and initiate plan and interventions as ordered  Monitor patient's weight and dietary intake as ordered or per policy  Utilize nutrition screening tool and intervene as necessary  Determine patient's food preferences and provide high-protein, high-caloric foods as appropriate       INTERVENTIONS:  - Monitor oral intake, urinary output, labs, and treatment plans  - Assess nutrition and hydration status and recommend course of action  - Evaluate amount of meals eaten  - Assist patient with eating if necessary   - Allow adequate time for meals  - Recommend/ encourage appropriate diets, oral nutritional supplements, and vitamin/mineral supplements  - Order, calculate, and assess calorie counts as needed  - Recommend, monitor, and adjust tube feedings and TPN/PPN based on assessed needs  - Assess need for intravenous fluids  - Provide specific nutrition/hydration education as appropriate  - Include patient/family/caregiver in decisions related to nutrition  Outcome: Progressing     Problem: HEMATOLOGIC - ADULT  Goal: Maintains hematologic stability  Description: INTERVENTIONS  - Assess for signs and symptoms of bleeding or hemorrhage  - Monitor labs  - Administer supportive blood products/factors as ordered and appropriate  Outcome: Progressing     Problem: MUSCULOSKELETAL - ADULT  Goal: Maintain or return mobility to safest level of function  Description: INTERVENTIONS:  - Assess patient's ability to carry out ADLs; assess patient's baseline for ADL function and identify physical deficits which impact ability to perform ADLs (bathing, care of mouth/teeth, toileting, grooming, dressing, etc )  - Assess/evaluate cause of self-care deficits   - Assess range of motion  - Assess patient's mobility  - Assess patient's need for assistive devices and provide as appropriate  - Encourage maximum independence but intervene and supervise when necessary  - Involve family in performance of ADLs  - Assess for home care needs following discharge   - Consider OT consult to assist with ADL evaluation and planning for discharge  - Provide patient education as appropriate  Outcome: Progressing     Problem: Prexisting or High Potential for Compromised Skin Integrity  Goal: Skin integrity is maintained or improved  Description: INTERVENTIONS:  - Identify patients at risk for skin breakdown  - Assess and monitor skin integrity  - Assess and monitor nutrition and hydration status  - Monitor labs   - Assess for incontinence   - Turn and reposition patient  - Assist with mobility/ambulation  - Relieve pressure over bony prominences  - Avoid friction and shearing  - Provide appropriate hygiene as needed including keeping skin clean and dry  - Evaluate need for skin moisturizer/barrier cream  - Collaborate with interdisciplinary team   - Patient/family teaching  - Consider wound care consult   Outcome: Progressing

## 2020-08-03 LAB
APTT PPP: 88 SECONDS (ref 23–37)
GLUCOSE SERPL-MCNC: 122 MG/DL (ref 65–140)
GLUCOSE SERPL-MCNC: 159 MG/DL (ref 65–140)
GLUCOSE SERPL-MCNC: 224 MG/DL (ref 65–140)
GLUCOSE SERPL-MCNC: 87 MG/DL (ref 65–140)
INR PPP: 1.5 (ref 0.84–1.19)
PROTHROMBIN TIME: 18 SECONDS (ref 11.6–14.5)

## 2020-08-03 PROCEDURE — 82948 REAGENT STRIP/BLOOD GLUCOSE: CPT

## 2020-08-03 PROCEDURE — 85610 PROTHROMBIN TIME: CPT | Performed by: INTERNAL MEDICINE

## 2020-08-03 PROCEDURE — 99233 SBSQ HOSP IP/OBS HIGH 50: CPT | Performed by: INTERNAL MEDICINE

## 2020-08-03 PROCEDURE — 85730 THROMBOPLASTIN TIME PARTIAL: CPT | Performed by: INTERNAL MEDICINE

## 2020-08-03 RX ADMIN — WARFARIN SODIUM 5 MG: 5 TABLET ORAL at 17:09

## 2020-08-03 RX ADMIN — FUROSEMIDE 40 MG: 40 TABLET ORAL at 08:37

## 2020-08-03 RX ADMIN — INSULIN LISPRO 1 UNITS: 100 INJECTION, SOLUTION INTRAVENOUS; SUBCUTANEOUS at 17:10

## 2020-08-03 RX ADMIN — SODIUM BICARBONATE 650 MG TABLET 650 MG: at 17:09

## 2020-08-03 RX ADMIN — SODIUM BICARBONATE 650 MG TABLET 650 MG: at 08:37

## 2020-08-03 RX ADMIN — INSULIN LISPRO 2 UNITS: 100 INJECTION, SOLUTION INTRAVENOUS; SUBCUTANEOUS at 21:34

## 2020-08-03 RX ADMIN — PRAVASTATIN SODIUM 40 MG: 20 TABLET ORAL at 17:09

## 2020-08-03 RX ADMIN — FINASTERIDE 5 MG: 5 TABLET, FILM COATED ORAL at 08:37

## 2020-08-03 RX ADMIN — DOXYCYCLINE 100 MG: 100 CAPSULE ORAL at 21:34

## 2020-08-03 RX ADMIN — FLUOXETINE 40 MG: 20 CAPSULE ORAL at 08:37

## 2020-08-03 RX ADMIN — CALCITRIOL 0.25 MCG: 0.25 CAPSULE, LIQUID FILLED ORAL at 08:38

## 2020-08-03 RX ADMIN — FERROUS SULFATE TAB 325 MG (65 MG ELEMENTAL FE) 325 MG: 325 (65 FE) TAB at 08:37

## 2020-08-03 RX ADMIN — POLYETHYLENE GLYCOL 3350 17 G: 17 POWDER, FOR SOLUTION ORAL at 08:37

## 2020-08-03 RX ADMIN — AMLODIPINE BESYLATE 5 MG: 5 TABLET ORAL at 08:37

## 2020-08-03 RX ADMIN — MEMANTINE 5 MG: 5 TABLET ORAL at 08:37

## 2020-08-03 RX ADMIN — CLOPIDOGREL BISULFATE 75 MG: 75 TABLET ORAL at 08:37

## 2020-08-03 RX ADMIN — TAMSULOSIN HYDROCHLORIDE 0.4 MG: 0.4 CAPSULE ORAL at 17:09

## 2020-08-03 RX ADMIN — INSULIN GLARGINE 5 UNITS: 100 INJECTION, SOLUTION SUBCUTANEOUS at 08:38

## 2020-08-03 RX ADMIN — DOXYCYCLINE 100 MG: 100 CAPSULE ORAL at 08:37

## 2020-08-03 RX ADMIN — HEPARIN SODIUM AND DEXTROSE 11.1 UNITS/KG/HR: 10000; 5 INJECTION INTRAVENOUS at 13:52

## 2020-08-03 NOTE — PROGRESS NOTES
[facility] Podiatry - Progress Note  Patient: Benito Fisher 80 y o  male   MRN: [id number]  PCP: Sha Mahoney MD  Unit/Bed#: MS Dias4-19 Encounter: [id number]  Date Of Visit: 20    ASSESSMENT:    Benito Fisher is a 80 y o  male with:    1  Left foot heel wound w/ MRI suspicious for calcaneal OM - Garcia 3 - POA  2  Left foot submetatarsal 5 wound - Garcia 1 - POA  3  PAD  - s/p L fem-PT (, ) & SFA-PT () bypass  - s/p agram LLE (DOS 20) - noted with chronically occluded bypass graft   4  CKD Stage IV  5  DM2      PLAN:    · Continue LWC to left heel  · Continue p o  Doxycycline for definite suppression, per ID  · WBAT in [place]  · Awaiting therapeutic INR for discharge  INR 1 5 today  · Stable for discharge from Podiatry perspective  · Rest of care per primary team        SUBJECTIVE:     The patient was seen, evaluated, and assessed at bedside today  The patient was awake, alert, and in no acute distress  No acute events overnight  Patient denies N/V/F/chills/SOB/CP  OBJECTIVE:     Vitals:   /64   Pulse 74   Temp 98 7 °F (37 1 °C)   Resp 18   Ht 6'   Wt 89 8 kg (198 lb)   SpO2 96%   BMI 26 85 kg/m²     Temp (24hrs), Av 3 °F (36 8 °C), Min:97 5 °F (36 4 °C), Max:98 9 °F (37 2 °C)      Physical Exam:     General:  Alert, cooperative, and in no distress  Lower extremity exam:  Cardiovascular status at baseline  Neurological status at baseline  Musculoskeletal status at baseline  No calf tenderness noted  Wound (1) located left heel, measures approximately 0 5 cm x 0 5 cm x 2 cm  with sinus tracking or undermining  Wound bed appears fibrotic with serous drainage  Deepest tissue noted in base is periosteum  Malodor is not noticed  Wound edge appears Attached  Aaliyah-wound skin appears intact  Probe to bone is negative   Signs of infection are present at this time       Wound (2) located sub met 5, measures approximately 1 cm x 0 5 cm x 0 1 cm  with sinus tracking or undermining  Wound bed appears fibrotic with no drainage  Deepest tissue noted in base is subq  Malodor is not noticed  Wound edge appears Attached  Aaliyah-wound skin appears intact  Probe to bone is negative   Signs of infection are not present at this time  Clinical Images 08/03/20: Additional Data:     Labs:    Results from last 7 days   Lab Units 08/02/20  1450 07/31/20  0356   WBC Thousand/uL 7 14 7 04   HEMOGLOBIN g/dL 8 4* 7 9*   HEMATOCRIT % 27 0* 25 7*   PLATELETS Thousands/uL 223 266   NEUTROS PCT %  --  49   LYMPHS PCT %  --  29   MONOS PCT %  --  12   EOS PCT %  --  8*     Results from last 7 days   Lab Units 08/02/20  0801 08/02/20  0550   POTASSIUM mmol/L 5 1 6 1*   CHLORIDE mmol/L  --  110*   CO2 mmol/L  --  19*   BUN mg/dL  --  55*   CREATININE mg/dL  --  2 80*   CALCIUM mg/dL  --  7 8*     Results from last 7 days   Lab Units 08/03/20  0541   INR  1 50*       * I Have Reviewed All Lab Data Listed Above  Recent Cultures (last 7 days):               Imaging: I have personally reviewed pertinent films in PACS  EKG, Pathology, and Other Studies: I have personally reviewed pertinent reports  ** Please Note: Portions of the record may have been created with voice recognition software  Occasional wrong word or "sound a like" substitutions may have occurred due to the inherent limitations of voice recognition software  Read the chart carefully and recognize, using context, where substitutions have occurred   **

## 2020-08-03 NOTE — ASSESSMENT & PLAN NOTE
· Baseline creatinine in the high 2s to low 3s  · Nephrology consulted in view of requirement for angiogram  · Patient would not want HD if needed  · Avoid hypotension, nephrotoxic medications  · Nephrology is following  · Continue Lasix  · Mathew Calloway as an outpatient for hyperkalemia  · Monitor

## 2020-08-03 NOTE — PROGRESS NOTES
Progress Note - Sebastián Paul 6/28/1930, 80 y o  male MRN: [id number]    Unit/Bed#: -01 Encounter: [id number]    Primary Care Provider: Hesham Naranjo MD   Date and time admitted to hospital: 7/21/2020 12:32 PM        * 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  Assessment & Plan  · Polymicrobial wound cultures positive for MRSA, Klebsiella, Enterococcus - unclear which is pathogen  · MRI - suspicion of mild plantar osteomyelitis but less prominent compared to 2/2020  · Patient refuses amputation   · Per ID if no resection done very low likelihood of cure with medical therapy and has increased risk of renal toxicity hence need oral suppression with Doxycycline  · Patient started on oral doxycycline for indefinite suppression    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  Assessment & Plan  · Continue with local wound care  · Podiatry is following with dressing changes 3 times a week    Peripheral arterial disease ([facility] [address] )  Assessment & Plan  · LEADS - RLE - LILIAN 1 2/88/52, 50-75% stenosis of prox-mid SFA, >75% stenosis in proximal calf bypass graft, high grade stenosis vs occlusion of distal SFA artery/stent; LLE - LILIAN 0 78/86/32 with occlusion of the distal SFA and above knee popliteal artery/stent    · Left lower extremity angiogram - completely occluded above-the-knee to below-the-knee bypass - likely occluded for a long time  · Not a candidate for 3rd time redo bypass due to age, co-morbidities and his wishes  · Patient does not want amputation or any further interventions  · Continue Coumadin, Plavix and statin  · Palliative Care is following    Anemia of chronic disease  Assessment & Plan  · Dropped to 7 3 on 7/24  · S/p 1 PRBC on 7/24  · Monitor hemoglobin and transfuse PRN    Atrial fibrillation   Assessment & Plan  · HR controlled  · On heparin drip  · Awaiting therapeutic INR  · Continue Coumadin    Chronic kidney disease stage 4  Assessment & Plan  · Baseline creatinine in the high 2s to low 3s  · Nephrology consulted in view of requirement for angiogram  · Patient would not want HD if needed  · Avoid hypotension, nephrotoxic medications  · Nephrology is following  · Continue Lasix  · Elizabetkelsey Briceno as an outpatient for hyperkalemia  · Monitor    Essential hypertension  Assessment & Plan  On Lasix 40 mg daily and Amlodipine 5 mg daily at home  BP acceptable    Diabetes mellitus type 2  Assessment & Plan  Lab Results   Component Value Date    HGBA1C 7 1 (H) 2020       · Controlled for patient's age as evidenced by A1c  · Glipizide held  · Ct Lantus 5 units in am, SSI  · Monitor blood sugars and adjust insulin as needed           VTE Pharmacologic Prophylaxis:   Pharmacologic: Warfarin (Coumadin)  Mechanical VTE Prophylaxis in Place: Yes    Patient Centered Rounds: I have performed bedside rounds with nursing staff today  Discussions with Specialists or Other Care Team Provider:     Education and Discussions with Family / Patient:  Discussed with patient's daughter    Time Spent for Care: 45 minutes  More than 50% of total time spent on counseling and coordination of care as described above  Current Length of Stay: 13 day(s)    Current Patient Status: Inpatient   Certification Statement: The patient will continue to require additional inpatient hospital stay due to Above    Discharge Plan / Estimated Discharge Date:  Awaiting therapeutic INR    Code Status: Level 3 - DNAR and DNI      Subjective:   Patient is comfortable in bed  No event overnight  Daughter bedside  No chest pain or shortness of breath    Objective:     Vitals:   Temp (24hrs), Av 5 °F (36 9 °C), Min:98 °F (36 7 °C), Max:98 9 °F (37 2 °C)    Temp:  [98 °F (36 7 °C)-98 9 °F (37 2 °C)] 98 7 °F (37 1 °C)  HR:  [74-77] 74  Resp:  [17-18] 18  BP: (126-133)/(61-64) 133/64  SpO2:  [96 %-98 %] 96 %  Body mass index is 26 85 kg/m²  Input and Output Summary (last 24 hours):        Intake/Output Summary (Last 24 hours) at 8/3/2020 1439  Last data filed at 8/3/2020 1200  Gross per 24 hour   Intake 540 ml   Output 950 ml   Net -410 ml       Physical Exam:     Physical Exam     Patient is awake alert in no acute distress  Lung clear to auscultation bilateral anteriorly  Heart positive S1-S2 no murmur  Abdomen soft nontender  Bilateral lower extremities edema    Additional Data:     Labs:    Results from last 7 days   Lab Units 08/02/20  1450 07/31/20  0356   WBC Thousand/uL 7 14 7 04   HEMOGLOBIN g/dL 8 4* 7 9*   HEMATOCRIT % 27 0* 25 7*   PLATELETS Thousands/uL 223 266   NEUTROS PCT %  --  49   LYMPHS PCT %  --  29   MONOS PCT %  --  12   EOS PCT %  --  8*     Results from last 7 days   Lab Units 08/02/20  0801 08/02/20  0550   POTASSIUM mmol/L 5 1 6 1*   CHLORIDE mmol/L  --  110*   CO2 mmol/L  --  19*   BUN mg/dL  --  55*   CREATININE mg/dL  --  2 80*   CALCIUM mg/dL  --  7 8*     Results from last 7 days   Lab Units 08/03/20  0541   INR  1 50*       * I Have Reviewed All Lab Data Listed Above  * Additional Pertinent Lab Tests Reviewed:  [address] Admission Reviewed    Imaging:    Imaging Reports Reviewed Today Include:   Imaging Personally Reviewed by Myself Includes:     Recent Cultures (last 7 days):           Last 24 Hours Medication List:   acetaminophen, 650 mg, Oral, Q6H PRN, Amberly Cano MD  amLODIPine, 5 mg, Oral, Daily, Learta Bio, CRNP  calcitriol, 0 25 mcg, Oral, Once per day on Mon Wed Fri, Phuc Gardiner MD  clopidogrel, 75 mg, Oral, Daily, Amberly Cano MD  diphenhydrAMINE, 25 mg, Oral, Q6H PRN, Nevaeh Rosalinda, DO  diphenhydrAMINE-zinc acetate, , Topical, TID PRN, Anitha Reich MD  doxycycline hyclate, 100 mg, Oral, Q12H [address], Evelyn Aldea, DO  ferrous sulfate, 325 mg, Oral, Daily With Breakfast, Vincenzo Bio, CRNP  finasteride, 5 mg, Oral, Daily, Amberly Cano MD  FLUoxetine, 40 mg, Oral, Daily, Amberly Cano MD  furosemide, 40 mg, Oral, Daily, Kristiemee Hinton PA-C  heparin (porcine), 3-30 Units/kg/hr (Order-Specific), Intravenous, Titrated, Kael Crain PA-C, Last Rate: 11 1 Units/kg/hr (08/03/20 1352)  heparin (porcine), 3,400 Units, Intravenous, Q1H PRN, Kael Crain PA-C  heparin (porcine), 6,800 Units, Intravenous, Q1H PRN, Kael Crain PA-C  insulin glargine, 5 Units, Subcutaneous, QAM, Yeimi Us DO  insulin lispro, 1-6 Units, Subcutaneous, 4x Daily (AC & HS), Judi Staley MD  memantine, 5 mg, Oral, Daily, Judi Staley MD  ondansetron, 4 mg, Intravenous, Q4H PRN, Judi Staley MD  polyethylene glycol, 17 g, Oral, Daily, Yeimi Us DO  pravastatin, 40 mg, Oral, Daily With Dinner, Judi Staley MD  sodium bicarbonate, 650 mg, Oral, BID after meals, Judi Staley MD  tamsulosin, 0 4 mg, Oral, Daily With Joe Wilkerson MD  warfarin, 5 mg, Oral, Daily (warfarin), Yeimi Us DO         Today, Patient Was Seen By: Yeimi Us DO    ** Please Note: This note has been constructed using a voice recognition system   **

## 2020-08-03 NOTE — SOCIAL WORK
Pt is not medically clear for dc per Dr Venice Dietrich d/t non therapeutic INR 1 5 today  CM updated [facility] in [address]

## 2020-08-04 LAB
ANION GAP SERPL CALCULATED.3IONS-SCNC: 7 MMOL/L (ref 4–13)
ANION GAP SERPL CALCULATED.3IONS-SCNC: 7 MMOL/L (ref 4–13)
APTT PPP: 41 SECONDS (ref 23–37)
APTT PPP: 43 SECONDS (ref 23–37)
APTT PPP: 58 SECONDS (ref 23–37)
BUN SERPL-MCNC: 53 MG/DL (ref 5–25)
BUN SERPL-MCNC: 55 MG/DL (ref 5–25)
CALCIUM SERPL-MCNC: 7.9 MG/DL (ref 8.3–10.1)
CALCIUM SERPL-MCNC: 8.6 MG/DL (ref 8.3–10.1)
CHLORIDE SERPL-SCNC: 107 MMOL/L (ref 100–108)
CHLORIDE SERPL-SCNC: 109 MMOL/L (ref 100–108)
CO2 SERPL-SCNC: 18 MMOL/L (ref 21–32)
CO2 SERPL-SCNC: 20 MMOL/L (ref 21–32)
CREAT SERPL-MCNC: 2.53 MG/DL (ref 0.6–1.3)
CREAT SERPL-MCNC: 2.55 MG/DL (ref 0.6–1.3)
GFR SERPL CREATININE-BSD FRML MDRD: 21 ML/MIN/1.73SQ M
GFR SERPL CREATININE-BSD FRML MDRD: 21 ML/MIN/1.73SQ M
GLUCOSE SERPL-MCNC: 139 MG/DL (ref 65–140)
GLUCOSE SERPL-MCNC: 172 MG/DL (ref 65–140)
GLUCOSE SERPL-MCNC: 205 MG/DL (ref 65–140)
GLUCOSE SERPL-MCNC: 82 MG/DL (ref 65–140)
GLUCOSE SERPL-MCNC: 84 MG/DL (ref 65–140)
GLUCOSE SERPL-MCNC: 86 MG/DL (ref 65–140)
INR PPP: 1.49 (ref 0.84–1.19)
POTASSIUM SERPL-SCNC: 5.3 MMOL/L (ref 3.5–5.3)
POTASSIUM SERPL-SCNC: 6.8 MMOL/L (ref 3.5–5.3)
PROTHROMBIN TIME: 18 SECONDS (ref 11.6–14.5)
SODIUM SERPL-SCNC: 134 MMOL/L (ref 136–145)
SODIUM SERPL-SCNC: 134 MMOL/L (ref 136–145)

## 2020-08-04 PROCEDURE — 85610 PROTHROMBIN TIME: CPT | Performed by: INTERNAL MEDICINE

## 2020-08-04 PROCEDURE — 85730 THROMBOPLASTIN TIME PARTIAL: CPT | Performed by: INTERNAL MEDICINE

## 2020-08-04 PROCEDURE — 82948 REAGENT STRIP/BLOOD GLUCOSE: CPT

## 2020-08-04 PROCEDURE — 97116 GAIT TRAINING THERAPY: CPT

## 2020-08-04 PROCEDURE — 97110 THERAPEUTIC EXERCISES: CPT

## 2020-08-04 PROCEDURE — 85730 THROMBOPLASTIN TIME PARTIAL: CPT | Performed by: PHYSICIAN ASSISTANT

## 2020-08-04 PROCEDURE — 80048 BASIC METABOLIC PNL TOTAL CA: CPT | Performed by: INTERNAL MEDICINE

## 2020-08-04 PROCEDURE — 99232 SBSQ HOSP IP/OBS MODERATE 35: CPT | Performed by: PHYSICIAN ASSISTANT

## 2020-08-04 RX ADMIN — WARFARIN SODIUM 5 MG: 5 TABLET ORAL at 17:08

## 2020-08-04 RX ADMIN — MEMANTINE 5 MG: 5 TABLET ORAL at 08:13

## 2020-08-04 RX ADMIN — INSULIN GLARGINE 5 UNITS: 100 INJECTION, SOLUTION SUBCUTANEOUS at 08:13

## 2020-08-04 RX ADMIN — HEPARIN SODIUM 3400 UNITS: 1000 INJECTION INTRAVENOUS; SUBCUTANEOUS at 06:20

## 2020-08-04 RX ADMIN — FINASTERIDE 5 MG: 5 TABLET, FILM COATED ORAL at 08:13

## 2020-08-04 RX ADMIN — POLYETHYLENE GLYCOL 3350 17 G: 17 POWDER, FOR SOLUTION ORAL at 08:13

## 2020-08-04 RX ADMIN — HEPARIN SODIUM 6800 UNITS: 1000 INJECTION INTRAVENOUS; SUBCUTANEOUS at 21:43

## 2020-08-04 RX ADMIN — AMLODIPINE BESYLATE 5 MG: 5 TABLET ORAL at 08:13

## 2020-08-04 RX ADMIN — HEPARIN SODIUM 3400 UNITS: 1000 INJECTION INTRAVENOUS; SUBCUTANEOUS at 14:02

## 2020-08-04 RX ADMIN — FLUOXETINE 40 MG: 20 CAPSULE ORAL at 08:16

## 2020-08-04 RX ADMIN — DOXYCYCLINE 100 MG: 100 CAPSULE ORAL at 08:21

## 2020-08-04 RX ADMIN — SODIUM BICARBONATE 650 MG TABLET 650 MG: at 17:07

## 2020-08-04 RX ADMIN — TAMSULOSIN HYDROCHLORIDE 0.4 MG: 0.4 CAPSULE ORAL at 17:07

## 2020-08-04 RX ADMIN — HEPARIN SODIUM AND DEXTROSE 15.1 UNITS/KG/HR: 10000; 5 INJECTION INTRAVENOUS at 16:37

## 2020-08-04 RX ADMIN — CLOPIDOGREL BISULFATE 75 MG: 75 TABLET ORAL at 08:13

## 2020-08-04 RX ADMIN — INSULIN LISPRO 1 UNITS: 100 INJECTION, SOLUTION INTRAVENOUS; SUBCUTANEOUS at 17:09

## 2020-08-04 RX ADMIN — PRAVASTATIN SODIUM 40 MG: 20 TABLET ORAL at 17:08

## 2020-08-04 RX ADMIN — DOXYCYCLINE 100 MG: 100 CAPSULE ORAL at 21:03

## 2020-08-04 RX ADMIN — FERROUS SULFATE TAB 325 MG (65 MG ELEMENTAL FE) 325 MG: 325 (65 FE) TAB at 08:13

## 2020-08-04 RX ADMIN — INSULIN LISPRO 2 UNITS: 100 INJECTION, SOLUTION INTRAVENOUS; SUBCUTANEOUS at 21:03

## 2020-08-04 RX ADMIN — FUROSEMIDE 40 MG: 40 TABLET ORAL at 08:13

## 2020-08-04 RX ADMIN — SODIUM BICARBONATE 650 MG TABLET 650 MG: at 08:16

## 2020-08-04 NOTE — PLAN OF CARE
Problem: Potential for Falls  Goal: Patient will remain free of falls  Description: INTERVENTIONS:  - Assess patient frequently for physical needs  -  Identify cognitive and physical deficits and behaviors that affect risk of falls    -  Gilbert fall precautions as indicated by assessment   - Educate patient/family on patient safety including physical limitations  - Instruct patient to call for assistance with activity based on assessment  - Modify environment to reduce risk of injury  - Consider OT/PT consult to assist with strengthening/mobility  Outcome: Progressing     Problem: PAIN - ADULT  Goal: Verbalizes/displays adequate comfort level or baseline comfort level  Description: Interventions:  - Encourage patient to monitor pain and request assistance  - Assess pain using appropriate pain scale  - Administer analgesics based on type and severity of pain and evaluate response  - Implement non-pharmacological measures as appropriate and evaluate response  - Consider cultural and social influences on pain and pain management  - Notify physician/advanced practitioner if interventions unsuccessful or patient reports new pain  Outcome: Progressing     Problem: INFECTION - ADULT  Goal: Absence or prevention of progression during hospitalization  Description: INTERVENTIONS:  - Assess and monitor for signs and symptoms of infection  - Monitor lab/diagnostic results  - Monitor all insertion sites, i e  indwelling lines, tubes, and drains  - Monitor endotracheal if appropriate and nasal secretions for changes in amount and color  - Gilbert appropriate cooling/warming therapies per order  - Administer medications as ordered  - Instruct and encourage patient and family to use good hand hygiene technique  - Identify and instruct in appropriate isolation precautions for identified infection/condition  Outcome: Progressing     Problem: Nutrition/Hydration-ADULT  Goal: Nutrient/Hydration intake appropriate for improving, restoring or maintaining nutritional needs  Description: Monitor and assess patient's nutrition/hydration status for malnutrition  Collaborate with interdisciplinary team and initiate plan and interventions as ordered  Monitor patient's weight and dietary intake as ordered or per policy  Utilize nutrition screening tool and intervene as necessary  Determine patient's food preferences and provide high-protein, high-caloric foods as appropriate       INTERVENTIONS:  - Monitor oral intake, urinary output, labs, and treatment plans  - Assess nutrition and hydration status and recommend course of action  - Evaluate amount of meals eaten  - Assist patient with eating if necessary   - Allow adequate time for meals  - Recommend/ encourage appropriate diets, oral nutritional supplements, and vitamin/mineral supplements  - Order, calculate, and assess calorie counts as needed  - Recommend, monitor, and adjust tube feedings and TPN/PPN based on assessed needs  - Assess need for intravenous fluids  - Provide specific nutrition/hydration education as appropriate  - Include patient/family/caregiver in decisions related to nutrition  Outcome: Progressing     Problem: HEMATOLOGIC - ADULT  Goal: Maintains hematologic stability  Description: INTERVENTIONS  - Assess for signs and symptoms of bleeding or hemorrhage  - Monitor labs  - Administer supportive blood products/factors as ordered and appropriate  Outcome: Progressing     Problem: MUSCULOSKELETAL - ADULT  Goal: Maintain or return mobility to safest level of function  Description: INTERVENTIONS:  - Assess patient's ability to carry out ADLs; assess patient's baseline for ADL function and identify physical deficits which impact ability to perform ADLs (bathing, care of mouth/teeth, toileting, grooming, dressing, etc )  - Assess/evaluate cause of self-care deficits   - Assess range of motion  - Assess patient's mobility  - Assess patient's need for assistive devices and provide as appropriate  - Encourage maximum independence but intervene and supervise when necessary  - Involve family in performance of ADLs  - Assess for home care needs following discharge   - Consider OT consult to assist with ADL evaluation and planning for discharge  - Provide patient education as appropriate  Outcome: Progressing     Problem: SKIN/TISSUE INTEGRITY - ADULT  Goal: Incision(s), wounds(s) or drain site(s) healing without S/S of infection  Description: INTERVENTIONS  - Assess and document risk factors for skin impairment   - Assess and document dressing, incision, wound bed, drain sites and surrounding tissue  - Consider nutrition services referral as needed  - Oral mucous membranes remain intact  - Provide patient/ family education  Outcome: Progressing     Problem: Prexisting or High Potential for Compromised Skin Integrity  Goal: Skin integrity is maintained or improved  Description: INTERVENTIONS:  - Identify patients at risk for skin breakdown  - Assess and monitor skin integrity  - Assess and monitor nutrition and hydration status  - Monitor labs   - Assess for incontinence   - Turn and reposition patient  - Assist with mobility/ambulation  - Relieve pressure over bony prominences  - Avoid friction and shearing  - Provide appropriate hygiene as needed including keeping skin clean and dry  - Evaluate need for skin moisturizer/barrier cream  - Collaborate with interdisciplinary team   - Patient/family teaching  - Consider wound care consult   Outcome: Progressing

## 2020-08-04 NOTE — ASSESSMENT & PLAN NOTE
· Dropped to 7 3 on 7/24  · S/p 1 PRBC on 7/24  · Hemoglobin stable on last check   · Monitor and transfuse as needed

## 2020-08-04 NOTE — SOCIAL WORK
CM reviewed pt during CC rounds  Pt is not medically clear fro dc d/t non therapeutic INR  Possible dc on Friday  CM will follow

## 2020-08-04 NOTE — PROGRESS NOTES
Progress Note - Katina Barrios 6/28/1930, 80 y o  male MRN: [id number]  Unit/Bed#: [facility]8-[facility] Encounter: [id number]  Primary Care Provider: Avni Nolen MD   Date and time admitted to hospital: 7/21/2020 12:32 PM    * 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  Assessment & Plan  · Polymicrobial wound cultures positive for MRSA, Klebsiella, Enterococcus - unclear which is pathogen  · MRI - suspicion of mild plantar osteomyelitis but less prominent compared to 2/2020  · Patient refuses amputation   · Per ID if no resection done very low likelihood of cure with medical therapy and has increased risk of renal toxicity hence need oral suppression with Doxycycline  · Patient started on oral doxycycline for indefinite suppression    Peripheral arterial disease (HCC)  Assessment & Plan  · LEADS - RLE - LILIAN 1 2/88/52, 50-75% stenosis of prox-mid SFA, >75% stenosis in proximal calf bypass graft, high grade stenosis vs occlusion of distal SFA artery/stent; LLE - LILIAN 0 78/86/32 with occlusion of the distal SFA and above knee popliteal artery/stent    · Left lower extremity angiogram - completely occluded above-the-knee to below-the-knee bypass - likely occluded for a long time  · Not a candidate for 3rd time redo bypass due to age, co-morbidities and his wishes  · Patient does not want amputation or any further interventions  · Continue Coumadin, Plavix and statin  · Outpatient follow up with Palliative care     Atrial fibrillation   Assessment & Plan  · HR controlled  · On heparin drip  · Awaiting therapeutic INR  · Continue Coumadin    Chronic kidney disease stage 4  Assessment & Plan  · Creatinine at baseline in the high 2s to low 3s  · Nephrology consulted in view of requirement for angiogram  · Patient would not want HD if needed  · Avoid hypotension, nephrotoxic medications  · Evaluated by Nephrology  · Continue Lasix  · Sadie Vizcarra as an outpatient for hyperkalemia  · Monitor BMP daily     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  Assessment & Plan  · Continue with local wound care  · Podiatry is following with dressing changes 3 times a week    Anemia of chronic disease  Assessment & Plan  · Dropped to 7 3 on   · S/p 1 PRBC on   · Hemoglobin stable on last check   · Monitor and transfuse as needed     Essential hypertension  Assessment & Plan  · On Lasix 40 mg daily and Amlodipine 5 mg daily at home  · BP acceptable, monitor routinely     Diabetes mellitus type 2  Assessment & Plan  Lab Results   Component Value Date    HGBA1C 7 1 (H) 2020     · Controlled for patient's age as evidenced by A1c  · Glipizide on hold while inpatient   · Continue Lantus 5 units in am  · QID accuchecks with SSI   · Monitor blood sugars and adjust insulin as needed  · Hypoglycemia protocol       Tremor  Assessment & Plan  · Follows with neurology as outpatient  · Had been recommended Zonisamide which he has not been using for the past few weeks at home    VTE Pharmacologic Prophylaxis:   Pharmacologic: Heparin Drip  Mechanical VTE Prophylaxis in Place: No    Patient Centered Rounds: I have performed bedside rounds with nursing staff today  Discussions with Specialists or Other Care Team Provider: primary RN    Education and Discussions with Family / Patient: patient and daughter Columbia Patches via telephone     Time Spent for Care: 15 minutes  More than 50% of total time spent on counseling and coordination of care as described above  Current Length of Stay: 14 day(s)    Current Patient Status: Inpatient   Certification Statement: The patient will continue to require additional inpatient hospital stay due to heprin to coumadin bridge     Discharge Plan: rehab when INR therapeutic     Code Status: Level 3 - DNAR and DNI    Subjective:   Patient offers no complaints  Specifically denies chest pain, SOB, abdominal pain, n/v/d/c, pain in lower extremitites       Objective:     Vitals:   Temp (24hrs), Av 6 °F (37 °C), Min:98 2 °F (36 8 °C), Max:98 8 °F (37 1 °C)    Temp:  [98 2 °F (36 8 °C)-98 8 °F (37 1 °C)] 98 8 °F (37 1 °C)  HR:  [72-81] 76  Resp:  [17-18] 18  BP: (132-152)/(64-66) 152/66  SpO2:  [89 %-96 %] 96 %  Body mass index is 26 85 kg/m²  Input and Output Summary (last 24 hours): Intake/Output Summary (Last 24 hours) at 8/4/2020 1325  Last data filed at 8/4/2020 0800  Gross per 24 hour   Intake 380 ml   Output 700 ml   Net -320 ml       Physical Exam:     Physical Exam   Constitutional: He is oriented to person, place, and time  He appears well-developed and well-nourished  No distress  Cardiovascular: Normal rate and regular rhythm  Murmur heard  Pulmonary/Chest: Effort normal and breath sounds normal  No respiratory distress  He has no wheezes  He has no rales  Abdominal: Soft  Bowel sounds are normal  He exhibits no distension  There is no abdominal tenderness  Musculoskeletal:         General: Edema (trace bilateral LE) present  Neurological: He is alert and oriented to person, place, and time  Skin: He is not diaphoretic  LLE dressing c/d/i  LLE in boot    Nursing note and vitals reviewed      Additional Data:     Labs:    Results from last 7 days   Lab Units 08/02/20  1450 07/31/20  0356   WBC Thousand/uL 7 14 7 04   HEMOGLOBIN g/dL 8 4* 7 9*   HEMATOCRIT % 27 0* 25 7*   PLATELETS Thousands/uL 223 266   NEUTROS PCT %  --  49   LYMPHS PCT %  --  29   MONOS PCT %  --  12   EOS PCT %  --  8*     Results from last 7 days   Lab Units 08/04/20  0730   SODIUM mmol/L 134*   POTASSIUM mmol/L 5 3   CHLORIDE mmol/L 109*   CO2 mmol/L 18*   BUN mg/dL 55*   CREATININE mg/dL 2 55*   ANION GAP mmol/L 7   CALCIUM mg/dL 8 6   GLUCOSE RANDOM mg/dL 86     Results from last 7 days   Lab Units 08/04/20  0503   INR  1 49*     Results from last 7 days   Lab Units 08/04/20  1108 08/04/20  9727 08/03/20  2057 08/03/20  1634 08/03/20  1113 08/03/20  0625 08/02/20  2024 08/02/20  1627 08/02/20  1050 08/02/20  0615 08/01/20 2043 08/01/20  1606   POC GLUCOSE mg/dl 139 84 224* 159* 122 87 183* 128 123 68 162* 197*                   * I Have Reviewed All Lab Data Listed Above  * Additional Pertinent Lab Tests Reviewed:  All Labs Within Last 24 Hours Reviewed    Imaging:    Imaging Reports Reviewed Today Include: none  Imaging Personally Reviewed by Myself Includes:  none    Recent Cultures (last 7 days):           Last 24 Hours Medication List:   acetaminophen, 650 mg, Oral, Q6H PRN, Amalia Light MD  amLODIPine, 5 mg, Oral, Daily, Roselee Goodell, CRNP  calcitriol, 0 25 mcg, Oral, Once per day on Mon Wed Fri, Josiah Ellis MD  clopidogrel, 75 mg, Oral, Daily, Amalia Light MD  diphenhydrAMINE, 25 mg, Oral, Q6H PRN, Fabienne Canales DO  diphenhydrAMINE-zinc acetate, , Topical, TID PRN, Sam Councilman, MD  doxycycline hyclate, 100 mg, Oral, Q12H [address], Evelyn Aldea,   ferrous sulfate, 325 mg, Oral, Daily With Breakfast, Roselee Goodell, CRNP  finasteride, 5 mg, Oral, Daily, Amalia Light MD  FLUoxetine, 40 mg, Oral, Daily, Amalia Light MD  furosemide, 40 mg, Oral, Daily, Trina Dan PA-C  heparin (porcine), 3-30 Units/kg/hr (Order-Specific), Intravenous, Titrated, Lobo Cooney PA-C, Last Rate: 13 1 Units/kg/hr (08/04/20 0621)  heparin (porcine), 3,400 Units, Intravenous, Q1H PRN, Lobo Cooney PA-C  heparin (porcine), 6,800 Units, Intravenous, Q1H PRN, Lobo Cooney PA-C  insulin glargine, 5 Units, Subcutaneous, QAMFabienne DO  insulin lispro, 1-6 Units, Subcutaneous, 4x Daily (AC & HS), Amalia Light MD  memantine, 5 mg, Oral, Daily, Amalia Light MD  ondansetron, 4 mg, Intravenous, Q4H PRN, Amalia Light MD  polyethylene glycol, 17 g, Oral, Daily, Fabienne Canales DO  pravastatin, 40 mg, Oral, Daily With Dinner, Amalia Light MD  sodium bicarbonate, 650 mg, Oral, BID after meals, Amalia Light MD  tamsulosin, 0 4 mg, Oral, Daily With Dinner, Amalia Light MD  warfarin, 5 mg, Oral, Daily (warfarin), Lenin Ledbetter DO Matt         Today, Patient Was Seen By: Adeola Lauren PA-C    ** Please Note: Dictation voice to text software may have been used in the creation of this document   **

## 2020-08-04 NOTE — PHYSICAL THERAPY NOTE
Physical Therapy Treatment Note     08/04/20 1105   Pain Assessment   Pain Assessment Tool 0-10   Pain Score 4   Pain Location/Orientation Orientation: Left; Location: Leg   Restrictions/Precautions   Weight Bearing Precautions Per Order Yes   LLE Weight Bearing Per Order WBAT   Braces or Orthoses Other (Comment)  (Globiped shoe on LLE)   Other Precautions Fall Risk;Pain;Telemetry; Chair Alarm;WBS   General   Chart Reviewed Yes   Family/Caregiver Present No   Cognition   Overall Cognitive Status WFL   Subjective   Subjective Pt  seated on recliner upon entry  Cooperative t/o session  pt  given assistance in donning the Globiped shoe on LLE LLE pain reported with mobility   Transfers   Sit to Stand   (CGA)   Additional items Assist x 1; Increased time required;Armrests   Stand to Sit 5  Supervision   Additional items Assist x 1; Armrests; Increased time required   Stand pivot 4  Minimal assistance   Additional items Assist x 1; Increased time required   Ambulation/Elevation   Gait pattern Decreased foot clearance; Forward Flexion; Improper Weight shift; Short stride; Excessively slow;Decreased L stance   Gait Assistance 4  Minimal assist   Additional items Assist x 2;Assist x 1;Verbal cues  (2nd A for chair follow)   Assistive Device Rolling walker   Distance 200 ft   Balance   Static Sitting Fair +   Ambulatory Fair -   Endurance Deficit   Endurance Deficit Yes   Endurance Deficit Description Fatigue   Activity Tolerance   Activity Tolerance Patient tolerated treatment well;Patient limited by pain   Nurse Made Aware Yes   Exercises   TKR Sitting;Bilateral;AROM;20 reps  (Reported pain in LLE with Kwame)   Assessment   Prognosis Good   Problem List Decreased strength;Decreased range of motion;Decreased endurance;Decreased mobility; Decreased skin integrity;Pain   Assessment Pt  reported increased pain with LLE Te's but not with amb  However noted decreased stance noted on LLE with ambualtion   Pt  noted ambulating with forward flexion and away from the RW  Consisted cues given to correct the same however not achieved 100%  Pt  requries assist in donning the globiped shoe and continues to be below his PLOF  Will continue to follow durign the stay to maximize functional mobiliity  Attempt stair next session  Barriers to Discharge Inaccessible home environment;Decreased caregiver support   Goals   Patient Goals Go home   STG Expiration Date 08/05/20   PT Treatment Day 1   Plan   Treatment/Interventions Functional transfer training;LE strengthening/ROM; Therapeutic exercise;Patient/family training;Equipment eval/education;Gait training;Spoke to nursing   Progress Progressing toward goals   PT Frequency Other (Comment)  (3-5x/week)   Recommendation   PT Discharge Recommendation Post-Acute Rehabilitation Services   Equipment Recommended Susan Kill   PT - OK to Discharge Yes         Sandy Lat, PTA

## 2020-08-04 NOTE — ASSESSMENT & PLAN NOTE
· LEADS - RLE - LILIAN 1 2/88/52, 50-75% stenosis of prox-mid SFA, >75% stenosis in proximal calf bypass graft, high grade stenosis vs occlusion of distal SFA artery/stent; LLE - LILIAN 0 78/86/32 with occlusion of the distal SFA and above knee popliteal artery/stent    · Left lower extremity angiogram - completely occluded above-the-knee to below-the-knee bypass - likely occluded for a long time  · Not a candidate for 3rd time redo bypass due to age, co-morbidities and his wishes  · Patient does not want amputation or any further interventions  · Continue Coumadin, Plavix and statin  · Outpatient follow up with Palliative care

## 2020-08-04 NOTE — ASSESSMENT & PLAN NOTE
· Creatinine at baseline in the high 2s to low 3s  · Nephrology consulted in view of requirement for angiogram  · Patient would not want HD if needed  · Avoid hypotension, nephrotoxic medications  · Evaluated by Nephrology  · Continue Lasix  · Hermelindo Spivey as an outpatient for hyperkalemia  · Monitor BMP daily

## 2020-08-04 NOTE — ASSESSMENT & PLAN NOTE
Lab Results   Component Value Date    HGBA1C 7 1 (H) 07/22/2020     · Controlled for patient's age as evidenced by A1c  · Glipizide on hold while inpatient   · Continue Lantus 5 units in am  · QID accuchecks with SSI   · Monitor blood sugars and adjust insulin as needed  · Hypoglycemia protocol

## 2020-08-04 NOTE — PLAN OF CARE
Problem: PHYSICAL THERAPY ADULT  Goal: Performs mobility at highest level of function for planned discharge setting  See evaluation for individualized goals  Description: Treatment/Interventions: Functional transfer training, LE strengthening/ROM, Therapeutic exercise, Endurance training, Equipment eval/education, Bed mobility, Gait training, Elevations          See flowsheet documentation for full assessment, interventions and recommendations  Outcome: Progressing  Note: Prognosis: Good  Problem List: Decreased strength, Decreased range of motion, Decreased endurance, Decreased mobility, Decreased skin integrity, Pain  Assessment: Pt  reported increased pain with LLE Te's but not with amb  However noted decreased stance noted on LLE with ambualtion  Pt  noted ambulating with forward flexion and away from the RW  Consisted cues given to correct the same however not achieved 100%  Pt  requries assist in donning the globiped shoe and continues to be below his PLOF  Will continue to follow durign the stay to maximize functional mobiliity  Attempt stair next session  Barriers to Discharge: Inaccessible home environment, Decreased caregiver support  Barriers to Discharge Comments: (lives alone + TONYA)  PT Discharge Recommendation: Post-Acute Rehabilitation Services     PT - OK to Discharge: Yes    See flowsheet documentation for full assessment

## 2020-08-05 LAB
ANION GAP SERPL CALCULATED.3IONS-SCNC: 7 MMOL/L (ref 4–13)
APTT PPP: 118 SECONDS (ref 23–37)
APTT PPP: >210 SECONDS (ref 23–37)
APTT PPP: >210 SECONDS (ref 23–37)
BASOPHILS # BLD AUTO: 0.07 THOUSANDS/ΜL (ref 0–0.1)
BASOPHILS NFR BLD AUTO: 1 % (ref 0–1)
BUN SERPL-MCNC: 64 MG/DL (ref 5–25)
CALCIUM SERPL-MCNC: 7.7 MG/DL (ref 8.3–10.1)
CHLORIDE SERPL-SCNC: 107 MMOL/L (ref 100–108)
CO2 SERPL-SCNC: 22 MMOL/L (ref 21–32)
CREAT SERPL-MCNC: 2.91 MG/DL (ref 0.6–1.3)
EOSINOPHIL # BLD AUTO: 0.49 THOUSAND/ΜL (ref 0–0.61)
EOSINOPHIL NFR BLD AUTO: 7 % (ref 0–6)
ERYTHROCYTE [DISTWIDTH] IN BLOOD BY AUTOMATED COUNT: 15.8 % (ref 11.6–15.1)
GFR SERPL CREATININE-BSD FRML MDRD: 18 ML/MIN/1.73SQ M
GLUCOSE SERPL-MCNC: 103 MG/DL (ref 65–140)
GLUCOSE SERPL-MCNC: 123 MG/DL (ref 65–140)
GLUCOSE SERPL-MCNC: 155 MG/DL (ref 65–140)
GLUCOSE SERPL-MCNC: 201 MG/DL (ref 65–140)
GLUCOSE SERPL-MCNC: 86 MG/DL (ref 65–140)
HCT VFR BLD AUTO: 25 % (ref 36.5–49.3)
HGB BLD-MCNC: 7.6 G/DL (ref 12–17)
IMM GRANULOCYTES # BLD AUTO: 0.03 THOUSAND/UL (ref 0–0.2)
IMM GRANULOCYTES NFR BLD AUTO: 0 % (ref 0–2)
INR PPP: 1.82 (ref 0.84–1.19)
LYMPHOCYTES # BLD AUTO: 1.88 THOUSANDS/ΜL (ref 0.6–4.47)
LYMPHOCYTES NFR BLD AUTO: 25 % (ref 14–44)
MCH RBC QN AUTO: 29.1 PG (ref 26.8–34.3)
MCHC RBC AUTO-ENTMCNC: 30.4 G/DL (ref 31.4–37.4)
MCV RBC AUTO: 96 FL (ref 82–98)
MONOCYTES # BLD AUTO: 0.89 THOUSAND/ΜL (ref 0.17–1.22)
MONOCYTES NFR BLD AUTO: 12 % (ref 4–12)
NEUTROPHILS # BLD AUTO: 4.03 THOUSANDS/ΜL (ref 1.85–7.62)
NEUTS SEG NFR BLD AUTO: 55 % (ref 43–75)
NRBC BLD AUTO-RTO: 0 /100 WBCS
PLATELET # BLD AUTO: 211 THOUSANDS/UL (ref 149–390)
PMV BLD AUTO: 10.4 FL (ref 8.9–12.7)
POTASSIUM SERPL-SCNC: 4.9 MMOL/L (ref 3.5–5.3)
PROTHROMBIN TIME: 21 SECONDS (ref 11.6–14.5)
RBC # BLD AUTO: 2.61 MILLION/UL (ref 3.88–5.62)
SODIUM SERPL-SCNC: 136 MMOL/L (ref 136–145)
WBC # BLD AUTO: 7.39 THOUSAND/UL (ref 4.31–10.16)

## 2020-08-05 PROCEDURE — 85610 PROTHROMBIN TIME: CPT | Performed by: INTERNAL MEDICINE

## 2020-08-05 PROCEDURE — 85730 THROMBOPLASTIN TIME PARTIAL: CPT | Performed by: INTERNAL MEDICINE

## 2020-08-05 PROCEDURE — 99232 SBSQ HOSP IP/OBS MODERATE 35: CPT | Performed by: HOSPITALIST

## 2020-08-05 PROCEDURE — 85730 THROMBOPLASTIN TIME PARTIAL: CPT | Performed by: HOSPITALIST

## 2020-08-05 PROCEDURE — 82948 REAGENT STRIP/BLOOD GLUCOSE: CPT

## 2020-08-05 PROCEDURE — 85025 COMPLETE CBC W/AUTO DIFF WBC: CPT | Performed by: INTERNAL MEDICINE

## 2020-08-05 PROCEDURE — 80048 BASIC METABOLIC PNL TOTAL CA: CPT | Performed by: INTERNAL MEDICINE

## 2020-08-05 RX ADMIN — PRAVASTATIN SODIUM 40 MG: 20 TABLET ORAL at 16:56

## 2020-08-05 RX ADMIN — FLUOXETINE 40 MG: 20 CAPSULE ORAL at 08:10

## 2020-08-05 RX ADMIN — FUROSEMIDE 40 MG: 40 TABLET ORAL at 08:09

## 2020-08-05 RX ADMIN — SODIUM BICARBONATE 650 MG TABLET 650 MG: at 16:56

## 2020-08-05 RX ADMIN — TAMSULOSIN HYDROCHLORIDE 0.4 MG: 0.4 CAPSULE ORAL at 16:56

## 2020-08-05 RX ADMIN — CLOPIDOGREL BISULFATE 75 MG: 75 TABLET ORAL at 08:09

## 2020-08-05 RX ADMIN — FINASTERIDE 5 MG: 5 TABLET, FILM COATED ORAL at 08:09

## 2020-08-05 RX ADMIN — DOXYCYCLINE 100 MG: 100 CAPSULE ORAL at 08:10

## 2020-08-05 RX ADMIN — INSULIN LISPRO 1 UNITS: 100 INJECTION, SOLUTION INTRAVENOUS; SUBCUTANEOUS at 11:30

## 2020-08-05 RX ADMIN — FERROUS SULFATE TAB 325 MG (65 MG ELEMENTAL FE) 325 MG: 325 (65 FE) TAB at 08:09

## 2020-08-05 RX ADMIN — DOXYCYCLINE 100 MG: 100 CAPSULE ORAL at 21:33

## 2020-08-05 RX ADMIN — INSULIN LISPRO 2 UNITS: 100 INJECTION, SOLUTION INTRAVENOUS; SUBCUTANEOUS at 21:34

## 2020-08-05 RX ADMIN — HEPARIN SODIUM AND DEXTROSE 13.1 UNITS/KG/HR: 10000; 5 INJECTION INTRAVENOUS at 15:10

## 2020-08-05 RX ADMIN — CALCITRIOL 0.25 MCG: 0.25 CAPSULE, LIQUID FILLED ORAL at 08:11

## 2020-08-05 RX ADMIN — INSULIN GLARGINE 5 UNITS: 100 INJECTION, SOLUTION SUBCUTANEOUS at 08:10

## 2020-08-05 RX ADMIN — WARFARIN SODIUM 5 MG: 5 TABLET ORAL at 16:56

## 2020-08-05 RX ADMIN — MEMANTINE 5 MG: 5 TABLET ORAL at 08:09

## 2020-08-05 RX ADMIN — SODIUM BICARBONATE 650 MG TABLET 650 MG: at 08:10

## 2020-08-05 NOTE — PROGRESS NOTES
Progress Note - Agustin Mcdonough 6/28/1930, 80 y o  male MRN: [id number]    Unit/Bed#: -[facility] Encounter: [id number]    Primary Care Provider: Marilyn Villagran MD   Date and time admitted to hospital: 7/21/2020 12:32 PM        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  Assessment & Plan  · Continue with local wound care  · Podiatry is following with dressing changes 3 times a week    Anemia of chronic disease  Assessment & Plan  · Dropped to 7 3 on 7/24  · S/p 1 PRBC on 7/24  · Monitor and transfuse as needed     Atrial fibrillation   Assessment & Plan  · HR controlled  · On heparin drip  · Awaiting therapeutic INR  · Continue Coumadin 5 mg    Peripheral arterial disease (HCC)  Assessment & Plan  · LEADS - RLE - LILIAN 1 2/88/52, 50-75% stenosis of prox-mid SFA, >75% stenosis in proximal calf bypass graft, high grade stenosis vs occlusion of distal SFA artery/stent; LLE - LILIAN 0 78/86/32 with occlusion of the distal SFA and above knee popliteal artery/stent    · Left lower extremity angiogram - completely occluded above-the-knee to below-the-knee bypass - likely occluded for a long time  · Not a candidate for 3rd time redo bypass due to age, co-morbidities and his wishes  · Patient does not want amputation or any further interventions  · Continue Coumadin, Plavix and statin  · Outpatient follow up with Palliative care     Tremor  Assessment & Plan  · Follows with neurology as outpatient  · Had been recommended Zonisamide which he has not been using for the past few weeks at home    Chronic kidney disease stage 4  Assessment & Plan  · Creatinine at baseline in the high 2s to low 3s  · Nephrology consulted in view of requirement for angiogram  · Patient would not want HD if needed  · Avoid hypotension, nephrotoxic medications  · Evaluated by Nephrology  · Continue Lasix  · Sharyn Toure as an outpatient for hyperkalemia  · Monitor BMP daily     Essential hypertension  Assessment & Plan  · On Lasix 40 mg daily and Amlodipine 5 mg daily at home  · BP acceptable, monitor routinely     Diabetes mellitus type 2  Assessment & Plan  Lab Results   Component Value Date    HGBA1C 7 1 (H) 07/22/2020     · Controlled for patient's age as evidenced by A1c  · Glipizide on hold while inpatient   · Continue Lantus 5 units in am  · QID accuchecks with SSI   · Monitor blood sugars and adjust insulin as needed  · Hypoglycemia protocol       * 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  Assessment & Plan  · Polymicrobial wound cultures positive for MRSA, Klebsiella, Enterococcus - unclear which is pathogen  · MRI - suspicion of mild plantar osteomyelitis but less prominent compared to 2/2020  · Patient refuses amputation   · Per ID if no resection done very low likelihood of cure with medical therapy and has increased risk of renal toxicity hence need oral suppression with Doxycycline  · Patient started on oral doxycycline for indefinite suppression      [facility] Progress Note  Patient: Mayank Denis 80 y o  male   MRN: [id number]  PCP: Adwoa Cerna MD  Unit/Bed#: -40 Encounter: [id number]  Date Of Visit: 08/05/20    Assessment:    Principal Problem:    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  Active Problems:    Diabetes mellitus type 2    Essential hypertension    Chronic kidney disease stage 4    Tremor    Peripheral arterial disease (HCC)    Atrial fibrillation     Anemia of chronic disease    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    Secondary hyperparathyroidism of renal origin ([facility] [address] )    Hyponatremia      Plan:         VTE Pharmacologic Prophylaxis:   Pharmacologic: Heparin Drip  Mechanical VTE Prophylaxis in Place: Yes    Patient Centered Rounds: I have performed bedside rounds with nursing staff today  Discussions with Specialists or Other Care Team Provider:     Education and Discussions with Family / Patient: patient    Time Spent for Care: 30 minutes    More than 50% of total time spent on counseling and coordination of care as described above  Current Length of Stay: 15 day(s)    Current Patient Status: Inpatient   Certification Statement: The patient will continue to require additional inpatient hospital stay due to INR > 2    Discharge Plan / Estimated Discharge Date: soon    Code Status: Level 3 - DNAR and DNI      Subjective:   No major complains    Objective:     Vitals:   Temp (24hrs), Av 1 °F (36 7 °C), Min:97 5 °F (36 4 °C), Max:98 7 °F (37 1 °C)    Temp:  [97 5 °F (36 4 °C)-98 7 °F (37 1 °C)] 97 5 °F (36 4 °C)  HR:  [73-90] 76  Resp:  [18] 18  BP: (123-129)/(56-63) 123/63  SpO2:  [96 %-100 %] 100 %  Body mass index is 26 85 kg/m²  Input and Output Summary (last 24 hours): Intake/Output Summary (Last 24 hours) at 2020 1846  Last data filed at 2020 1805  Gross per 24 hour   Intake 215 ml   Output 1035 ml   Net -820 ml       Physical Exam:     Physical Exam   Constitutional: He is oriented to person, place, and time  He appears well-developed and well-nourished  HENT:   Head: Normocephalic  Eyes: Conjunctivae are normal    Cardiovascular: Normal rate, regular rhythm and normal heart sounds  Pulmonary/Chest: Effort normal  No stridor  No respiratory distress  Abdominal: Soft  He exhibits no distension  There is no abdominal tenderness  Neurological: He is alert and oriented to person, place, and time  Vitals reviewed  Additional Data:     Labs:    Results from last 7 days   Lab Units 20  0357   WBC Thousand/uL 7 39   HEMOGLOBIN g/dL 7 6*   HEMATOCRIT % 25 0*   PLATELETS Thousands/uL 211   NEUTROS PCT % 55   LYMPHS PCT % 25   MONOS PCT % 12   EOS PCT % 7*     Results from last 7 days   Lab Units 20  0430   POTASSIUM mmol/L 4 9   CHLORIDE mmol/L 107   CO2 mmol/L 22   BUN mg/dL 64*   CREATININE mg/dL 2 91*   CALCIUM mg/dL 7 7*     Results from last 7 days   Lab Units 20  0405   INR  1 82*       * I Have Reviewed All Lab Data Listed Above    * Additional Pertinent Lab Tests Reviewed: All Labs Within Last 24 Hours Reviewed    Imaging:    Imaging Reports Reviewed Today Include:   Imaging Personally Reviewed by Myself Includes:      Recent Cultures (last 7 days):           Last 24 Hours Medication List:   acetaminophen, 650 mg, Oral, Q6H PRN, Rozina Kidd MD  amLODIPine, 5 mg, Oral, Daily, Ramy Favors, CRNP  calcitriol, 0 25 mcg, Oral, Once per day on Mon Wed Fri, Davie Cazares MD  clopidogrel, 75 mg, Oral, Daily, Rozina Kidd MD  diphenhydrAMINE, 25 mg, Oral, Q6H PRN, Grupo Ng DO  diphenhydrAMINE-zinc acetate, , Topical, TID PRN, Patricia Harkins MD  doxycycline hyclate, 100 mg, Oral, Q12H [address], Evelyn Aldea,   ferrous sulfate, 325 mg, Oral, Daily With Breakfast, Ramy Favors, CRNP  finasteride, 5 mg, Oral, Daily, Rozina Kidd MD  FLUoxetine, 40 mg, Oral, Daily, Rozina Kidd MD  furosemide, 40 mg, Oral, Daily, Vannesa Miranda PA-C  heparin (porcine), 3-30 Units/kg/hr (Order-Specific), Intravenous, Titrated, Hever Chandler PA-C, Last Rate: 13 1 Units/kg/hr (08/05/20 1510)  heparin (porcine), 3,400 Units, Intravenous, Q1H PRN, Hever Chandler PA-C  heparin (porcine), 6,800 Units, Intravenous, Q1H PRN, Hever Chandler PA-C  insulin glargine, 5 Units, Subcutaneous, QAM, Grupo Ng DO  insulin lispro, 1-6 Units, Subcutaneous, 4x Daily (AC & HS), Rozina Kidd MD  memantine, 5 mg, Oral, Daily, Rozina Kidd MD  ondansetron, 4 mg, Intravenous, Q4H PRN, Rozina Kidd MD  polyethylene glycol, 17 g, Oral, Daily, Grupo Ng DO  pravastatin, 40 mg, Oral, Daily With Dinner, Rozina Kidd MD  sodium bicarbonate, 650 mg, Oral, BID after meals, Rozina Kidd MD  tamsulosin, 0 4 mg, Oral, Daily With Reymundo Pelaez MD  warfarin, 5 mg, Oral, Daily (warfarin), Grupo Ng DO         Today, Patient Was Seen By: Jean Carlos Ortega MD    ** Please Note: This note has been constructed using a voice recognition system   **

## 2020-08-05 NOTE — PLAN OF CARE
Problem: Potential for Falls  Goal: Patient will remain free of falls  Description: INTERVENTIONS:  - Assess patient frequently for physical needs  -  Identify cognitive and physical deficits and behaviors that affect risk of falls    -  [place] fall precautions as indicated by assessment   - Educate patient/family on patient safety including physical limitations  - Instruct patient to call for assistance with activity based on assessment  - Modify environment to reduce risk of injury  - Consider OT/PT consult to assist with strengthening/mobility  Outcome: Progressing     Problem: PAIN - ADULT  Goal: Verbalizes/displays adequate comfort level or baseline comfort level  Description: Interventions:  - Encourage patient to monitor pain and request assistance  - Assess pain using appropriate pain scale  - Administer analgesics based on type and severity of pain and evaluate response  - Implement non-pharmacological measures as appropriate and evaluate response  - Consider cultural and social influences on pain and pain management  - Notify physician/advanced practitioner if interventions unsuccessful or patient reports new pain  Outcome: Progressing     Problem: INFECTION - ADULT  Goal: Absence or prevention of progression during hospitalization  Description: INTERVENTIONS:  - Assess and monitor for signs and symptoms of infection  - Monitor lab/diagnostic results  - Monitor all insertion sites, i e  indwelling lines, tubes, and drains  - Monitor endotracheal if appropriate and nasal secretions for changes in amount and color  - [place] appropriate cooling/warming therapies per order  - Administer medications as ordered  - Instruct and encourage patient and family to use good hand hygiene technique  - Identify and instruct in appropriate isolation precautions for identified infection/condition  Outcome: Progressing     Problem: DISCHARGE PLANNING  Goal: Discharge to home or other facility with appropriate resources  Description: INTERVENTIONS:  - Identify barriers to discharge w/patient and caregiver  - Arrange for needed discharge resources and transportation as appropriate  - Identify discharge learning needs (meds, wound care, etc )  - Arrange for interpretive services to assist at discharge as needed  - Refer to Case Management Department for coordinating discharge planning if the patient needs post-hospital services based on physician/advanced practitioner order or complex needs related to functional status, cognitive ability, or social support system  Outcome: Progressing     Problem: Nutrition/Hydration-ADULT  Goal: Nutrient/Hydration intake appropriate for improving, restoring or maintaining nutritional needs  Description: Monitor and assess patient's nutrition/hydration status for malnutrition  Collaborate with interdisciplinary team and initiate plan and interventions as ordered  Monitor patient's weight and dietary intake as ordered or per policy  Utilize nutrition screening tool and intervene as necessary  Determine patient's food preferences and provide high-protein, high-caloric foods as appropriate       INTERVENTIONS:  - Monitor oral intake, urinary output, labs, and treatment plans  - Assess nutrition and hydration status and recommend course of action  - Evaluate amount of meals eaten  - Assist patient with eating if necessary   - Allow adequate time for meals  - Recommend/ encourage appropriate diets, oral nutritional supplements, and vitamin/mineral supplements  - Order, calculate, and assess calorie counts as needed  - Recommend, monitor, and adjust tube feedings and TPN/PPN based on assessed needs  - Assess need for intravenous fluids  - Provide specific nutrition/hydration education as appropriate  - Include patient/family/caregiver in decisions related to nutrition  Outcome: Progressing     Problem: HEMATOLOGIC - ADULT  Goal: Maintains hematologic stability  Description: INTERVENTIONS  - Assess for signs and symptoms of bleeding or hemorrhage  - Monitor labs  - Administer supportive blood products/factors as ordered and appropriate  Outcome: Progressing     Problem: MUSCULOSKELETAL - ADULT  Goal: Maintain or return mobility to safest level of function  Description: INTERVENTIONS:  - Assess patient's ability to carry out ADLs; assess patient's baseline for ADL function and identify physical deficits which impact ability to perform ADLs (bathing, care of mouth/teeth, toileting, grooming, dressing, etc )  - Assess/evaluate cause of self-care deficits   - Assess range of motion  - Assess patient's mobility  - Assess patient's need for assistive devices and provide as appropriate  - Encourage maximum independence but intervene and supervise when necessary  - Involve family in performance of ADLs  - Assess for home care needs following discharge   - Consider OT consult to assist with ADL evaluation and planning for discharge  - Provide patient education as appropriate  Outcome: Progressing     Problem: SKIN/TISSUE INTEGRITY - ADULT  Goal: Incision(s), wounds(s) or drain site(s) healing without S/S of infection  Description: INTERVENTIONS  - Assess and document risk factors for skin impairment   - Assess and document dressing, incision, wound bed, drain sites and surrounding tissue  - Consider nutrition services referral as needed  - Oral mucous membranes remain intact  - Provide patient/ family education  Outcome: Progressing     Problem: Prexisting or High Potential for Compromised Skin Integrity  Goal: Skin integrity is maintained or improved  Description: INTERVENTIONS:  - Identify patients at risk for skin breakdown  - Assess and monitor skin integrity  - Assess and monitor nutrition and hydration status  - Monitor labs   - Assess for incontinence   - Turn and reposition patient  - Assist with mobility/ambulation  - Relieve pressure over bony prominences  - Avoid friction and shearing  - Provide appropriate hygiene as needed including keeping skin clean and dry  - Evaluate need for skin moisturizer/barrier cream  - Collaborate with interdisciplinary team   - Patient/family teaching  - Consider wound care consult   Outcome: Progressing

## 2020-08-05 NOTE — ASSESSMENT & PLAN NOTE
· Creatinine at baseline in the high 2s to low 3s  · Nephrology consulted in view of requirement for angiogram  · Patient would not want HD if needed  · Avoid hypotension, nephrotoxic medications  · Evaluated by Nephrology  · Continue Lasix  · Padmini Kay as an outpatient for hyperkalemia  · Monitor BMP daily

## 2020-08-06 LAB
APTT PPP: 85 SECONDS (ref 23–37)
APTT PPP: 87 SECONDS (ref 23–37)
BASOPHILS # BLD AUTO: 0.07 THOUSANDS/ΜL (ref 0–0.1)
BASOPHILS NFR BLD AUTO: 1 % (ref 0–1)
EOSINOPHIL # BLD AUTO: 0.51 THOUSAND/ΜL (ref 0–0.61)
EOSINOPHIL NFR BLD AUTO: 7 % (ref 0–6)
ERYTHROCYTE [DISTWIDTH] IN BLOOD BY AUTOMATED COUNT: 15.9 % (ref 11.6–15.1)
GLUCOSE SERPL-MCNC: 147 MG/DL (ref 65–140)
GLUCOSE SERPL-MCNC: 217 MG/DL (ref 65–140)
GLUCOSE SERPL-MCNC: 219 MG/DL (ref 65–140)
GLUCOSE SERPL-MCNC: 92 MG/DL (ref 65–140)
HCT VFR BLD AUTO: 26 % (ref 36.5–49.3)
HGB BLD-MCNC: 7.8 G/DL (ref 12–17)
IMM GRANULOCYTES # BLD AUTO: 0.03 THOUSAND/UL (ref 0–0.2)
IMM GRANULOCYTES NFR BLD AUTO: 0 % (ref 0–2)
INR PPP: 1.78 (ref 0.84–1.19)
LYMPHOCYTES # BLD AUTO: 1.64 THOUSANDS/ΜL (ref 0.6–4.47)
LYMPHOCYTES NFR BLD AUTO: 21 % (ref 14–44)
MCH RBC QN AUTO: 28.9 PG (ref 26.8–34.3)
MCHC RBC AUTO-ENTMCNC: 30 G/DL (ref 31.4–37.4)
MCV RBC AUTO: 96 FL (ref 82–98)
MONOCYTES # BLD AUTO: 1.01 THOUSAND/ΜL (ref 0.17–1.22)
MONOCYTES NFR BLD AUTO: 13 % (ref 4–12)
NEUTROPHILS # BLD AUTO: 4.45 THOUSANDS/ΜL (ref 1.85–7.62)
NEUTS SEG NFR BLD AUTO: 58 % (ref 43–75)
NRBC BLD AUTO-RTO: 0 /100 WBCS
PLATELET # BLD AUTO: 223 THOUSANDS/UL (ref 149–390)
PMV BLD AUTO: 10.3 FL (ref 8.9–12.7)
PROTHROMBIN TIME: 20.6 SECONDS (ref 11.6–14.5)
RBC # BLD AUTO: 2.7 MILLION/UL (ref 3.88–5.62)
WBC # BLD AUTO: 7.71 THOUSAND/UL (ref 4.31–10.16)

## 2020-08-06 PROCEDURE — 99232 SBSQ HOSP IP/OBS MODERATE 35: CPT | Performed by: HOSPITALIST

## 2020-08-06 PROCEDURE — 85610 PROTHROMBIN TIME: CPT | Performed by: HOSPITALIST

## 2020-08-06 PROCEDURE — 82948 REAGENT STRIP/BLOOD GLUCOSE: CPT

## 2020-08-06 PROCEDURE — 97168 OT RE-EVAL EST PLAN CARE: CPT

## 2020-08-06 PROCEDURE — 85730 THROMBOPLASTIN TIME PARTIAL: CPT | Performed by: HOSPITALIST

## 2020-08-06 PROCEDURE — 85025 COMPLETE CBC W/AUTO DIFF WBC: CPT | Performed by: HOSPITALIST

## 2020-08-06 RX ORDER — WARFARIN SODIUM 7.5 MG/1
7.5 TABLET ORAL
Status: COMPLETED | OUTPATIENT
Start: 2020-08-06 | End: 2020-08-06

## 2020-08-06 RX ADMIN — FERROUS SULFATE TAB 325 MG (65 MG ELEMENTAL FE) 325 MG: 325 (65 FE) TAB at 08:55

## 2020-08-06 RX ADMIN — PRAVASTATIN SODIUM 40 MG: 20 TABLET ORAL at 16:54

## 2020-08-06 RX ADMIN — TAMSULOSIN HYDROCHLORIDE 0.4 MG: 0.4 CAPSULE ORAL at 16:54

## 2020-08-06 RX ADMIN — SODIUM BICARBONATE 650 MG TABLET 650 MG: at 16:55

## 2020-08-06 RX ADMIN — FINASTERIDE 5 MG: 5 TABLET, FILM COATED ORAL at 08:55

## 2020-08-06 RX ADMIN — DOXYCYCLINE 100 MG: 100 CAPSULE ORAL at 08:57

## 2020-08-06 RX ADMIN — CLOPIDOGREL BISULFATE 75 MG: 75 TABLET ORAL at 08:55

## 2020-08-06 RX ADMIN — WARFARIN SODIUM 7.5 MG: 7.5 TABLET ORAL at 17:01

## 2020-08-06 RX ADMIN — FLUOXETINE 40 MG: 20 CAPSULE ORAL at 08:56

## 2020-08-06 RX ADMIN — INSULIN GLARGINE 5 UNITS: 100 INJECTION, SOLUTION SUBCUTANEOUS at 08:55

## 2020-08-06 RX ADMIN — INSULIN LISPRO 2 UNITS: 100 INJECTION, SOLUTION INTRAVENOUS; SUBCUTANEOUS at 16:54

## 2020-08-06 RX ADMIN — FUROSEMIDE 40 MG: 40 TABLET ORAL at 08:55

## 2020-08-06 RX ADMIN — DOXYCYCLINE 100 MG: 100 CAPSULE ORAL at 21:26

## 2020-08-06 RX ADMIN — INSULIN LISPRO 2 UNITS: 100 INJECTION, SOLUTION INTRAVENOUS; SUBCUTANEOUS at 21:26

## 2020-08-06 RX ADMIN — MEMANTINE 5 MG: 5 TABLET ORAL at 08:55

## 2020-08-06 RX ADMIN — SODIUM BICARBONATE 650 MG TABLET 650 MG: at 08:56

## 2020-08-06 NOTE — SOCIAL WORK
Cm reviewed patient's chart  Cm informed that patient's INR is not therapeutic at this time  Cm will provide update to following facility  Cm will continue to follow

## 2020-08-06 NOTE — PLAN OF CARE
Problem: OCCUPATIONAL THERAPY ADULT  Goal: Performs self-care activities at highest level of function for planned discharge setting  See evaluation for individualized goals  Description: Treatment Interventions: ADL retraining, Functional transfer training, Endurance training, Cognitive reorientation, Patient/family training, Compensatory technique education, Activityengagement, Energy conservation          See flowsheet documentation for full assessment, interventions and recommendations  Note: Limitation: Decreased ADL status, Decreased endurance, Decreased self-care trans, Decreased high-level ADLs  Prognosis: Good  Assessment: pt seen for occupational therapy re-evaluation today as goals from IE  on 2020  He has shown good improvements in all areas, now only requires mod assist LB dressing and min assist for transfers  he does have overall good safety awareness  He wishes to go home from here, although at this time, STR is still preferred and recommended  OT will continue to follow while in [facility] care  Goals from IE still appropriate and will be extended for another 2 weeks  OT Discharge Recommendation: Post-Acute Rehabilitation Services  OT - OK to Discharge:  Yes

## 2020-08-06 NOTE — ASSESSMENT & PLAN NOTE
· Creatinine at baseline in the high 2s to low 3s  · Nephrology consulted in view of requirement for angiogram  · Patient would not want HD if needed  · Avoid hypotension, nephrotoxic medications  · Evaluated by Nephrology  · Continue Lasix  · Jeffry Cheung as an outpatient for hyperkalemia  · Monitor BMP daily

## 2020-08-06 NOTE — OCCUPATIONAL THERAPY NOTE
Occupational Therapy Re-Evaluation      Karina Dejesus    8/6/2020    Principal Problem:    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  Active Problems:    Diabetes mellitus type 2    Essential hypertension    Chronic kidney disease stage 4    Tremor    Peripheral arterial disease (HCC)    Atrial fibrillation     Anemia of chronic disease    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    Secondary hyperparathyroidism of renal origin ([facility] [address] )    Hyponatremia      Past Medical History:   Diagnosis Date    A-fib ([facility] [address] )     Alzheimer's disease ([facility] [address] )     Depression     Diabetes mellitus ([facility] [address] )     Anderson catheter in place     History of atrial fibrillation     History of chronic kidney disease     History of peripheral vascular disease     Hyperlipidemia     Hypertension     Kidney disease     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[facility] [address] [facility]     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[facility] [address] )     Memory loss     Osteomyelitis of right foot ([facility] [address] )     Renal disorder        Past Surgical History:   Procedure Laterality Date    APPENDECTOMY  1945    FEMORAL ARTERY - TIBIAL ARTERY BYPASS GRAFT Right 01/08/2014    R SFA- PT w/ nonrev GSV, Pre[facility]ena [facility]- [facility]    FEMORAL ARTERY - TIBIAL ARTERY BYPASS GRAFT Left 06/04/2015    L SFA- PT w/ Cryovein- Ivan/ B[facility]hi    FOOT SURGERY  06/14/2013    I&D with vac    IR LOWER EXTREMITY / INTERVENTION  7/24/2020    WOUND DEBRIDEMENT Right 03/07/2014    R thigh wound washout, VAC- [facility]        08/06/20 1150   Note Type   Note type Re-eval   Restrictions/Precautions   Weight Bearing Precautions Per Order Yes   LLE Weight Bearing Per Order WBAT   Braces or Orthoses Other (Comment)  (Globiped shoe)   Other Precautions Chair Alarm; Fall Risk;Pain;WBS   Pain Assessment   Pain Assessment Tool 0-10   Pain Score No Pain   Home Living   Type of Home Other (Comment)  (reports living in the back room in his office )   Home Layout One level;Stairs to enter without rails   Bathroom Shower/Tub None   [address] Walker;Cane   Additional Comments 1/2 bath only, no kitchen  uses microwave   Prior Function   Level of [place] Independent with ADLs and functional mobility   Lives With Alone   Receives Help From Family   ADL Assistance Independent   IADLs Independent   Falls in the last 6 months 1 to 4   Vocational Full time employment   Comments 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 , has his own office   Lifestyle   Autonomy pt reports being independent w self care  usually spongebathes or goes to the [facility] to take a shower   Reciprocal Relationships supportive daughter who calls him daily and who assist as neeeded   Intrinsic Gratification Reading the newspaper; 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   Psychosocial   Psychosocial (WDL) WDL   Patient Behaviors/Mood [place] affect   Subjective   Subjective "I hope that I can go home from here"   ADL   Eating Assistance 7  Independent   Eating Deficit Setup  (encouragement)   Grooming Assistance 5  Supervision/Setup   Grooming Deficit Increased time to complete;Setup   UB Bathing Assistance 5  Supervision/Setup   UB Bathing Deficit Setup; Increased time to complete; Chest;Right arm; Abdomen;Left arm   LB Bathing Assistance 4  Minimal Assistance   LB Bathing Deficit Increased time to complete;Right upper leg;Left upper leg;Right lower leg including foot; Left lower leg including foot   UB Dressing Assistance 5  Supervision/Setup   LB Dressing Assistance 3  Moderate Assistance   LB Dressing Deficit Don/doff R sock; Don/doff L sock; Thread RLE into pants; Thread LLE into pants   Toileting Assistance  4  Minimal Assistance   Transfers   Sit to Stand 5  Supervision   Additional items Increased time required;Verbal cues   Stand to Sit 5  Supervision   Additional items Increased time required   Stand pivot 4  Minimal assistance   Additional items Assist x 1; Increased time required   Functional Mobility   Functional Mobility 4  Minimal assistance   Additional items Rolling walker   Balance Static Sitting Good   Dynamic Sitting Fair +   Static Standing Fair   Dynamic Standing Fair -   Activity Tolerance   Activity Tolerance Patient tolerated treatment well   RUE Assessment   RUE Assessment WFL   LUE Assessment   LUE Assessment WFL   Hand Function   Gross Motor Coordination Functional   Fine Motor Coordination Functional   Cognition   Overall Cognitive Status WFL   Arousal/Participation Alert; Cooperative   Attention Within functional limits   Orientation Level Oriented X4   Memory Within functional limits   Following Commands Follows all commands and directions without difficulty   Assessment   Limitation Decreased ADL status; Decreased endurance;Decreased self-care trans;Decreased high-level ADLs   Assessment pt seen for occupational therapy re-evaluation today as goals from IE  on 2020  He has shown good improvements in all areas, now only requires mod assist LB dressing and min assist for transfers  he does have overall good safety awareness  He wishes to go home from here, although at this time, STR is still preferred and recommended  OT will continue to follow while in [facility] care  Goals from IE still appropriate and will be extended for another 2 weeks  Goals   Patient Goals to go home   Long Term Goal #1 see below   Plan   Treatment Interventions ADL retraining;Functional transfer training;UE strengthening/ROM; Endurance training; Compensatory technique education; Energy conservation; Activityengagement;Patient/family training   Goal Expiration Date 20   OT Frequency 3-5x/wk   Recommendation   OT Discharge Recommendation Post-Acute Rehabilitation Services   OT - OK to Discharge Yes   Barthel Index   Feeding 10   Bathing 0   Grooming Score 5   Dressing Score 5   Bladder Score 10   Bowels Score 10   Toilet Use Score 5   Transfers (Bed/Chair) Score 10   Mobility (Level Surface) Score 10   Stairs Score 0   Barthel Index Score 65       GOALS     1   Pt will complete UB ADL tasks @ MOD I assist level with DME PRN     2  Pt will complete LB ADL tasks @ MOD I assist level with DME PRN     3  Pt will complete IADL tasks @ MOD Iassist level with DME PRN     4  Pt will have G carryover of safety awareness during ADL/IADL task & functional mobility     5  Pt will complete transfers @ MOD I assist level with DME PRN     6  Pt will complete functional mobility @ MOD I assist level with DME PRN     7  Pt will be able to identify & demonstrate precautions t/o I/ADLs and functional mobility     8   Pt will participate in formal cog assessment (ACLS) w/ G attention to assist w/ safe d/c

## 2020-08-06 NOTE — PROGRESS NOTES
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  Assessment & Plan  · Continue with local wound care  · Podiatry is following with dressing changes 3 times a week    Anemia of chronic disease  Assessment & Plan  · Dropped to 7 3 on 7/24  · S/p 1 PRBC on 7/24  · Monitor and transfuse as needed     Atrial fibrillation   Assessment & Plan  · HR controlled  · On heparin drip  · Awaiting therapeutic INR  · Continue Coumadin - increase to 7 5 mg    Peripheral arterial disease (HCC)  Assessment & Plan  · LEADS - RLE - LILIAN 1 2/88/52, 50-75% stenosis of prox-mid SFA, >75% stenosis in proximal calf bypass graft, high grade stenosis vs occlusion of distal SFA artery/stent; LLE - LILIAN 0 78/86/32 with occlusion of the distal SFA and above knee popliteal artery/stent    · Left lower extremity angiogram - completely occluded above-the-knee to below-the-knee bypass - likely occluded for a long time  · Not a candidate for 3rd time redo bypass due to age, co-morbidities and his wishes  · Patient does not want amputation or any further interventions  · Continue Coumadin, Plavix and statin  · Outpatient follow up with Palliative care     Tremor  Assessment & Plan  · Follows with neurology as outpatient  · Had been recommended Zonisamide which he has not been using for the past few weeks at home    Chronic kidney disease stage 4  Assessment & Plan  · Creatinine at baseline in the high 2s to low 3s  · Nephrology consulted in view of requirement for angiogram  · Patient would not want HD if needed  · Avoid hypotension, nephrotoxic medications  · Evaluated by Nephrology  · Continue Lasix  · Dwight Conklin as an outpatient for hyperkalemia  · Monitor BMP daily     Essential hypertension  Assessment & Plan  · On Lasix 40 mg daily and Amlodipine 5 mg daily at home  · BP acceptable, monitor routinely     Diabetes mellitus type 2  Assessment & Plan  Lab Results   Component Value Date    HGBA1C 7 1 (H) 07/22/2020     · Controlled for patient's age as evidenced by A1c  · Glipizide on hold while inpatient   · Continue Lantus 5 units in am  · QID accuchecks with SSI   · Monitor blood sugars and adjust insulin as needed  · Hypoglycemia protocol       * 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  Assessment & Plan  · Polymicrobial wound cultures positive for MRSA, Klebsiella, Enterococcus - unclear which is pathogen  · MRI - suspicion of mild plantar osteomyelitis but less prominent compared to 2/2020  · Patient refuses amputation   · Per ID if no resection done very low likelihood of cure with medical therapy and has increased risk of renal toxicity hence need oral suppression with Doxycycline  · Patient started on oral doxycycline for indefinite suppression        [facility] Internal Medicine Progress Note  Patient: Junior Mosqueda 80 y o  male   MRN: [id number]  PCP: Bob Castellano MD  Unit/Bed#: -53 Encounter: [id number]  Date Of Visit: 08/06/20    Assessment:    Principal Problem:    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  Active Problems:    Diabetes mellitus type 2    Essential hypertension    Chronic kidney disease stage 4    Tremor    Peripheral arterial disease (HCC)    Atrial fibrillation     Anemia of chronic disease    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    Secondary hyperparathyroidism of renal origin ([facility] [address] )    Hyponatremia      Plan:           VTE Pharmacologic Prophylaxis:   Pharmacologic: Heparin Drip  Mechanical VTE Prophylaxis in Place: Yes    Patient Centered Rounds: I have performed bedside rounds with nursing staff today  Discussions with Specialists or Other Care Team Provider:     Education and Discussions with Family / Patient: patient    Time Spent for Care: 30 minutes    More than 50% of total time spent on counseling and coordination of care as described above  Current Length of Stay: 16 day(s)    Current Patient Status: Inpatient   Certification Statement: The patient will continue to require additional inpatient hospital stay due to await INR > 2    Discharge Plan / Estimated Discharge Date:     Code Status: Level 3 - DNAR and DNI      Subjective:   Feels ok, no major complains    Objective:     Vitals:   Temp (24hrs), Av 6 °F (37 °C), Min:97 5 °F (36 4 °C), Max:99 3 °F (37 4 °C)    Temp:  [97 5 °F (36 4 °C)-99 3 °F (37 4 °C)] 98 9 °F (37 2 °C)  HR:  [76-85] 85  Resp:  [16-18] 18  BP: (123-127)/(63-64) 127/63  SpO2:  [95 %-100 %] 96 %  Body mass index is 26 85 kg/m²  Input and Output Summary (last 24 hours): Intake/Output Summary (Last 24 hours) at 2020 1317  Last data filed at 2020 0800  Gross per 24 hour   Intake 180 ml   Output 1175 ml   Net -995 ml       Physical Exam:     Physical Exam   Constitutional: He is oriented to person, place, and time  He appears well-developed and well-nourished  HENT:   Head: Normocephalic and atraumatic  Eyes: Conjunctivae are normal    Cardiovascular: Normal rate and regular rhythm  Exam reveals no friction rub  No murmur heard  Pulmonary/Chest: Effort normal  No stridor  No respiratory distress  Abdominal: Soft  He exhibits no distension  There is no abdominal tenderness  Neurological: He is alert and oriented to person, place, and time  Vitals reviewed      Additional Data:     Labs:    Results from last 7 days   Lab Units 20  0404   WBC Thousand/uL 7 71   HEMOGLOBIN g/dL 7 8*   HEMATOCRIT % 26 0*   PLATELETS Thousands/uL 223   NEUTROS PCT % 58   LYMPHS PCT % 21   MONOS PCT % 13*   EOS PCT % 7*     Results from last 7 days   Lab Units 20  0430   POTASSIUM mmol/L 4 9   CHLORIDE mmol/L 107   CO2 mmol/L 22   BUN mg/dL 64*   CREATININE mg/dL 2 91*   CALCIUM mg/dL 7 7*     Results from last 7 days   Lab Units 20  0439   INR 1 78*       * I Have Reviewed All Lab Data Listed Above  * Additional Pertinent Lab Tests Reviewed:  All Labs Within Last 24 Hours Reviewed    Imaging:    Imaging Reports Reviewed Today Include:   Imaging Personally Reviewed by Myself Includes:      Recent Cultures (last 7 days):           Last 24 Hours Medication List:   acetaminophen, 650 mg, Oral, Q6H PRN, Thierno Mcdowell MD  amLODIPine, 5 mg, Oral, Daily, COLLEEN Humphrey  calcitriol, 0 25 mcg, Oral, Once per day on Mon Wed Fri, Laila Aguilar MD  clopidogrel, 75 mg, Oral, Daily, Thierno Mcdowell MD  diphenhydrAMINE, 25 mg, Oral, Q6H PRN, Steffany Nguyen DO  diphenhydrAMINE-zinc acetate, , Topical, TID PRN, Narciso Avery MD  doxycycline hyclate, 100 mg, Oral, Q12H [address], Evelyn Aldea,   ferrous sulfate, 325 mg, Oral, Daily With Breakfast, COLLEEN Humphrey  finasteride, 5 mg, Oral, Daily, Thierno Mcdowell MD  FLUoxetine, 40 mg, Oral, Daily, Thierno Mcdowell MD  furosemide, 40 mg, Oral, Daily, Jak Marte PA-C  heparin (porcine), 3-30 Units/kg/hr (Order-Specific), Intravenous, Titrated, Jada Leonard PA-C, Last Rate: 10 1 Units/kg/hr (08/06/20 1058)  heparin (porcine), 3,400 Units, Intravenous, Q1H PRN, Jada Leonard PA-C  heparin (porcine), 6,800 Units, Intravenous, Q1H PRN, Jada Leonard PA-C  insulin glargine, 5 Units, Subcutaneous, QAM, Steffany Nguyen DO  insulin lispro, 1-6 Units, Subcutaneous, 4x Daily (AC & HS), Thierno Mcdowell MD  memantine, 5 mg, Oral, Daily, Thierno Mcdowell MD  ondansetron, 4 mg, Intravenous, Q4H PRN, Thierno Mcdowell MD  polyethylene glycol, 17 g, Oral, Daily, Steffany Nguyen DO  pravastatin, 40 mg, Oral, Daily With Kelly Camarena MD  sodium bicarbonate, 650 mg, Oral, BID after meals, Thierno Mcdowell MD  tamsulosin, 0 4 mg, Oral, Daily With Kelly Camarena MD  warfarin, 7 5 mg, Oral, Once (warfarin), Jose Diana MD         Today, Patient Was Seen By: Jose Diana MD    ** Please Note: This note has been constructed using a voice recognition system   **

## 2020-08-06 NOTE — ASSESSMENT & PLAN NOTE
· HR controlled  · On heparin drip  · Awaiting therapeutic INR  · Continue Coumadin - increase to 7 5 mg

## 2020-08-06 NOTE — PLAN OF CARE
Problem: Potential for Falls  Goal: Patient will remain free of falls  Description: INTERVENTIONS:  - Assess patient frequently for physical needs  -  Identify cognitive and physical deficits and behaviors that affect risk of falls    -  [place] fall precautions as indicated by assessment   - Educate patient/family on patient safety including physical limitations  - Instruct patient to call for assistance with activity based on assessment  - Modify environment to reduce risk of injury  - Consider OT/PT consult to assist with strengthening/mobility  Outcome: Progressing     Problem: PAIN - ADULT  Goal: Verbalizes/displays adequate comfort level or baseline comfort level  Description: Interventions:  - Encourage patient to monitor pain and request assistance  - Assess pain using appropriate pain scale  - Administer analgesics based on type and severity of pain and evaluate response  - Implement non-pharmacological measures as appropriate and evaluate response  - Consider cultural and social influences on pain and pain management  - Notify physician/advanced practitioner if interventions unsuccessful or patient reports new pain  Outcome: Progressing     Problem: INFECTION - ADULT  Goal: Absence or prevention of progression during hospitalization  Description: INTERVENTIONS:  - Assess and monitor for signs and symptoms of infection  - Monitor lab/diagnostic results  - Monitor all insertion sites, i e  indwelling lines, tubes, and drains  - Monitor endotracheal if appropriate and nasal secretions for changes in amount and color  - [place] appropriate cooling/warming therapies per order  - Administer medications as ordered  - Instruct and encourage patient and family to use good hand hygiene technique  - Identify and instruct in appropriate isolation precautions for identified infection/condition  Outcome: Progressing     Problem: Nutrition/Hydration-ADULT  Goal: Nutrient/Hydration intake appropriate for improving, restoring or maintaining nutritional needs  Description: Monitor and assess patient's nutrition/hydration status for malnutrition  Collaborate with interdisciplinary team and initiate plan and interventions as ordered  Monitor patient's weight and dietary intake as ordered or per policy  Utilize nutrition screening tool and intervene as necessary  Determine patient's food preferences and provide high-protein, high-caloric foods as appropriate       INTERVENTIONS:  - Monitor oral intake, urinary output, labs, and treatment plans  - Assess nutrition and hydration status and recommend course of action  - Evaluate amount of meals eaten  - Assist patient with eating if necessary   - Allow adequate time for meals  - Recommend/ encourage appropriate diets, oral nutritional supplements, and vitamin/mineral supplements  - Order, calculate, and assess calorie counts as needed  - Recommend, monitor, and adjust tube feedings and TPN/PPN based on assessed needs  - Assess need for intravenous fluids  - Provide specific nutrition/hydration education as appropriate  - Include patient/family/caregiver in decisions related to nutrition  Outcome: Progressing     Problem: HEMATOLOGIC - ADULT  Goal: Maintains hematologic stability  Description: INTERVENTIONS  - Assess for signs and symptoms of bleeding or hemorrhage  - Monitor labs  - Administer supportive blood products/factors as ordered and appropriate  Outcome: Progressing     Problem: MUSCULOSKELETAL - ADULT  Goal: Maintain or return mobility to safest level of function  Description: INTERVENTIONS:  - Assess patient's ability to carry out ADLs; assess patient's baseline for ADL function and identify physical deficits which impact ability to perform ADLs (bathing, care of mouth/teeth, toileting, grooming, dressing, etc )  - Assess/evaluate cause of self-care deficits   - Assess range of motion  - Assess patient's mobility  - Assess patient's need for assistive devices and provide as appropriate  - Encourage maximum independence but intervene and supervise when necessary  - Involve family in performance of ADLs  - Assess for home care needs following discharge   - Consider OT consult to assist with ADL evaluation and planning for discharge  - Provide patient education as appropriate  Outcome: Progressing     Problem: Prexisting or High Potential for Compromised Skin Integrity  Goal: Skin integrity is maintained or improved  Description: INTERVENTIONS:  - Identify patients at risk for skin breakdown  - Assess and monitor skin integrity  - Assess and monitor nutrition and hydration status  - Monitor labs   - Assess for incontinence   - Turn and reposition patient  - Assist with mobility/ambulation  - Relieve pressure over bony prominences  - Avoid friction and shearing  - Provide appropriate hygiene as needed including keeping skin clean and dry  - Evaluate need for skin moisturizer/barrier cream  - Collaborate with interdisciplinary team   - Patient/family teaching  - Consider wound care consult   Outcome: Progressing     Problem: SKIN/TISSUE INTEGRITY - ADULT  Goal: Incision(s), wounds(s) or drain site(s) healing without S/S of infection  Description: INTERVENTIONS  - Assess and document risk factors for skin impairment   - Assess and document dressing, incision, wound bed, drain sites and surrounding tissue  - Consider nutrition services referral as needed  - Oral mucous membranes remain intact  - Provide patient/ family education  Outcome: Progressing     Problem: DISCHARGE PLANNING  Goal: Discharge to home or other facility with appropriate resources  Description: INTERVENTIONS:  - Identify barriers to discharge w/patient and caregiver  - Arrange for needed discharge resources and transportation as appropriate  - Identify discharge learning needs (meds, wound care, etc )  - Arrange for interpretive services to assist at discharge as needed  - Refer to Case Management Department for coordinating discharge planning if the patient needs post-hospital services based on physician/advanced practitioner order or complex needs related to functional status, cognitive ability, or social support system  Outcome: Progressing

## 2020-08-07 VITALS
RESPIRATION RATE: 16 BRPM | HEIGHT: 72 IN | DIASTOLIC BLOOD PRESSURE: 67 MMHG | OXYGEN SATURATION: 95 % | HEART RATE: 80 BPM | BODY MASS INDEX: 26.82 KG/M2 | TEMPERATURE: 98.6 F | SYSTOLIC BLOOD PRESSURE: 130 MMHG | WEIGHT: 198 LBS

## 2020-08-07 LAB
ANION GAP SERPL CALCULATED.3IONS-SCNC: 8 MMOL/L (ref 4–13)
APTT PPP: 84 SECONDS (ref 23–37)
BUN SERPL-MCNC: 66 MG/DL (ref 5–25)
CALCIUM SERPL-MCNC: 8.8 MG/DL (ref 8.3–10.1)
CHLORIDE SERPL-SCNC: 106 MMOL/L (ref 100–108)
CO2 SERPL-SCNC: 20 MMOL/L (ref 21–32)
CREAT SERPL-MCNC: 3.04 MG/DL (ref 0.6–1.3)
GFR SERPL CREATININE-BSD FRML MDRD: 17 ML/MIN/1.73SQ M
GLUCOSE SERPL-MCNC: 117 MG/DL (ref 65–140)
GLUCOSE SERPL-MCNC: 132 MG/DL (ref 65–140)
GLUCOSE SERPL-MCNC: 139 MG/DL (ref 65–140)
INR PPP: 2.08 (ref 0.84–1.19)
POTASSIUM SERPL-SCNC: 4.4 MMOL/L (ref 3.5–5.3)
PROTHROMBIN TIME: 23.3 SECONDS (ref 11.6–14.5)
SARS-COV-2 RNA RESP QL NAA+PROBE: NEGATIVE
SODIUM SERPL-SCNC: 134 MMOL/L (ref 136–145)

## 2020-08-07 PROCEDURE — U0003 INFECTIOUS AGENT DETECTION BY NUCLEIC ACID (DNA OR RNA); SEVERE ACUTE RESPIRATORY SYNDROME CORONAVIRUS 2 (SARS-COV-2) (CORONAVIRUS DISEASE [COVID-19]), AMPLIFIED PROBE TECHNIQUE, MAKING USE OF HIGH THROUGHPUT TECHNOLOGIES AS DESCRIBED BY CMS-2020-01-R: HCPCS | Performed by: HOSPITALIST

## 2020-08-07 PROCEDURE — 85610 PROTHROMBIN TIME: CPT | Performed by: HOSPITALIST

## 2020-08-07 PROCEDURE — 80048 BASIC METABOLIC PNL TOTAL CA: CPT | Performed by: HOSPITALIST

## 2020-08-07 PROCEDURE — 99239 HOSP IP/OBS DSCHRG MGMT >30: CPT | Performed by: HOSPITALIST

## 2020-08-07 PROCEDURE — 85730 THROMBOPLASTIN TIME PARTIAL: CPT | Performed by: HOSPITALIST

## 2020-08-07 PROCEDURE — 82948 REAGENT STRIP/BLOOD GLUCOSE: CPT

## 2020-08-07 RX ORDER — WARFARIN SODIUM 7.5 MG/1
7.5 TABLET ORAL
Status: DISCONTINUED | OUTPATIENT
Start: 2020-08-07 | End: 2020-08-07 | Stop reason: HOSPADM

## 2020-08-07 RX ORDER — POLYETHYLENE GLYCOL 3350 17 G/17G
17 POWDER, FOR SOLUTION ORAL DAILY
Qty: 14 EACH | Refills: 0 | Status: CANCELLED | OUTPATIENT
Start: 2020-08-08

## 2020-08-07 RX ORDER — WARFARIN SODIUM 7.5 MG/1
7.5 TABLET ORAL
Refills: 0 | Status: CANCELLED
Start: 2020-08-07 | End: 2020-08-07

## 2020-08-07 RX ORDER — CALCITRIOL 0.25 UG/1
0.25 CAPSULE, LIQUID FILLED ORAL 3 TIMES WEEKLY
Refills: 0 | Status: CANCELLED
Start: 2020-08-10

## 2020-08-07 RX ORDER — POLYETHYLENE GLYCOL 3350 17 G/17G
17 POWDER, FOR SOLUTION ORAL DAILY
Qty: 14 EACH | Refills: 0 | Status: SHIPPED | OUTPATIENT
Start: 2020-08-08 | End: 2020-08-23 | Stop reason: HOSPADM

## 2020-08-07 RX ORDER — CALCITRIOL 0.25 UG/1
0.25 CAPSULE, LIQUID FILLED ORAL 3 TIMES WEEKLY
Refills: 0
Start: 2020-08-10 | End: 2020-08-23 | Stop reason: HOSPADM

## 2020-08-07 RX ORDER — DOXYCYCLINE HYCLATE 100 MG/1
100 CAPSULE ORAL EVERY 12 HOURS SCHEDULED
Refills: 0 | Status: CANCELLED
Start: 2020-08-07 | End: 2020-09-06

## 2020-08-07 RX ORDER — WARFARIN SODIUM 5 MG/1
5 TABLET ORAL
Refills: 0 | Status: CANCELLED
Start: 2020-08-07 | End: 2020-08-07

## 2020-08-07 RX ORDER — FERROUS SULFATE TAB EC 324 MG (65 MG FE EQUIVALENT) 324 (65 FE) MG
324 TABLET DELAYED RESPONSE ORAL
Qty: 60 TABLET | Refills: 0 | Status: CANCELLED
Start: 2020-08-07

## 2020-08-07 RX ORDER — WARFARIN SODIUM 7.5 MG/1
7.5 TABLET ORAL
Refills: 0
Start: 2020-08-07 | End: 2020-08-23 | Stop reason: HOSPADM

## 2020-08-07 RX ORDER — ACETAMINOPHEN 325 MG/1
650 TABLET ORAL EVERY 6 HOURS PRN
Qty: 30 TABLET | Refills: 0 | Status: CANCELLED | OUTPATIENT
Start: 2020-08-07

## 2020-08-07 RX ORDER — PATIROMER 8.4 G/1
8.4 POWDER, FOR SUSPENSION ORAL 2 TIMES WEEKLY
Refills: 0
Start: 2020-08-10 | End: 2020-08-23 | Stop reason: HOSPADM

## 2020-08-07 RX ORDER — WARFARIN SODIUM 5 MG/1
5 TABLET ORAL
Status: DISCONTINUED | OUTPATIENT
Start: 2020-08-08 | End: 2020-08-07 | Stop reason: HOSPADM

## 2020-08-07 RX ORDER — FERROUS SULFATE TAB EC 324 MG (65 MG FE EQUIVALENT) 324 (65 FE) MG
324 TABLET DELAYED RESPONSE ORAL
Qty: 60 TABLET | Refills: 0
Start: 2020-08-07 | End: 2020-08-23 | Stop reason: HOSPADM

## 2020-08-07 RX ORDER — DOXYCYCLINE HYCLATE 100 MG/1
100 CAPSULE ORAL EVERY 12 HOURS SCHEDULED
Refills: 0
Start: 2020-08-07 | End: 2020-08-23 | Stop reason: HOSPADM

## 2020-08-07 RX ORDER — WARFARIN SODIUM 5 MG/1
5 TABLET ORAL
Refills: 0
Start: 2020-08-08 | End: 2020-08-23 | Stop reason: HOSPADM

## 2020-08-07 RX ADMIN — FUROSEMIDE 40 MG: 40 TABLET ORAL at 08:24

## 2020-08-07 RX ADMIN — CALCITRIOL 0.25 MCG: 0.25 CAPSULE, LIQUID FILLED ORAL at 08:24

## 2020-08-07 RX ADMIN — INSULIN GLARGINE 5 UNITS: 100 INJECTION, SOLUTION SUBCUTANEOUS at 08:24

## 2020-08-07 RX ADMIN — SODIUM BICARBONATE 650 MG TABLET 650 MG: at 08:24

## 2020-08-07 RX ADMIN — DOXYCYCLINE 100 MG: 100 CAPSULE ORAL at 08:24

## 2020-08-07 RX ADMIN — AMLODIPINE BESYLATE 5 MG: 5 TABLET ORAL at 08:24

## 2020-08-07 RX ADMIN — FINASTERIDE 5 MG: 5 TABLET, FILM COATED ORAL at 08:24

## 2020-08-07 RX ADMIN — FERROUS SULFATE TAB 325 MG (65 MG ELEMENTAL FE) 325 MG: 325 (65 FE) TAB at 08:24

## 2020-08-07 RX ADMIN — FLUOXETINE 40 MG: 20 CAPSULE ORAL at 08:24

## 2020-08-07 RX ADMIN — CLOPIDOGREL BISULFATE 75 MG: 75 TABLET ORAL at 08:24

## 2020-08-07 RX ADMIN — HEPARIN SODIUM AND DEXTROSE 10.1 UNITS/KG/HR: 10000; 5 INJECTION INTRAVENOUS at 00:13

## 2020-08-07 RX ADMIN — MEMANTINE 5 MG: 5 TABLET ORAL at 08:24

## 2020-08-07 NOTE — DISCHARGE SUMMARY
Discharge- Isiah Elias 6/28/1930, 80 y o  male MRN: [id number]    Unit/Bed#: [facility]-01 Encounter: [id number]    Primary Care Provider: Erika Lepe MD   Date and time admitted to hospital: 7/21/2020 12:32 PM        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  Assessment & Plan  · Continue with local wound care  · Podiatry is following with dressing changes 3 times a week  · OP wound center f/u    Anemia of chronic disease  Assessment & Plan  · Dropped to 7 3 on 7/24  · S/p 1 PRBC on 7/24  · Latest 7 8    Atrial fibrillation   Assessment & Plan  · HR controlled  · Was on heparin drip  · Today INR 2 0; stop heparin  · Was receiving 5 mg coumadin daily till yesterday when gave him 7 5 mg on 8/6  · Will recommend rehab to give 7 5 mg today, 5 mg tomorrow on 8/8 & check INR on 8/9/20    Hyperkalemia  Assessment & Plan  · Restart Veltassa 8 4 gm twice a week as an outpatient for hyperkalemia, if not available at rehab then use kayexalate 15 gm weekly  · Received 1 dose kayexalate on 8/2/20 here  · K today 4 4    Peripheral arterial disease (HCC)  Assessment & Plan  · LEADS - RLE - LILIAN 1 2/88/52, 50-75% stenosis of prox-mid SFA, >75% stenosis in proximal calf bypass graft, high grade stenosis vs occlusion of distal SFA artery/stent; LLE - LILIAN 0 78/86/32 with occlusion of the distal SFA and above knee popliteal artery/stent    · Left lower extremity angiogram - completely occluded above-the-knee to below-the-knee bypass - likely occluded for a long time  · Not a candidate for 3rd time redo bypass due to age, co-morbidities and his wishes  · Patient does not want amputation or any further interventions  · Continue Coumadin, Plavix and statin  · Outpatient follow up with Palliative care     Tremor  Assessment & Plan  · Follows with neurology as outpatient  · Had been recommended Zonisamide which he has not been using for the past few weeks at home    Chronic kidney disease stage 4  Assessment & Plan  · Creatinine at baseline in the high 2s to low 3s  · Nephrology consulted in view of requirement for angiogram  · Patient would not want HD if needed  · Avoid hypotension, nephrotoxic medications  · Evaluated by Nephrology  · Continue Lasix 40 mg QD  · Restart Veltassa twice a week as an outpatient for hyperkalemia, if not available at rehab then use kayexalate 15 gm weekly    Essential hypertension  Assessment & Plan  · On Lasix 40 mg daily and Amlodipine 5 mg daily at home  · BP acceptable, monitor routinely     Diabetes mellitus type 2  Assessment & Plan  Lab Results   Component Value Date    HGBA1C 7 1 (H) 07/22/2020     Restart home glipizide 2 5 mg QD    * 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  Assessment & Plan  · Polymicrobial wound cultures positive for MRSA, Klebsiella, Enterococcus - unclear which is pathogen  · MRI - suspicion of mild plantar osteomyelitis but less prominent compared to 2/2020  · Patient refuses amputation   · Per ID if no resection done very low likelihood of cure with medical therapy and has increased risk of renal toxicity hence need oral suppression with Doxycycline  · Patient started on oral doxycycline for indefinite suppression  · Outpatient f/u at wound center        Transition of Care Discharge Summary - [facility]    Patient Information: Brook Eisenmenger 80 y o  male MRN: [id number]  Unit/Bed#: -01 Encounter: [id number]    Discharging Physician / Practitioner: Gissel Dennis MD  PCP: Gatito Jimenez MD  Admission Date: 7/21/2020  Discharge Date: 08/07/20    Disposition:      Short Term Rehab or SNF at a [facility] SNF (see below)    For Discharges to [facility] SNF:   · [facility] to reach physician and no message left      Reason for Admission:  Foot wound/infection    Discharge Diagnoses:     Principal Problem:    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  Active Problems:    Diabetes mellitus type 2    Essential hypertension    Chronic kidney disease stage 4    Tremor    Peripheral arterial disease (HCC)    Hyperkalemia    Atrial fibrillation     Anemia of chronic disease    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    Secondary hyperparathyroidism of renal origin ([address] )    Hyponatremia  Resolved Problems:    * No resolved hospital problems  *      Consultations During Hospital Stay:  · Podiatry  · Vascular surgery  · Infectious Disease  · Nephrology  · Palliative care    Procedures Performed:       Medication Adjustments and Discharge Medications:  · Summary of Medication Adjustments made as a result of this hospitalization:   · Medication Dosing Tapers - Please refer to Discharge Medication List for details on any medication dosing tapers (if applicable to patient)  · Medications being temporarily held (include recommended restart time): none  · Discharge Medication List: See after visit summary for reconciled discharge medications  Wound Care Recommendations:  When applicable, please see wound care section of After Visit Summary  Diet Recommendations at Discharge:  Diet -        Diet Orders   (From admission, onward)             Start     Ordered    07/31/20 1409  Diet Nick/CHO Controlled; Consistent Carbohydrate Diet Level 2 (5 carb servings/75 grams CHO/meal); Consistent Carbohydrate Diet Level 2 (5 carb servings/75 grams CHO/meal), Potassium 2 GM  Diet effective now     Question Answer Comment   Diet Type Nick/CHO Controlled    Nick/CHO Controlled Consistent Carbohydrate Diet Level 2 (5 carb servings/75 grams CHO/meal)    Other Restriction(s): Consistent Carbohydrate Diet Level 2 (5 carb servings/75 grams CHO/meal)    Other Restriction(s): Potassium 2 GM    RD to adjust diet per protocol? No        07/31/20 1409              Fluid Restriction - No Fluid Restriction at Discharge      Instructions for any Catheters / Lines Present at Discharge (including removal date, if applicable): none    Significant Findings / Test Results:     MRI ankle/heel left  wo contrast   Final Result by Sheryl Boas, MD (07/27 1130)      Plantar hindfoot midline soft tissue wound/ulcer extending to the underlying plantar cortex of the calcaneus similar to February 2020  Hindfoot subcutaneous edema extending superiorly and distally outside the field-of-view may represent cellulitis in the appropriate clinical context  This is more prominent compared to February 2020  No loculated fluid collection to suggest abscess  Trace plantar calcaneal subcortical edema with minimal ill-defined T1 hyperintensity suspicious for mild osteomyelitis  Findings are less prominent compared to February 2020  This study demonstrates a significant  finding and was documented as such in [facility] for liaison and referring practitioner notification  Workstation performed: [id number]         IR lower extremity / intervention   Final Result by Astrid Cuello MD (07/24 1643)   Impression:       Left lower extremity runoff with a completely occluded above-the-knee to below-knee bypass  Widely patent inflow to the leg  Given the angiographic appearance and difficulty probing with a catheter and wire I suspect the bypass has been occluded for a long time  Single vessel posterior tibial runoff is unchanged from 2015, but now supplied indirectly by collaterals arising from the stented native SFA  Plan:    Assess for future interventions including repeat bypass or repeat endovascular attempt  If repeat bypass is not option, one consideration would be native recanalization from the posterior tibial artery, however the area of interruption at the prior bypass may preclude direct wire passage, and no opacification of the native popliteal artery    is identified whatsoever  If the patient returns for endovascular intervention anesthesia support is recommended as he was unable to tolerate prolonged periods on his back        Findings discussed with the vascular surgery team directly  Workstation performed: [id number]         VAS lower limb arterial duplex, complete bilateral   Final Result by Oswaldo Seals DO (07/23 1532)      XR foot 3+ views LEFT   Final Result by Robson Valle MD (07/21 8027)      No acute osseous abnormality  Workstation performed: [id number]               Incidental Findings:   ·      Test Results Pending at Discharge (will require follow up):   ·      Outpatient Tests Requested:  · INR on 8/9/20 & BMP on 8/9/20 for potassium levels    Complications:     Hospital Course:     Katina Barrios is a 80 y o  male patient who originally presented to the hospital on 7/21/2020 due to request of his outpatient podiatrist regarding a progressively worsening foot wound/ulceration  Notably, he has a history of diabetes mellitus and neuropathy and per daughter, she feels he may have stepped in water several days back which led to a flare up of a possible infection  He also has a prior history of MRSA  In the ER, he was initiated on a dose of IV Vancomycin    Condition at Discharge: stable     Discharge Day Visit / Exam:     Subjective:  Feels well, has no major complains    Vitals: Blood Pressure: 130/67 (08/07/20 1504)  Pulse: 80 (08/07/20 1504)  Temperature: 98 6 °F (37 °C) (08/07/20 1504)  Temp Source: Oral (08/07/20 0641)  Respirations: 16 (08/07/20 1504)  Height: 6' (182 9 cm) (07/21/20 1239)  Weight - Scale: 89 8 kg (198 lb) (07/21/20 1239)  SpO2: 95 % (08/07/20 1504)  Exam:   Physical Exam  Vitals signs reviewed  Constitutional:       Appearance: He is well-developed and well-nourished  HENT:      Head: Normocephalic and atraumatic  Eyes:      Conjunctiva/sclera: Conjunctivae normal    Cardiovascular:      Rate and Rhythm: Normal rate and regular rhythm  Heart sounds: Normal heart sounds  Pulmonary:      Effort: Pulmonary effort is normal  No respiratory distress  Breath sounds: Normal breath sounds  No stridor  Abdominal:      General: There is no distension  Palpations: Abdomen is soft  Tenderness: There is no abdominal tenderness  Neurological:      Mental Status: He is alert and oriented to person, place, and time  Discussion with Family: daughter      Discharge instructions/Information to patient and family:   See after visit summary section titled Discharge Instructions for information provided to patient and family  Planned Readmission: no      Discharge Statement:  I spent 35 minutes discharging the patient  This time was spent on the day of discharge  I had direct contact with the patient on the day of discharge  Greater than 50% of the total time was spent examining patient, answering all patient questions, arranging and discussing plan of care with patient as well as directly providing post-discharge instructions  Additional time then spent on discharge activities      ** Please Note: This note has been constructed using a voice recognition system **

## 2020-08-07 NOTE — ASSESSMENT & PLAN NOTE
· Continue with local wound care  · Podiatry is following with dressing changes 3 times a week  · OP wound center f/u

## 2020-08-07 NOTE — ASSESSMENT & PLAN NOTE
· Restart Veltassa 8 4 gm twice a week as an outpatient for hyperkalemia, if not available at rehab then use kayexalate 15 gm weekly  · Received 1 dose kayexalate on 8/2/20 here  · K today 4 4

## 2020-08-07 NOTE — ASSESSMENT & PLAN NOTE
· HR controlled  · Was on heparin drip  · Today INR 2 0; stop heparin  · Was receiving 5 mg coumadin daily till yesterday when gave him 7 5 mg on 8/6  · Will recommend rehab to give 7 5 mg today, 5 mg tomorrow on 8/8 & check INR on 8/9/20

## 2020-08-07 NOTE — SOCIAL WORK
Cm informed by provider, that patient is medically stable for dc today  Cm provided update to following facility, [facility]  Cm sent update referral and awaiting response  Cm will need to arrange transport if facility does not allow family to transport  Cm informed by facility that patient has a bed for today  Cm informed that patient will need a COVID test prior to admission  Cm informed provider  Cm to inform patient's daughter  Patient's daughter in agreement with dc and WC Ladan [facility]ers transport since facility will not allow family to transport  Cm provided the cell phone number of admission liaison to patient's daughter to review further roles at facility  Cm arranged a WC [facility] Boers transport with [facility] for 5pm today  Late dc due to the request of the facility and when the bed would be available  Cm informed provider, facility, DC RN and patient's daughter

## 2020-08-07 NOTE — ASSESSMENT & PLAN NOTE
· Creatinine at baseline in the high 2s to low 3s  · Nephrology consulted in view of requirement for angiogram  · Patient would not want HD if needed  · Avoid hypotension, nephrotoxic medications  · Evaluated by Nephrology  · Continue Lasix 40 mg QD  · Restart Veltassa twice a week as an outpatient for hyperkalemia, if not available at rehab then use kayexalate 15 gm weekly

## 2020-08-07 NOTE — ASSESSMENT & PLAN NOTE
· Polymicrobial wound cultures positive for MRSA, Klebsiella, Enterococcus - unclear which is pathogen  · MRI - suspicion of mild plantar osteomyelitis but less prominent compared to 2/2020  · Patient refuses amputation   · Per ID if no resection done very low likelihood of cure with medical therapy and has increased risk of renal toxicity hence need oral suppression with Doxycycline  · Patient started on oral doxycycline for indefinite suppression  · Outpatient f/u at wound center

## 2020-08-07 NOTE — ASSESSMENT & PLAN NOTE
Lab Results   Component Value Date    HGBA1C 7 1 (H) 07/22/2020     Restart home glipizide 2 5 mg QD

## 2020-08-07 NOTE — PROGRESS NOTES
Dino Mendoza offered Sacrament of the Sick/Anointing and offered a blessing       08/07/20 1100   Clinical Encounter Type   Visited With Patient   Routine Visit Follow-up   Continue Visiting Yes   Adventism Encounters   Adventism Needs Prayer   Sacramental Encounters   Sacrament of Sick-Anointing Anointed

## 2020-08-07 NOTE — NURSING NOTE
Patient refusing to get OOB to chair all day  Patient also refusing to reposition despite numerous attempts to turn  Patient educated about importance of Q2 hour turning and getting out of bed as well as the risk of skin damage due to pressure

## 2020-08-07 NOTE — DISCHARGE INSTR - AVS FIRST PAGE
He will be on doxycycline for infinite period    Give coumadin 7 5 mg today 8/7/20 & 5 mg tomorrow 8/8/20 & check INR on Sunday 8/9/20 & dose further accordingly    His potassium needs to monitored at least weekly  He needs to be on valtessa 8 4 gm twice a week   If not available at rehab, then use kayexalate 15 gm once a week

## 2020-08-10 ENCOUNTER — TELEPHONE (OUTPATIENT)
Dept: PALLIATIVE MEDICINE | Facility: CLINIC | Age: 85
End: 2020-08-10

## 2020-08-10 NOTE — TELEPHONE ENCOUNTER
This patient is from our hospital f/u list  I reached out to patient's sister, Per sister stated she does not want palliative care for her brother  Sister stated he is currently at a nursing home and does not know if he will get discharged

## 2020-08-13 NOTE — TELEPHONE ENCOUNTER
Dave Henson the daughter called and was wondering who scheduled her dad for a Palliative HFU apt for 8/14/20  She wanted it cancelled and does not feel her dad needs this apt right now and he is at [facility] right now  I did share with daughter Dave Henson that the option of a Virtual Visit would be available when she is able to be present with dad

## 2020-08-17 ENCOUNTER — APPOINTMENT (EMERGENCY)
Dept: RADIOLOGY | Facility: HOSPITAL | Age: 85
DRG: 177 | End: 2020-08-17
Payer: MEDICARE

## 2020-08-17 ENCOUNTER — HOSPITAL ENCOUNTER (INPATIENT)
Facility: HOSPITAL | Age: 85
LOS: 6 days | DRG: 177 | End: 2020-08-23
Attending: EMERGENCY MEDICINE | Admitting: INTERNAL MEDICINE
Payer: MEDICARE

## 2020-08-17 DIAGNOSIS — J18.9 MULTIFOCAL PNEUMONIA: Primary | ICD-10-CM

## 2020-08-17 DIAGNOSIS — R77.8 ELEVATED TROPONIN: ICD-10-CM

## 2020-08-17 DIAGNOSIS — N18.30 ACUTE RENAL FAILURE SUPERIMPOSED ON STAGE 3 CHRONIC KIDNEY DISEASE (HCC): ICD-10-CM

## 2020-08-17 DIAGNOSIS — N17.9 ACUTE RENAL FAILURE SUPERIMPOSED ON STAGE 3 CHRONIC KIDNEY DISEASE (HCC): ICD-10-CM

## 2020-08-17 DIAGNOSIS — J96.91 RESPIRATORY FAILURE WITH HYPOXIA (HCC): ICD-10-CM

## 2020-08-17 DIAGNOSIS — Z71.89 GOALS OF CARE, COUNSELING/DISCUSSION: ICD-10-CM

## 2020-08-17 DIAGNOSIS — C79.51 BONY METASTASIS (HCC): ICD-10-CM

## 2020-08-17 DIAGNOSIS — N17.9 AKI (ACUTE KIDNEY INJURY) (HCC): ICD-10-CM

## 2020-08-17 PROBLEM — I50.33 ACUTE ON CHRONIC DIASTOLIC CONGESTIVE HEART FAILURE (HCC): Status: ACTIVE | Noted: 2020-08-17

## 2020-08-17 PROBLEM — J96.01 ACUTE RESPIRATORY FAILURE WITH HYPOXIA (HCC): Status: ACTIVE | Noted: 2020-08-17

## 2020-08-17 PROBLEM — M86.9 OSTEOMYELITIS OF FOOT (HCC): Status: ACTIVE | Noted: 2020-08-17

## 2020-08-17 LAB
ALBUMIN SERPL BCP-MCNC: 2.4 G/DL (ref 3.5–5)
ALP SERPL-CCNC: 251 U/L (ref 46–116)
ALT SERPL W P-5'-P-CCNC: 83 U/L (ref 12–78)
ANION GAP SERPL CALCULATED.3IONS-SCNC: 18 MMOL/L (ref 4–13)
APTT PPP: 51 SECONDS (ref 23–37)
AST SERPL W P-5'-P-CCNC: 126 U/L (ref 5–45)
ATRIAL RATE: 87 BPM
BASOPHILS # BLD AUTO: 0.06 THOUSANDS/ΜL (ref 0–0.1)
BASOPHILS NFR BLD AUTO: 0 % (ref 0–1)
BILIRUB SERPL-MCNC: 0.3 MG/DL (ref 0.2–1)
BUN SERPL-MCNC: 100 MG/DL (ref 5–25)
CALCIUM SERPL-MCNC: 8.1 MG/DL (ref 8.3–10.1)
CHLORIDE SERPL-SCNC: 98 MMOL/L (ref 100–108)
CO2 SERPL-SCNC: 16 MMOL/L (ref 21–32)
CREAT SERPL-MCNC: 3.87 MG/DL (ref 0.6–1.3)
D DIMER PPP FEU-MCNC: 3.23 UG/ML FEU
EOSINOPHIL # BLD AUTO: 0 THOUSAND/ΜL (ref 0–0.61)
EOSINOPHIL NFR BLD AUTO: 0 % (ref 0–6)
ERYTHROCYTE [DISTWIDTH] IN BLOOD BY AUTOMATED COUNT: 15.7 % (ref 11.6–15.1)
GFR SERPL CREATININE-BSD FRML MDRD: 13 ML/MIN/1.73SQ M
GLUCOSE SERPL-MCNC: 239 MG/DL (ref 65–140)
GLUCOSE SERPL-MCNC: 275 MG/DL (ref 65–140)
HCT VFR BLD AUTO: 26.1 % (ref 36.5–49.3)
HGB BLD-MCNC: 7.8 G/DL (ref 12–17)
IMM GRANULOCYTES # BLD AUTO: 0.2 THOUSAND/UL (ref 0–0.2)
IMM GRANULOCYTES NFR BLD AUTO: 1 % (ref 0–2)
INR PPP: 2.82 (ref 0.84–1.19)
LACTATE SERPL-SCNC: 1.6 MMOL/L (ref 0.5–2)
LACTATE SERPL-SCNC: 2.9 MMOL/L (ref 0.5–2)
LYMPHOCYTES # BLD AUTO: 1.27 THOUSANDS/ΜL (ref 0.6–4.47)
LYMPHOCYTES NFR BLD AUTO: 8 % (ref 14–44)
MCH RBC QN AUTO: 28.4 PG (ref 26.8–34.3)
MCHC RBC AUTO-ENTMCNC: 29.9 G/DL (ref 31.4–37.4)
MCV RBC AUTO: 95 FL (ref 82–98)
MONOCYTES # BLD AUTO: 1.45 THOUSAND/ΜL (ref 0.17–1.22)
MONOCYTES NFR BLD AUTO: 9 % (ref 4–12)
NEUTROPHILS # BLD AUTO: 12.57 THOUSANDS/ΜL (ref 1.85–7.62)
NEUTS SEG NFR BLD AUTO: 82 % (ref 43–75)
NRBC BLD AUTO-RTO: 0 /100 WBCS
NT-PROBNP SERPL-MCNC: ABNORMAL PG/ML
P AXIS: 61 DEGREES
PLATELET # BLD AUTO: 340 THOUSANDS/UL (ref 149–390)
PMV BLD AUTO: 10.4 FL (ref 8.9–12.7)
POTASSIUM SERPL-SCNC: 4.9 MMOL/L (ref 3.5–5.3)
PR INTERVAL: 146 MS
PROCALCITONIN SERPL-MCNC: 1.09 NG/ML
PROT SERPL-MCNC: 8 G/DL (ref 6.4–8.2)
PROTHROMBIN TIME: 29 SECONDS (ref 11.6–14.5)
QRS AXIS: 56 DEGREES
QRSD INTERVAL: 90 MS
QT INTERVAL: 402 MS
QTC INTERVAL: 475 MS
RBC # BLD AUTO: 2.75 MILLION/UL (ref 3.88–5.62)
SARS-COV-2 RNA RESP QL NAA+PROBE: NEGATIVE
SODIUM SERPL-SCNC: 132 MMOL/L (ref 136–145)
T WAVE AXIS: 42 DEGREES
TROPONIN I SERPL-MCNC: 0.13 NG/ML
VENTRICULAR RATE: 84 BPM
WBC # BLD AUTO: 15.55 THOUSAND/UL (ref 4.31–10.16)

## 2020-08-17 PROCEDURE — 87040 BLOOD CULTURE FOR BACTERIA: CPT | Performed by: EMERGENCY MEDICINE

## 2020-08-17 PROCEDURE — 71045 X-RAY EXAM CHEST 1 VIEW: CPT

## 2020-08-17 PROCEDURE — 99285 EMERGENCY DEPT VISIT HI MDM: CPT

## 2020-08-17 PROCEDURE — 93005 ELECTROCARDIOGRAM TRACING: CPT

## 2020-08-17 PROCEDURE — 96365 THER/PROPH/DIAG IV INF INIT: CPT

## 2020-08-17 PROCEDURE — 82728 ASSAY OF FERRITIN: CPT | Performed by: PHYSICIAN ASSISTANT

## 2020-08-17 PROCEDURE — 84484 ASSAY OF TROPONIN QUANT: CPT | Performed by: EMERGENCY MEDICINE

## 2020-08-17 PROCEDURE — 36415 COLL VENOUS BLD VENIPUNCTURE: CPT | Performed by: EMERGENCY MEDICINE

## 2020-08-17 PROCEDURE — 99233 SBSQ HOSP IP/OBS HIGH 50: CPT | Performed by: PHYSICIAN ASSISTANT

## 2020-08-17 PROCEDURE — 82948 REAGENT STRIP/BLOOD GLUCOSE: CPT

## 2020-08-17 PROCEDURE — 99285 EMERGENCY DEPT VISIT HI MDM: CPT | Performed by: EMERGENCY MEDICINE

## 2020-08-17 PROCEDURE — 93010 ELECTROCARDIOGRAM REPORT: CPT

## 2020-08-17 PROCEDURE — 84484 ASSAY OF TROPONIN QUANT: CPT | Performed by: PHYSICIAN ASSISTANT

## 2020-08-17 PROCEDURE — 85730 THROMBOPLASTIN TIME PARTIAL: CPT | Performed by: EMERGENCY MEDICINE

## 2020-08-17 PROCEDURE — 85610 PROTHROMBIN TIME: CPT | Performed by: EMERGENCY MEDICINE

## 2020-08-17 PROCEDURE — 83880 ASSAY OF NATRIURETIC PEPTIDE: CPT | Performed by: EMERGENCY MEDICINE

## 2020-08-17 PROCEDURE — 83605 ASSAY OF LACTIC ACID: CPT | Performed by: EMERGENCY MEDICINE

## 2020-08-17 PROCEDURE — 86140 C-REACTIVE PROTEIN: CPT | Performed by: PHYSICIAN ASSISTANT

## 2020-08-17 PROCEDURE — 85379 FIBRIN DEGRADATION QUANT: CPT | Performed by: EMERGENCY MEDICINE

## 2020-08-17 PROCEDURE — 85025 COMPLETE CBC W/AUTO DIFF WBC: CPT | Performed by: EMERGENCY MEDICINE

## 2020-08-17 PROCEDURE — 96367 TX/PROPH/DG ADDL SEQ IV INF: CPT

## 2020-08-17 PROCEDURE — 84145 PROCALCITONIN (PCT): CPT | Performed by: EMERGENCY MEDICINE

## 2020-08-17 PROCEDURE — 83520 IMMUNOASSAY QUANT NOS NONAB: CPT | Performed by: PHYSICIAN ASSISTANT

## 2020-08-17 PROCEDURE — 80053 COMPREHEN METABOLIC PANEL: CPT | Performed by: EMERGENCY MEDICINE

## 2020-08-17 PROCEDURE — 87635 SARS-COV-2 COVID-19 AMP PRB: CPT | Performed by: EMERGENCY MEDICINE

## 2020-08-17 RX ORDER — HEPARIN SODIUM 10000 [USP'U]/100ML
3-30 INJECTION, SOLUTION INTRAVENOUS
Status: DISCONTINUED | OUTPATIENT
Start: 2020-08-17 | End: 2020-08-19

## 2020-08-17 RX ORDER — MELATONIN
2000 DAILY
Status: DISCONTINUED | OUTPATIENT
Start: 2020-08-18 | End: 2020-08-22

## 2020-08-17 RX ORDER — CALCITRIOL 0.25 UG/1
0.25 CAPSULE, LIQUID FILLED ORAL 3 TIMES WEEKLY
Status: DISCONTINUED | OUTPATIENT
Start: 2020-08-17 | End: 2020-08-22

## 2020-08-17 RX ORDER — FINASTERIDE 5 MG/1
5 TABLET, FILM COATED ORAL DAILY
Status: DISCONTINUED | OUTPATIENT
Start: 2020-08-18 | End: 2020-08-22

## 2020-08-17 RX ORDER — MEMANTINE HYDROCHLORIDE 5 MG/1
10 TABLET ORAL 2 TIMES DAILY
Status: DISCONTINUED | OUTPATIENT
Start: 2020-08-17 | End: 2020-08-17

## 2020-08-17 RX ORDER — AMLODIPINE BESYLATE 5 MG/1
5 TABLET ORAL DAILY
Status: DISCONTINUED | OUTPATIENT
Start: 2020-08-18 | End: 2020-08-18

## 2020-08-17 RX ORDER — CLOPIDOGREL BISULFATE 75 MG/1
75 TABLET ORAL DAILY
Status: DISCONTINUED | OUTPATIENT
Start: 2020-08-18 | End: 2020-08-19

## 2020-08-17 RX ORDER — SODIUM BICARBONATE 650 MG/1
650 TABLET ORAL
Status: DISCONTINUED | OUTPATIENT
Start: 2020-08-17 | End: 2020-08-18

## 2020-08-17 RX ORDER — MEMANTINE HYDROCHLORIDE 5 MG/1
5 TABLET ORAL 2 TIMES DAILY
Status: DISCONTINUED | OUTPATIENT
Start: 2020-08-17 | End: 2020-08-22

## 2020-08-17 RX ORDER — PRAVASTATIN SODIUM 40 MG
40 TABLET ORAL
Status: DISCONTINUED | OUTPATIENT
Start: 2020-08-18 | End: 2020-08-17 | Stop reason: SDUPTHER

## 2020-08-17 RX ORDER — FUROSEMIDE 10 MG/ML
40 INJECTION INTRAMUSCULAR; INTRAVENOUS
Status: DISCONTINUED | OUTPATIENT
Start: 2020-08-17 | End: 2020-08-18

## 2020-08-17 RX ORDER — ASCORBIC ACID 500 MG
1000 TABLET ORAL EVERY 12 HOURS SCHEDULED
Status: DISCONTINUED | OUTPATIENT
Start: 2020-08-17 | End: 2020-08-22

## 2020-08-17 RX ORDER — MULTIVITAMIN/IRON/FOLIC ACID 18MG-0.4MG
1 TABLET ORAL DAILY
Status: DISCONTINUED | OUTPATIENT
Start: 2020-08-25 | End: 2020-08-22

## 2020-08-17 RX ORDER — ZINC SULFATE 50(220)MG
220 CAPSULE ORAL DAILY
Status: DISCONTINUED | OUTPATIENT
Start: 2020-08-18 | End: 2020-08-22

## 2020-08-17 RX ORDER — DEXAMETHASONE SODIUM PHOSPHATE 4 MG/ML
6 INJECTION, SOLUTION INTRA-ARTICULAR; INTRALESIONAL; INTRAMUSCULAR; INTRAVENOUS; SOFT TISSUE EVERY 24 HOURS
Status: DISCONTINUED | OUTPATIENT
Start: 2020-08-17 | End: 2020-08-22

## 2020-08-17 RX ORDER — METHYLPREDNISOLONE SODIUM SUCCINATE 40 MG/ML
40 INJECTION, POWDER, LYOPHILIZED, FOR SOLUTION INTRAMUSCULAR; INTRAVENOUS EVERY 24 HOURS
Status: DISCONTINUED | OUTPATIENT
Start: 2020-08-17 | End: 2020-08-17

## 2020-08-17 RX ORDER — TAMSULOSIN HYDROCHLORIDE 0.4 MG/1
0.4 CAPSULE ORAL
Status: DISCONTINUED | OUTPATIENT
Start: 2020-08-18 | End: 2020-08-22

## 2020-08-17 RX ORDER — ONDANSETRON 2 MG/ML
4 INJECTION INTRAMUSCULAR; INTRAVENOUS EVERY 6 HOURS PRN
Status: DISCONTINUED | OUTPATIENT
Start: 2020-08-17 | End: 2020-08-23 | Stop reason: HOSPADM

## 2020-08-17 RX ORDER — DOXYCYCLINE HYCLATE 100 MG/1
100 CAPSULE ORAL EVERY 12 HOURS SCHEDULED
Status: DISCONTINUED | OUTPATIENT
Start: 2020-08-17 | End: 2020-08-22

## 2020-08-17 RX ORDER — GUAIFENESIN 600 MG
600 TABLET, EXTENDED RELEASE 12 HR ORAL EVERY 12 HOURS SCHEDULED
Status: DISCONTINUED | OUTPATIENT
Start: 2020-08-17 | End: 2020-08-22

## 2020-08-17 RX ORDER — ACETAMINOPHEN 325 MG/1
650 TABLET ORAL EVERY 6 HOURS PRN
Status: DISCONTINUED | OUTPATIENT
Start: 2020-08-17 | End: 2020-08-23 | Stop reason: HOSPADM

## 2020-08-17 RX ORDER — PRIMIDONE 50 MG/1
50 TABLET ORAL DAILY
Status: DISCONTINUED | OUTPATIENT
Start: 2020-08-18 | End: 2020-08-22

## 2020-08-17 RX ORDER — ATORVASTATIN CALCIUM 40 MG/1
40 TABLET, FILM COATED ORAL
Status: DISCONTINUED | OUTPATIENT
Start: 2020-08-17 | End: 2020-08-22

## 2020-08-17 RX ORDER — FLUOXETINE HYDROCHLORIDE 20 MG/1
40 CAPSULE ORAL DAILY
Status: DISCONTINUED | OUTPATIENT
Start: 2020-08-18 | End: 2020-08-22

## 2020-08-17 RX ADMIN — SODIUM BICARBONATE 650 MG TABLET 650 MG: at 20:52

## 2020-08-17 RX ADMIN — HEPARIN SODIUM AND DEXTROSE 18 UNITS/KG/HR: 10000; 5 INJECTION INTRAVENOUS at 17:08

## 2020-08-17 RX ADMIN — MEMANTINE 5 MG: 5 TABLET ORAL at 20:52

## 2020-08-17 RX ADMIN — DEXAMETHASONE SODIUM PHOSPHATE 6 MG: 4 INJECTION, SOLUTION INTRAMUSCULAR; INTRAVENOUS at 21:11

## 2020-08-17 RX ADMIN — CALCITRIOL CAPSULES 0.25 MCG 0.25 MCG: 0.25 CAPSULE ORAL at 21:44

## 2020-08-17 RX ADMIN — INSULIN LISPRO 3 UNITS: 100 INJECTION, SOLUTION INTRAVENOUS; SUBCUTANEOUS at 21:44

## 2020-08-17 RX ADMIN — OXYCODONE HYDROCHLORIDE AND ACETAMINOPHEN 1000 MG: 500 TABLET ORAL at 20:53

## 2020-08-17 RX ADMIN — DOXYCYCLINE 100 MG: 100 CAPSULE ORAL at 20:53

## 2020-08-17 RX ADMIN — ATORVASTATIN CALCIUM 40 MG: 40 TABLET, FILM COATED ORAL at 21:11

## 2020-08-17 RX ADMIN — CEFEPIME HYDROCHLORIDE 2000 MG: 2 INJECTION, POWDER, FOR SOLUTION INTRAVENOUS at 14:36

## 2020-08-17 RX ADMIN — FUROSEMIDE 40 MG: 10 INJECTION, SOLUTION INTRAMUSCULAR; INTRAVENOUS at 21:07

## 2020-08-17 RX ADMIN — GUAIFENESIN 600 MG: 600 TABLET ORAL at 20:53

## 2020-08-17 RX ADMIN — CEFTRIAXONE 2000 MG: 2 INJECTION, POWDER, FOR SOLUTION INTRAMUSCULAR; INTRAVENOUS at 21:49

## 2020-08-17 RX ADMIN — VANCOMYCIN HYDROCHLORIDE 1500 MG: 1 INJECTION, POWDER, LYOPHILIZED, FOR SOLUTION INTRAVENOUS at 15:12

## 2020-08-17 NOTE — ED NOTES
Dr Irene Stanford aware of pts bumped lactic and that he meets sepsis criteria  Per DR Rodney we will  NOT be administering fluids at 30ml/kg        Isidra Whitmore RN  08/17/20 1037

## 2020-08-17 NOTE — ED NOTES
Pt gave verbal consent to update daughter [facility], [facility]  08/17/20 3241

## 2020-08-17 NOTE — H&P
H&P- Zahraa Wilkins 6/28/1930, 80 y o  male MRN: [id number]    Unit/Bed#: [facility] 87 [id number] Encounter: [id number]    Primary Care Provider: Adalberto Tatum MD   Date and time admitted to hospital: 8/17/2020  1:55 PM        * Multifocal pneumonia  Assessment & Plan  Presenting after being found to be hypoxic at nursing home  Chest x-ray with multifocal bilateral consolidation and ground-glass opacity  Nodular areas are more suggestive of pneumonia then edema according to chest x-ray read  Initial COVID swab negative  However with reported positive COVID exposure at nursing home  Will reswab COVID in the morning  Currently requiring 6 L nasal cannula to maintain saturations  Follow moderate COVID algorithm  Troponin elevated at 0 13  BNP elevated at 41,086  D-dimer elevated at 3 23  Transaminitis- , ALT 83, alk-phos 251    Check CRP, ferritin, interleukin 6  Continue with IV ceftriaxone  Already on doxycycline 100 mg p o  Q 12 for osteomyelitis of foot  Obtain procalcitonin and a m  Procalcitonin  Start vitamin D3, vitamin-C, zinc, atorvastatin  Start on IV steroids- dexamethasone 6 mg daily times 10 days  Started on anticoagulation-intermediate intensity with heparin drip   Consult pulmonary per pathway    Acute on chronic diastolic congestive heart failure [facility])  Assessment & Plan  Presenting with elevated BNP 41,086  Echocardiogram from 2017 with abnormal left ventricular relaxation- grade 1 diastolic dysfunction  Maintained on Lasix 40 mg daily  With crackles here on exam   Started on IV Lasix 40 mg b i d   Monitor I&Os and daily weights    Cardiac diet    Osteomyelitis of foot ([facility] )  Assessment & Plan  Recent hospital admission to [facility] from 7/21/20 to 8/7/20 for left foot osteomyelitis  Patient started on oral doxycycline noon for indefinite suppression    SALO (acute kidney injury) ([facility] )  Assessment & Plan  SALO on CKD stage 4  Presenting with creatinine of 3 8  Baseline appears to be around 2 8  Monitor in the setting of IV diuresis    Acute respiratory failure with hypoxia [facility])  Assessment & Plan  Presenting with hypoxia  Likely secondary to pneumonia vs CHF vs COVID  Requiring 6 L oxygen nasal cannula  Pre-hospital not on oxygen  Atrial fibrillation   Assessment & Plan  Anticoagulated on Coumadin  INR 2 82 on admission  Coumadin was held on admission  Patient was started on heparin drip for COVID pathway    Peripheral arterial disease [facility])  Assessment & Plan  Evaluated at [facility]  During recent hospitalization end of July 2020  Left lower extremity angiogram with completely occluded above the knee to below the knee bypass  Likely chronically occluded  Not a candidate for 3rd time redo bypass due to age, comorbidities, and patient wishes  Patient at that time did not want any further amputation or interventions  Maintained on Coumadin, Plavix, statin  Transaminitis  Assessment & Plan  With transaminitis- , ALT 83, alk phos 251  Likely related to above  Will continue to trend  Essential hypertension  Assessment & Plan  Home regimen amlodipine 5 mg daily  Continue here  Diabetes mellitus type 2  Assessment & Plan  Lab Results   Component Value Date    HGBA1C 7 1 (H) 07/22/2020   Hold oral antihyperglycemics-glipizide  Placed on insulin sliding scale  VTE Prophylaxis: Heparin Drip  / sequential compression device   Code Status: full code  Anticipated Length of Stay:  Patient will be admitted on an Inpatient basis with an anticipated length of stay of  Greater than 2 midnights   Justification for Hospital Stay:  Multifocal pneumonia with need for IV antibiotics and further workup    Chief Complaint:   Shortness of breath / hypoxia at nursing home    History of Present Illness:    William Lainez is a 80 y o  male with past medical history of chronic osteomyelitis, anemia, CKD stage 4, atrial fibrillation, peripheral arterial disease, type 2 diabetes, essential hypertension,     He presents from  nursing home in [facility] with complaint of shortness of breath  Patient is a very poor historian and unable to provide details about his past medical history or current medical condition  While at [facility] he was noted to have hypoxia with O2 saturation dipping to high 80s to 90s  He is not normally on oxygen  Currently denies any chest pain, chest pressure  Patient became quite agitated upon asking questions  Review of Systems:    General:   No Fever or chills; No significant weight loss or gain  EENT:   No ear pain, facial swelling; No sneezing, sore throat  Skin:   No rashes, color changes  Respiratory:     No shortness of breath, cough, wheezing, stridor  Cardiovascular:     No chest pain, palpitations  Gastrointestinal:    No nausea, vomiting, diarrhea; No abdominal pain  Musculoskeletal:     No arthralgias, myalgias, swelling  Neurologic:   No dizziness, numbness, weakness  No speech difficulties  Psych:   No agitation, suicidal ideations      Otherwise, All other twelve-point review of systems normal    Unreliable historian    Past Medical and Surgical History:     Past Medical History:   Diagnosis Date    [facility] )     Alzheimer's disease ([facility] )     Depression     Diabetes mellitus ([facility] )     Anderson catheter in place     History of atrial fibrillation     History of chronic kidney disease     History of peripheral vascular disease     Hyperlipidemia     Hypertension     Kidney disease     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[facility] )     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[facility] )     Memory loss     Osteomyelitis of right foot ([facility] )     Renal disorder        Past Surgical History:   Procedure Laterality Date    APPENDECTOMY  1945    FEMORAL ARTERY - TIBIAL ARTERY BYPASS GRAFT Right 01/08/2014    R SFA- PT w/ nonrev GSV, PreVena VAC- Balshi    FEMORAL ARTERY - TIBIAL ARTERY BYPASS GRAFT Left 06/04/2015    L SFA- PT w/ Cryovein- Ivan/ Balshi    FOOT SURGERY  06/14/2013    I&D with vac    IR LOWER EXTREMITY / INTERVENTION  7/24/2020    WOUND DEBRIDEMENT Right 03/07/2014    R thigh wound washout, [facility]- [facility]       Meds/Allergies:    Current Facility-Administered Medications   Medication Dose Route Frequency Provider Last Rate Last Dose    acetaminophen (TYLENOL) tablet 650 mg  650 mg Oral Q6H PRN Mary Beth Us PA-C        [START ON 8/18/2020] amLODIPine (NORVASC) tablet 5 mg  5 mg Oral Daily Malena Ramirez PA-C        ascorbic acid (VITAMIN C) tablet 1,000 mg  1,000 mg Oral Q12H [facility] VINOD SchusterC   1,000 mg at 08/17/20 2053    atorvastatin (LIPITOR) tablet 40 mg  40 mg Oral HS Malena Ramirez PA-C   40 mg at 08/17/20 2111    calcitriol (ROCALTROL) capsule 0 25 mcg  0 25 mcg Oral Once per day on Mon Wed Fri VINOD SchusterC   0 25 mcg at 08/17/20 2144    cefTRIAXone (ROCEPHIN) 2,000 mg in dextrose 5 % 50 mL IVPB  2,000 mg Intravenous Q24H Malena Ramirez PA-C        [START ON 8/18/2020] cholecalciferol (VITAMIN D3) tablet 2,000 Units  2,000 Units Oral Daily Malena Ramirez PA-C        [START ON 8/18/2020] clopidogrel (PLAVIX) tablet 75 mg  75 mg Oral Daily Malena Ramirez PA-C        dexamethasone (DECADRON) injection 6 mg  6 mg Intravenous Q24H VINOD SchusterC   6 mg at 08/17/20 2111    doxycycline hyclate (VIBRAMYCIN) capsule 100 mg  100 mg Oral Q12H [facility] VINOD SchusterC   100 mg at 08/17/20 2053    [START ON 8/18/2020] finasteride (PROSCAR) tablet 5 mg  5 mg Oral Daily Malena Ramirez PA-C        [START ON 8/18/2020] FLUoxetine (PROzac) capsule 40 mg  40 mg Oral Daily aMlena Ramirez PA-C        furosemide (LASIX) injection 40 mg  40 mg Intravenous BID (diuretic) Mary Beth Us PA-C   40 mg at 08/17/20 2107    guaiFENesin (MUCINEX) 12 hr tablet 600 mg  600 mg Oral Q12H [facility] Malena Ramirez PA-C   600 mg at 08/17/20 2053    heparin (porcine) 25,000 units in 250 mL infusion (premix)  3-30 Units/kg/hr (Order-Specific) Intravenous Titrated Maty Bacon PA-C 18 9 mL/hr at 08/17/20 1708 18 Units/kg/hr at 08/17/20 1708    [START ON 8/18/2020] insulin lispro (HumaLOG) 100 units/mL subcutaneous injection 1-6 Units  1-6 Units Subcutaneous TID AC Malena Ramirez PA-C        insulin lispro (HumaLOG) 100 units/mL subcutaneous injection 1-6 Units  1-6 Units Subcutaneous HS Maty Bacon PA-C   3 Units at 08/17/20 2144    memantine (NAMENDA) tablet 5 mg  5 mg Oral BID Maty Bacon PA-C   5 mg at 08/17/20 2052    [START ON 8/18/2020] zinc sulfate (ZINCATE) capsule 220 mg  220 mg Oral Daily Malena Ramirez PA-C        Followed by   Patel James ON 8/25/2020] multivitamin-minerals (CENTRUM ADULTS) tablet 1 tablet  1 tablet Oral Daily Malena Ramirez PA-C        ondansetron (ZOFRAN) injection 4 mg  4 mg Intravenous Q6H PRN Maty Bacon PA-C        [START ON 8/18/2020] primidone (MYSOLINE) tablet 50 mg  50 mg Oral Daily Malena Ramirez PA-C        sodium bicarbonate tablet 650 mg  650 mg Oral BID after meals Malena Ramirez PA-C   650 mg at 08/17/20 2052    [START ON 8/18/2020] tamsulosin (FLOMAX) capsule 0 4 mg  0 4 mg Oral Daily With [email or web address]           Allergies   Allergen Reactions    Metoprolol Bradycardia    Unasyn [Ampicillin-Sulbactam Sodium] Rash       Allergies: Allergies   Allergen Reactions    Metoprolol Bradycardia    Unasyn [Ampicillin-Sulbactam Sodium] Rash       Social History:     Marital Status:       Substance Use History:   Social History     Substance and Sexual Activity   Alcohol Use Yes    Comment: one beer on his birthday     Social History     Tobacco Use   Smoking Status Never Smoker   Smokeless Tobacco Never Used     Social History     Substance and Sexual Activity   Drug Use No       Family History:    non-contributory    Physical Exam:     Vitals:   Blood Pressure: 121/73 (08/17/20 4633)  Pulse: 89 (08/17/20 2058)  Temperature: (!) 97 2 °F (36 2 °C) (08/17/20 1410)  Temp Source: Temporal (08/17/20 1410)  Respirations: 22 (08/17/20 1410)  Height: 6' (182 9 cm) (08/17/20 1410)  Weight - Scale: 105 kg (232 lb 2 3 oz) (08/17/20 1410)  SpO2: (!) 87 % (08/17/20 2058)    Physical Exam  Vitals signs and nursing note reviewed  Constitutional:       General: He is not in acute distress  Appearance: He is obese  He is toxic-appearing  HENT:      Head: Normocephalic and atraumatic  Nose: Nose normal    Cardiovascular:      Rate and Rhythm: Normal rate and regular rhythm  Pulmonary:      Effort: No respiratory distress  Breath sounds: No stridor  Comments: Coarse breath sounds bilaterally  With crackles in bases bilaterally  Currently on 6 L oxygen nasal cannula  Abdominal:      General: Bowel sounds are normal       Palpations: Abdomen is soft  Neurological:      Comments: Oriented to person and place   Psychiatric:         Mood and Affect: Mood normal          Additional Data:     Lab Results: I have personally reviewed pertinent reports  Results from last 7 days   Lab Units 08/17/20  1417   WBC Thousand/uL 15 55*   HEMOGLOBIN g/dL 7 8*   HEMATOCRIT % 26 1*   PLATELETS Thousands/uL 340   NEUTROS PCT % 82*   LYMPHS PCT % 8*   MONOS PCT % 9   EOS PCT % 0     Results from last 7 days   Lab Units 08/17/20  1418   POTASSIUM mmol/L 4 9   CHLORIDE mmol/L 98*   CO2 mmol/L 16*   BUN mg/dL 100*   CREATININE mg/dL 3 87*   CALCIUM mg/dL 8 1*   ALK PHOS U/L 251*   ALT U/L 83*   AST U/L 126*     Results from last 7 days   Lab Units 08/17/20  1418   INR  2 82*       Imaging: I have personally reviewed pertinent reports  Xr Chest 1 View Portable    Result Date: 8/17/2020  Narrative: CHEST INDICATION:   SOB/hypoxia  COMPARISON:  Chest radiograph from 8/27/2017  EXAM PERFORMED/VIEWS:  XR CHEST PORTABLE FINDINGS: Cardiomediastinal silhouette appears unremarkable  Multifocal bilateral consolidation and groundglass opacity  No definite effusion  No pneumothorax   Osseous structures appear within normal limits for patient age  Impression: Multifocal bilateral consolidation and groundglass opacity  The nodular areas are more suggestive of pneumonia than edema  Workstation performed: [id number]     Xr Foot 3+ Views Left    Result Date: 7/21/2020  Narrative: LEFT FOOT INDICATION:   foot wound  COMPARISON:  5/28/2015  VIEWS:  XR FOOT 3+ VW LEFT FINDINGS: There is no acute fracture or dislocation  Calcaneal spur(s) noted  No lytic or blastic osseous lesion  Soft tissues are unremarkable  Impression: No acute osseous abnormality  Workstation performed: [id number]     Mri Ankle/heel Left  Wo Contrast    Result Date: 7/27/2020  Narrative: MRI LEFT ANKLE - WITHOUT CONTRAST INDICATION:   Evaluate left calcaneus for osteomyelitis  COMPARISON:  Left foot radiographs 7/21/2020 and left ankle MRI 2/16/2020 TECHNIQUE:   MR sequences were obtained of the left ankle including: Localizers, coronal STIR, coronal T1, axial T2 fat sat, axial PD, sagittal T2 fat sat, sagittal T1 sequences were obtained  Gadolinium was not used  FINDINGS: SUBCUTANEOUS TISSUES: Plantar hindfoot midline soft tissue wound/ulcer extends to the underlying plantar cortex of the calcaneus similar to February 2020  Regional plantar subcutaneous edema extending superiorly above the level of the ankle and distally through the dorsal midfoot outside the field-of-view more prominent since February 2020  No loculated fluid collection  JOINT EFFUSION: None  TENDONS: Achilles tendon: Trace Achilles insertional enthesopathy without associated edema similar to February 2020  Peroneus brevis and longus: Normal  Posterior tibialis, flexor digitorum longus, flexor hallucis longus: Normal  Anterior tibialis, extensor digitorum longus, extensor hallucis longus:  Normal  LIGAMENTS: Lateral collateral ligament complex:  Normal  Distal tibiofibular syndesmosis:  Normal  Medial collateral ligament complex:  Normal  PLANTAR FASCIA:  Intact   Thickening of the medial cord origin attributed to chronic plantar fasciitis  ARTICULAR SURFACES:  Normal  SINUS TARSI:  Normal  Tarsal Tunnel: Unremarkable  BONES:  Trace subcortical T2 hyperintensity in the plantar calcaneus deep to the soft tissue wound/ulcer images 13 and 14 series 8 and image 15 series 9  There is trace corresponding ill-defined T1 hyperintensity  Degree of edema is better than what was present for every 2020  No fracture or dislocation  Small plantar calcaneal and retrocalcaneal spurs  MUSCULATURE:  Plantar hindfoot fatty muscle atrophy  Impression: Plantar hindfoot midline soft tissue wound/ulcer extending to the underlying plantar cortex of the calcaneus similar to February 2020  Hindfoot subcutaneous edema extending superiorly and distally outside the field-of-view may represent cellulitis in the appropriate clinical context  This is more prominent compared to February 2020  No loculated fluid collection to suggest abscess  Trace plantar calcaneal subcortical edema with minimal ill-defined T1 hyperintensity suspicious for mild osteomyelitis  Findings are less prominent compared to February 2020  This study demonstrates a significant  finding and was documented as such in [facility] for liaison and referring practitioner notification  Workstation performed: [id number]     Ir Lower Extremity / Intervention    Result Date: 7/24/2020  Narrative: Abdominal aortogram with left lower extremity arteriography Selective angiograms of the left superficial femoral artery Attempted recanalization of left superficial femoral artery bypass origin Ultrasound-guided access to the right common femoral artery History: Left lower extremity critical limb ischemia with nonhealing foot wound  Complex vascular history including endovascular revascularization of the left lower extremity in May 2015, followed by left lower extremity above the knee to below-knee bypass in June 2015   Apparently his wound has been present for several years, although he can walk several blocks  Recent duplex is suspicious for proximal bypass stenosis  Chronic renal failure  Contrast: Carbon dioxide used to limit iodinated contrast, followed by 30 cc Visipaque Fluoro time: 11 9 minutes Number of Images: 500 Conscious sedation time: 2 5 hours Technique: The patient was brought to the interventional radiology holding area and identified verbally and by wristband  After discussion of the procedure and its details, risks, benefits, and alternatives both verbal and written informed consent were obtained from the patient in person and his daughter via telephone  The patient was then brought to the angiography suite, placed supine on the table, and the right groin was prepped and draped in the usual sterile fashion  All elements of maximal sterile barrier technique were followed (cap, mask, sterile gown, sterile gloves, large sterile sheet, hand hygiene and 2% chlorhexidine for cutaneous antisepsis)  Sterile ultrasound technique with sterile gel and sterile probe covers was also utilized  A preprocedure timeout was performed  Fluoroscopic imaging was used to identify the femoral head  Under ultrasound, the right common femoral artery was identified  Under ultrasound guidance, lidocaine was administered to the skin and underlying soft tissues  A small skin incision was made  Under continuous ultrasound guidance, the right common femoral artery was punctured in retrograde fashion with return of pulsatile red blood  A static ultrasound image was saved  A microwire was advanced through the needle into the abdominal aorta under fluoroscopy  The microneedle was exchanged for micropuncture sheath, and the wire and inner dilator were removed  Next, a [place] wire was advanced into the abdominal aorta over which a 5 Western [place] vascular sheath was inserted and connected to a flush bag   A 4 Arabic Omni Flush catheter was advanced to the level of T12 and a carbon dioxide digital subtraction abdominal aortogram was performed  The catheter was then pulled down to just above the iliac bifurcation and pelvic arteriography was performed with carbon dioxide  A C2 catheter was then utilized to cross the bifurcation and placed in the left common femoral artery  Carbon dioxide angiography was performed of the left lower extremity to the foot  Decision was made to attempt to probe the proximal bypass  Over an [facility] advantage a 6 Western [place] 65 cm destination sheath was advanced up and over the bifurcation  Contrast was used to map the SFA origin which was then accessed and the sheath inserted into the proximal SFA  Using a Glidewire and angled catheter the occluded of the bypass was probed  Tough tissue was encountered  Further probing was performed with an RADHA catheter and a stiff Glidewire which eventually resulted in access outside the artery and a small temporary AV fistula  These abated on follow-up imaging  Final imaging was then performed with limited contrast to the foot  All wires catheters and the sheath were then removed  A pro glide was used to achieve hemostasis in the artery  Puncture site was then cleaned and sterilely dressed  The patient tolerated the procedure well and there were no immediate complications  Findings: Carbon dioxide angiography revealing a wide, splayed aortic bifurcation  The abdominal aorta is widely patent  The bilateral common iliac, external iliac, and internal iliac arteries are widely patent  Bilateral common femoral arteries are patent  Puncture site amenable for device closure  Proximal right SFA and profunda are patent  On the left, the common femoral artery is patent  The profunda and its origin are patent  The proximal SFA is patent  The mid SFA occludes at the level of previously placed stents  No native popliteal artery is identified  A small segment of tibioperoneal trunk is identified   The posterior tibial artery is patent from the upper calf through the foot  There is anatomic abnormality at the site of what is probably the prior bypass distal anastomosis with what appears to been interruption of the  artery at this level  There is an irregularity of the wall of the native superficial femoral artery in the thigh compatible with the proximal anastomosis of the prior bypass  Large collaterals arise from the native SFA including a collateral that courses down the leg arising approximately 2 cm after the stent origin  This is narrowed at its proximal aspect probably as a result of stent struts  I suspect that this was identified as the "bypass"on duplex  This large collateral eventually supplies several meandering branches that reconstitute the posterior tibial artery  Interval imaging reveals wire and catheter probing the prior anastomosis  Final imaging without extravasation or perforation and unchanged distal collaterals  Patent right common femoral artery by ultrasound  Impression: Impression: Left lower extremity runoff with a completely occluded above-the-knee to below-knee bypass  Widely patent inflow to the leg  Given the angiographic appearance and difficulty probing with a catheter and wire I suspect the bypass has been occluded for a long time  Single vessel posterior tibial runoff is unchanged from 2015, but now supplied indirectly by collaterals arising from the stented native SFA  Plan: Assess for future interventions including repeat bypass or repeat endovascular attempt  If repeat bypass is not option, one consideration would be native recanalization from the posterior tibial artery, however the area of interruption at the prior bypass may preclude direct wire passage, and no opacification of the native popliteal artery is identified whatsoever  If the patient returns for endovascular intervention anesthesia support is recommended as he was unable to tolerate prolonged periods on his back   Findings discussed with the vascular surgery team directly  Workstation performed: [id number]     Vas Lower Limb Arterial Duplex, Complete Bilateral    Result Date: 7/23/2020  Narrative:  [facility] REPORT CLINICAL: Indications: Patient presents with an ulcer on his left lateral forefoot and heel which started over 2 years ago but has become worse in the past month or two  Operative History: 2015-05-29 Popliteal A stent 2014-05-02 PTA right graft with angiosculpt 2014-03-07 Right Vac Dressing 2014-01-08 Right Femoro_distal posterior tibial bypass Greater saphenous 2013-10-30 Right SAA3  Aortogram with run off 2012-08-17 Right SAA3  Aortogram with run off 2012 right fem-PTA graft 2014 Left fem-PTA BPG Risk Factors The patient has history of HTN, Diabetes, HLD, CKD, PAD, AFIB, and Venous stasis dermatitis  The patient's current BMI is 26 85, Weight is 198 lb and height is 71 in  Clinical Right Pressure: 130/ mm Hg, Left Pressure: IV site    FINDINGS:  Segment                Right                        Left                                          Impression  PSV (cm/s)  EDV  Impression  PSV (cm/s)  Inflow Anastomosis                         43                           67  Mid Thigh (Graft)                          34                               Low Thigh (Graft)                          49                          118  Near Knee (Graft)                          47                           84  Mid Calf (Graft)                           48                           58  High Calf (Graft)      >75%               441                          124  Low Calf (Graft)                           43                           53  Outflow Anastomosis                        43                           26  Common Femoral Artery                      84                          114  Prox Profunda                             121                          130  Prox SFA                                   59                           60  Mid SFA 42                          111  Dist SFA - Stent       Occluded             0       Occluded             0  Proximal Pop - Stent   Occluded             0       Occluded             0  Distal Pop             Occluded             0    0                          Distal Pop - Stent                                  Occluded             0  Dist Post Tibial                           40                           81     CONCLUSION:  Impression: RIGHT LOWER LIMB: Evidence of a 50-75% stenosis noted in the proximal to mid native superficial femoral artery  Evidence of a >75% stenosis noted in the proximal calf bypass graft  There appears to be a high grade stenosis vs occlusion of the distal superficial femoral artery/stent  Ankle/Brachial index: 1 2 which may be unreliable secondary to multiple collaterals noted, (Prior: 0 67)  PVR/ PPG tracings are dampened  Metatarsal pressure of 88 mm Hg, (Prior: 116 mm Hg)  Great toe pressure of 52 mm Hg, within the healing range for a diabetic, (Prior: 46 mm Hg)  LEFT LOWER LIMB: Findings suggest a patent distal superficial femoral to posterior tibial artery bypass graft with low flow noted at the distal anastomosis, Known occlusion of the distal superficial femoral and popliteal artery stent   Ankle/Brachial index: 0 78 which may be unreliable secondary to multiple collaterals noted, (Prior: 0 37)  PVR/ PPG tracings are dampened  Metatarsal pressure of 86 mm Hg, (Prior: 65 mm Hg)  Great toe pressure of 32 mm Hg, below the healing range for a diabetic, (Prior: 27 mm Hg)  Tech note: This exam was very technically limited due to patient's constant leg movements  Alternative imaging maybe suggested  Compared to previous study on 1/20/2020, there is an increase in the left LILIAN and great toe pressures, and there is a bypass graft noted in the left leg    SIGNATURE: Electronically Signed by: Yadira Lee on 2020-07-23 03:34:39 PM      EKG, Pathology, and Other Studies Reviewed on Admission:   · EKG:  Please refer to chart    Allscripts Records Reviewed: Yes     Total Time for Visit, including Counseling / Coordination of Care: 45 minutes  Greater than 50% of this total time spent on direct patient counseling and coordination of care  ** Please Note: This note has been constructed using a voice recognition system   **

## 2020-08-18 ENCOUNTER — APPOINTMENT (INPATIENT)
Dept: NON INVASIVE DIAGNOSTICS | Facility: HOSPITAL | Age: 85
DRG: 177 | End: 2020-08-18
Payer: MEDICARE

## 2020-08-18 PROBLEM — E87.2 ACIDOSIS: Status: ACTIVE | Noted: 2020-08-18

## 2020-08-18 LAB
ALBUMIN SERPL BCP-MCNC: 2.2 G/DL (ref 3.5–5)
ALP SERPL-CCNC: 257 U/L (ref 46–116)
ALT SERPL W P-5'-P-CCNC: 66 U/L (ref 12–78)
AMORPH URATE CRY URNS QL MICRO: ABNORMAL /HPF
ANION GAP SERPL CALCULATED.3IONS-SCNC: 19 MMOL/L (ref 4–13)
APTT PPP: 107 SECONDS (ref 23–37)
APTT PPP: 199 SECONDS (ref 23–37)
APTT PPP: >210 SECONDS (ref 23–37)
AST SERPL W P-5'-P-CCNC: 64 U/L (ref 5–45)
BACTERIA UR QL AUTO: ABNORMAL /HPF
BASE EXCESS BLDA CALC-SCNC: -10.9 MMOL/L
BASOPHILS # BLD AUTO: 0.03 THOUSANDS/ΜL (ref 0–0.1)
BASOPHILS NFR BLD AUTO: 0 % (ref 0–1)
BILIRUB SERPL-MCNC: 0.32 MG/DL (ref 0.2–1)
BILIRUB UR QL STRIP: NEGATIVE
BUN SERPL-MCNC: 108 MG/DL (ref 5–25)
CALCIUM SERPL-MCNC: 8.1 MG/DL (ref 8.3–10.1)
CHLORIDE SERPL-SCNC: 99 MMOL/L (ref 100–108)
CHLORIDE UR-SCNC: 64 MMOL/L
CLARITY UR: ABNORMAL
CO2 SERPL-SCNC: 14 MMOL/L (ref 21–32)
COARSE GRAN CASTS URNS QL MICRO: ABNORMAL /LPF
COLOR UR: YELLOW
CREAT SERPL-MCNC: 3.82 MG/DL (ref 0.6–1.3)
CREAT UR-MCNC: 46.8 MG/DL
CRP SERPL QL: 366.4 MG/L
D DIMER PPP FEU-MCNC: 3.78 UG/ML FEU
EOSINOPHIL # BLD AUTO: 0 THOUSAND/ΜL (ref 0–0.61)
EOSINOPHIL NFR BLD AUTO: 0 % (ref 0–6)
ERYTHROCYTE [DISTWIDTH] IN BLOOD BY AUTOMATED COUNT: 15.5 % (ref 11.6–15.1)
FERRITIN SERPL-MCNC: 332 NG/ML (ref 8–388)
FLUAV RNA NPH QL NAA+PROBE: NORMAL
FLUBV RNA NPH QL NAA+PROBE: NORMAL
GFR SERPL CREATININE-BSD FRML MDRD: 13 ML/MIN/1.73SQ M
GLUCOSE SERPL-MCNC: 191 MG/DL (ref 65–140)
GLUCOSE SERPL-MCNC: 195 MG/DL (ref 65–140)
GLUCOSE SERPL-MCNC: 199 MG/DL (ref 65–140)
GLUCOSE SERPL-MCNC: 202 MG/DL (ref 65–140)
GLUCOSE SERPL-MCNC: 205 MG/DL (ref 65–140)
GLUCOSE UR STRIP-MCNC: NEGATIVE MG/DL
HCO3 BLDA-SCNC: 13.1 MMOL/L (ref 22–28)
HCT VFR BLD AUTO: 23.7 % (ref 36.5–49.3)
HGB BLD-MCNC: 7.2 G/DL (ref 12–17)
HGB UR QL STRIP.AUTO: ABNORMAL
IMM GRANULOCYTES # BLD AUTO: 0.18 THOUSAND/UL (ref 0–0.2)
IMM GRANULOCYTES NFR BLD AUTO: 1 % (ref 0–2)
KETONES UR STRIP-MCNC: NEGATIVE MG/DL
L PNEUMO1 AG UR QL IA.RAPID: NEGATIVE
LEUKOCYTE ESTERASE UR QL STRIP: NEGATIVE
LYMPHOCYTES # BLD AUTO: 0.76 THOUSANDS/ΜL (ref 0.6–4.47)
LYMPHOCYTES NFR BLD AUTO: 5 % (ref 14–44)
MCH RBC QN AUTO: 28.8 PG (ref 26.8–34.3)
MCHC RBC AUTO-ENTMCNC: 30.4 G/DL (ref 31.4–37.4)
MCV RBC AUTO: 95 FL (ref 82–98)
MONOCYTES # BLD AUTO: 1 THOUSAND/ΜL (ref 0.17–1.22)
MONOCYTES NFR BLD AUTO: 7 % (ref 4–12)
NASAL CANNULA: 6
NEUTROPHILS # BLD AUTO: 13.39 THOUSANDS/ΜL (ref 1.85–7.62)
NEUTS SEG NFR BLD AUTO: 87 % (ref 43–75)
NITRITE UR QL STRIP: NEGATIVE
NON-SQ EPI CELLS URNS QL MICRO: ABNORMAL /HPF
NRBC BLD AUTO-RTO: 0 /100 WBCS
O2 CT BLDA-SCNC: 9.6 ML/DL (ref 16–23)
OXYHGB MFR BLDA: 90.8 % (ref 94–97)
PCO2 BLDA: 23 MM HG (ref 36–44)
PH BLDA: 7.37 [PH] (ref 7.35–7.45)
PH UR STRIP.AUTO: 5 [PH]
PLATELET # BLD AUTO: 305 THOUSANDS/UL (ref 149–390)
PMV BLD AUTO: 10.2 FL (ref 8.9–12.7)
PO2 BLDA: 68.2 MM HG (ref 75–129)
POTASSIUM SERPL-SCNC: 5 MMOL/L (ref 3.5–5.3)
PROCALCITONIN SERPL-MCNC: 1.62 NG/ML
PROT SERPL-MCNC: 7.4 G/DL (ref 6.4–8.2)
PROT UR STRIP-MCNC: ABNORMAL MG/DL
RBC # BLD AUTO: 2.5 MILLION/UL (ref 3.88–5.62)
RBC #/AREA URNS AUTO: ABNORMAL /HPF
RSV RNA NPH QL NAA+PROBE: NORMAL
S PNEUM AG UR QL: NEGATIVE
SARS-COV-2 RNA RESP QL NAA+PROBE: NEGATIVE
SODIUM 24H UR-SCNC: 63 MOL/L
SODIUM SERPL-SCNC: 132 MMOL/L (ref 136–145)
SP GR UR STRIP.AUTO: 1.02 (ref 1–1.03)
SPECIMEN SOURCE: ABNORMAL
TROPONIN I SERPL-MCNC: 0.06 NG/ML
UROBILINOGEN UR QL STRIP.AUTO: 0.2 E.U./DL
UUN 24H UR-MCNC: 551 MG/DL
WBC # BLD AUTO: 15.36 THOUSAND/UL (ref 4.31–10.16)
WBC #/AREA URNS AUTO: ABNORMAL /HPF

## 2020-08-18 PROCEDURE — 82570 ASSAY OF URINE CREATININE: CPT | Performed by: INTERNAL MEDICINE

## 2020-08-18 PROCEDURE — 80053 COMPREHEN METABOLIC PANEL: CPT | Performed by: PHYSICIAN ASSISTANT

## 2020-08-18 PROCEDURE — 87449 NOS EACH ORGANISM AG IA: CPT | Performed by: PHYSICIAN ASSISTANT

## 2020-08-18 PROCEDURE — 92610 EVALUATE SWALLOWING FUNCTION: CPT

## 2020-08-18 PROCEDURE — 87635 SARS-COV-2 COVID-19 AMP PRB: CPT | Performed by: PHYSICIAN ASSISTANT

## 2020-08-18 PROCEDURE — 87631 RESP VIRUS 3-5 TARGETS: CPT | Performed by: PHYSICIAN ASSISTANT

## 2020-08-18 PROCEDURE — 36600 WITHDRAWAL OF ARTERIAL BLOOD: CPT

## 2020-08-18 PROCEDURE — 99223 1ST HOSP IP/OBS HIGH 75: CPT | Performed by: INTERNAL MEDICINE

## 2020-08-18 PROCEDURE — 84300 ASSAY OF URINE SODIUM: CPT | Performed by: INTERNAL MEDICINE

## 2020-08-18 PROCEDURE — 97163 PT EVAL HIGH COMPLEX 45 MIN: CPT

## 2020-08-18 PROCEDURE — 85730 THROMBOPLASTIN TIME PARTIAL: CPT | Performed by: INTERNAL MEDICINE

## 2020-08-18 PROCEDURE — 97167 OT EVAL HIGH COMPLEX 60 MIN: CPT

## 2020-08-18 PROCEDURE — 87581 M.PNEUMON DNA AMP PROBE: CPT | Performed by: PHYSICIAN ASSISTANT

## 2020-08-18 PROCEDURE — C8929 TTE W OR WO FOL WCON,DOPPLER: HCPCS

## 2020-08-18 PROCEDURE — 82805 BLOOD GASES W/O2 SATURATION: CPT | Performed by: INTERNAL MEDICINE

## 2020-08-18 PROCEDURE — 84540 ASSAY OF URINE/UREA-N: CPT | Performed by: INTERNAL MEDICINE

## 2020-08-18 PROCEDURE — 82436 ASSAY OF URINE CHLORIDE: CPT | Performed by: INTERNAL MEDICINE

## 2020-08-18 PROCEDURE — 81001 URINALYSIS AUTO W/SCOPE: CPT | Performed by: PHYSICIAN ASSISTANT

## 2020-08-18 PROCEDURE — 87486 CHLMYD PNEUM DNA AMP PROBE: CPT | Performed by: PHYSICIAN ASSISTANT

## 2020-08-18 PROCEDURE — 87633 RESP VIRUS 12-25 TARGETS: CPT | Performed by: PHYSICIAN ASSISTANT

## 2020-08-18 PROCEDURE — 87798 DETECT AGENT NOS DNA AMP: CPT | Performed by: PHYSICIAN ASSISTANT

## 2020-08-18 PROCEDURE — 99232 SBSQ HOSP IP/OBS MODERATE 35: CPT | Performed by: INTERNAL MEDICINE

## 2020-08-18 PROCEDURE — 85025 COMPLETE CBC W/AUTO DIFF WBC: CPT | Performed by: PHYSICIAN ASSISTANT

## 2020-08-18 PROCEDURE — 84145 PROCALCITONIN (PCT): CPT | Performed by: PHYSICIAN ASSISTANT

## 2020-08-18 PROCEDURE — 93306 TTE W/DOPPLER COMPLETE: CPT | Performed by: INTERNAL MEDICINE

## 2020-08-18 PROCEDURE — 85379 FIBRIN DEGRADATION QUANT: CPT | Performed by: PHYSICIAN ASSISTANT

## 2020-08-18 PROCEDURE — 82948 REAGENT STRIP/BLOOD GLUCOSE: CPT

## 2020-08-18 RX ORDER — AMLODIPINE BESYLATE 5 MG/1
5 TABLET ORAL DAILY
Status: DISCONTINUED | OUTPATIENT
Start: 2020-08-19 | End: 2020-08-22

## 2020-08-18 RX ORDER — SODIUM BICARBONATE 650 MG/1
1300 TABLET ORAL
Status: DISCONTINUED | OUTPATIENT
Start: 2020-08-18 | End: 2020-08-22

## 2020-08-18 RX ADMIN — ATORVASTATIN CALCIUM 40 MG: 40 TABLET, FILM COATED ORAL at 21:16

## 2020-08-18 RX ADMIN — Medication 2000 UNITS: at 08:01

## 2020-08-18 RX ADMIN — MEMANTINE 5 MG: 5 TABLET ORAL at 08:01

## 2020-08-18 RX ADMIN — ZINC SULFATE 220 MG (50 MG) CAPSULE 220 MG: CAPSULE at 08:01

## 2020-08-18 RX ADMIN — DOXYCYCLINE 100 MG: 100 CAPSULE ORAL at 08:01

## 2020-08-18 RX ADMIN — GUAIFENESIN 600 MG: 600 TABLET ORAL at 08:01

## 2020-08-18 RX ADMIN — INSULIN LISPRO 2 UNITS: 100 INJECTION, SOLUTION INTRAVENOUS; SUBCUTANEOUS at 23:04

## 2020-08-18 RX ADMIN — HEPARIN SODIUM AND DEXTROSE 18 UNITS/KG/HR: 10000; 5 INJECTION INTRAVENOUS at 08:00

## 2020-08-18 RX ADMIN — SODIUM BICARBONATE 650 MG TABLET 650 MG: at 08:01

## 2020-08-18 RX ADMIN — PERFLUTREN 0.6 ML/MIN: 6.52 INJECTION, SUSPENSION INTRAVENOUS at 16:05

## 2020-08-18 RX ADMIN — INSULIN LISPRO 2 UNITS: 100 INJECTION, SOLUTION INTRAVENOUS; SUBCUTANEOUS at 08:02

## 2020-08-18 RX ADMIN — INSULIN LISPRO 2 UNITS: 100 INJECTION, SOLUTION INTRAVENOUS; SUBCUTANEOUS at 13:12

## 2020-08-18 RX ADMIN — FUROSEMIDE 40 MG: 10 INJECTION, SOLUTION INTRAMUSCULAR; INTRAVENOUS at 15:23

## 2020-08-18 RX ADMIN — GUAIFENESIN 600 MG: 600 TABLET ORAL at 20:54

## 2020-08-18 RX ADMIN — SODIUM BICARBONATE 650 MG TABLET 1300 MG: at 17:11

## 2020-08-18 RX ADMIN — FLUOXETINE 40 MG: 20 CAPSULE ORAL at 08:01

## 2020-08-18 RX ADMIN — MEMANTINE 5 MG: 5 TABLET ORAL at 17:11

## 2020-08-18 RX ADMIN — DOXYCYCLINE 100 MG: 100 CAPSULE ORAL at 20:53

## 2020-08-18 RX ADMIN — AMLODIPINE BESYLATE 5 MG: 5 TABLET ORAL at 08:01

## 2020-08-18 RX ADMIN — INSULIN LISPRO 2 UNITS: 100 INJECTION, SOLUTION INTRAVENOUS; SUBCUTANEOUS at 17:13

## 2020-08-18 RX ADMIN — OXYCODONE HYDROCHLORIDE AND ACETAMINOPHEN 1000 MG: 500 TABLET ORAL at 20:53

## 2020-08-18 RX ADMIN — FINASTERIDE 5 MG: 5 TABLET, FILM COATED ORAL at 08:01

## 2020-08-18 RX ADMIN — DEXAMETHASONE SODIUM PHOSPHATE 6 MG: 4 INJECTION, SOLUTION INTRAMUSCULAR; INTRAVENOUS at 20:53

## 2020-08-18 RX ADMIN — CEFTRIAXONE 2000 MG: 2 INJECTION, POWDER, FOR SOLUTION INTRAMUSCULAR; INTRAVENOUS at 20:54

## 2020-08-18 RX ADMIN — FUROSEMIDE 40 MG: 10 INJECTION, SOLUTION INTRAMUSCULAR; INTRAVENOUS at 08:00

## 2020-08-18 RX ADMIN — CLOPIDOGREL BISULFATE 75 MG: 75 TABLET ORAL at 08:01

## 2020-08-18 RX ADMIN — TAMSULOSIN HYDROCHLORIDE 0.4 MG: 0.4 CAPSULE ORAL at 17:11

## 2020-08-18 RX ADMIN — PRIMIDONE 50 MG: 50 TABLET ORAL at 08:03

## 2020-08-18 RX ADMIN — OXYCODONE HYDROCHLORIDE AND ACETAMINOPHEN 1000 MG: 500 TABLET ORAL at 08:01

## 2020-08-18 NOTE — SPEECH THERAPY NOTE
Speech Language/Pathology  Speech-Language Pathology Bedside Swallow Evaluation        Patient Name: Zahraa Wilkins    YNJQC'F Date: 8/18/2020     Problem List  Principal Problem:    Multifocal pneumonia  Active Problems:    Diabetes mellitus type 2    Essential hypertension    Transaminitis    Peripheral arterial disease (HCC)    Atrial fibrillation     Acute respiratory failure with hypoxia (HCC)    SALO (acute kidney injury) ([facility] [address] [address] )    Osteomyelitis of foot ([facility] [address] )    Acute on chronic diastolic congestive heart failure ([facility] [address] )         Past Medical History  Past Medical History:   Diagnosis Date    A-fib ([facility] )     Alzheimer's disease ([facility] [address] )     Depression     Diabetes mellitus ([facility] [address] )     Anderson catheter in place     History of atrial fibrillation     History of chronic kidney disease     History of peripheral vascular disease     Hyperlipidemia     Hypertension     Kidney disease     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[facility] [address] [facility] )     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[facility] [address] )     Memory loss     Osteomyelitis of right foot ([facility] [address] )     Renal disorder        Past Surgical History  Past Surgical History:   Procedure Laterality Date    APPENDECTOMY  1945    FEMORAL ARTERY - TIBIAL ARTERY BYPASS GRAFT Right 01/08/2014    R SFA- PT w/ nonrev GSV, PreVena VAC- Balshi    FEMORAL ARTERY - TIBIAL ARTERY BYPASS GRAFT Left 06/04/2015    L SFA- PT w/ Cryovein- Ivan/ Balshi    FOOT SURGERY  06/14/2013    I&D with vac    IR LOWER EXTREMITY / INTERVENTION  7/24/2020    WOUND DEBRIDEMENT Right 03/07/2014    R thigh wound washout, VAC- Balshi       Summary    Assessment was limited due to pt's poor appetite, and pt's fatigue  Pt presents with at least mild oral dysphagia, characterized by inconsistent prolonged mastication, bolus formation, and transfer with soft solid foods  Pharyngeal phase appeared [facility]  There were no s/s of aspiration  However, as stated, assessment was limited   Pt would benefit from further diagnostic dysphagia tx to ensure appropriate diet consistency  Recommendations:   Diet: soft/level 3 diet and thin liquids, straws ok  Meds: whole with liquid   Intermittent supervision at meals  Aspiration precautions and compensatory swallowing strategies: upright posture, only feed when fully alert, slow rate of feeding and small bites/sips  Other Recommendations/ considerations: ST to see for further diagnostic dysphagia tx to ensure diet toleration and appropriate diet consistency  Current Medical Status  Copied from admission:  William Lainez is a 80 y o  male with past medical history of chronic osteomyelitis, anemia, CKD stage 4, atrial fibrillation, peripheral arterial disease, type 2 diabetes, essential hypertension,      He presents from  nursing home in [facility] with complaint of shortness of breath  Patient is a very poor historian and unable to provide details about his past medical history or current medical condition  While at [facility] he was noted to have hypoxia with O2 saturation dipping to high 80s to 90s  He is not normally on oxygen  Currently denies any chest pain, chest pressure  Patient became quite agitated upon asking questions  Order received and chart reviewed  Spoke with RN, who reports pt didn't appear to do well with his breakfast, so he ordered him soft foods for lunch  He took his pills whole with water without difficulty  Pt is suspected Covid, but has had 3 negative tests per RN  Past medical history:   Please see H&P for details    Special Studies:  CXR-Multifocal bilateral consolidation and groundglass opacity  The nodular areas are more suggestive of pneumonia than edema         Social/Education/Vocational Hx:  Pt lives in SNF/ECF    Swallow Information   Current Risks for Dysphagia & Aspiration: decreased alertness and CHF, hypoxia, awake but needed stimulation  Current Symptoms/Concerns: coughing with po and difficulty chewing this morning  Current Diet: regular diet and thin liquids   Baseline Diet: regular diet and thin liquids, assumed, pt states this, not in chart    Baseline Assessment   Behavior/Cognition: waxing and waning arousal level and needed frequent stimulation to stay awake  Speech/Language Status: able to follow commands and limited verbal output, no dysarthria/apraxia  Patient Positioning: upright in bed     Swallow Mechanism Exam   Facial: symmetrical  Labial: WFL  Lingual: WFL  Velum: symmetrical  Mandible: adequate ROM  Dentition: upper dentures and few teeth on bottom, worn down  Vocal quality:clear/adequate   Volitional Cough: unable to initiate volitional cough   Respiratory: NC, sats in low 90s    Consistencies Assessed and Performance   Consistencies Administered: thin liquids, puree and soft solids  -pt was fed some of his lunch tray, including a bite of meatloaf, a bite of ayden food cake, 4 oz applesauce, and several sips of thin water by straw  Encouraged pt to eat more of his lunch, but he refused stating it didn't taste right  Oral Stage: Adequate bolus retrieval and draw from straw, very prolonged mastication, bolus formation and transfer with the bite of meatloaf, but only mildly prolonged with a bite of ayden food cake  Oral phase with puree and thin liquids was prompt  No oral residue or pocketing  Pharyngeal Stage: Hyolaryngeal elevation was observed and palpated  Swallows appear fairly timely, with no s/s of aspiration  Esophageal Concerns: none reported      Results Reviewed with: patient, RN and MD   Dysphagia Goals: 1  Pt will tolerate dysphagia 3 diet and thin liquids without s/s of aspiration across all meals and snacks  2  Pt will tolerate regular diet and thin liquids without s/s of aspiration across all meals and snacks     Discharge recommendation: likely no follow up needed for swallowing    Speech Therapy Prognosis   Prognosis: fair    Prognosis Considerations: age, medical status, cognitive status and therapeutic potential

## 2020-08-18 NOTE — NURSING NOTE
Patient on 6L NC and dipping into high 70s  Called respiratory to assess for possible need for mid flow

## 2020-08-18 NOTE — CASE MANAGEMENT
The patient is currently on isolation precautions; he is a bundled patient  A copy of the BPCI letter was placed in his physical chart for him for when he is discharged

## 2020-08-18 NOTE — ASSESSMENT & PLAN NOTE
SALO on CKD stage 4  Presenting with creatinine of 3 8  Baseline appears to be around 2 8    Monitor in the setting of IV diuresis

## 2020-08-18 NOTE — NURSING NOTE
Patient is a difficult stick for bloodwork/IV  Unable to obtain all morning labs  Patient will need new IV as one being used now was placed by EMS  [address] aware and will send someone with Ultrasound guidance

## 2020-08-18 NOTE — ASSESSMENT & PLAN NOTE
Presenting with elevated BNP 41,086  Echocardiogram from 2017 with abnormal left ventricular relaxation- grade 1 diastolic dysfunction  Maintained on Lasix 40 mg daily  With crackles here on exam   Started on IV Lasix 40 mg b i d   Monitor I&Os and daily weights    Cardiac diet

## 2020-08-18 NOTE — PHYSICAL THERAPY NOTE
PT EVALUATION    Pt  Name: Sebastián Paul  Pt  Age: 80 y o    MRN: [id number]  LENGTH OF STAY: 1    Patient Active Problem List   Diagnosis    Acute renal failure superimposed on stage 3 chronic kidney disease ([place] [address] )    PVD (peripheral vascular disease)    Diabetes mellitus type 2    Essential hypertension    HLD (hyperlipidemia)    Atherosclerosis of artery of extremity with ulceration (HCC)    Transaminitis    Chronic anemia    Depression    Ulcer of left heel (HCC)    Enlarged prostate    Incomplete bladder emptying    Chronic kidney disease stage 4    Tremor    Osteomyelitis of right foot (HCC)    Other chronic osteomyelitis, multiple sites ([place] [address] )    Acute venous stasis dermatitis    Atherosclerosis of native artery of extremity (HCC)    Diastolic dysfunction    Edema of extremities    Other complications due to other vascular device, implant, and graft    Wound, surgical, nonhealing    Hyperlipidemia    Hypertension    Osteomyelitis of left foot (HCC)    Peripheral arterial disease ([place] [address] )    Diabetes mellitus with neuropathy ([place] [address] )    Type II diabetes mellitus with foot ulcer ([place] [address] )    Hyperkalemia    Coagulopathy ([place] [address] )    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    Atrial fibrillation     Anemia of chronic disease    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    Secondary hyperparathyroidism of renal origin ([place] [address] )    Hyponatremia    Multifocal pneumonia    Acute respiratory failure with hypoxia ([place] [address] )    SALO (acute kidney injury) ([place] [address] )    Osteomyelitis of foot ([place] [address] )    Acute on chronic diastolic congestive heart failure (HCC)    Acidosis       Admitting Diagnoses:   Elevated troponin [R79 89]  [facility]  ) [N17 9]  Respiratory failure with hypoxia (HCC) [J96 91]  Shortness of breath [R06 02]  Multifocal pneumonia [J18 9]    Past Medical History:   Diagnosis Date    A-fib ([place] [address] )     Alzheimer's disease ([facility] )     Depression     Diabetes mellitus ([place] [address] )  Anderson catheter in place     History of atrial fibrillation     History of chronic kidney disease     History of peripheral vascular disease     Hyperlipidemia     Hypertension     Kidney disease     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[facility] [address] )     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[facility] [address] )     Memory loss     Osteomyelitis of right foot ([facility] [address] )     Renal disorder        Past Surgical History:   Procedure Laterality Date    APPENDECTOMY  1945    FEMORAL ARTERY - TIBIAL ARTERY BYPASS GRAFT Right 01/08/2014    R SFA- PT w/ nonrev GSV, PreVena VAC- Balshi    FEMORAL ARTERY - TIBIAL ARTERY BYPASS GRAFT Left 06/04/2015    L SFA- PT w/ Cryovein- Ivan/ Balshi    FOOT SURGERY  06/14/2013    I&D with vac    IR LOWER EXTREMITY / INTERVENTION  7/24/2020    WOUND DEBRIDEMENT Right 03/07/2014    R thigh wound washout, VAC- Balshi       Imaging Studies:  XR chest 1 view portable   ED Interpretation by Geovanna Love DO (08/17 2667)   IMPRESSION:     Multifocal bilateral consolidation and groundglass opacity  The nodular areas are more suggestive of pneumonia than edema          Final Result by Sergei Vyas MD (08/17 1346)      Multifocal bilateral consolidation and groundglass opacity  The nodular areas are more suggestive of pneumonia than edema  Workstation performed: [id number]              08/18/20 1509   Note Type   Note type Eval only   Pain Assessment   Pain Score No Pain   Home Living   Type of Home SNF  (presents from [place] for rehab)   Additional Comments Pt poor historian, limited information gathered from pt re: home set up & PLOF  Per chart review, pt lives alone in a ranch style home w/ 2STE  Pt now presenting from [place] for rehab following hospitalization [facility] on 7/22/20- 8/7/20      Prior Function   Level of [place] Needs assistance with ADLs and functional mobility   Lives With Facility staff   Receives Help From Personal care attendant   ADL Assistance Needs assistance   Falls in the last 6 months   (unknown) Vocational Retired   Comments Per chart review, pt was I w/ functional mobility & ADL's prior to rehab  Restrictions/Precautions   Weight Bearing Precautions Per Order No   Other Precautions Multiple lines;O2;Fall Risk;Cognitive; Chair Alarm; Bed Alarm;Droplet precautions  (7L O2 NC)   General   Additional Pertinent History Recent hospitalization at [facility] on 7/22/20-8/7/20 then D/C to Son for rehab  Family/Caregiver Present No   Cognition   Overall Cognitive Status Impaired   Arousal/Participation Lethargic   Attention Attends with cues to redirect   Orientation Level Oriented to person;Oriented to place  (w/ multiple choice options for place & time)   Following Commands Follows one step commands with increased time or repetition   Comments limited to lethargy   RUE Assessment   RUE Assessment   (refer to OT)   LUE Assessment   LUE Assessment   (refer to OT)   RLE Assessment   RLE Assessment X  (4-/5 grossly except hip 3+/5)   LLE Assessment   LLE Assessment X  (4-/5 grossly except hip 3+/5)   Bed Mobility   Supine to Sit 2  Maximal assistance   Additional items Assist x 2;HOB elevated; Bedrails; Increased time required;Verbal cues;LE management   Sit to Supine 2  Maximal assistance   Additional items Assist x 2; Increased time required;Verbal cues;LE management   Additional Comments cues for techniques & safety   Transfers   Sit to Stand 2  Maximal assistance   Additional items Assist x 2; Increased time required;Verbal cues   Stand to Sit 2  Maximal assistance   Additional items Assist x 2; Increased time required;Verbal cues   Additional Comments cues for techniques & safety; performed 2x; tremulous upon standing w/ poor standing tolerance & balance   Ambulation/Elevation   Gait pattern Not appropriate  (attempted 2x but pt unable to tolerate at this time)   Balance   Static Sitting Fair -   Dynamic Sitting Poor +   Static Standing Poor  (w/ RW)   Dynamic Standing   (zero)   Ambulatory Zero   Endurance Deficit Endurance Deficit Yes   Endurance Deficit Description weakness, fatigue, lethargy   Activity Tolerance   Activity Tolerance Patient limited by fatigue;Treatment limited secondary to medical complications (Comment)   Medical Staff Made Aware OT Guille Ridedwardu   Nurse Made Aware ERENDIRA Bryant   Assessment   Prognosis Fair   Problem List Decreased strength;Decreased endurance; Impaired balance;Decreased mobility; Decreased cognition; Impaired judgement;Decreased safety awareness   Assessment Pt  89 y o male admitted for Multifocal pneumonia w/ Elevated troponin R79 89 & SALO (acute kidney injury) ([facility] [address] ) N17 9  (+) COVID exposure but tested negative for COVID 2-3x  Pt referred to PT for mobility assessment & D/C planning  PTA, pt presented from [place] for rehab following hospitalization [facility] on 7/22/20-8/7/20  Currently, pt poor historian 2* to lethargy but per chart review, prior to rehab pt was living alone in a ranch style home w/ 2STE  On eval, pt demonstrate dec mobility, balance, endurance & amb  Pt require maxAx2 for bed mobility & sit<>stand transitions + cues for techniques  Pt able to stand w/ RW however unable to tolerate amb w/ RW at this time, attempted 2x  Pt demonstrate poor standing balance & tolerance  Pt lethargic t/o session but arousable w/ frequent stimuli  No dizziness reported t/o session  Nsg staff most recent vital signs as follows: /69   Pulse 77   Temp 98 °F (36 7 °C)   Resp 16   Ht 6' (1 829 m)   Wt 100 kg (221 lb 1 9 oz)   SpO2 92%   BMI 29 99 kg/m²   At end of session, pt assisted back in bed w/o issues, call bell & phone in reach, bed alarm activated  Fall precautions reinforced w/ good understanding  Pt functioning below baseline hence will continue skilled PT to improve function & safety  Will recommend quick move device for OOB<>recliner transfers  At this time, due to above mentioned deficits & high risk for falls, pt will benefit from continued inpt rehab at D/C  CM to follow   Nsg staff to continue to mobilized pt as tolerated to prevent further decline in function  Nsg notified  Barriers to Discharge Inaccessible home environment;Decreased caregiver support   Barriers to Discharge Comments home alone; (+) TONYA   Goals   Patient Goals unable to expressed 2* to impaired cognition & lethargy   STG Expiration Date 09/01/20   Short Term Goal #1 Goals to be met in 14 days; pt will be able to: 1) inc strength & balance by 1/2 grade to improve overall functional mobility & dec fall risk; 2) inc bed mobility to modAx1 for pt to be able to get in/OOB safely w/ proper techniques 100% of the time, to dec caregiver burden & safely function at home; 3) inc transfers to modAx1 for pt to transition safely from one surface to another w/o % of the time, to dec caregiver burden & safely function at home; 4) PT to see when pt appropriate for amb & set amb goals; 5) inc barthel score to 45 to decrease overall risk for falls; 6) pt/caregiver ed   PT Treatment Day 0   Plan   Treatment/Interventions Functional transfer training;LE strengthening/ROM; Therapeutic exercise; Endurance training;Patient/family training;Bed mobility;Spoke to nursing;OT   PT Frequency Other (Comment)  (3-5x/wk)   Recommendation   PT Discharge Recommendation Post-Acute Rehabilitation Services   Equipment Recommended Roxy Macias; Other (Comment)  (quick move device for transfers OOB<>recliner)   PT - OK to Discharge Yes  (to inpt rehab when medically cleared)   Barthel Index   Feeding 5   Bathing 0   Grooming Score 0   Dressing Score 0   Bladder Score 0   Bowels Score 10   Toilet Use Score 5   Transfers (Bed/Chair) Score 5   Mobility (Level Surface) Score 0   Stairs Score 0   Barthel Index Score 25   Hx/personal factors: co-morbidities, inaccessible home, home alone, advanced age, mutliple lines, use of AD, dec cognition, fall risk, assist w/ ADL's and O2  Examination: dec mobility, dec balance, dec endurance, dec amb, high fall risk, dec cognition  Clinical: unpredictable (ongoing medical status, abnormal lab values and high fall risk)  Complexity: high     Mario Hilton, PT

## 2020-08-18 NOTE — ASSESSMENT & PLAN NOTE
Recent hospital admission to [facility] from 7/21/20 to 8/7/20 for left foot osteomyelitis  Patient started on oral doxycycline noon for indefinite suppression

## 2020-08-18 NOTE — ASSESSMENT & PLAN NOTE
Evaluated at [facility]  During recent hospitalization end of July 2020  Left lower extremity angiogram with completely occluded above the knee to below the knee bypass  Likely chronically occluded  Not a candidate for 3rd time redo bypass due to age, comorbidities, and patient wishes  Patient at that time did not want any further amputation or interventions  Maintained on Coumadin, Plavix, statin

## 2020-08-18 NOTE — OCCUPATIONAL THERAPY NOTE
Occupational Therapy Evaluation     Patient Name: Claus Beaulieu  AHDAK'H Date: 8/18/2020  Problem List  Principal Problem:    Multifocal pneumonia  Active Problems:    Diabetes mellitus type 2    Essential hypertension    Transaminitis    Peripheral arterial disease (HCC)    Atrial fibrillation     Hyponatremia    Acute respiratory failure with hypoxia (HCC)    SALO (acute kidney injury) ([facility] )    Osteomyelitis of foot ([facility] )    Acute on chronic diastolic congestive heart failure ([facility] )    Acidosis    Past Medical History  Past Medical History:   Diagnosis Date    A-fib ([facility]     Alzheimer's disease ([facility] )     Depression     Diabetes mellitus ([facility]     Andreson catheter in place     History of atrial fibrillation     History of chronic kidney disease     History of peripheral vascular disease     Hyperlipidemia     Hypertension     Kidney disease     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     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[facility]     Memory loss     Osteomyelitis of right foot ([facility]     Renal disorder      Past Surgical History  Past Surgical History:   Procedure Laterality Date    APPENDECTOMY  1945    FEMORAL ARTERY - TIBIAL ARTERY BYPASS GRAFT Right 01/08/2014    R SFA- PT w/ nonrev GSV, PreVena VAC- Balshi    FEMORAL ARTERY - TIBIAL ARTERY BYPASS GRAFT Left 06/04/2015    L SFA- PT w/ Cryovein- Ivan/ Balshi    FOOT SURGERY  06/14/2013    I&D with vac    IR LOWER EXTREMITY / INTERVENTION  7/24/2020    WOUND DEBRIDEMENT Right 03/07/2014    R thigh wound washout, VAC- Balshi           08/18/20 1508   Note Type   Note type Eval only   Restrictions/Precautions   Braces or Orthoses Other (Comment)  (Globoped L LE)   Other Precautions Cognitive; Chair Alarm; Bed Alarm;Multiple lines;O2;Fall Risk;Droplet precautions  (7L O2 NC)   Pain Assessment   Pain Assessment Tool Pain Assessment not indicated - pt denies pain   Pain Score No Pain   Home Living   Type of Home SNF  (Pt presents from Son for [address] [place])   Home Layout One level;Ramped entrance;Elevator   Home Equipment Walker;Cane   Additional Comments Pt is poor historian, limited info obtained from pt regarding home setup and PLOF  Per CM note from recent admission at [facility] on [facility]/22/20, pt lives alone in a ranch style home with 2 TONYA  Pt now presenting from [address] [place] [address] where pt was D/C'd to from [facility] on 8/7/20 for rehab  Prior Function   Level of [place] Needs assistance with ADLs and functional mobility; Needs assistance with IADLs   Lives With Facility staff; Alone  (lived alone prior to rehab)   Receives Help From Family;Personal care attendant   ADL Assistance Needs assistance   IADLs Needs assistance   Falls in the last 6 months   (pt unable to report)   Vocational Retired   Comments At rehab, pt required assist w/ ADLs, IADLs, and functional mobility/transfers w/ use of RW  Prior to rehab (per CM notes): Pt was I w/ ADLs and functional mobility/transfers w/ use of RW/SPC and required assist w/ IADLs  Lifestyle   Autonomy At rehab, pt required assist w/ ADLs, IADLs, and functional mobility/transfers w/ use of RW  Prior to rehab (per CM notes): Pt was I w/ ADLs and functional mobility/transfers w/ use of RW/SPC and required assist w/ IADLs  Reciprocal Relationships Dtr   Service to Others Retired   Intrinsic Gratification Reading the newspaper   Psychosocial   Psychosocial (WDL) WDL   ADL   Where Assessed Edge of bed   Eating Assistance 5  Supervision/Setup   Grooming Assistance 4  [facility] 4  [facility] 2  Maximal [facility] [address] 4  [address] 2  Maximal [address]  2  Maximal [address] 2  Maximal Assistance   Bed Mobility   Supine to Sit 2  Maximal assistance   Additional items Assist x 2;HOB elevated; Bedrails; Increased time required;Verbal cues;LE management   Sit to Supine 2  Maximal assistance   Additional items Assist x 2; Increased time required;Verbal cues;LE management   Additional Comments Pt lying supine at end of session w/ bed alarm activated  Call bell and phone within reach  All needs met and pt reports no further questions for OT at this time   Transfers   Sit to Stand 2  Maximal assistance   Additional items Assist x 2; Increased time required;Verbal cues   Stand to Sit 2  Maximal assistance   Additional items Assist x 2; Increased time required;Verbal cues   Additional Comments Cues for safe technique and hand placement  Sit<>stand transfers completed x2 trials w/ pt requiring Max A of 2 to initiate forward weight shift from surface for sit>stand  Pt able to tolerate approx  10 seconds in standing position, unable to take steps  Pt impulsive for stand>sit 2* increased fatigue, requiring Max A of 2 for controlled descent to surface   Functional Mobility   Additional Comments Pt unable to take steps at this time 2* decreased standing tolerance/balance and increased fatigue   Recommend use of Quick Move for OOB w/ nursing   Balance   Static Sitting Fair -   Dynamic Sitting Poor +   Static Standing Poor   Dynamic Standing   (Zero)   Ambulatory Zero   Activity Tolerance   Activity Tolerance Patient limited by fatigue;Treatment limited secondary to medical complications (Comment)   Medical Staff Made Aware ASHLYN Chiu   Nurse Made Aware yes; Loan Oliveros RN   RUE Assessment   RUE Assessment X  (decreased shldr AROM; Elbow-distal=WFL)   RUE Overall AROM   R Shoulder Flexion 80-90*   RUE Strength   R Shoulder Flexion 3-/5   R Shoulder Extension 3-/5   R Elbow Flexion 4-/5   R Elbow Extension 4-/5   LUE Assessment   LUE Assessment X  (decreased shldr AROM; Elbow-distal=WFL)   LUE Overall AROM   L Shoulder Flexion 80-90*   LUE Strength   L Shoulder Flexion 3-/5   L Shoulder Extension 3-/5   L Elbow Flexion 4-/5   L Elbow Extension 4-/5   Hand Function   Gross Motor Coordination Functional   Fine Motor Coordination Impaired Sensation   Light Touch No apparent deficits   Proprioception   Proprioception No apparent deficits   Vision - Complex Assessment   Additional Comments Vision assessment limited 2* pt keeping eyes closed for most of session  Pt able to open eyes w/ verbal cues, however minimal eye contact maintained  Pt able to accurately identify number of fingers held up by therapist   Cognition   Overall Cognitive Status Impaired   Arousal/Participation Lethargic;Persistent stimuli required   Attention Attends with cues to redirect   Orientation Level Oriented to person;Oriented to place  (with multiple choice options for place and time)   Memory Decreased recall of precautions;Decreased recall of recent events;Decreased short term memory   Following Commands Follows one step commands with increased time or repetition   Comments pt w/ increased lethargy, requiring max verbal and tactile cues for participation   Assessment   Limitation Decreased ADL status; Decreased UE ROM; Decreased UE strength;Decreased Safe judgement during ADL;Decreased cognition;Decreased endurance;Decreased self-care trans;Decreased high-level ADLs   Prognosis Fair   Assessment Pt is a 80 y o  male seen for OT evaluation [facility]  [facility] w/ SOB and hypoxia and dx'd w/ Multifocal pneumonia, acute on chronic diastolic CHF, and SALO  COVID (-) x2  Comorbidities affecting pts functional performance include a significant PMH of A-Fib, Alzheimer's disease, Depression, DM, CKD, PVD, HLD, HTN, Kidney disease, Memory loss, and renal disorder  Pt with active OT orders and activity orders for Up and OOB as tolerated   Pt is poor historian, limited info obtained from pt regarding home setup and PLOF  Per CM note from recent admission at [facility] on 7/22/20, pt lives alone in a ranch style home with 2 TONYA  Pt now presenting from [address] [place] [address] where pt was D/C'd to from [facility] on 8/7/20 for rehab   At rehab, pt required assist w/ ADLs, IADLs, and functional mobility/transfers w/ use of RW  Prior to rehab (per CM notes): Pt was I w/ ADLs and functional mobility/transfers w/ use of RW/SPC and required assist w/ IADLs  Upon evaluation, pt currently requires Min A for UB ADLs, Max A for LB ADLs, Max A for toileting, Max A of 2 for bed mobility, and max A of 2 for functional transfers 2* the following deficits impacting occupational performance: weakness, decreased strength, decreased balance, decreased tolerance, impaired memory and decreased safety awareness  These impairments, as well at pts limited home support, difficulty performing ADLS and limited insight into deficits limit pts ability to safely engage in all baseline areas of occupation  Pt scored overall 25/100 on the Barthel Index  Based on the aforementioned OT evaluation, functional performance deficits, and assessments, pt has been identified as a High complexity evaluation  Pt to continue to benefit from continued acute OT services during hospital stay to address defined deficits and to maximize level of functional independence in the following Occupational Performance areas: grooming, bathing/shower, toilet hygiene, dressing, medication management, health maintenance, functional mobility, community mobility, clothing management and social participation  From OT standpoint, recommend STR upon D/C  Pt may benefit from LTC placement  OT will continue to follow pt 3-5x/wk to address the following goals to  w/in 10-14 days:   Goals   Patient Goals None stated at this time 2* cognitive deficits and lethargy   LTG Time Frame 10-14   Long Term Goal Please refer to LTGs listed below   Plan   Treatment Interventions ADL retraining;Functional transfer training;UE strengthening/ROM; Endurance training;Patient/family training;Equipment evaluation/education; Compensatory technique education; Energy conservation; Activityengagement;Continued evaluation   Goal Expiration Date 20   OT Treatment Day 0 OT Frequency 3-5x/wk   Recommendation   OT Discharge Recommendation Post-Acute Rehabilitation Services   OT - OK to Discharge Yes  (when medically cleared to rehab)   Barthel Index   Feeding 5   Bathing 0   Grooming Score 0   Dressing Score 0   Bladder Score 0   Bowels Score 10   Toilet Use Score 5   Transfers (Bed/Chair) Score 5   Mobility (Level Surface) Score 0   Stairs Score 0   Barthel Index Score 25   Modified Carver Scale   Modified Micah Scale 4      GOALS    1  Pt will improve activity tolerance to G for min 30 min txment sessions for increase engagement in functional tasks    2  Pt will complete bed mobility at a Min A level w/ G balance/safety demonstrated to decrease caregiver assistance required     3  Pt will complete UB dressing/self care w/ Supervision using adaptive device and DME as needed     4  Pt will complete LB dressing/self care w/ min A using adaptive device and DME as needed    5  Pt will complete toileting w/ min A w/ G hygiene/thoroughness using DME as needed    6  Pt will improve functional transfers to Min A on/off all surfaces using DME as needed w/ G balance/safety     7  Pt will be attentive 100% of the time during ongoing cognitive assessment w/ G participation to assist w/ safe d/c planning/recommendations    8  Pt will demonstrate G carryover of pt/caregiver education and training as appropriate w/o cues w/ good tolerance to increase safety during functional tasks    9  Pt will demonstrate 100% carryover of energy conservation techniques t/o functional I/ADL/leisure tasks w/o cues s/p skilled education to increase endurance during functional tasks    10  Pt will increase BUE strength by 1MM grade via AROM/AAROM/PROM exercises to increase independence in ADLs and transfers    11   Pt will verbalize 3 potential fall hazards and identify appropriate compensatory techniques to decrease fall risk in home environment         Marguerite Mac OTR/L
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
## 2020-08-18 NOTE — ASSESSMENT & PLAN NOTE
Anticoagulated on Coumadin  INR 2 82 on admission  Coumadin was held on admission     Patient was started on heparin drip for COVID pathway

## 2020-08-18 NOTE — ASSESSMENT & PLAN NOTE
Presenting after being found to be hypoxic at nursing home  Chest x-ray with multifocal bilateral consolidation and ground-glass opacity  Nodular areas are more suggestive of pneumonia then edema according to chest x-ray read  Initial COVID swab negative  However with reported positive COVID exposure at nursing home  Will reswab COVID in the morning  Currently requiring 6 L nasal cannula to maintain saturations  Follow moderate COVID algorithm  Troponin elevated at 0 13  BNP elevated at 41,086  D-dimer elevated at 3 23  Transaminitis- , ALT 83, alk-phos 251    Check CRP, ferritin, interleukin 6  Continue with IV ceftriaxone  Already on doxycycline 100 mg p o  Q 12 for osteomyelitis of foot  Obtain procalcitonin and a m  Procalcitonin  Start vitamin D3, vitamin-C, zinc, atorvastatin    Start on IV steroids- dexamethasone 6 mg daily times 10 days  Started on anticoagulation-intermediate intensity with heparin drip   Consult pulmonary per pathway

## 2020-08-18 NOTE — CONSULTS
Consultation - Pulmonary Medicine   Katina Barrios 80 y o  male MRN: [id number]  Unit/Bed#: [facility] [id number] Encounter: [id number]      Assessment / Plan:  1  Acute Hypoxic Respiratory Failure   · Continue maintain SpO2 greater than or equal to 88%  Patient did not wear oxygen prior to this hospital stay  Likely secondary to 2 , 3, 4   · Continue to maintain SpO2 greater than or equal to 88%  · Continue to encourage cough and deep breathing exercises  · Continue pulmonary toileting including out of bed as tolerated, incentive spirometry, flutter valve, increase activity as able     2  Abnormal CXR with Multifocal Pneumonia   · Chest x-ray with multifocal pneumonia  · Patient presents to the hospital after being found to be hypoxic at the nursing home  He does have positive COVID 19 exposure  Initial COVID swab was negative  · Continue Covid 19 Airborn Precautions  · Continue to trend COVID-19 labs  · Send Influenza Swab and Respiratory Viral Panel Swab   · Obtain Echo   · DDimer Elevated at 3 78   · ProCalcitonin elevated at 1 09  Pending repeat Pro Nick   · Corona Virus Swab Negative x 2   · BC Pending   · Strep and legionella urinary antigens are pending   · CRP: 366 4   · Ferritin is normal   · Interleukin -6 is pending   · Patient is already on doxycycline 100 mg p o  Q 12 hours for osteomyelitis of the foot continue   · Continue IV ceftriaxone  · Continue vitamin-D, vitamin-C, zinc, and atorvastatin  · Continue IV steroids including dexamethasone 6 mg q day  · Continue heparin drip  · Will discuss with my attending however could benefit from CTA for further assessment of consolidations and r/o PE as cause of elevated D Dimer and hypoxia, however he is taking coumadin at home and presents to the hospital with an INR of 2 82 making PE less likely     3  Suspected COVID 19   · Patient with positive exposure to COVID-19  · Initial swab was negative    · Follow-up finalized results from repeat COVID-19 swab this a m  4  Acute on Chronic Diastolic CHF Exacerbation   · BNP on admission is 41,000  · Echocardiogram from 2017 reveals abnormal left ventricular relaxation with grade 1 diastolic dysfunction  · Continue IV Lasix per primary care team  · Continue to monitor daily weights  · Continue to monitor I&Os  · Agree with echo and Diuresis     5  A-Fib   · Patient is anticoagulated with Coumadin  · Coumadin being held and patient is on heparin drip for COVID pathway at this time  6  HTN   · Continue amlodipine    7  Type 2 Diabetes   · HBA1C : 7 1   · Continue management per primary care team    History of Present Illness   Physician Requesting Consult: Roberta Trujillo MD  Reason for Consult / Principal Problem: COVID  Hx and PE limited by: NA  HPI: Amber Slater is a 80y o  year old male who presents to the hospital from a nursing home after being found to be hypoxic  H and P was reviewed  Patient upon my assessment today unfortunately is difficult to obtain a history from  He reports that he has been having a dry cough occasionally  He denies any SOB, wheezing, fevers, or chills  He was then found to be hypoxic and required 6 LPM NC to maintain SPO2 > 90%  In the ED workup revealed Multifocal Pneumonia on CXR  Work up was suspicious for covid 19 infection and swab was sent which was negative  Patient was started on the COVID 19 Pathway which prompted the consult  From pulmonary perspective, patient does have a smoking history in the past however he reports quitting over 20 years ago  He reports that he worked as a   He does not use any inhalers or nebulizers at home  He has never follow-up with pulmonologist before  He has never had PFTs  He has had significant exposure to COVID-19 while he was in the nursing home      Inpatient consult to Pulmonology  Consult performed by: Onur Bellamy PA-C  Consult ordered by: Jewell Sutherland PA-C          Review of Systems   Constitutional: Positive for activity change  Negative for appetite change, chills, diaphoresis, fatigue, fever and unexpected weight change  HENT: Negative for congestion, hearing loss, mouth sores, nosebleeds, postnasal drip, rhinorrhea and sinus pressure  Respiratory: Positive for cough and shortness of breath  Negative for apnea, choking, chest tightness, wheezing and stridor  Cardiovascular: Positive for leg swelling  Negative for chest pain and palpitations  Gastrointestinal: Negative for abdominal distention, abdominal pain, constipation, diarrhea, nausea and vomiting  Musculoskeletal: Negative for joint swelling  Skin: Negative for rash  Allergic/Immunologic: Negative for immunocompromised state  Hematological: Negative for adenopathy  Psychiatric/Behavioral: Negative for agitation  All other systems reviewed and are negative        Historical Information   Past Medical History:   Diagnosis Date    [facility]fib ([facility] )     [facility]     Depression     Diabetes mellitus ([facility] )     Anderson catheter in place     History of atrial fibrillation     History of chronic kidney disease     History of peripheral vascular disease     Hyperlipidemia     Hypertension     Kidney disease     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[facility] )     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[facility] )     Memory loss     Osteomyelitis of right foot ([facility] )     Renal disorder      Past Surgical History:   Procedure Laterality Date    APPENDECTOMY  1945    FEMORAL ARTERY - TIBIAL ARTERY BYPASS GRAFT Right 01/08/2014    R SFA- PT w/ nonrev GSV, PreVena VAC- Balshi    FEMORAL ARTERY - TIBIAL ARTERY BYPASS GRAFT Left 06/04/2015    L SFA- PT w/ Cryovein- Ivan/ Balshi    FOOT SURGERY  06/14/2013    I&D with vac    IR LOWER EXTREMITY / INTERVENTION  7/24/2020    WOUND DEBRIDEMENT Right 03/07/2014    R thigh wound washout, VAC- Balshi     Social History   Social History     Substance and Sexual Activity   Alcohol Use Yes    Comment: one beer on his birthday     Social History     Substance and Sexual Activity   Drug Use No     Social History     Tobacco Use   Smoking Status Never Smoker   Smokeless Tobacco Never Used     Occupational History: Patient is a retired   He Lives in a nursing home  He reports previous tobacco abuse of 1 PPD x 15 years  He denies any other significant occupational exposure history  He does report previous exposure to COVID-19 positive patient at nursing home      Family History:   Family History   Problem Relation Age of Onset    Diabetes Mother     Heart failure Mother     Hypertension Mother     Cancer Father     Hypertension Sister     Hyperthyroidism Sister     Stroke Brother     Diabetes Maternal Grandmother     Clotting disorder Maternal Grandmother     No Known Problems Maternal Grandfather     No Known Problems Paternal Grandmother     No Known Problems Paternal Grandfather     Diabetes Brother     Arthritis Brother     Glaucoma Brother     Cataracts Brother     No Known Problems Son     Hyperthyroidism Daughter     Kidney disease Daughter     COPD Daughter     Diabetes Brother     Cancer Brother        Meds/Allergies   current meds:   Current Facility-Administered Medications   Medication Dose Route Frequency    acetaminophen (TYLENOL) tablet 650 mg  650 mg Oral Q6H PRN    amLODIPine (NORVASC) tablet 5 mg  5 mg Oral Daily    ascorbic acid (VITAMIN C) tablet 1,000 mg  1,000 mg Oral Q12H [address]    atorvastatin (LIPITOR) tablet 40 mg  40 mg Oral HS    calcitriol (ROCALTROL) capsule 0 25 mcg  0 25 mcg Oral Once per day on Mon Wed Fri    cefTRIAXone (ROCEPHIN) 2,000 mg in dextrose 5 % 50 mL IVPB  2,000 mg Intravenous Q24H    cholecalciferol (VITAMIN D3) tablet 2,000 Units  2,000 Units Oral Daily    clopidogrel (PLAVIX) tablet 75 mg  75 mg Oral Daily    dexamethasone (DECADRON) injection 6 mg  6 mg Intravenous Q24H    doxycycline hyclate (VIBRAMYCIN) capsule 100 mg  100 mg Oral Q12H [address]    finasteride (PROSCAR) tablet 5 mg  5 mg Oral Daily    FLUoxetine (PROzac) capsule 40 mg  40 mg Oral Daily    furosemide (LASIX) injection 40 mg  40 mg Intravenous BID (diuretic)    guaiFENesin (MUCINEX) 12 hr tablet 600 mg  600 mg Oral Q12H [address]    heparin (porcine) 25,000 units in 250 mL infusion (premix)  3-30 Units/kg/hr (Order-Specific) Intravenous Titrated    insulin lispro (HumaLOG) 100 units/mL subcutaneous injection 1-6 Units  1-6 Units Subcutaneous TID AC    insulin lispro (HumaLOG) 100 units/mL subcutaneous injection 1-6 Units  1-6 Units Subcutaneous HS    memantine (NAMENDA) tablet 5 mg  5 mg Oral BID    zinc sulfate (ZINCATE) capsule 220 mg  220 mg Oral Daily    Followed by   Lenore Buck ON 8/25/2020] multivitamin-minerals (CENTRUM ADULTS) tablet 1 tablet  1 tablet Oral Daily    ondansetron (ZOFRAN) injection 4 mg  4 mg Intravenous Q6H PRN    primidone (MYSOLINE) tablet 50 mg  50 mg Oral Daily    sodium bicarbonate tablet 650 mg  650 mg Oral BID after meals    tamsulosin (FLOMAX) capsule 0 4 mg  0 4 mg Oral Daily With Dinner       Allergies   Allergen Reactions    Metoprolol Bradycardia    Unasyn [Ampicillin-Sulbactam Sodium] Rash       Objective   Vitals: Blood pressure 130/72, pulse 80, temperature 98 °F (36 7 °C), resp  rate 22, height 6' (1 829 m), weight 100 kg (221 lb 1 9 oz), SpO2 93 %  ,Body mass index is 29 99 kg/m²  6 LPM NC     Intake/Output Summary (Last 24 hours) at 8/18/2020 9278  Last data filed at 8/17/2020 1741  Gross per 24 hour   Intake 300 ml   Output    Net 300 ml     Invasive Devices     Peripheral Intravenous Line            Peripheral IV 08/17/20 Left Forearm less than 1 day    Peripheral IV 08/18/20 Distal;Right;Upper;Ventral (anterior) Arm less than 1 day          Drain            External Urinary Catheter Large less than 1 day                Physical Exam  Vitals signs and nursing note reviewed  Constitutional:       General: He is not in acute distress       Appearance: Normal appearance  He is normal weight  He is not toxic-appearing or diaphoretic  HENT:      Head: Normocephalic and atraumatic  Nose: Congestion and rhinorrhea present  Mouth/Throat:      Pharynx: No oropharyngeal exudate or posterior oropharyngeal erythema  Eyes:      General: No scleral icterus  Neck:      Musculoskeletal: Normal range of motion and neck supple  No muscular tenderness  Cardiovascular:      Rate and Rhythm: Normal rate and regular rhythm  Pulses: Normal pulses  Heart sounds: Normal heart sounds  No murmur  No friction rub  No gallop  Pulmonary:      Effort: Pulmonary effort is normal       Breath sounds: Rales present  No wheezing  Chest:      Chest wall: No tenderness  Abdominal:      General: Abdomen is flat  Bowel sounds are normal  There is no distension  Palpations: Abdomen is soft  There is no mass  Tenderness: There is no abdominal tenderness  There is no guarding or rebound  Hernia: No hernia is present  Musculoskeletal:         General: No swelling, tenderness or signs of injury  Lymphadenopathy:      Cervical: No cervical adenopathy  Skin:     General: Skin is warm and dry  Neurological:      General: No focal deficit present  Mental Status: He is alert and oriented to person, place, and time  Psychiatric:         Mood and Affect: Mood normal          Lab Results:   I have personally reviewed pertinent lab results  , ABG: No results found for: PHART, FYX1GYX, PO2ART, AFG1LXC, L3ZMRURM, BEART, SOURCE, BNP: No results found for: BNP, CBC:   Lab Results   Component Value Date    WBC 15 36 (H) 08/18/2020    HGB 7 2 (L) 08/18/2020    HCT 23 7 (L) 08/18/2020    MCV 95 08/18/2020     08/18/2020    MCH 28 8 08/18/2020    MCHC 30 4 (L) 08/18/2020    RDW 15 5 (H) 08/18/2020    MPV 10 2 08/18/2020    NRBC 0 08/18/2020   , CMP:   Lab Results   Component Value Date    SODIUM 132 (L) 08/18/2020    K 5 0 08/18/2020    CL 99 (L) 08/18/2020 CO2 14 (L) 08/18/2020     (H) 08/18/2020    CREATININE 3 82 (H) 08/18/2020    CALCIUM 8 1 (L) 08/18/2020    AST 64 (H) 08/18/2020    ALT 66 08/18/2020    ALKPHOS 257 (H) 08/18/2020    EGFR 13 08/18/2020   , PT/INR:   Lab Results   Component Value Date    INR 2 82 (H) 08/17/2020   , Troponin:   Lab Results   Component Value Date    TROPONINI 0 06 (H) 08/17/2020     Imaging Studies: I have personally reviewed pertinent reports  CXR 8/17/2020:  FINDINGS:     Cardiomediastinal silhouette appears unremarkable      Multifocal bilateral consolidation and groundglass opacity  No definite effusion  No pneumothorax      Osseous structures appear within normal limits for patient age      IMPRESSION:     Multifocal bilateral consolidation and groundglass opacity  The nodular areas are more suggestive of pneumonia than edema  EKG, Pathology, and Other Studies: I have personally reviewed pertinent reports         PFT Results:   None for Review     Code Status: Level 3 - DNAR and DNI

## 2020-08-18 NOTE — PLAN OF CARE
Problem: PHYSICAL THERAPY ADULT  Goal: Performs mobility at highest level of function for planned discharge setting  See evaluation for individualized goals  Description: Treatment/Interventions: Functional transfer training, LE strengthening/ROM, Therapeutic exercise, Endurance training, Patient/family training, Bed mobility, Spoke to nursing, OT  Equipment Recommended: Rodgers Cranker, Other (Comment)(quick move device for transfers OOB<>recliner)       See flowsheet documentation for full assessment, interventions and recommendations  Note: Prognosis: Fair  Problem List: Decreased strength, Decreased endurance, Impaired balance, Decreased mobility, Decreased cognition, Impaired judgement, Decreased safety awareness  Assessment: Pt  89 y o male admitted for Multifocal pneumonia w/ Elevated troponin R79 89 & SALO (acute kidney injury) ([facility] ) N17 9  (+) COVID exposure but tested negative for COVID 2-3x  Pt referred to PT for mobility assessment & D/C planning  PTA, pt presented from [place] for rehab following hospitalization at [facility] on 7/22/20-8/7/20  Currently, pt poor historian 2* to lethargy but per chart review, prior to rehab pt was living alone in a ranch style home w/ 2STE  On eval, pt demonstrate dec mobility, balance, endurance & amb  Pt require maxAx2 for bed mobility & sit<>stand transitions + cues for techniques  Pt able to stand w/ RW however unable to tolerate amb w/ RW at this time, attempted 2x  Pt demonstrate poor standing balance & tolerance  Pt lethargic t/o session but arousable w/ frequent stimuli  No dizziness reported t/o session  Nsg staff most recent vital signs as follows: /69   Pulse 77   Temp 98 °F (36 7 °C)   Resp 16   Ht 6' (1 829 m)   Wt 100 kg (221 lb 1 9 oz)   SpO2 92%   BMI 29 99 kg/m²   At end of session, pt assisted back in bed w/o issues, call bell & phone in reach, bed alarm activated  Fall precautions reinforced w/ good understanding   Pt functioning below baseline hence will continue skilled PT to improve function & safety  Will recommend quick move device for OOB<>recliner transfers  At this time, due to above mentioned deficits & high risk for falls, pt will benefit from continued inpt rehab at D/C  CM to follow  Nsg staff to continue to mobilized pt as tolerated to prevent further decline in function  Nsg notified  Barriers to Discharge: Inaccessible home environment, Decreased caregiver support  Barriers to Discharge Comments: home alone; (+) TONYA  PT Discharge Recommendation: Post-Acute Rehabilitation Services     PT - OK to Discharge: Yes(to inpt rehab when medically cleared)    See flowsheet documentation for full assessment

## 2020-08-18 NOTE — PROGRESS NOTES
Pt with elevated POC BS levels, acute CHF  Will order CCD 3 diet with 4 gm YARELI  Monitor intake, liberalize as necessary  Will not supplement at this time  Will monitor intake and supplement appropriately

## 2020-08-18 NOTE — CONSULTS
Consultation - Nephrology   Nayla Correa 80 y o  male MRN: [id number]  [facility] [address] [id number] Encounter: [id number]    ASSESSMENT and PLAN:  Chronic kidney disease, stage IV:  - suspect secondary to hypertensive nephrosclerosis + diabetic nephropathy + age-related nephron loss  - upon review medical records, it appears that the patient has a baseline creatinine high 2s to low 3s mg/dL  - patient follows with Dr Elaina Stafford as an outpatient  Acute kidney injury (POA) on chronic kidney disease, stage IV:  - acute kidney injury suspect prerenal azotemia + cardiorenal syndrome + underlying pneumonia + progression to ATN  - patient was admitted with a creatinine of 3 87 mg/dL  - patient's creatinine today is at 3 82 mg/dL,   - UA revealed urine specific gravity 1 020, trace protein, large blood, innumerable RBC, 1-2 coarse granular casts  - clinically patient with uremic symptoms  Per note review, patient and daughter did not wish to pursue dialysis  Primary nephrologist to speak with daughter    - check ABG  - check BMP, magnesium, phosphorus in a m    - check urine studies  - check PVR with bladder scan  Maintain urinary retention protocol    - check renal ultrasound to r/o hydronephrosis, obstruction if renal indices worsen  - avoid NSAIDS, nephrotoxins, IV contrast if possible  - adjust medications to appropriate GFR   - avoid hypotension/ fluctuations in blood pressure to prevent decreased renal perfusion    - monitor volume status with strict intake/output  - please perform daily standing weight if hemodynamically stable  Hypertension:  - blood pressure stable  - home medication regimen:  Amlodipine 5 mg daily + furosemide 40 mg daily +   - currently on:  Amlodipine 5 mg daily + furosemide 40 mg IV b i d   - optimize hemodynamics  - maintain MAP > 65mmHg  - avoid hypotension/ fluctuations in blood pressure       Electrolytes:  - hyponatremia with most recent sodium 132 mEq     - implement 1 5 L fluid restriction  - with history of hyperkalemia, most recent potassium 5 0     - maintain low-potassium diet  - on veltassa twice weekly as outpatient  - will continue to monitor with lab studies  Acid/base:  - most recent bicarbonate decreased at 14 with elevated anion gap of 19     - outpatient regimen includes: Sodium bicarbonate supplements 650 mg b i d    - currently on: Sodium bicarbonate supplements 650 mg b i d, will increase to 1300 mg t i d now  - will continue to monitor with lab studies  Anemia likely of chronic disease:  - most recent hemoglobin at 7 2 grams/deciliter    - maintain hemoglobin greater than 8 grams/deciliter  - transfuse for hemoglobin < 7 grams/deciliter per primary team    - on oral iron supplements as outpatient  - recommend FOBT  - management per primary team      Mineral bone disease of chronic kidney disease:  - on calcitriol 0 25 mcg 3 times weekly  - will check magnesium and phosphorus in am    - will continue to monitor PTH and phosphorus as outpatient  DM:  - most recent hemoglobin A1c: 7 1    - currently on insulin    - management as per primary team     CHF: EF 60% with grade 1 diastolic dysfunction  - awaiting results of repeat echo  - BNP upon admission 41,000    - home diuretic regimen:  Furosemide 40 mg daily  - currently on:  Furosemide 40 mg IV b i d   - monitor volume status closely  - management as per primary team      Other problems:   - pneumonia:   - chest x-ray revealed multifocal bilateral consolidation and ground-glass opacity  Nodule areas are more suggestive of pneumonia than edema    - COVID-19 negative x two  - recommend to check ABG  - pulmonary following   - AFib:   - on Coumadin as outpatient  Currently on heparin drip  - per primary team       HISTORY OF PRESENT ILLNESS:  Requesting Physician: Hernesto Rao MD  Reason for Consult:  Acute kidney injury    Katina Simpsonufman is a 80 y o  male S medical history of AFib, Alzheimer's, depression, diabetes mellitus, hyperlipidemia, hypertension, chronic kidney disease, stage IV, hyperkalemia, who was admitted to [address] after presenting from nursing home after being found hypoxic  In the emergency department, workup revealed multi focal pneumonia  Workup suspicious for COVID-19, however now negative x 2  Of note, patient recently hospitalized from 07/21/2020- 08/07/2020 at [address] for nonhealing wounds of her left foot with concern for left foot osteomyelitis  Upon assessment and evaluation, patient resting in bed  Echo being completed  Patient opened eyes to arrival, however then closed his eyes  He is not speaking, however he will nod his head when questions are asked  Question reliability of head nod  A renal consultation is requested today for assistance in the management of acute kidney injury      PAST MEDICAL HISTORY:  Past Medical History:   Diagnosis Date    A-fib [facility])     Alzheimer's disease [facility]     Depression     Diabetes mellitus [facility]     Anderson catheter in place     History of atrial fibrillation     History of chronic kidney disease     History of peripheral vascular disease     Hyperlipidemia     Hypertension     Kidney disease     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[facility]     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[facility]     Memory loss     Osteomyelitis of right foot ([facility]     Renal disorder        PAST SURGICAL HISTORY:  Past Surgical History:   Procedure Laterality Date    APPENDECTOMY  1945    FEMORAL ARTERY - TIBIAL ARTERY BYPASS GRAFT Right 01/08/2014    R SFA- PT w/ nonrev GSV, PreVena VAC- Balshi    FEMORAL ARTERY - TIBIAL ARTERY BYPASS GRAFT Left 06/04/2015    L SFA- PT w/ Cryovein- Ivan/ Balshi    FOOT SURGERY  06/14/2013    I&D with vac    IR LOWER EXTREMITY / INTERVENTION  7/24/2020    WOUND DEBRIDEMENT Right 03/07/2014    R thigh wound washout, VAC- Balshi       ALLERGIES:  Allergies   Allergen Reactions    Metoprolol Bradycardia    Unasyn [Ampicillin-Sulbactam Sodium] Rash       SOCIAL HISTORY:  Social History     Substance and Sexual Activity   Alcohol Use Yes    Comment: one beer on his birthday     Social History     Substance and Sexual Activity   Drug Use No     Social History     Tobacco Use   Smoking Status Never Smoker   Smokeless Tobacco Never Used       FAMILY HISTORY:  Family History   Problem Relation Age of Onset    Diabetes Mother     Heart failure Mother     Hypertension Mother     Cancer Father     Hypertension Sister     Hyperthyroidism Sister     Stroke Brother     Diabetes Maternal Grandmother     Clotting disorder Maternal Grandmother     No Known Problems Maternal Grandfather     No Known Problems Paternal Grandmother     No Known Problems Paternal Grandfather     Diabetes Brother     Arthritis Brother     Glaucoma Brother     Cataracts Brother     No Known Problems Son     Hyperthyroidism Daughter     Kidney disease Daughter     COPD Daughter     Diabetes Brother     Cancer Brother        MEDICATIONS:    Current Facility-Administered Medications:     acetaminophen (TYLENOL) tablet 650 mg, 650 mg, Oral, Q6H PRN, Demetrio Ramachandran PA-C    [START ON 8/19/2020] amLODIPine (NORVASC) tablet 5 mg, 5 mg, Oral, Daily, Lizz Beltre,     ascorbic acid (VITAMIN C) tablet 1,000 mg, 1,000 mg, Oral, Q12H [address], Malena Ramirez PA-C, 1,000 mg at 08/18/20 0801    atorvastatin (LIPITOR) tablet 40 mg, 40 mg, Oral, HS, Malena Ramirez PA-C, 40 mg at 08/17/20 2111    calcitriol (ROCALTROL) capsule 0 25 mcg, 0 25 mcg, Oral, Once per day on Mon Wed Fri, Malena Ramirez PA-C, 0 25 mcg at 08/17/20 2144    cefTRIAXone (ROCEPHIN) 2,000 mg in dextrose 5 % 50 mL IVPB, 2,000 mg, Intravenous, Q24H, Malena Ramirez PA-C, Last Rate: 100 mL/hr at 08/17/20 2149, 2,000 mg at 08/17/20 2149    cholecalciferol (VITAMIN D3) tablet 2,000 Units, 2,000 Units, Oral, Daily, Malena Ramirez PA-C, 2,000 Units at 08/18/20 0801    clopidogrel (PLAVIX) tablet 75 mg, 75 mg, Oral, Daily, ED Schuster-C, 75 mg at 08/18/20 0801    dexamethasone (DECADRON) injection 6 mg, 6 mg, Intravenous, Q24H, Malena Ramirez PA-C, 6 mg at 08/17/20 2111    doxycycline hyclate (VIBRAMYCIN) capsule 100 mg, 100 mg, Oral, Q12H [facility], ED Schuster-C, 100 mg at 08/18/20 0801    finasteride (PROSCAR) tablet 5 mg, 5 mg, Oral, Daily, ED Schuster-C, 5 mg at 08/18/20 0801    FLUoxetine (PROzac) capsule 40 mg, 40 mg, Oral, Daily, ED Schuster-C, 40 mg at 08/18/20 0801    furosemide (LASIX) injection 40 mg, 40 mg, Intravenous, BID (diuretic), ED Schuster-C, 40 mg at 08/18/20 0800    guaiFENesin (MUCINEX) 12 hr tablet 600 mg, 600 mg, Oral, Q12H [facility], Malena Ramirez PA-C, 600 mg at 08/18/20 0801    heparin (porcine) 25,000 units in 250 mL infusion (premix), 3-30 Units/kg/hr (Order-Specific), Intravenous, Titrated, ED Schusetr-C, Stopped at 08/18/20 0848    insulin lispro (HumaLOG) 100 units/mL subcutaneous injection 1-6 Units, 1-6 Units, Subcutaneous, TID AC, 2 Units at 08/18/20 1312 **AND** Fingerstick Glucose (POCT), , , TID AC, Malena Ramirez PA-C    insulin lispro (HumaLOG) 100 units/mL subcutaneous injection 1-6 Units, 1-6 Units, Subcutaneous, HS, ED Schuster-C, 3 Units at 08/17/20 2144    memantine (NAMENDA) tablet 5 mg, 5 mg, Oral, BID, ED Schuster-C, 5 mg at 08/18/20 0801    zinc sulfate (ZINCATE) capsule 220 mg, 220 mg, Oral, Daily, 220 mg at 08/18/20 0801 **FOLLOWED BY** [START ON 8/25/2020] multivitamin-minerals (CENTRUM ADULTS) tablet 1 tablet, 1 tablet, Oral, Daily, Malena Ramirez PA-C    ondansetron (ZOFRAN) injection 4 mg, 4 mg, Intravenous, Q6H PRN, Malena Ramirez PA-C    primidone (MYSOLINE) tablet 50 mg, 50 mg, Oral, Daily, Malena Ramierz PA-C, 50 mg at 08/18/20 0803    sodium bicarbonate tablet 650 mg, 650 mg, Oral, BID after meals, Malena Ramirez PA-C, 650 mg at 08/18/20 0801   tamsulosin (FLOMAX) capsule 0 4 mg, 0 4 mg, Oral, Daily With Dinner, Elizabet Pastrana PA-C    REVIEW OF SYSTEMS:  All the systems were reviewed and were negative except as documented on the HPI      PHYSICAL EXAM:  Current Weight: Weight - Scale: 100 kg (221 lb 1 9 oz)  First Weight: Weight - Scale: 105 kg (232 lb 2 3 oz)  Vitals:    08/18/20 0706 08/18/20 0715 08/18/20 1105 08/18/20 1439   BP: 130/72   128/69   BP Location:       Pulse: 80   77   Resp:    16   Temp: 98 °F (36 7 °C)      TempSrc:       SpO2: 94% 93%  92%   Weight:       Height:   6' (1 829 m)        Intake/Output Summary (Last 24 hours) at 8/18/2020 1453  Last data filed at 8/18/2020 1415  Gross per 24 hour   Intake 900 ml   Output    Net 900 ml     General: not in acute distress  Skin: no rash, warm  Eyes: pale conjunctivae, anicteric sclerae  ENT: dry lips and mucous membranes  Neck: supple with trachea midline  Chest: coarse breath sounds bilaterally  CVS: normal rate, regular rhythm   Abdomen: soft, non-tender, non-distended, normoactive bowel sounds  Extremities: trace bilateral lower extremity edema   Neuro: lethargic         Invasive Devices:        Lab Results:   Results from last 7 days   Lab Units 08/18/20  0621 08/17/20  1418 08/17/20  1417   WBC Thousand/uL 15 36*  --  15 55*   HEMOGLOBIN g/dL 7 2*  --  7 8*   HEMATOCRIT % 23 7*  --  26 1*   PLATELETS Thousands/uL 305  --  340   POTASSIUM mmol/L 5 0 4 9  --    CHLORIDE mmol/L 99* 98*  --    CO2 mmol/L 14* 16*  --    BUN mg/dL 108* 100*  --    CREATININE mg/dL 3 82* 3 87*  --    CALCIUM mg/dL 8 1* 8 1*  --    ALK PHOS U/L 257* 251*  --    ALT U/L 66 83*  --    AST U/L 64* 126*  --

## 2020-08-18 NOTE — ASSESSMENT & PLAN NOTE
Lab Results   Component Value Date    HGBA1C 7 1 (H) 07/22/2020   Hold oral antihyperglycemics-glipizide  Placed on insulin sliding scale

## 2020-08-18 NOTE — PLAN OF CARE
Problem: OCCUPATIONAL THERAPY ADULT  Goal: Performs self-care activities at highest level of function for planned discharge setting  See evaluation for individualized goals  Description: Treatment Interventions: ADL retraining, Functional transfer training, UE strengthening/ROM, Endurance training, Patient/family training, Equipment evaluation/education, Compensatory technique education, Energy conservation, Activityengagement, Continued evaluation          See flowsheet documentation for full assessment, interventions and recommendations  Note: Limitation: Decreased ADL status, Decreased UE ROM, Decreased UE strength, Decreased Safe judgement during ADL, Decreased cognition, Decreased endurance, Decreased self-care trans, Decreased high-level ADLs  Prognosis: Fair  Assessment: Pt is a 80 y o  male seen for OT evaluation s/p adm to [facility] on 8/17/2020 w/ SOB and hypoxia and dx'd w/ Multifocal pneumonia, acute on chronic diastolic CHF, and SALO  COVID (-) x2  Comorbidities affecting pts functional performance include a significant PMH of A-Fib, Alzheimer's disease, Depression, DM, CKD, PVD, HLD, HTN, Kidney disease, Memory loss, and renal disorder  Pt with active OT orders and activity orders for Up and OOB as tolerated   Pt is poor historian, limited info obtained from pt regarding home setup and PLOF  Per CM note from recent admission at [facility] on [facility], pt lives alone in a ranch style home with 2 TONYA  Pt now presenting from [address] [place] [address] where pt was D/C'd to from [facility] on 8/7/20 for rehab  At rehab, pt required assist w/ ADLs, IADLs, and functional mobility/transfers w/ use of RW  Prior to rehab (per CM notes): Pt was I w/ ADLs and functional mobility/transfers w/ use of RW/SPC and required assist w/ IADLs   Upon evaluation, pt currently requires Min A for UB ADLs, Max A for LB ADLs, Max A for toileting, Max A of 2 for bed mobility, and max A of 2 for functional transfers 2* the following deficits impacting occupational performance: weakness, decreased strength, decreased balance, decreased tolerance, impaired memory and decreased safety awareness  These impairments, as well at pts limited home support, difficulty performing ADLS and limited insight into deficits limit pts ability to safely engage in all baseline areas of occupation  Pt scored overall 25/100 on the Barthel Index  Based on the aforementioned OT evaluation, functional performance deficits, and assessments, pt has been identified as a High complexity evaluation  Pt to continue to benefit from continued acute OT services during hospital stay to address defined deficits and to maximize level of functional independence in the following Occupational Performance areas: grooming, bathing/shower, toilet hygiene, dressing, medication management, health maintenance, functional mobility, community mobility, clothing management and social participation  From OT standpoint, recommend STR upon D/C  Pt may benefit from LTC placement   OT will continue to follow pt 3-5x/wk to address the following goals to  w/in 10-14 days:     OT Discharge Recommendation: [address]  OT - OK to Discharge: Yes(when medically cleared to rehab)

## 2020-08-18 NOTE — PLAN OF CARE
Problem: Potential for Falls  Goal: Patient will remain free of falls  Description: INTERVENTIONS:  - Assess patient frequently for physical needs  -  Identify cognitive and physical deficits and behaviors that affect risk of falls    -  [place] fall precautions as indicated by assessment   - Educate patient/family on patient safety including physical limitations  - Instruct patient to call for assistance with activity based on assessment  - Modify environment to reduce risk of injury  - Consider OT/PT consult to assist with strengthening/mobility  Outcome: Progressing     Problem: PAIN - ADULT  Goal: Verbalizes/displays adequate comfort level or baseline comfort level  Description: Interventions:  - Encourage patient to monitor pain and request assistance  - Assess pain using appropriate pain scale  - Administer analgesics based on type and severity of pain and evaluate response  - Implement non-pharmacological measures as appropriate and evaluate response  - Consider cultural and social influences on pain and pain management  - Notify physician/advanced practitioner if interventions unsuccessful or patient reports new pain  Outcome: Progressing     Problem: INFECTION - ADULT  Goal: Absence or prevention of progression during hospitalization  Description: INTERVENTIONS:  - Assess and monitor for signs and symptoms of infection  - Monitor lab/diagnostic results  - Monitor all insertion sites, i e  indwelling lines, tubes, and drains  - Monitor endotracheal if appropriate and nasal secretions for changes in amount and color  - [place] appropriate cooling/warming therapies per order  - Administer medications as ordered  - Instruct and encourage patient and family to use good hand hygiene technique  - Identify and instruct in appropriate isolation precautions for identified infection/condition  Outcome: Progressing     Problem: SAFETY ADULT  Goal: Patient will remain free of falls  Description: INTERVENTIONS:  - Assess patient frequently for physical needs  -  Identify cognitive and physical deficits and behaviors that affect risk of falls    -  [place] fall precautions as indicated by assessment   - Educate patient/family on patient safety including physical limitations  - Instruct patient to call for assistance with activity based on assessment  - Modify environment to reduce risk of injury  - Consider OT/PT consult to assist with strengthening/mobility  Outcome: Progressing  Goal: Maintain or return to baseline ADL function  Description: INTERVENTIONS:  -  Assess patient's ability to carry out ADLs; assess patient's baseline for ADL function and identify physical deficits which impact ability to perform ADLs (bathing, care of mouth/teeth, toileting, grooming, dressing, etc )  - Assess/evaluate cause of self-care deficits   - Assess range of motion  - Assess patient's mobility; develop plan if impaired  - Assess patient's need for assistive devices and provide as appropriate  - Encourage maximum independence but intervene and supervise when necessary  - Involve family in performance of ADLs  - Assess for home care needs following discharge   - Consider OT consult to assist with ADL evaluation and planning for discharge  - Provide patient education as appropriate  Outcome: Progressing  Goal: Maintain or return mobility status to optimal level  Description: INTERVENTIONS:  - Assess patient's baseline mobility status (ambulation, transfers, stairs, etc )    - Identify cognitive and physical deficits and behaviors that affect mobility  - Identify mobility aids required to assist with transfers and/or ambulation (gait belt, sit-to-stand, lift, walker, cane, etc )  - [place] fall precautions as indicated by assessment  - Record patient progress and toleration of activity level on Mobility SBAR; progress patient to next Phase/Stage  - Instruct patient to call for assistance with activity based on assessment  - Consider rehabilitation consult to assist with strengthening/weightbearing, etc   Outcome: Progressing     Problem: DISCHARGE PLANNING  Goal: Discharge to home or other facility with appropriate resources  Description: INTERVENTIONS:  - Identify barriers to discharge w/patient and caregiver  - Arrange for needed discharge resources and transportation as appropriate  - Identify discharge learning needs (meds, wound care, etc )  - Arrange for interpretive services to assist at discharge as needed  - Refer to Case Management Department for coordinating discharge planning if the patient needs post-hospital services based on physician/advanced practitioner order or complex needs related to functional status, cognitive ability, or social support system  Outcome: Progressing     Problem: Knowledge Deficit  Goal: Patient/family/caregiver demonstrates understanding of disease process, treatment plan, medications, and discharge instructions  Description: Complete learning assessment and assess knowledge base    Interventions:  - Provide teaching at level of understanding  - Provide teaching via preferred learning methods  Outcome: Progressing

## 2020-08-18 NOTE — ASSESSMENT & PLAN NOTE
Presenting with hypoxia  Likely secondary to pneumonia vs CHF vs COVID  Requiring 6 L oxygen nasal cannula  Pre-hospital not on oxygen

## 2020-08-19 ENCOUNTER — APPOINTMENT (INPATIENT)
Dept: CT IMAGING | Facility: HOSPITAL | Age: 85
DRG: 177 | End: 2020-08-19
Payer: MEDICARE

## 2020-08-19 LAB
ABO GROUP BLD: NORMAL
ANION GAP SERPL CALCULATED.3IONS-SCNC: 17 MMOL/L (ref 4–13)
APTT PPP: 82 SECONDS (ref 23–37)
APTT PPP: 90 SECONDS (ref 23–37)
B PARAP IS1001 DNA NPH QL NAA+NON-PROBE: NOT DETECTED
B PERT.PT PRMT NPH QL NAA+NON-PROBE: NOT DETECTED
BASOPHILS # BLD AUTO: 0.01 THOUSANDS/ΜL (ref 0–0.1)
BASOPHILS NFR BLD AUTO: 0 % (ref 0–1)
BLD GP AB SCN SERPL QL: NEGATIVE
BUN SERPL-MCNC: 109 MG/DL (ref 5–25)
C PNEUM DNA NPH QL NAA+NON-PROBE: NOT DETECTED
CALCIUM SERPL-MCNC: 8 MG/DL (ref 8.3–10.1)
CHLORIDE SERPL-SCNC: 101 MMOL/L (ref 100–108)
CO2 SERPL-SCNC: 16 MMOL/L (ref 21–32)
CREAT SERPL-MCNC: 3.72 MG/DL (ref 0.6–1.3)
EOSINOPHIL # BLD AUTO: 0 THOUSAND/ΜL (ref 0–0.61)
EOSINOPHIL NFR BLD AUTO: 0 % (ref 0–6)
ERYTHROCYTE [DISTWIDTH] IN BLOOD BY AUTOMATED COUNT: 15.8 % (ref 11.6–15.1)
FLUAV RNA NPH QL NAA+NON-PROBE: NOT DETECTED
FLUBV RNA NPH QL NAA+NON-PROBE: NOT DETECTED
GFR SERPL CREATININE-BSD FRML MDRD: 13 ML/MIN/1.73SQ M
GLUCOSE SERPL-MCNC: 184 MG/DL (ref 65–140)
GLUCOSE SERPL-MCNC: 194 MG/DL (ref 65–140)
GLUCOSE SERPL-MCNC: 262 MG/DL (ref 65–140)
GLUCOSE SERPL-MCNC: 283 MG/DL (ref 65–140)
GLUCOSE SERPL-MCNC: 335 MG/DL (ref 65–140)
HADV DNA NPH QL NAA+NON-PROBE: NOT DETECTED
HCT VFR BLD AUTO: 22.6 % (ref 36.5–49.3)
HGB BLD-MCNC: 6.9 G/DL (ref 12–17)
HMPV RNA NPH QL NAA+NON-PROBE: NOT DETECTED
HPIV 1+2+3+4 RNA NPH QL NAA+NON-PROBE: NOT DETECTED
HPIV 1+2+3+4 RNA NPH QL NAA+NON-PROBE: NOT DETECTED
IMM GRANULOCYTES # BLD AUTO: 0.1 THOUSAND/UL (ref 0–0.2)
IMM GRANULOCYTES NFR BLD AUTO: 1 % (ref 0–2)
LYMPHOCYTES # BLD AUTO: 0.57 THOUSANDS/ΜL (ref 0.6–4.47)
LYMPHOCYTES NFR BLD AUTO: 5 % (ref 14–44)
M PNEUMO DNA NPH QL NAA+NON-PROBE: NOT DETECTED
MAGNESIUM SERPL-MCNC: 2.3 MG/DL (ref 1.6–2.6)
MCH RBC QN AUTO: 28 PG (ref 26.8–34.3)
MCHC RBC AUTO-ENTMCNC: 30.1 G/DL (ref 31.4–37.4)
MCV RBC AUTO: 93 FL (ref 82–98)
MONOCYTES # BLD AUTO: 0.52 THOUSAND/ΜL (ref 0.17–1.22)
MONOCYTES NFR BLD AUTO: 5 % (ref 4–12)
NEUTROPHILS # BLD AUTO: 9.9 THOUSANDS/ΜL (ref 1.85–7.62)
NEUTS SEG NFR BLD AUTO: 89 % (ref 43–75)
NRBC BLD AUTO-RTO: 0 /100 WBCS
PHOSPHATE SERPL-MCNC: 5.6 MG/DL (ref 2.3–4.1)
PLATELET # BLD AUTO: 324 THOUSANDS/UL (ref 149–390)
PMV BLD AUTO: 10.3 FL (ref 8.9–12.7)
POTASSIUM SERPL-SCNC: 4.6 MMOL/L (ref 3.5–5.3)
PROCALCITONIN SERPL-MCNC: 1.48 NG/ML
RBC # BLD AUTO: 2.43 MILLION/UL (ref 3.88–5.62)
RH BLD: NEGATIVE
RSV RNA NPH QL NAA+NON-PROBE: NOT DETECTED
RV+EV RNA NPH QL NAA+NON-PROBE: NOT DETECTED
SODIUM SERPL-SCNC: 134 MMOL/L (ref 136–145)
SPECIMEN EXPIRATION DATE: NORMAL
WBC # BLD AUTO: 11.1 THOUSAND/UL (ref 4.31–10.16)

## 2020-08-19 PROCEDURE — 97530 THERAPEUTIC ACTIVITIES: CPT

## 2020-08-19 PROCEDURE — 99232 SBSQ HOSP IP/OBS MODERATE 35: CPT | Performed by: INTERNAL MEDICINE

## 2020-08-19 PROCEDURE — 30233N1 TRANSFUSION OF NONAUTOLOGOUS RED BLOOD CELLS INTO PERIPHERAL VEIN, PERCUTANEOUS APPROACH: ICD-10-PCS | Performed by: INTERNAL MEDICINE

## 2020-08-19 PROCEDURE — 83735 ASSAY OF MAGNESIUM: CPT | Performed by: NURSE PRACTITIONER

## 2020-08-19 PROCEDURE — P9040 RBC LEUKOREDUCED IRRADIATED: HCPCS

## 2020-08-19 PROCEDURE — 84100 ASSAY OF PHOSPHORUS: CPT | Performed by: NURSE PRACTITIONER

## 2020-08-19 PROCEDURE — 85025 COMPLETE CBC W/AUTO DIFF WBC: CPT | Performed by: INTERNAL MEDICINE

## 2020-08-19 PROCEDURE — G1004 CDSM NDSC: HCPCS

## 2020-08-19 PROCEDURE — 74176 CT ABD & PELVIS W/O CONTRAST: CPT

## 2020-08-19 PROCEDURE — 82948 REAGENT STRIP/BLOOD GLUCOSE: CPT

## 2020-08-19 PROCEDURE — 80048 BASIC METABOLIC PNL TOTAL CA: CPT | Performed by: NURSE PRACTITIONER

## 2020-08-19 PROCEDURE — 86850 RBC ANTIBODY SCREEN: CPT | Performed by: NURSE PRACTITIONER

## 2020-08-19 PROCEDURE — 86901 BLOOD TYPING SEROLOGIC RH(D): CPT | Performed by: NURSE PRACTITIONER

## 2020-08-19 PROCEDURE — 71250 CT THORAX DX C-: CPT

## 2020-08-19 PROCEDURE — 97110 THERAPEUTIC EXERCISES: CPT

## 2020-08-19 PROCEDURE — 86920 COMPATIBILITY TEST SPIN: CPT

## 2020-08-19 PROCEDURE — 86900 BLOOD TYPING SEROLOGIC ABO: CPT | Performed by: NURSE PRACTITIONER

## 2020-08-19 PROCEDURE — 92526 ORAL FUNCTION THERAPY: CPT

## 2020-08-19 PROCEDURE — 84145 PROCALCITONIN (PCT): CPT | Performed by: PHYSICIAN ASSISTANT

## 2020-08-19 PROCEDURE — 85730 THROMBOPLASTIN TIME PARTIAL: CPT | Performed by: INTERNAL MEDICINE

## 2020-08-19 PROCEDURE — 97535 SELF CARE MNGMENT TRAINING: CPT

## 2020-08-19 RX ORDER — SODIUM CHLORIDE, SODIUM GLUCONATE, SODIUM ACETATE, POTASSIUM CHLORIDE, MAGNESIUM CHLORIDE, SODIUM PHOSPHATE, DIBASIC, AND POTASSIUM PHOSPHATE .53; .5; .37; .037; .03; .012; .00082 G/100ML; G/100ML; G/100ML; G/100ML; G/100ML; G/100ML; G/100ML
250 INJECTION, SOLUTION INTRAVENOUS ONCE
Status: COMPLETED | OUTPATIENT
Start: 2020-08-19 | End: 2020-08-19

## 2020-08-19 RX ADMIN — INSULIN LISPRO 1 UNITS: 100 INJECTION, SOLUTION INTRAVENOUS; SUBCUTANEOUS at 08:03

## 2020-08-19 RX ADMIN — GUAIFENESIN 600 MG: 600 TABLET ORAL at 21:27

## 2020-08-19 RX ADMIN — ATORVASTATIN CALCIUM 40 MG: 40 TABLET, FILM COATED ORAL at 21:27

## 2020-08-19 RX ADMIN — MEMANTINE 5 MG: 5 TABLET ORAL at 08:01

## 2020-08-19 RX ADMIN — FINASTERIDE 5 MG: 5 TABLET, FILM COATED ORAL at 08:01

## 2020-08-19 RX ADMIN — CEFTRIAXONE 2000 MG: 2 INJECTION, POWDER, FOR SOLUTION INTRAMUSCULAR; INTRAVENOUS at 21:28

## 2020-08-19 RX ADMIN — DOXYCYCLINE 100 MG: 100 CAPSULE ORAL at 21:27

## 2020-08-19 RX ADMIN — SODIUM BICARBONATE 650 MG TABLET 1300 MG: at 11:30

## 2020-08-19 RX ADMIN — PRIMIDONE 50 MG: 50 TABLET ORAL at 08:02

## 2020-08-19 RX ADMIN — DOXYCYCLINE 100 MG: 100 CAPSULE ORAL at 08:01

## 2020-08-19 RX ADMIN — CLOPIDOGREL BISULFATE 75 MG: 75 TABLET ORAL at 08:01

## 2020-08-19 RX ADMIN — DEXAMETHASONE SODIUM PHOSPHATE 6 MG: 4 INJECTION, SOLUTION INTRAMUSCULAR; INTRAVENOUS at 21:27

## 2020-08-19 RX ADMIN — SODIUM CHLORIDE, SODIUM GLUCONATE, SODIUM ACETATE, POTASSIUM CHLORIDE, MAGNESIUM CHLORIDE, SODIUM PHOSPHATE, DIBASIC, AND POTASSIUM PHOSPHATE 250 ML: .53; .5; .37; .037; .03; .012; .00082 INJECTION, SOLUTION INTRAVENOUS at 15:26

## 2020-08-19 RX ADMIN — MEMANTINE 5 MG: 5 TABLET ORAL at 15:29

## 2020-08-19 RX ADMIN — CALCITRIOL CAPSULES 0.25 MCG 0.25 MCG: 0.25 CAPSULE ORAL at 08:02

## 2020-08-19 RX ADMIN — OXYCODONE HYDROCHLORIDE AND ACETAMINOPHEN 1000 MG: 500 TABLET ORAL at 21:27

## 2020-08-19 RX ADMIN — INSULIN LISPRO 4 UNITS: 100 INJECTION, SOLUTION INTRAVENOUS; SUBCUTANEOUS at 21:28

## 2020-08-19 RX ADMIN — TAMSULOSIN HYDROCHLORIDE 0.4 MG: 0.4 CAPSULE ORAL at 15:30

## 2020-08-19 RX ADMIN — OXYCODONE HYDROCHLORIDE AND ACETAMINOPHEN 1000 MG: 500 TABLET ORAL at 08:01

## 2020-08-19 RX ADMIN — SODIUM BICARBONATE 650 MG TABLET 1300 MG: at 15:29

## 2020-08-19 RX ADMIN — GUAIFENESIN 600 MG: 600 TABLET ORAL at 08:01

## 2020-08-19 RX ADMIN — Medication 2000 UNITS: at 08:01

## 2020-08-19 RX ADMIN — ZINC SULFATE 220 MG (50 MG) CAPSULE 220 MG: CAPSULE at 08:01

## 2020-08-19 RX ADMIN — AMLODIPINE BESYLATE 5 MG: 5 TABLET ORAL at 08:11

## 2020-08-19 RX ADMIN — FLUOXETINE 40 MG: 20 CAPSULE ORAL at 08:01

## 2020-08-19 RX ADMIN — SODIUM BICARBONATE 650 MG TABLET 1300 MG: at 08:01

## 2020-08-19 RX ADMIN — INSULIN LISPRO 5 UNITS: 100 INJECTION, SOLUTION INTRAVENOUS; SUBCUTANEOUS at 15:39

## 2020-08-19 RX ADMIN — INSULIN LISPRO 3 UNITS: 100 INJECTION, SOLUTION INTRAVENOUS; SUBCUTANEOUS at 12:00

## 2020-08-19 NOTE — ASSESSMENT & PLAN NOTE
SALO on CKD stage 4  Evaluated by Nephrology, appreciate recommendations  Received diuretics without improvement of kidney function  Urine workup  Not a candidate for hemodialysis  Lethargic, suspect secondary to uremia    ABG showed pH 7 374, CO2 23

## 2020-08-19 NOTE — SPEECH THERAPY NOTE
Speech Language/Pathology    Speech/Language Pathology Progress Note    Patient Name: aCrlos Morales  LDCWU'[id number] Date: 8/19/2020     Problem List  Principal Problem:    Multifocal pneumonia  Active Problems:    Diabetes mellitus type 2    Essential hypertension    Transaminitis    Peripheral arterial disease (HCC)    Atrial fibrillation     Hyponatremia    Acute respiratory failure with hypoxia (HCC)    SALO on CKD stage 4    Osteomyelitis of foot (HCC)    Acute on chronic diastolic congestive heart failure (HCC)    Acidosis       Past Medical History  Past Medical History:   Diagnosis Date    [facility]     Alzheimer's disease [facility]     Depression     Diabetes mellitus ([facility]     Anderson catheter in place     History of atrial fibrillation     History of chronic kidney disease     History of peripheral vascular disease     Hyperlipidemia     Hypertension     Kidney disease     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     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[facility]     Memory loss     Osteomyelitis of right foot ([facility]     Renal disorder         Past Surgical History  Past Surgical History:   Procedure Laterality Date    APPENDECTOMY  1945    FEMORAL ARTERY - TIBIAL ARTERY BYPASS GRAFT Right 01/08/2014    R SFA- PT w/ nonrev GSV, PreVena VAC- Balshi    FEMORAL ARTERY - TIBIAL ARTERY BYPASS GRAFT Left 06/04/2015    L SFA- PT w/ Cryovein- Ivan/ Balshi    FOOT SURGERY  06/14/2013    I&D with vac    IR LOWER EXTREMITY / INTERVENTION  7/24/2020    WOUND DEBRIDEMENT Right 03/07/2014    R thigh wound washout, VAC- Balshi         Subjective:  Pt seen for dysphagia tx  Pt still contact/airborne isolation due to pending COVID test (pt has had 2 negative, waiting for third negative)  Pt sitting upright in bed, more alert and more conversive today  Objective:  Pt was assisted with his meal; he asked to be fed due to feeling weak   Pt was fed scrambled eggs, toast with butter (he fed himself the toast), a bowl of cream of wheat, and a bowl of applesauce  He drank water and milk by straw  Also observed pt take pills crushed in applesauce, given by RN  Bite size and strength was appropriate, including biting into toast and tearing off pieces  Mastication, bolus formation and transfer was WNL  There was no oral residue or pocketing observed  Pharyngeal swallows were observed and palpated and appeared fairly timely  Pt coughed x2, once after a bite of the pill/applesauce mixture, and once during the meal, which did not occur immediately after a certain food/drink  There were no other s/s of aspiration throughout the entire meal      Assessment:  Pt more alert and much better PO intake during breakfast  Overall, oropharyngeal swallow appear to be WNL  Pt coughed x2 during the meal, but did not appear necessarily related to the swallow  There were no other s/s of aspiration during the meal      Plan/Recommendations: It was recommended yesterday that diet be changed to dysphagia 3, however it is still regular today, and based on pt's performance at breakfast, it is appropriate to continue regular diet and thin liquids  No further ST recommended at this time   Discharge from [address] [place] [address]

## 2020-08-19 NOTE — ASSESSMENT & PLAN NOTE
Anticoagulated on Coumadin  INR 2 82 on admission  Coumadin was held on admission     Patient was started on heparin drip for COVID pathway  Hold heparin drip now

## 2020-08-19 NOTE — PLAN OF CARE
Problem: Potential for Falls  Goal: Patient will remain free of falls  Description: INTERVENTIONS:  - Assess patient frequently for physical needs  -  Identify cognitive and physical deficits and behaviors that affect risk of falls    -  [place] fall precautions as indicated by assessment   - Educate patient/family on patient safety including physical limitations  - Instruct patient to call for assistance with activity based on assessment  - Modify environment to reduce risk of injury  - Consider OT/PT consult to assist with strengthening/mobility  8/18/2020 2341 by Marlene Rivas RN  Outcome: Progressing  8/18/2020 2341 by Marlene Rivas RN  Outcome: Progressing     Problem: PAIN - ADULT  Goal: Verbalizes/displays adequate comfort level or baseline comfort level  Description: Interventions:  - Encourage patient to monitor pain and request assistance  - Assess pain using appropriate pain scale  - Administer analgesics based on type and severity of pain and evaluate response  - Implement non-pharmacological measures as appropriate and evaluate response  - Consider cultural and social influences on pain and pain management  - Notify physician/advanced practitioner if interventions unsuccessful or patient reports new pain  8/18/2020 2341 by Marlene Rivas RN  Outcome: Progressing  8/18/2020 2341 by Marlene Rivas RN  Outcome: Progressing     Problem: INFECTION - ADULT  Goal: Absence or prevention of progression during hospitalization  Description: INTERVENTIONS:  - Assess and monitor for signs and symptoms of infection  - Monitor lab/diagnostic results  - Monitor all insertion sites, i e  indwelling lines, tubes, and drains  - Monitor endotracheal if appropriate and nasal secretions for changes in amount and color  - [place] appropriate cooling/warming therapies per order  - Administer medications as ordered  - Instruct and encourage patient and family to use good hand hygiene technique  - Identify and instruct in appropriate isolation precautions for identified infection/condition  8/18/2020 2341 by Jeremy Sandhoff, RN  Outcome: Progressing  8/18/2020 2341 by Jeremy Sandhoff, RN  Outcome: Progressing     Problem: SAFETY ADULT  Goal: Patient will remain free of falls  Description: INTERVENTIONS:  - Assess patient frequently for physical needs  -  Identify cognitive and physical deficits and behaviors that affect risk of falls    -  [place] fall precautions as indicated by assessment   - Educate patient/family on patient safety including physical limitations  - Instruct patient to call for assistance with activity based on assessment  - Modify environment to reduce risk of injury  - Consider OT/PT consult to assist with strengthening/mobility  8/18/2020 2341 by Jeremy Sandhoff, RN  Outcome: Progressing  8/18/2020 2341 by Jeremy Sandhoff, RN  Outcome: Progressing  Goal: Maintain or return to baseline ADL function  Description: INTERVENTIONS:  -  Assess patient's ability to carry out ADLs; assess patient's baseline for ADL function and identify physical deficits which impact ability to perform ADLs (bathing, care of mouth/teeth, toileting, grooming, dressing, etc )  - Assess/evaluate cause of self-care deficits   - Assess range of motion  - Assess patient's mobility; develop plan if impaired  - Assess patient's need for assistive devices and provide as appropriate  - Encourage maximum independence but intervene and supervise when necessary  - Involve family in performance of ADLs  - Assess for home care needs following discharge   - Consider OT consult to assist with ADL evaluation and planning for discharge  - Provide patient education as appropriate  8/18/2020 2341 by Jeremy Sandhoff, RN  Outcome: Progressing  8/18/2020 2341 by Jeremy Sandhoff, RN  Outcome: Progressing  Goal: Maintain or return mobility status to optimal level  Description: INTERVENTIONS:  - Assess patient's baseline mobility status (ambulation, transfers, stairs, etc )    - Identify cognitive and physical deficits and behaviors that affect mobility  - Identify mobility aids required to assist with transfers and/or ambulation (gait belt, sit-to-stand, lift, walker, cane, etc )  - [place] fall precautions as indicated by assessment  - Record patient progress and toleration of activity level on Mobility SBAR; progress patient to next Phase/Stage  - Instruct patient to call for assistance with activity based on assessment  - Consider rehabilitation consult to assist with strengthening/weightbearing, etc   8/18/2020 2341 by Carmella Khan RN  Outcome: Progressing  8/18/2020 2341 by Carmella Khan RN  Outcome: Progressing     Problem: DISCHARGE PLANNING  Goal: Discharge to home or other facility with appropriate resources  Description: INTERVENTIONS:  - Identify barriers to discharge w/patient and caregiver  - Arrange for needed discharge resources and transportation as appropriate  - Identify discharge learning needs (meds, wound care, etc )  - Arrange for interpretive services to assist at discharge as needed  - Refer to Case Management Department for coordinating discharge planning if the patient needs post-hospital services based on physician/advanced practitioner order or complex needs related to functional status, cognitive ability, or social support system  8/18/2020 2341 by Carmella Khan RN  Outcome: Progressing  8/18/2020 2341 by Carmella Khan RN  Outcome: Progressing     Problem: Knowledge Deficit  Goal: Patient/family/caregiver demonstrates understanding of disease process, treatment plan, medications, and discharge instructions  Description: Complete learning assessment and assess knowledge base    Interventions:  - Provide teaching at level of understanding  - Provide teaching via preferred learning methods  8/18/2020 2341 by Carmella Khan RN  Outcome: Progressing  8/18/2020 2341 by Carmella Khan RN  Outcome: Progressing     Problem: Prexisting or High Potential for Compromised Skin Integrity  Goal: Skin integrity is maintained or improved  Description: INTERVENTIONS:  - Identify patients at risk for skin breakdown  - Assess and monitor skin integrity  - Assess and monitor nutrition and hydration status  - Monitor labs   - Assess for incontinence   - Turn and reposition patient  - Assist with mobility/ambulation  - Relieve pressure over bony prominences  - Avoid friction and shearing  - Provide appropriate hygiene as needed including keeping skin clean and dry  - Evaluate need for skin moisturizer/barrier cream  - Collaborate with interdisciplinary team   - Patient/family teaching  - Consider wound care consult   8/18/2020 2341 by Ailyn Burerll RN  Outcome: Progressing  8/18/2020 2341 by Ailyn Burrell RN  Outcome: Progressing     Problem: Nutrition/Hydration-ADULT  Goal: Nutrient/Hydration intake appropriate for improving, restoring or maintaining nutritional needs  Description: Monitor and assess patient's nutrition/hydration status for malnutrition  Collaborate with interdisciplinary team and initiate plan and interventions as ordered  Monitor patient's weight and dietary intake as ordered or per policy  Utilize nutrition screening tool and intervene as necessary  Determine patient's food preferences and provide high-protein, high-caloric foods as appropriate       INTERVENTIONS:  - Monitor oral intake, urinary output, labs, and treatment plans  - Assess nutrition and hydration status and recommend course of action  - Evaluate amount of meals eaten  - Assist patient with eating if necessary   - Allow adequate time for meals  - Recommend/ encourage appropriate diets, oral nutritional supplements, and vitamin/mineral supplements  - Order, calculate, and assess calorie counts as needed  - Recommend, monitor, and adjust tube feedings and TPN/PPN based on assessed needs  - Assess need for intravenous fluids  - Provide specific nutrition/hydration education as appropriate  - Include patient/family/caregiver in decisions related to nutrition  8/18/2020 2341 by Lore Nobles RN  Outcome: Progressing  8/18/2020 2341 by Lore Nobles RN  Outcome: Progressing

## 2020-08-19 NOTE — ASSESSMENT & PLAN NOTE
Presenting with hypoxia    Likely secondary to pneumonia vs CHF vs suspected COVID  Required 6 L of oxygen admission, shortly after placed on mid flow, currently down to 6 L nasal cannula  Continue COVID-19 pathway  RVP negative  Continue supplemental oxygen maintain oxygenation greater than 8%

## 2020-08-19 NOTE — ASSESSMENT & PLAN NOTE
SALO on CKD stage 4  Evaluated by Nephrology, appreciate recommendations  Hold diuretics after todays afternoon dose  Urine workup  Not a candidate for hemodialysis  Lethargic, suspect secondary to uremia    ABG showed pH 7 374, CO2 23

## 2020-08-19 NOTE — ASSESSMENT & PLAN NOTE
With transaminitis- , ALT 83, alk phos 251  Likely related to liver congestion from CHF versus call with  Will continue to trend

## 2020-08-19 NOTE — PLAN OF CARE
Problem: Potential for Falls  Goal: Patient will remain free of falls  Description: INTERVENTIONS:  - Assess patient frequently for physical needs  -  Identify cognitive and physical deficits and behaviors that affect risk of falls    -  [place] fall precautions as indicated by assessment   - Educate patient/family on patient safety including physical limitations  - Instruct patient to call for assistance with activity based on assessment  - Modify environment to reduce risk of injury  - Consider OT/PT consult to assist with strengthening/mobility  8/19/2020 1631 by Ayse Reed RN  Outcome: Progressing  8/19/2020 1631 by Ayse Reed RN  Outcome: Progressing     Problem: PAIN - ADULT  Goal: Verbalizes/displays adequate comfort level or baseline comfort level  Description: Interventions:  - Encourage patient to monitor pain and request assistance  - Assess pain using appropriate pain scale  - Administer analgesics based on type and severity of pain and evaluate response  - Implement non-pharmacological measures as appropriate and evaluate response  - Consider cultural and social influences on pain and pain management  - Notify physician/advanced practitioner if interventions unsuccessful or patient reports new pain  8/19/2020 1631 by Ayse Reed RN  Outcome: Progressing  8/19/2020 1631 by Ayse Reed RN  Outcome: Progressing     Problem: INFECTION - ADULT  Goal: Absence or prevention of progression during hospitalization  Description: INTERVENTIONS:  - Assess and monitor for signs and symptoms of infection  - Monitor lab/diagnostic results  - Monitor all insertion sites, i e  indwelling lines, tubes, and drains  - Monitor endotracheal if appropriate and nasal secretions for changes in amount and color  - [place] appropriate cooling/warming therapies per order  - Administer medications as ordered  - Instruct and encourage patient and family to use good hand hygiene technique  - Identify and instruct in appropriate isolation precautions for identified infection/condition  8/19/2020 1631 by Rd Kimball RN  Outcome: Progressing  8/19/2020 1631 by Rd Kimball RN  Outcome: Progressing     Problem: SAFETY ADULT  Goal: Patient will remain free of falls  Description: INTERVENTIONS:  - Assess patient frequently for physical needs  -  Identify cognitive and physical deficits and behaviors that affect risk of falls    -  [place] fall precautions as indicated by assessment   - Educate patient/family on patient safety including physical limitations  - Instruct patient to call for assistance with activity based on assessment  - Modify environment to reduce risk of injury  - Consider OT/PT consult to assist with strengthening/mobility  8/19/2020 1631 by Rd Kimball RN  Outcome: Progressing  8/19/2020 1631 by Rd Kimball RN  Outcome: Progressing  Goal: Maintain or return to baseline ADL function  Description: INTERVENTIONS:  -  Assess patient's ability to carry out ADLs; assess patient's baseline for ADL function and identify physical deficits which impact ability to perform ADLs (bathing, care of mouth/teeth, toileting, grooming, dressing, etc )  - Assess/evaluate cause of self-care deficits   - Assess range of motion  - Assess patient's mobility; develop plan if impaired  - Assess patient's need for assistive devices and provide as appropriate  - Encourage maximum independence but intervene and supervise when necessary  - Involve family in performance of ADLs  - Assess for home care needs following discharge   - Consider OT consult to assist with ADL evaluation and planning for discharge  - Provide patient education as appropriate  8/19/2020 1631 by Rd Kimball RN  Outcome: Progressing  8/19/2020 1631 by Rd Kimball RN  Outcome: Progressing  Goal: Maintain or return mobility status to optimal level  Description: INTERVENTIONS:  - Assess patient's baseline mobility status (ambulation, transfers, stairs, etc )    - Identify cognitive and physical deficits and behaviors that affect mobility  - Identify mobility aids required to assist with transfers and/or ambulation (gait belt, sit-to-stand, lift, walker, cane, etc )  - [place] fall precautions as indicated by assessment  - Record patient progress and toleration of activity level on Mobility SBAR; progress patient to next Phase/Stage  - Instruct patient to call for assistance with activity based on assessment  - Consider rehabilitation consult to assist with strengthening/weightbearing, etc   8/19/2020 1631 by Cathleen Ham RN  Outcome: Progressing  8/19/2020 1631 by Cathleen Ham RN  Outcome: Progressing     Problem: DISCHARGE PLANNING  Goal: Discharge to home or other facility with appropriate resources  Description: INTERVENTIONS:  - Identify barriers to discharge w/patient and caregiver  - Arrange for needed discharge resources and transportation as appropriate  - Identify discharge learning needs (meds, wound care, etc )  - Arrange for interpretive services to assist at discharge as needed  - Refer to Case Management Department for coordinating discharge planning if the patient needs post-hospital services based on physician/advanced practitioner order or complex needs related to functional status, cognitive ability, or social support system  8/19/2020 1631 by Cathleen Ham RN  Outcome: Progressing  8/19/2020 1631 by Cathleen Ham RN  Outcome: Progressing     Problem: Knowledge Deficit  Goal: Patient/family/caregiver demonstrates understanding of disease process, treatment plan, medications, and discharge instructions  Description: Complete learning assessment and assess knowledge base    Interventions:  - Provide teaching at level of understanding  - Provide teaching via preferred learning methods  8/19/2020 1631 by Cathleen Ham RN  Outcome: Progressing  8/19/2020 1631 by Cathleen Ham RN  Outcome: Progressing Problem: Prexisting or High Potential for Compromised Skin Integrity  Goal: Skin integrity is maintained or improved  Description: INTERVENTIONS:  - Identify patients at risk for skin breakdown  - Assess and monitor skin integrity  - Assess and monitor nutrition and hydration status  - Monitor labs   - Assess for incontinence   - Turn and reposition patient  - Assist with mobility/ambulation  - Relieve pressure over bony prominences  - Avoid friction and shearing  - Provide appropriate hygiene as needed including keeping skin clean and dry  - Evaluate need for skin moisturizer/barrier cream  - Collaborate with interdisciplinary team   - Patient/family teaching  - Consider wound care consult   8/19/2020 1631 by Evelyn Shaffer RN  Outcome: Progressing  8/19/2020 1631 by Evelyn Shaffer RN  Outcome: Progressing     Problem: Nutrition/Hydration-ADULT  Goal: Nutrient/Hydration intake appropriate for improving, restoring or maintaining nutritional needs  Description: Monitor and assess patient's nutrition/hydration status for malnutrition  Collaborate with interdisciplinary team and initiate plan and interventions as ordered  Monitor patient's weight and dietary intake as ordered or per policy  Utilize nutrition screening tool and intervene as necessary  Determine patient's food preferences and provide high-protein, high-caloric foods as appropriate       INTERVENTIONS:  - Monitor oral intake, urinary output, labs, and treatment plans  - Assess nutrition and hydration status and recommend course of action  - Evaluate amount of meals eaten  - Assist patient with eating if necessary   - Allow adequate time for meals  - Recommend/ encourage appropriate diets, oral nutritional supplements, and vitamin/mineral supplements  - Order, calculate, and assess calorie counts as needed  - Recommend, monitor, and adjust tube feedings and TPN/PPN based on assessed needs  - Assess need for intravenous fluids  - Provide specific nutrition/hydration education as appropriate  - Include patient/family/caregiver in decisions related to nutrition  8/19/2020 1631 by Andrey Hobbs RN  Outcome: Progressing  8/19/2020 1631 by Andrey Hobbs RN  Outcome: Progressing

## 2020-08-19 NOTE — ASSESSMENT & PLAN NOTE
Presenting after being found to be hypoxic at nursing home  Chest x-ray with multifocal bilateral consolidation and ground-glass opacity  Positive COVID exposure at nursing home, had 4 COVID-19 test in the past 11 days    Follow moderate COVID algorithm  Troponin elevated at 0 13  BNP elevated at 41,086  D-dimer elevated at 3 23-->3 78  Transaminitis- , ALT 83, alk-phos 251    Ferritin 332  Procalcitonin 1 09-->1 62 (with worsening kidney function too)    Ceftriaxone/doxy  Start vitamin D3, vitamin-C, zinc, atorvastatin    Dexamethasone 6 mg daily times 10 days  Started on anticoagulation-intermediate intensity with heparin drip   Pulmonology on board, appreciate recommendations

## 2020-08-19 NOTE — ASSESSMENT & PLAN NOTE
Recent hospital admission [facility] from 7/21/20 to 8/7/20 for left foot osteomyelitis  Patient started on oral doxycycline for indefinite suppression

## 2020-08-19 NOTE — ASSESSMENT & PLAN NOTE
Presenting with hypoxia    Likely secondary to pneumonia vs CHF vs COVID  Required 6 L of oxygen admission, will overnight placed on mid flow, currently on 7 L  Continue COVID-19 pathway  Viral panel pending  Diuresis with Lasix

## 2020-08-19 NOTE — PROGRESS NOTES
NEPHROLOGY PROGRESS NOTE   Zahraa Wilkins 80 y o  male MRN: [id number]  Unit/Bed#: [place] [address] -01 Encounter: [id number]      ASSESSMENT/PLAN:  Chronic kidney disease, stage IV:  - suspect secondary to hypertensive nephrosclerosis + diabetic nephropathy + age-related nephron loss  - upon review medical records, it appears that the patient has a baseline creatinine high 2s to low 3s mg/dL  - patient follows with Dr Sang Jones as an outpatient      Acute kidney injury (POA) on chronic kidney disease, stage IV:  - acute kidney injury suspect prerenal azotemia + cardiorenal syndrome + underlying pneumonia + progression to ATN +/- progression of chronic kidney disease   - patient was admitted with a creatinine of 3 87 mg/dL  - patient's creatinine today is at 3 72 mg/dL,   - UA revealed urine specific gravity 1 020, trace protein, large blood, innumerable RBC, 1-2 coarse granular casts  - urine sodium 63, urine creatinine 46 8, urine urea nitrogen 551, urine chloride 64      - per patient and daughter, both do not wish to pursue dialysis  - IV furosemide on hold  - check PVR with bladder scan  Maintain urinary retention protocol    - check renal ultrasound to r/o hydronephrosis, obstruction if renal indices worsen  - avoid NSAIDS, nephrotoxins, IV contrast if possible  - adjust medications to appropriate GFR   - avoid hypotension/ fluctuations in blood pressure to prevent decreased renal perfusion    - monitor volume status with strict intake/output  - please perform daily standing weight if hemodynamically stable       Hypertension:  - blood pressure stable  - home medication regimen:  Amlodipine 5 mg daily + furosemide 40 mg daily  - currently on:  Amlodipine 5 mg daily  - optimize hemodynamics  - maintain MAP > 65mmHg     - avoid hypotension/ fluctuations in blood pressure       Electrolytes:  - hyponatremia with most recent sodium 134 mEq                - implement 1 5 L fluid restriction  - with history of hyperkalemia, most recent potassium 4 6                - maintain low-potassium diet  - on veltassa twice weekly as outpatient  - will continue to monitor with lab studies      Acid/base:  - most recent bicarbonate 16 with elevated anion gap of 17                - outpatient regimen includes: Sodium bicarbonate supplements 650 mg b i d               - currently on: Sodium bicarbonate supplements 1300 mg t i d   - will continue to monitor with lab studies      Anemia likely of chronic disease:  - most recent hemoglobin at 6 9 grams/deciliter  - one unit PRBC ordered per primary  - maintain hemoglobin greater than 8 grams/deciliter  - transfuse for hemoglobin < 7 grams/deciliter per primary team    - on oral iron supplements as outpatient  - recommend FOBT  - management per primary team       Mineral bone disease of chronic kidney disease:  - on calcitriol 0 25 mcg 3 times weekly  - magnesium 2 3   - hyperphosphatemia with most recent phosphorus elevated at 5 6    - monitor phosphorus levels, may initiate binders if phosphorus does not improve  - will continue to monitor PTH and phosphorus as outpatient       DM:  - most recent hemoglobin A1c: 7 1    - currently on insulin    - management as per primary team      CHF: EF 60%  - BNP upon admission 41,000    - home diuretic regimen:  Furosemide 40 mg daily  - diuretics on hold  - monitor volume status closely  - management as per primary team       Other problems:   - pneumonia:              - chest x-ray revealed multifocal bilateral consolidation and ground-glass opacity  Nodule areas are more suggestive of pneumonia than edema  - continue Rocephin + doxycycline in which patient was already on for osteomyelitis of foot  - per primary team and Pulmonary      - suspected COVID-19 infection:   - suspicion due to positive exposure, radiographic finding, hypoxia and elevated inflammatory markers/D-dimer    - continued on COVID-19 pathway treatment  - per primary team and Pulmonary   - AFib:              - on Coumadin as outpatient  Currently on heparin drip                - per primary team         SUBJECTIVE:  To limit potential exposure of COVID-19, ROS obtained via primary team  Given status of patient, iPad utilization would not be beneficial      OBJECTIVE:  Current Weight: Weight - Scale: 100 kg (221 lb 1 9 oz)  Vitals:    08/19/20 1245   BP:    Pulse: 77   Resp: 20   Temp: (!) 97 3 °F (36 3 °C)   SpO2:        Intake/Output Summary (Last 24 hours) at 8/19/2020 1334  Last data filed at 8/18/2020 1709  Gross per 24 hour   Intake 600 ml   Output 510 ml   Net 90 ml     To limit potential exposure of COVID-19, physical exam obtained via primary team  Given status of patient, iPad utilization would not be beneficial      General:  No acute distress  Skin:  Warm, no rash  Eyes:  Sclerae anicteric  ENT:  Moist lips and mucous membranes  Neck:  Supple trachea midline  Chest:  Bibasilar crackles   CVS:  Regular rate regular rhythm  Abdomen:  Soft, nontender, normoactive bowel sounds  Extremities:  Lower extremity edema bilaterally   Neuro: lethargic, opens eyes to questions, nods         Medications:  Scheduled Meds:  Current Facility-Administered Medications   Medication Dose Route Frequency Provider Last Rate    acetaminophen  650 mg Oral Q6H PRN Maty Bacon PA-C      amLODIPine  5 mg Oral Daily Lizz K DO Patt      ascorbic acid  1,000 mg Oral Q12H [address] Malena Ramirez PA-C      atorvastatin  40 mg Oral HS Malena Ramirez PA-C      calcitriol  0 25 mcg Oral Once per day on Mon Wed Fri Malena Ramirez PA-C      cefTRIAXone  2,000 mg Intravenous Q24H Maty Bacon PA-C 2,000 mg (08/18/20 2054)    cholecalciferol  2,000 Units Oral Daily Maty Bacon PA-C      clopidogrel  75 mg Oral Daily Malena Ramirez PA-C      dexamethasone  6 mg Intravenous Q24H Maty Bacon PA-C      doxycycline hyclate  100 mg Oral Q12H [address] Malena Ramirez PA-C      finasteride  5 mg Oral Daily Carlton, PA-C      FLUoxetine  40 mg Oral Daily Carlton, PA-C      guaiFENesin  600 mg Oral Q12H [address] Malena Piger, PA-C      heparin (porcine)  3-30 Units/kg/hr (Order-Specific) Intravenous Titrated JYOTI Guevara 10 Units/kg/hr (08/19/20 0645)    insulin lispro  1-6 Units Subcutaneous TID AC ED Schuster-ELMA      insulin lispro  1-6 Units Subcutaneous HS Malena Ramirez PA-C      memantine  5 mg Oral BID JYOTI Guevara      zinc sulfate  220 mg Oral Daily Malena Ramirez PA-C      Followed by   Davis Graham ON 8/25/2020] multivitamin-minerals  1 tablet Oral Daily Malena Ramirez PA-C      ondansetron  4 mg Intravenous Q6H PRN JYOTI Guevara      primidone  50 mg Oral Daily Malena Ramirez PA-C      sodium bicarbonate  1,300 mg Oral TID after meals COLLEEN Aguayo      tamsulosin  0 4 mg Oral Daily With TempronicsJYOTI         PRN Meds:   acetaminophen    ondansetron    Continuous Infusions:heparin (porcine), 3-30 Units/kg/hr (Order-Specific), Last Rate: 10 Units/kg/hr (08/19/20 0645)        Laboratory Results:  Results from last 7 days   Lab Units 08/19/20  0559 08/18/20  0621 08/17/20  1418 08/17/20  1417   WBC Thousand/uL 11 10* 15 36*  --  15 55*   HEMOGLOBIN g/dL 6 9* 7 2*  --  7 8*   HEMATOCRIT % 22 6* 23 7*  --  26 1*   PLATELETS Thousands/uL 324 305  --  340   SODIUM mmol/L 134* 132* 132*  --    POTASSIUM mmol/L 4 6 5 0 4 9  --    CHLORIDE mmol/L 101 99* 98*  --    CO2 mmol/L 16* 14* 16*  --    BUN mg/dL 109* 108* 100*  --    CREATININE mg/dL 3 72* 3 82* 3 87*  --    CALCIUM mg/dL 8 0* 8 1* 8 1*  --    MAGNESIUM mg/dL 2 3  --   --   --    PHOSPHORUS mg/dL 5 6*  --   --   --

## 2020-08-19 NOTE — PLAN OF CARE
Problem: OCCUPATIONAL THERAPY ADULT  Goal: Performs self-care activities at highest level of function for planned discharge setting  See evaluation for individualized goals  Description: Treatment Interventions: ADL retraining, Functional transfer training, UE strengthening/ROM, Endurance training, Patient/family training, Equipment evaluation/education, Compensatory technique education, Energy conservation, Activityengagement, Continued evaluation          See flowsheet documentation for full assessment, interventions and recommendations  Note: Limitation: Decreased ADL status, Decreased UE ROM, Decreased UE strength, Decreased Safe judgement during ADL, Decreased cognition, Decreased endurance, Decreased self-care trans, Decreased high-level ADLs  Prognosis: Fair  Assessment: Pt seen for 30 min tx session with focus on functional balance, functional mobility, ADL status, transfer safety, cognition, and activity tolerance  Pt able to tolerate OOB mobility; sitting balance=f/f+, standing balance=f-/p+  Pt required verbal/physical cues to maintain transfer safety  Pt required assistance with LE ADLs  Cognitive deficits noted--memory, problem-solving  Pt fatigues quickly with functional tasks  Pt would benefit from continued PT/OT in his LTC facility  Will continue        OT Discharge Recommendation: Post-Acute Rehabilitation Services(vs  continue PT/OT in his LTC facility)  OT - OK to Discharge: Yes(when medically cleared to rehab)

## 2020-08-19 NOTE — PHYSICAL THERAPY NOTE
PT PROGRESS NOTE    Name: Kurt Ibrahim  AGE: 80 y o  MRN: [id number]  LENGTH OF STAY: 2             08/19/20 1246   Pain Assessment   Pain Score No Pain   Restrictions/Precautions   Weight Bearing Precautions Per Order No   LLE Weight Bearing Per Order WBAT  (per previous PT notes during [facility] admission)   Braces or Orthoses Other (Comment)  (globiped shoe L foot per previous PT notes during [facility] stay)   Other Precautions Multiple lines;Telemetry;O2;Fall Risk;Contact/isolation; Airborne/isolation  (5L O2 NC)   General   Chart Reviewed Yes   Response to Previous Treatment Patient reporting fatigue but able to participate  Family/Caregiver Present No   Cognition   Overall Cognitive Status Impaired   Arousal/Participation Arousable   Attention Attends with cues to redirect   Orientation Level Oriented to person;Oriented to place;Oriented to time   Following Commands Follows one step commands without difficulty   Subjective   Subjective Pt agreeable to therapy  Bed Mobility   Rolling R 3  Moderate assistance   Additional items Assist x 1;HOB elevated; Bedrails; Increased time required;Verbal cues;LE management   Supine to Sit 2  Maximal assistance   Additional items Assist x 1;HOB elevated; Bedrails; Increased time required;Verbal cues;LE management   Additional Comments cues for techniques   Transfers   Sit to Stand 3  Moderate assistance   Additional items Assist x 2; Increased time required;Verbal cues   Stand to Sit 3  Moderate assistance   Additional items Assist x 2;Armrests; Increased time required;Verbal cues   Additional Comments cues for techniques & safety   Ambulation/Elevation   Gait pattern Wide NOAH; Forward Flexion;Decreased foot clearance; Short stride; Excessively slow  (dec B/L knee extension during stance; slight knee buckling )   Gait Assistance 3  Moderate assist   Additional items Assist x 2;Verbal cues; Tactile cues   Assistive Device Rolling walker   Distance 3'x1   Balance   Static Sitting Fair Static Standing Poor  (w/ RW)   Ambulatory Poor -  (w/ RW)   Endurance Deficit   Endurance Deficit Yes   Endurance Deficit Description weakness; fatigue   Activity Tolerance   Activity Tolerance Patient limited by fatigue;Treatment limited secondary to medical complications (Comment)   Medical Staff Made Aware CANDICE Busby   Nurse Made Aware ERENDIRA Darnell   Exercises   THR Supine;10 reps;AAROM; Bilateral   Assessment   Prognosis Good   Problem List Decreased strength;Decreased endurance; Impaired balance;Decreased mobility; Decreased cognition; Impaired judgement;Decreased safety awareness   Assessment Pt seen for PT per POC  Pt negative x4  for COVID however now placed on airborne/contact precautions  Improved mobility & activity tolerance noted this tx session  Pt progressed to maxAx1 for bed mobility & modAx2 for transfers & amb w/ RW + cues for techniques & safety  See above levels of assistance required for all functional tasks  Gait deviations as above but no gross LOB noted  Pt tolerated above mentioned thera  ex well, AAROM  Pt more alert this tx session, able to answer questions appropriately & follow simple commands w/o difficulty  No SOB & dizziness reported t/o session  Pt now on 5L O2 NC  Nsg staff most recent vital signs as follows: /62   Pulse 77   Temp (!) 97 3 °F (36 3 °C) (Oral)   Resp 20   Ht 6' (1 829 m)   Wt 100 kg (221 lb 1 9 oz)   SpO2 94%   BMI 29 99 kg/m²   Will continue PT per POC  Updated PT goals stated below  At end of session, pt tolerated OOB in chair w/o issues, call bell & phone in reach, chair alarm activated  Fall precautions reinforced w/ good understanding  Will continue to recommend inpt rehab at D/C  CM to follow  Nsg staff to continue to mobilized pt (OOB in chair for all meals & ambulate in room/unit) as tolerated to prevent decline in function  Nsg notified      Barriers to Discharge Inaccessible home environment;Decreased caregiver support   Barriers to Discharge Comments home alone; (+)TONYA   Goals   Patient Goals to get better   STG Expiration Date 09/02/20   Short Term Goal #1 Goals to be met in 14 days; pt will be able to: 1) inc strength & balance by 1/2 grade to improve overall functional mobility & dec fall risk; 2) inc bed mobility to minAx1 for pt to be able to get in/OOB safely w/ proper techniques 100% of the time, to dec caregiver burden & safely function at home; 3) inc transfers to minAx1 for pt to transition safely from one surface to another w/o % of the time, to dec caregiver burden & safely function at home; 4) inc amb w/ RW approx  150' w/ minAx1 for pt to ambulate household distances w/o any % of the time, to dec caregiver burden & safely function at home; 5) negotiate stairs w/ minAx1 for inc safety during stair mgt inside/outside of home & dec caregiver burden; 6) pt/caregiver ed   PT Treatment Day 1   Plan   Treatment/Interventions Functional transfer training;LE strengthening/ROM; Elevations; Therapeutic exercise; Endurance training;Patient/family training;Bed mobility;Gait training;Spoke to nursing;OT   Progress Progressing toward goals   PT Frequency Other (Comment)  (3-5x/wk)   Recommendation   PT Discharge Recommendation Post-Acute Rehabilitation Services   Equipment Recommended Walker  (RW)   PT - OK to Discharge Yes  (to inpt rehab when medically cleared)   Davina Conti, PT

## 2020-08-19 NOTE — ASSESSMENT & PLAN NOTE
No signs of active bleeding  Hemoglobin on admission 7 8, yesterday 7 2, today 6 9  Transfuse 1 unit of blood,  Questionable worsening kidney function, fecal occult blood pending

## 2020-08-19 NOTE — PROGRESS NOTES
Progress Note - Pulmonary   Gina Lee 80 y o  male MRN: [id number]  Unit/Bed#: [place] 2 -01 Encounter: [id number]      Assessment/plan:  1  Acute hypoxic respiratory failure   -titrate supplemental oxygen to maintain SpO2 greater than 88%  -patient on 5 L nasal cannula during my examination with SpO2 91 to 92% at rest   -no home O2 prior to admission   -home O2 evaluation prior to discharge  2  Suspected COVID-19 infection  -suspicion due to patient with positive exposure to COVID-19, radiographic finding, hypoxia and elevated inflammatory markers/D-dimer  -COVID-19 PCR negative on August 7th, 17th and 18th   -influenza and respiratory panel negative  -D-dimer elevated 3 2 > 3 78 patient on heparin infusion  -IL 6 pending  -ferritin normal 332  -elevated   -strep and Legionella antigen negative  -would continue airborne precaution isolation  -continue COVID-19 pathway treatment  3  Abnormal chest x-ray with multifocal pneumonia  -  continue Rocephin  -continue doxycycline which patient was already on for osteomyelitis of foot  -procalcitonin elevated and trending up 1 09 > 1 62  -pulmonary toilet/out of bed increase activity as tolerated/incentive spirometry    4  Acute on chronic diastolic CHF exacerbation  -elevated BNP 41,000  -echo from 2017 revealing abnormal left ventricular relaxation with grade 1 diastolic dysfunction   -06/27/0640 echocardiogram reviewed with mild increased left ventricular wall thickness with normal left ventricular systolic function EF 32% and reported normal diastolic function  Normal right ventricle size and systolic function, mild aortic valve stenosis with estimated PASP 52 mmHg  -continue to monitor I/Os   -daily weights   -diuresis per primary service/nephrology  -monitor renal function    5  SALO with CKD stage 4  -nephrology following       Subjective:   Patient seen and examined  Resting comfortably in bed no apparent distress    Patient denies any shortness of breath at rest or cough  Denies any fevers, chills, bronchospasm, chest pain, abdominal pain  Objective:   Vitals: Blood pressure 124/66, pulse 75, temperature (!) 97 2 °F (36 2 °C), resp  rate 20, height 6' (1 829 m), weight 100 kg (221 lb 1 9 oz), SpO2 95 %  , 5 L nasal cannula, Body mass index is 29 99 kg/m²  Intake/Output Summary (Last 24 hours) at 8/19/2020 1103  Last data filed at 8/18/2020 1709  Gross per 24 hour   Intake 600 ml   Output 510 ml   Net 90 ml         Physical Exam  Gen: Awake, alert, oriented, no acute distress  HEENT: Mucous membranes moist  NECK: No accessory muscle use  Cardiac: Regular, single S1, single S2, no murmurs  Lungs:  Faint Bibasilar crackles  Abdomen: normoactive bowel sounds, soft nontender  Extremities: no cyanosis, no clubbing, positive lower extremity edema bilaterally    Labs: I have personally reviewed pertinent lab results  , ABG:   Lab Results   Component Value Date    PHART 7 374 08/18/2020    ZSS3HLG 23 0 (LL) 08/18/2020    PO2ART 68 2 (L) 08/18/2020    BQD1WVZ 13 1 (L) 08/18/2020    BEART -10 9 08/18/2020    SOURCE Radial, Right 08/18/2020   , BNP: No results found for: BNP, CBC:   Lab Results   Component Value Date    WBC 11 10 (H) 08/19/2020    HGB 6 9 (LL) 08/19/2020    HCT 22 6 (L) 08/19/2020    MCV 93 08/19/2020     08/19/2020    MCH 28 0 08/19/2020    MCHC 30 1 (L) 08/19/2020    RDW 15 8 (H) 08/19/2020    MPV 10 3 08/19/2020    NRBC 0 08/19/2020   , CMP:   Lab Results   Component Value Date    SODIUM 134 (L) 08/19/2020    K 4 6 08/19/2020     08/19/2020    CO2 16 (L) 08/19/2020     (H) 08/19/2020    CREATININE 3 72 (H) 08/19/2020    CALCIUM 8 0 (L) 08/19/2020    EGFR 13 08/19/2020   , PT/INR: No results found for: PT, INR, Troponin: No results found for: TROPONINI  Imaging and other studies: I have personally reviewed pertinent reports     and I have personally reviewed pertinent films in PACS      Effie Velasquez JYOTI

## 2020-08-19 NOTE — OCCUPATIONAL THERAPY NOTE
OccupationalTherapy Progress Note(time=1230-1300)     Patient Name: Tabitha Serrano  ROCTK'B Date: 8/19/2020  Problem List  Principal Problem:    Multifocal pneumonia  Active Problems:    Diabetes mellitus type 2    Essential hypertension    Transaminitis    Anemia    Peripheral arterial disease (HCC)    Atrial fibrillation     Hyponatremia    Acute respiratory failure with hypoxia (HCC)    SALO on CKD stage 4    Osteomyelitis of foot (HCC)    Acute on chronic diastolic congestive heart failure (HCC)    Acidosis          08/19/20 1300   Restrictions/Precautions   Weight Bearing Precautions Per Order No   LLE Weight Bearing Per Order WBAT   Other Precautions Fall Risk;Pain;Droplet precautions;Contact/isolation;Cognitive; Chair Alarm   Pain Assessment   Pain Assessment Tool FLACC   Pain Rating: FLACC (Rest) - Face 0   Pain Rating: FLACC (Rest) - Legs 0   Pain Rating: FLACC (Rest) - Activity 0   Pain Rating: FLACC (Rest) - Cry 0   Pain Rating: FLACC (Rest) - Consolability 0   Score: FLACC (Rest) 0   ADL   Where Assessed Edge of bed   Eating Assistance 5  Supervision/Setup   Eating Deficit Setup; Increased time to complete;Supervision/safety   LB Dressing Assistance 1  Total Assistance   LB Dressing Deficit Don/doff R sock; Don/doff L sock; Don/doff R shoe;Don/doff L shoe   Functional Standing Tolerance   Time 1-2mins   Transfers   Sit to Stand 3  Moderate assistance   Additional items Assist x 2; Increased time required;Verbal cues   Stand to Sit 3  Moderate assistance   Additional items Assist x 2; Increased time required;Verbal cues   Functional Mobility   Functional Mobility 3  Moderate assistance   Additional Comments x2   Additional items Rolling walker   Cognition   Overall Cognitive Status Impaired   Arousal/Participation Alert   Attention Attends with cues to redirect   Orientation Level Oriented to person;Oriented to place;Oriented to time   Memory Decreased short term memory;Decreased recall of precautions Following Commands Follows one step commands without difficulty   Activity Tolerance   Activity Tolerance Patient limited by fatigue   Medical Staff Made Aware nsg, P T  Assessment   Assessment Pt seen for 30 min tx session with focus on functional balance, functional mobility, ADL status, transfer safety, cognition, and activity tolerance  Pt able to tolerate OOB mobility; sitting balance=f/f+, standing balance=f-/p+  Pt required verbal/physical cues to maintain transfer safety  Pt required assistance with LE ADLs  Cognitive deficits noted--memory, problem-solving  Pt fatigues quickly with functional tasks  Pt would benefit from continued PT/OT in his LTC facility  Will continue  Plan   Treatment Interventions ADL retraining;Functional transfer training;UE strengthening/ROM; Endurance training;Cognitive reorientation;Patient/family training;Equipment evaluation/education; Compensatory technique education   Goal Expiration Date 09/01/20   OT Treatment Day 1   OT Frequency 3-5x/wk   Recommendation   OT Discharge Recommendation Post-Acute Rehabilitation Services  (vs  continue PT/OT in his LTC facility)   Barthel Index   Feeding 5   Bathing 0   Grooming Score 0   Dressing Score 5   Bladder Score 0   Bowels Score 5   Toilet Use Score 5   Transfers (Bed/Chair) Score 5   Mobility (Level Surface) Score 0   Stairs Score 0   Barthel Index Score 25   Abraham Parker, OT

## 2020-08-19 NOTE — PLAN OF CARE
Problem: Potential for Falls  Goal: Patient will remain free of falls  Description: INTERVENTIONS:  - Assess patient frequently for physical needs  -  Identify cognitive and physical deficits and behaviors that affect risk of falls    -  [place] fall precautions as indicated by assessment   - Educate patient/family on patient safety including physical limitations  - Instruct patient to call for assistance with activity based on assessment  - Modify environment to reduce risk of injury  - Consider OT/PT consult to assist with strengthening/mobility  Outcome: Progressing     Problem: PAIN - ADULT  Goal: Verbalizes/displays adequate comfort level or baseline comfort level  Description: Interventions:  - Encourage patient to monitor pain and request assistance  - Assess pain using appropriate pain scale  - Administer analgesics based on type and severity of pain and evaluate response  - Implement non-pharmacological measures as appropriate and evaluate response  - Consider cultural and social influences on pain and pain management  - Notify physician/advanced practitioner if interventions unsuccessful or patient reports new pain  Outcome: Progressing     Problem: INFECTION - ADULT  Goal: Absence or prevention of progression during hospitalization  Description: INTERVENTIONS:  - Assess and monitor for signs and symptoms of infection  - Monitor lab/diagnostic results  - Monitor all insertion sites, i e  indwelling lines, tubes, and drains  - Monitor endotracheal if appropriate and nasal secretions for changes in amount and color  - [place] appropriate cooling/warming therapies per order  - Administer medications as ordered  - Instruct and encourage patient and family to use good hand hygiene technique  - Identify and instruct in appropriate isolation precautions for identified infection/condition  Outcome: Progressing     Problem: SAFETY ADULT  Goal: Patient will remain free of falls  Description: INTERVENTIONS:  - Assess patient frequently for physical needs  -  Identify cognitive and physical deficits and behaviors that affect risk of falls    -  [place] fall precautions as indicated by assessment   - Educate patient/family on patient safety including physical limitations  - Instruct patient to call for assistance with activity based on assessment  - Modify environment to reduce risk of injury  - Consider OT/PT consult to assist with strengthening/mobility  Outcome: Progressing  Goal: Maintain or return to baseline ADL function  Description: INTERVENTIONS:  -  Assess patient's ability to carry out ADLs; assess patient's baseline for ADL function and identify physical deficits which impact ability to perform ADLs (bathing, care of mouth/teeth, toileting, grooming, dressing, etc )  - Assess/evaluate cause of self-care deficits   - Assess range of motion  - Assess patient's mobility; develop plan if impaired  - Assess patient's need for assistive devices and provide as appropriate  - Encourage maximum independence but intervene and supervise when necessary  - Involve family in performance of ADLs  - Assess for home care needs following discharge   - Consider OT consult to assist with ADL evaluation and planning for discharge  - Provide patient education as appropriate  Outcome: Progressing  Goal: Maintain or return mobility status to optimal level  Description: INTERVENTIONS:  - Assess patient's baseline mobility status (ambulation, transfers, stairs, etc )    - Identify cognitive and physical deficits and behaviors that affect mobility  - Identify mobility aids required to assist with transfers and/or ambulation (gait belt, sit-to-stand, lift, walker, cane, etc )  - [place] fall precautions as indicated by assessment  - Record patient progress and toleration of activity level on Mobility SBAR; progress patient to next Phase/Stage  - Instruct patient to call for assistance with activity based on assessment  - Consider rehabilitation consult to assist with strengthening/weightbearing, etc   Outcome: Progressing     Problem: DISCHARGE PLANNING  Goal: Discharge to home or other facility with appropriate resources  Description: INTERVENTIONS:  - Identify barriers to discharge w/patient and caregiver  - Arrange for needed discharge resources and transportation as appropriate  - Identify discharge learning needs (meds, wound care, etc )  - Arrange for interpretive services to assist at discharge as needed  - Refer to Case Management Department for coordinating discharge planning if the patient needs post-hospital services based on physician/advanced practitioner order or complex needs related to functional status, cognitive ability, or social support system  Outcome: Progressing     Problem: Knowledge Deficit  Goal: Patient/family/caregiver demonstrates understanding of disease process, treatment plan, medications, and discharge instructions  Description: Complete learning assessment and assess knowledge base    Interventions:  - Provide teaching at level of understanding  - Provide teaching via preferred learning methods  Outcome: Progressing     Problem: Prexisting or High Potential for Compromised Skin Integrity  Goal: Skin integrity is maintained or improved  Description: INTERVENTIONS:  - Identify patients at risk for skin breakdown  - Assess and monitor skin integrity  - Assess and monitor nutrition and hydration status  - Monitor labs   - Assess for incontinence   - Turn and reposition patient  - Assist with mobility/ambulation  - Relieve pressure over bony prominences  - Avoid friction and shearing  - Provide appropriate hygiene as needed including keeping skin clean and dry  - Evaluate need for skin moisturizer/barrier cream  - Collaborate with interdisciplinary team   - Patient/family teaching  - Consider wound care consult   Outcome: Progressing     Problem: Nutrition/Hydration-ADULT  Goal: Nutrient/Hydration intake appropriate for improving, restoring or maintaining nutritional needs  Description: Monitor and assess patient's nutrition/hydration status for malnutrition  Collaborate with interdisciplinary team and initiate plan and interventions as ordered  Monitor patient's weight and dietary intake as ordered or per policy  Utilize nutrition screening tool and intervene as necessary  Determine patient's food preferences and provide high-protein, high-caloric foods as appropriate       INTERVENTIONS:  - Monitor oral intake, urinary output, labs, and treatment plans  - Assess nutrition and hydration status and recommend course of action  - Evaluate amount of meals eaten  - Assist patient with eating if necessary   - Allow adequate time for meals  - Recommend/ encourage appropriate diets, oral nutritional supplements, and vitamin/mineral supplements  - Order, calculate, and assess calorie counts as needed  - Recommend, monitor, and adjust tube feedings and TPN/PPN based on assessed needs  - Assess need for intravenous fluids  - Provide specific nutrition/hydration education as appropriate  - Include patient/family/caregiver in decisions related to nutrition  Outcome: Progressing

## 2020-08-19 NOTE — ASSESSMENT & PLAN NOTE
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

## 2020-08-19 NOTE — ASSESSMENT & PLAN NOTE
Evaluated at 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

## 2020-08-19 NOTE — ASSESSMENT & PLAN NOTE
Recent hospital admission to [facility] from 7/21/20 to 8/7/20 for left foot osteomyelitis  Patient started on oral doxycycline for indefinite suppression

## 2020-08-19 NOTE — PLAN OF CARE
Problem: PHYSICAL THERAPY ADULT  Goal: Performs mobility at highest level of function for planned discharge setting  See evaluation for individualized goals  Description: Treatment/Interventions: Functional transfer training, LE strengthening/ROM, Therapeutic exercise, Endurance training, Patient/family training, Bed mobility, Spoke to nursing, OT  Equipment Recommended: Allen Linton (Comment)(quick move device for transfers OOB<>recliner)       See flowsheet documentation for full assessment, interventions and recommendations  Note: Prognosis: Good  Problem List: Decreased strength, Decreased endurance, Impaired balance, Decreased mobility, Decreased cognition, Impaired judgement, Decreased safety awareness  Assessment: Pt seen for PT per POC  Pt negative x4  for COVID however now placed on airborne/contact precautions  Improved mobility & activity tolerance noted this tx session  Pt progressed to maxAx1 for bed mobility & modAx2 for transfers & amb w/ RW + cues for techniques & safety  See above levels of assistance required for all functional tasks  Gait deviations as above but no gross LOB noted  Pt tolerated above mentioned thera  ex well, AAROM  Pt more alert this tx session, able to answer questions appropriately & follow simple commands w/o difficulty  No SOB & dizziness reported t/o session  Pt now on 5L O2 NC  Nsg staff most recent vital signs as follows: /62   Pulse 77   Temp (!) 97 3 °F (36 3 °C) (Oral)   Resp 20   Ht 6' (1 829 m)   Wt 100 kg (221 lb 1 9 oz)   SpO2 94%   BMI 29 99 kg/m²   Will continue PT per POC  Updated PT goals stated below  At end of session, pt tolerated OOB in chair w/o issues, call bell & phone in reach, chair alarm activated  Fall precautions reinforced w/ good understanding  Will continue to recommend inpt rehab at D/C  CM to follow   Nsg staff to continue to mobilized pt (OOB in chair for all meals & ambulate in room/unit) as tolerated to prevent decline in function  Nsg notified  Barriers to Discharge: Inaccessible home environment, Decreased caregiver support  Barriers to Discharge Comments: home alone; (+)TONYA  PT Discharge Recommendation: [id number]     PT - OK to Discharge: Yes(to inpt rehab when medically cleared)    See flowsheet documentation for full assessment

## 2020-08-19 NOTE — ASSESSMENT & PLAN NOTE
Presenting with elevated BNP 41,086, suspect CHF exacerbation  Echo pending  Diuresed with 40 mg Lasix IV b i d , stoped by Nephrology monitor tomorrow

## 2020-08-19 NOTE — ASSESSMENT & PLAN NOTE
Presenting after being found to be hypoxic at nursing home  Chest x-ray with multifocal bilateral consolidation and ground-glass opacity  Positive COVID exposure at nursing home, had 4 negative COVID-19 tests in the past 11 days    Follow moderate COVID algorithm  Troponin elevated at 0 13  BNP elevated at 41,086  D-dimer elevated at 3 23-->3 78  Transaminitis- , ALT 83, alk-phos 251    Ferritin 332  Procalcitonin 1 09-->1 62 (with worsening kidney function too)    Ceftriaxone/doxy  Vitamin D3, vitamin-C, zinc, atorvastatin    Dexamethasone 6 mg daily for 10 days  Started on anticoagulation-intermediate intensity with heparin drip   Pulmonology on board, appreciate recommendations

## 2020-08-19 NOTE — PROGRESS NOTES
Progress Note - Gina Lee 6/28/1930, 80 y o  male MRN: [id number]    Unit/Bed#: [address] [place] [address] Encounter: [id number]    Primary Care Provider: Marcelino Mendoza MD   Date and time admitted to hospital: 8/17/2020  1:55 PM        Acute respiratory failure with hypoxia [facility])  Assessment & Plan  Presenting with hypoxia  Likely secondary to pneumonia vs CHF vs COVID  Required 6 L of oxygen admission, will overnight placed on mid flow, currently on 7 L  Continue COVID-19 pathway  Viral panel pending  Diuresis with Lasix    * Multifocal pneumonia  Assessment & Plan  Presenting after being found to be hypoxic at nursing home  Chest x-ray with multifocal bilateral consolidation and ground-glass opacity  Positive COVID exposure at nursing home, had 4 COVID-19 test in the past 11 days    Follow moderate COVID algorithm  Troponin elevated at 0 13  BNP elevated at 41,086  D-dimer elevated at 3 23-->3 78  Transaminitis- , ALT 83, alk-phos 251    Ferritin 332  Procalcitonin 1 09-->1 62 (with worsening kidney function too)    Ceftriaxone/doxy  Start vitamin D3, vitamin-C, zinc, atorvastatin  Dexamethasone 6 mg daily times 10 days  Started on anticoagulation-intermediate intensity with heparin drip   Pulmonology on board, appreciate recommendations      Acute on chronic diastolic congestive heart failure ([address] )  Assessment & Plan  Presenting with elevated BNP 41,086, suspect CHF exacerbation  Echo pending  Diuresed with 40 mg Lasix IV b i d , stoped by Nephrology monitor tomorrow    SALO on CKD stage 4  Assessment & Plan  SALO on CKD stage 4  Evaluated by Nephrology, appreciate recommendations  Hold diuretics after todays afternoon dose  Urine workup  Not a candidate for hemodialysis  Lethargic, suspect secondary to uremia    ABG showed pH 7 374, CO2 23    Acidosis  Assessment & Plan  Due to worsening kidney function  On sodium bicarb tabs 1300 mg t i d     Osteomyelitis of foot ([address] )  Assessment & Plan  Recent hospital admission to [facility] from 7/21/20 to 8/7/20 for left foot osteomyelitis  Patient started on oral doxycycline for indefinite suppression    Hyponatremia  Assessment & Plan  Mild hyponatremia, likely dilutional    Atrial fibrillation   Assessment & Plan  Anticoagulated on Coumadin  INR 2 82 on admission  Coumadin was held on admission  Patient was started on heparin drip for COVID pathway    Peripheral arterial disease [facility])  Assessment & Plan  Evaluated at [facility]  During recent hospitalization end of July 2020  Left lower extremity angiogram with completely occluded above the knee to below the knee bypass  Likely chronically occluded  Not a candidate for 3rd time redo bypass due to age, comorbidities, and patient wishes  Patient at that time did not want any further amputation or interventions  Maintained on Coumadin, Plavix, statin  Transaminitis  Assessment & Plan  With transaminitis- , ALT 83, alk phos 251  Likely related to above  Will continue to trend  Essential hypertension  Assessment & Plan  Home regimen amlodipine 5 mg daily  Continue here  Diabetes mellitus type 2  Assessment & Plan  Lab Results   Component Value Date    HGBA1C 7 1 (H) 07/22/2020   Hold oral antihyperglycemics-glipizide  Placed on insulin sliding scale  VTE Pharmacologic Prophylaxis:   Pharmacologic: Heparin Drip  Mechanical VTE Prophylaxis in Place: Yes    Patient Centered Rounds: I have performed bedside rounds with nursing staff today  Discussions with Specialists or Other Care Team Provider:  Pulmonology, nephrology, case management, nursing    Education and Discussions with Family / Patient:  Patient and daughter over the phone    Time Spent for Care: 30 minutes  More than 50% of total time spent on counseling and coordination of care as described above      Current Length of Stay: 1 day(s)    Current Patient Status: Inpatient   Certification Statement: The patient will continue to require additional inpatient hospital stay due to Above    Discharge Plan: To be determined    Code Status: Level 3 - DNAR and DNI      Subjective:   Patient was seen and evaluated bedside  He is very lethargic on and off  He ate his breakfast, lunch on his own then he worked with physical therapy after that he could barely open his eyes for conversation  Later he was sitting up in eating his dinner  Objective:     Vitals:   Temp (24hrs), Av °F (36 7 °C), Min:98 °F (36 7 °C), Max:98 °F (36 7 °C)    Temp:  [98 °F (36 7 °C)] 98 °F (36 7 °C)  HR:  [77-89] 77  Resp:  [16] 16  BP: (121-130)/(69-73) 128/69  SpO2:  [86 %-94 %] 92 %  Body mass index is 29 99 kg/m²  Input and Output Summary (last 24 hours): Intake/Output Summary (Last 24 hours) at 2020  Last data filed at 2020 1709  Gross per 24 hour   Intake 600 ml   Output 510 ml   Net 90 ml       Physical Exam:     Physical Exam  Constitutional:       General: He is not in acute distress  HENT:      Head: Normocephalic and atraumatic  Cardiovascular:      Rate and Rhythm: Normal rate and regular rhythm  Heart sounds: No murmur  No friction rub  No gallop  Pulmonary:      Effort: Pulmonary effort is normal  No respiratory distress  Comments: Bibasilar crackles  Abdominal:      General: Bowel sounds are normal  There is no distension  Palpations: Abdomen is soft  Musculoskeletal:         General: No swelling  Skin:     General: Skin is warm and dry  Neurological:      General: No focal deficit present  Mental Status: He is alert     Psychiatric:      Comments: Unable to assess         Additional Data:     Labs:    Results from last 7 days   Lab Units 20  0621   WBC Thousand/uL 15 36*   HEMOGLOBIN g/dL 7 2*   HEMATOCRIT % 23 7*   PLATELETS Thousands/uL 305   NEUTROS PCT % 87*   LYMPHS PCT % 5*   MONOS PCT % 7   EOS PCT % 0     Results from last 7 days   Lab Units 20  0621   SODIUM mmol/L 132*   POTASSIUM mmol/L 5 0   CHLORIDE mmol/L 99*   CO2 mmol/L 14*   BUN mg/dL 108*   CREATININE mg/dL 3 82*   ANION GAP mmol/L 19*   CALCIUM mg/dL 8 1*   ALBUMIN g/dL 2 2*   TOTAL BILIRUBIN mg/dL 0 32   ALK PHOS U/L 257*   ALT U/L 66   AST U/L 64*   GLUCOSE RANDOM mg/dL 195*     Results from last 7 days   Lab Units 08/17/20  1418   INR  2 82*     Results from last 7 days   Lab Units 08/18/20  1534 08/18/20  1136 08/18/20  0736 08/17/20  2123   POC GLUCOSE mg/dl 205* 191* 199* 239*         Results from last 7 days   Lab Units 08/18/20  1339 08/17/20  1633 08/17/20  1418 08/17/20  1417   LACTIC ACID mmol/L  --  1 6  --  2 9*   PROCALCITONIN ng/ml 1 62*  --  1 09*  --            * I Have Reviewed All Lab Data Listed Above  * Additional Pertinent Lab Tests Reviewed: [address] Admission Reviewed    Imaging:    Imaging Reports Reviewed Today Include: all  Imaging Personally Reviewed by Myself Includes:      Recent Cultures (last 7 days):     Results from last 7 days   Lab Units 08/18/20  0621 08/17/20  1418   BLOOD CULTURE   --  No Growth at 24 hrs  No Growth at 24 hrs     LEGIONELLA URINARY ANTIGEN  Negative  --        Last 24 Hours Medication List:   Current Facility-Administered Medications   Medication Dose Route Frequency Provider Last Rate    acetaminophen  650 mg Oral Q6H PRN VINOD BarriosC      [START ON 8/19/2020] amLODIPine  5 mg Oral Daily Lizz Beltre DO      ascorbic acid  1,000 mg Oral Q12H [facility] Malena Ramirez PA-C      atorvastatin  40 mg Oral HS ED Barrios-ELMA      calcitriol  0 25 mcg Oral Once per day on Mon Wed Fri ED Barrios-ELMA      cefTRIAXone  2,000 mg Intravenous Q24H Niels Dick PA-C 2,000 mg (08/17/20 2149)    cholecalciferol  2,000 Units Oral Daily Niels Larkana, PA-C      clopidogrel  75 mg Oral Daily Malena Ramirez PA-C      dexamethasone  6 mg Intravenous Q24H Nielsherminio Dick, PA-ELMA      doxycycline hyclate  100 mg Oral Q12H [facility] Radhames Reyna ED Ramirez-ELMA      finasteride  5 mg Oral Daily ED Schuster-ELMA      FLUoxetine  40 mg Oral Daily ED Garcia-ELMA      guaiFENesin  600 mg Oral Q12H [address] ED Schuster-ELMA      heparin (porcine)  3-30 Units/kg/hr (Order-Specific) Intravenous Titrated ED Garcia-C 12 Units/kg/hr (08/18/20 1701)    insulin lispro  1-6 Units Subcutaneous TID AC ED Schuster-C      insulin lispro  1-6 Units Subcutaneous HS ED Schuster-C      memantine  5 mg Oral BID ED Garcia-ELMA      zinc sulfate  220 mg Oral Daily Malena Ramirez PA-C      Followed by   Esther Cisneros ON 8/25/2020] multivitamin-minerals  1 tablet Oral Daily Malena Ramirez PA-C      ondansetron  4 mg Intravenous Q6H PRN ED Garcia-ELMA      primidone  50 mg Oral Daily Malena Ramirez PA-C      sodium bicarbonate  1,300 mg Oral TID after meals COLLEEN Aguayo      tamsulosin  0 4 mg Oral Daily With [id number]          Today, Patient Was Seen By: Joshua Deluca MD    ** Please Note: Dictation voice to text software may have been used in the creation of this document   **

## 2020-08-19 NOTE — ASSESSMENT & PLAN NOTE
Presenting with elevated BNP 41,086, suspect CHF exacerbation  Echo showed ejection fraction 17% normal diastolic function  Normal right ventricular size and function  Moderate pulmonary hypertension  Diuresed with 40 mg Lasix IV b i d yesterday without improvement of kidney function    Hold diuretics today per nephro

## 2020-08-19 NOTE — PROGRESS NOTES
Progress Note - Lonnie Martinez 6/28/1930, 80 y o  male MRN: [id number]    Unit/Bed#: [facility] 2 [address] 222-01 Encounter: [id number]    Primary Care Provider: Danny Aase, MD   Date and time admitted to hospital: 8/17/2020  1:55 PM        Acute respiratory failure with hypoxia [facility])  Assessment & Plan  Presenting with hypoxia  Likely secondary to pneumonia vs CHF vs suspected COVID  Required 6 L of oxygen admission, shortly after placed on mid flow, currently down to 6 L nasal cannula  Continue COVID-19 pathway  RVP negative  Continue supplemental oxygen maintain oxygenation greater than 8%    * Multifocal pneumonia  Assessment & Plan  Presenting after being found to be hypoxic at nursing home  Chest x-ray with multifocal bilateral consolidation and ground-glass opacity  Positive COVID exposure at nursing home, had 4 negative COVID-19 tests in the past 11 days    Follow moderate COVID algorithm  Troponin elevated at 0 13  BNP elevated at 41,086  D-dimer elevated at 3 23-->3 78  Transaminitis- , ALT 83, alk-phos 251    Ferritin 332  Procalcitonin 1 09-->1 62 (with worsening kidney function too)    Ceftriaxone/doxy  Vitamin D3, vitamin-C, zinc, atorvastatin  Dexamethasone 6 mg daily for 10 days  Started on anticoagulation-intermediate intensity with heparin drip   Pulmonology on board, appreciate recommendations      Acute on chronic diastolic congestive heart failure ([facility] [address] )  Assessment & Plan  Presenting with elevated BNP 41,086, suspect CHF exacerbation  Echo showed ejection fraction 86% normal diastolic function  Normal right ventricular size and function  Moderate pulmonary hypertension  Diuresed with 40 mg Lasix IV b i d yesterday without improvement of kidney function    Hold diuretics today per nephro    SALO on CKD stage 4  Assessment & Plan  SALO on CKD stage 4  Evaluated by Nephrology, appreciate recommendations  Received diuretics without improvement of kidney function  Urine workup  Not a candidate for hemodialysis  Lethargic, suspect secondary to uremia  ABG showed pH 7 374, CO2 23    Acidosis  Assessment & Plan  Due to worsening kidney function  On sodium bicarb tabs 1300 mg t i d     Osteomyelitis of foot ([address] )  Assessment & Plan  Recent hospital admission to [facility] from 7/21/20 to 8/7/20 for left foot osteomyelitis  Patient started on oral doxycycline for indefinite suppression    Hyponatremia  Assessment & Plan  Mild hyponatremia, likely dilutional    Atrial fibrillation   Assessment & Plan  Anticoagulated on Coumadin  INR 2 82 on admission  Coumadin was held on admission  Patient was started on heparin drip for COVID pathway  Hold heparin drip now    Peripheral arterial disease [facility])  Assessment & Plan  Evaluated at [facility]  During recent hospitalization end of July 2020  Left lower extremity angiogram with completely occluded above the knee to below the knee bypass  Likely chronically occluded  Not a candidate for 3rd time redo bypass due to age, comorbidities, and patient wishes  Patient at that time did not want any further amputation or interventions  Maintained on Coumadin, Plavix, statin  Anemia  Assessment & Plan  No signs of active bleeding  Hemoglobin on admission 7 8, yesterday 7 2, today 6 9  Transfuse 1 unit of blood,  Questionable worsening kidney function, fecal occult blood pending    Transaminitis  Assessment & Plan  With transaminitis- , ALT 83, alk phos 251  Likely related to liver congestion from CHF versus call with  Will continue to trend  Essential hypertension  Assessment & Plan  Home regimen amlodipine 5 mg daily  Continue here  Diabetes mellitus type 2  Assessment & Plan  Lab Results   Component Value Date    HGBA1C 7 1 (H) 07/22/2020   Hold oral antihyperglycemics-glipizide  Placed on insulin sliding scale          VTE Pharmacologic Prophylaxis:   Pharmacologic: Hold due to low hemoglobin requiring transfusion  Mechanical VTE Prophylaxis in Place: Yes    Patient Centered Rounds: I have performed bedside rounds with nursing staff today  Discussions with Specialists or Other Care Team Provider:  Nephrology    Education and Discussions with Family / Patient:  Patient and daughter over the phone    Time Spent for Care: 30 minutes  More than 50% of total time spent on counseling and coordination of care as described above  Current Length of Stay: 2 day(s)    Current Patient Status: Inpatient   Certification Statement: The patient will continue to require additional inpatient hospital stay due to Above    Discharge Plan: To be determined    Code Status: Level 3 - DNAR and DNI      Subjective:   Patient was seen and evaluated bedside, he was lethargic  Later he was sitting in a chair eating his lunch  Objective:     Vitals:   Temp (24hrs), Av 3 °F (36 3 °C), Min:97 2 °F (36 2 °C), Max:97 3 °F (36 3 °C)    Temp:  [97 2 °F (36 2 °C)-97 3 °F (36 3 °C)] 97 3 °F (36 3 °C)  HR:  [75-82] 78  Resp:  [20] 20  BP: (117-126)/(62-67) 117/63  SpO2:  [76 %-95 %] 93 %  Body mass index is 29 99 kg/m²  Input and Output Summary (last 24 hours): Intake/Output Summary (Last 24 hours) at 2020 1810  Last data filed at 2020 1515  Gross per 24 hour   Intake 350 ml   Output    Net 350 ml       Physical Exam:     Physical Exam  Constitutional:       General: He is not in acute distress  Comments: Alert but lethargic, nodding appropriately to questions  Answers with few words   HENT:      Head: Atraumatic  Neck:      Musculoskeletal: Neck supple  Cardiovascular:      Rate and Rhythm: Normal rate and regular rhythm  Heart sounds: No murmur  No friction rub  No gallop  Pulmonary:      Comments: Coarse breathing sounds, no respiratory distress  On 6 L of oxygen  Abdominal:      General: Bowel sounds are normal  There is no distension  Palpations: Abdomen is soft  Tenderness: There is no abdominal tenderness  Musculoskeletal:         General: No swelling  Skin:     General: Skin is warm and dry  Neurological:      General: No focal deficit present  Mental Status: He is alert  Psychiatric:         Mood and Affect: Mood normal        Additional Data:     Labs:    Results from last 7 days   Lab Units 08/19/20  0559   WBC Thousand/uL 11 10*   HEMOGLOBIN g/dL 6 9*   HEMATOCRIT % 22 6*   PLATELETS Thousands/uL 324   NEUTROS PCT % 89*   LYMPHS PCT % 5*   MONOS PCT % 5   EOS PCT % 0     Results from last 7 days   Lab Units 08/19/20  0559 08/18/20  0621   SODIUM mmol/L 134* 132*   POTASSIUM mmol/L 4 6 5 0   CHLORIDE mmol/L 101 99*   CO2 mmol/L 16* 14*   BUN mg/dL 109* 108*   CREATININE mg/dL 3 72* 3 82*   ANION GAP mmol/L 17* 19*   CALCIUM mg/dL 8 0* 8 1*   ALBUMIN g/dL  --  2 2*   TOTAL BILIRUBIN mg/dL  --  0 32   ALK PHOS U/L  --  257*   ALT U/L  --  66   AST U/L  --  64*   GLUCOSE RANDOM mg/dL 194* 195*     Results from last 7 days   Lab Units 08/17/20  1418   INR  2 82*     Results from last 7 days   Lab Units 08/19/20  1538 08/19/20  1123 08/19/20  0729 08/18/20  2304 08/18/20  1534 08/18/20  1136 08/18/20  0736 08/17/20  2123   POC GLUCOSE mg/dl 335* 262* 184* 202* 205* 191* 199* 239*         Results from last 7 days   Lab Units 08/19/20  0559 08/18/20  1339 08/17/20  1633 08/17/20  1418 08/17/20  1417   LACTIC ACID mmol/L  --   --  1 6  --  2 9*   PROCALCITONIN ng/ml 1 48* 1 62*  --  1 09*  --            * I Have Reviewed All Lab Data Listed Above  * Additional Pertinent Lab Tests Reviewed: [address] Admission Reviewed    Imaging:    Imaging Reports Reviewed Today Include: all  Imaging Personally Reviewed by Myself Includes:      Recent Cultures (last 7 days):     Results from last 7 days   Lab Units 08/18/20  0621 08/17/20  1418   BLOOD CULTURE   --  No Growth at 24 hrs  No Growth at 24 hrs     LEGIONELLA URINARY ANTIGEN  Negative  --        Last 24 Hours Medication List:   Current Facility-Administered Medications   Medication Dose Route Frequency Provider Last Rate    acetaminophen  650 mg Oral Q6H PRN Junior Anderson PA-C      amLODIPine  5 mg Oral Daily Lizz Gridervedi, DO      ascorbic acid  1,000 mg Oral Q12H [address] Malena Ramirez PA-C      atorvastatin  40 mg Oral HS Junior Anderson PA-C      calcitriol  0 25 mcg Oral Once per day on Mon Wed Fri Junior Anderson PA-C      cefTRIAXone  2,000 mg Intravenous Q24H Junior Anderson PA-C 2,000 mg (08/18/20 2054)    cholecalciferol  2,000 Units Oral Daily Malena Ramirez PA-C      dexamethasone  6 mg Intravenous Q24H Junior Anderson PA-C      doxycycline hyclate  100 mg Oral Q12H [address] Malena Ramirez PA-C      finasteride  5 mg Oral Daily Malena Ramirez PA-C      FLUoxetine  40 mg Oral Daily Malena Ramirez PA-C      guaiFENesin  600 mg Oral Q12H [address] Malena Ramirez PA-C      insulin lispro  1-6 Units Subcutaneous TID AC Malena Ramirez PA-C      insulin lispro  1-6 Units Subcutaneous HS Malena Ramirez PA-C      memantine  5 mg Oral BID Junior Anderson PA-C      multi-electrolyte  250 mL Intravenous Once [employer], DO      zinc sulfate  220 mg Oral Daily Malena Ramirez PA-C      Followed by   Harley Art ON 8/25/2020] multivitamin-minerals  1 tablet Oral Daily Malena Ramirez PA-C      ondansetron  4 mg Intravenous Q6H PRN Junior Anderson PA-C      primidone  50 mg Oral Daily Malena Ramirez PA-C      sodium bicarbonate  1,300 mg Oral TID after meals COLLEEN Aguayo      tamsulosin  0 4 mg Oral Daily With Lolly Lomeli PA-C          Today, Patient Was Seen By: Lamona Angelucci, MD    ** Please Note: Dictation voice to text software may have been used in the creation of this document   **

## 2020-08-20 PROBLEM — C79.51 BONY METASTASIS (HCC): Status: ACTIVE | Noted: 2020-08-20

## 2020-08-20 PROBLEM — E88.09 HYPOALBUMINEMIA: Status: ACTIVE | Noted: 2020-08-20

## 2020-08-20 LAB
ABO GROUP BLD BPU: NORMAL
ANION GAP SERPL CALCULATED.3IONS-SCNC: 20 MMOL/L (ref 4–13)
BASOPHILS # BLD AUTO: 0.01 THOUSANDS/ΜL (ref 0–0.1)
BASOPHILS NFR BLD AUTO: 0 % (ref 0–1)
BPU ID: NORMAL
BUN SERPL-MCNC: 118 MG/DL (ref 5–25)
CALCIUM SERPL-MCNC: 7.7 MG/DL (ref 8.3–10.1)
CHLORIDE SERPL-SCNC: 100 MMOL/L (ref 100–108)
CO2 SERPL-SCNC: 14 MMOL/L (ref 21–32)
CREAT SERPL-MCNC: 3.66 MG/DL (ref 0.6–1.3)
CROSSMATCH: NORMAL
EOSINOPHIL # BLD AUTO: 0 THOUSAND/ΜL (ref 0–0.61)
EOSINOPHIL NFR BLD AUTO: 0 % (ref 0–6)
ERYTHROCYTE [DISTWIDTH] IN BLOOD BY AUTOMATED COUNT: 15.9 % (ref 11.6–15.1)
GFR SERPL CREATININE-BSD FRML MDRD: 14 ML/MIN/1.73SQ M
GLUCOSE SERPL-MCNC: 223 MG/DL (ref 65–140)
GLUCOSE SERPL-MCNC: 235 MG/DL (ref 65–140)
GLUCOSE SERPL-MCNC: 330 MG/DL (ref 65–140)
GLUCOSE SERPL-MCNC: 336 MG/DL (ref 65–140)
GLUCOSE SERPL-MCNC: 371 MG/DL (ref 65–140)
HCT VFR BLD AUTO: 27.3 % (ref 36.5–49.3)
HGB BLD-MCNC: 8.5 G/DL (ref 12–17)
IL6 SERPL-MCNC: 9.9 PG/ML (ref 0–15.5)
IMM GRANULOCYTES # BLD AUTO: 0.11 THOUSAND/UL (ref 0–0.2)
IMM GRANULOCYTES NFR BLD AUTO: 1 % (ref 0–2)
LYMPHOCYTES # BLD AUTO: 0.66 THOUSANDS/ΜL (ref 0.6–4.47)
LYMPHOCYTES NFR BLD AUTO: 6 % (ref 14–44)
MCH RBC QN AUTO: 28.5 PG (ref 26.8–34.3)
MCHC RBC AUTO-ENTMCNC: 31.1 G/DL (ref 31.4–37.4)
MCV RBC AUTO: 92 FL (ref 82–98)
MONOCYTES # BLD AUTO: 0.66 THOUSAND/ΜL (ref 0.17–1.22)
MONOCYTES NFR BLD AUTO: 6 % (ref 4–12)
NEUTROPHILS # BLD AUTO: 10.11 THOUSANDS/ΜL (ref 1.85–7.62)
NEUTS SEG NFR BLD AUTO: 87 % (ref 43–75)
NRBC BLD AUTO-RTO: 0 /100 WBCS
PHOSPHATE SERPL-MCNC: 5.4 MG/DL (ref 2.3–4.1)
PLATELET # BLD AUTO: 351 THOUSANDS/UL (ref 149–390)
PMV BLD AUTO: 10.1 FL (ref 8.9–12.7)
POTASSIUM SERPL-SCNC: 4.6 MMOL/L (ref 3.5–5.3)
RBC # BLD AUTO: 2.98 MILLION/UL (ref 3.88–5.62)
SODIUM SERPL-SCNC: 134 MMOL/L (ref 136–145)
UNIT DISPENSE STATUS: NORMAL
UNIT PRODUCT CODE: NORMAL
UNIT RH: NORMAL
WBC # BLD AUTO: 11.55 THOUSAND/UL (ref 4.31–10.16)

## 2020-08-20 PROCEDURE — 99232 SBSQ HOSP IP/OBS MODERATE 35: CPT | Performed by: INTERNAL MEDICINE

## 2020-08-20 PROCEDURE — 85025 COMPLETE CBC W/AUTO DIFF WBC: CPT | Performed by: INTERNAL MEDICINE

## 2020-08-20 PROCEDURE — 80048 BASIC METABOLIC PNL TOTAL CA: CPT | Performed by: NURSE PRACTITIONER

## 2020-08-20 PROCEDURE — 82948 REAGENT STRIP/BLOOD GLUCOSE: CPT

## 2020-08-20 PROCEDURE — 84100 ASSAY OF PHOSPHORUS: CPT | Performed by: NURSE PRACTITIONER

## 2020-08-20 PROCEDURE — 97110 THERAPEUTIC EXERCISES: CPT

## 2020-08-20 PROCEDURE — 97530 THERAPEUTIC ACTIVITIES: CPT

## 2020-08-20 RX ADMIN — DOXYCYCLINE 100 MG: 100 CAPSULE ORAL at 22:59

## 2020-08-20 RX ADMIN — OXYCODONE HYDROCHLORIDE AND ACETAMINOPHEN 1000 MG: 500 TABLET ORAL at 22:59

## 2020-08-20 RX ADMIN — MEMANTINE 5 MG: 5 TABLET ORAL at 17:24

## 2020-08-20 RX ADMIN — AMLODIPINE BESYLATE 5 MG: 5 TABLET ORAL at 08:33

## 2020-08-20 RX ADMIN — SODIUM BICARBONATE 650 MG TABLET 1300 MG: at 12:21

## 2020-08-20 RX ADMIN — Medication 2000 UNITS: at 08:33

## 2020-08-20 RX ADMIN — INSULIN LISPRO 5 UNITS: 100 INJECTION, SOLUTION INTRAVENOUS; SUBCUTANEOUS at 23:02

## 2020-08-20 RX ADMIN — SODIUM BICARBONATE 650 MG TABLET 1300 MG: at 08:33

## 2020-08-20 RX ADMIN — GUAIFENESIN 600 MG: 600 TABLET ORAL at 23:01

## 2020-08-20 RX ADMIN — TAMSULOSIN HYDROCHLORIDE 0.4 MG: 0.4 CAPSULE ORAL at 17:24

## 2020-08-20 RX ADMIN — OXYCODONE HYDROCHLORIDE AND ACETAMINOPHEN 1000 MG: 500 TABLET ORAL at 08:33

## 2020-08-20 RX ADMIN — FINASTERIDE 5 MG: 5 TABLET, FILM COATED ORAL at 08:33

## 2020-08-20 RX ADMIN — PRIMIDONE 50 MG: 50 TABLET ORAL at 08:34

## 2020-08-20 RX ADMIN — INSULIN LISPRO 2 UNITS: 100 INJECTION, SOLUTION INTRAVENOUS; SUBCUTANEOUS at 08:34

## 2020-08-20 RX ADMIN — FLUOXETINE 40 MG: 20 CAPSULE ORAL at 08:33

## 2020-08-20 RX ADMIN — ZINC SULFATE 220 MG (50 MG) CAPSULE 220 MG: CAPSULE at 08:33

## 2020-08-20 RX ADMIN — MEMANTINE 5 MG: 5 TABLET ORAL at 08:33

## 2020-08-20 RX ADMIN — INSULIN LISPRO 5 UNITS: 100 INJECTION, SOLUTION INTRAVENOUS; SUBCUTANEOUS at 17:25

## 2020-08-20 RX ADMIN — DEXAMETHASONE SODIUM PHOSPHATE 6 MG: 4 INJECTION, SOLUTION INTRAMUSCULAR; INTRAVENOUS at 23:00

## 2020-08-20 RX ADMIN — INSULIN LISPRO 6 UNITS: 100 INJECTION, SOLUTION INTRAVENOUS; SUBCUTANEOUS at 12:20

## 2020-08-20 RX ADMIN — GUAIFENESIN 600 MG: 600 TABLET ORAL at 08:33

## 2020-08-20 RX ADMIN — ATORVASTATIN CALCIUM 40 MG: 40 TABLET, FILM COATED ORAL at 22:59

## 2020-08-20 RX ADMIN — DOXYCYCLINE 100 MG: 100 CAPSULE ORAL at 08:33

## 2020-08-20 RX ADMIN — SODIUM BICARBONATE 650 MG TABLET 1300 MG: at 17:24

## 2020-08-20 NOTE — NURSING NOTE
Unable to obtain patients morning blood work  Nurse attempted 2x's and PCA attempted 2x's  Supervisor made aware  Will notify day shift RN

## 2020-08-20 NOTE — PLAN OF CARE
Problem: Potential for Falls  Goal: Patient will remain free of falls  Description: INTERVENTIONS:  - Assess patient frequently for physical needs  -  Identify cognitive and physical deficits and behaviors that affect risk of falls    -  [place] fall precautions as indicated by assessment   - Educate patient/family on patient safety including physical limitations  - Instruct patient to call for assistance with activity based on assessment  - Modify environment to reduce risk of injury  - Consider OT/PT consult to assist with strengthening/mobility  Outcome: Not Progressing     Problem: PAIN - ADULT  Goal: Verbalizes/displays adequate comfort level or baseline comfort level  Description: Interventions:  - Encourage patient to monitor pain and request assistance  - Assess pain using appropriate pain scale  - Administer analgesics based on type and severity of pain and evaluate response  - Implement non-pharmacological measures as appropriate and evaluate response  - Consider cultural and social influences on pain and pain management  - Notify physician/advanced practitioner if interventions unsuccessful or patient reports new pain  Outcome: Not Progressing     Problem: INFECTION - ADULT  Goal: Absence or prevention of progression during hospitalization  Description: INTERVENTIONS:  - Assess and monitor for signs and symptoms of infection  - Monitor lab/diagnostic results  - Monitor all insertion sites, i e  indwelling lines, tubes, and drains  - Monitor endotracheal if appropriate and nasal secretions for changes in amount and color  - [place] appropriate cooling/warming therapies per order  - Administer medications as ordered  - Instruct and encourage patient and family to use good hand hygiene technique  - Identify and instruct in appropriate isolation precautions for identified infection/condition  Outcome: Not Progressing     Problem: SAFETY ADULT  Goal: Patient will remain free of falls  Description: INTERVENTIONS:  - Assess patient frequently for physical needs  -  Identify cognitive and physical deficits and behaviors that affect risk of falls    -  [place] fall precautions as indicated by assessment   - Educate patient/family on patient safety including physical limitations  - Instruct patient to call for assistance with activity based on assessment  - Modify environment to reduce risk of injury  - Consider OT/PT consult to assist with strengthening/mobility  Outcome: Not Progressing  Goal: Maintain or return to baseline ADL function  Description: INTERVENTIONS:  -  Assess patient's ability to carry out ADLs; assess patient's baseline for ADL function and identify physical deficits which impact ability to perform ADLs (bathing, care of mouth/teeth, toileting, grooming, dressing, etc )  - Assess/evaluate cause of self-care deficits   - Assess range of motion  - Assess patient's mobility; develop plan if impaired  - Assess patient's need for assistive devices and provide as appropriate  - Encourage maximum independence but intervene and supervise when necessary  - Involve family in performance of ADLs  - Assess for home care needs following discharge   - Consider OT consult to assist with ADL evaluation and planning for discharge  - Provide patient education as appropriate  Outcome: Not Progressing  Goal: Maintain or return mobility status to optimal level  Description: INTERVENTIONS:  - Assess patient's baseline mobility status (ambulation, transfers, stairs, etc )    - Identify cognitive and physical deficits and behaviors that affect mobility  - Identify mobility aids required to assist with transfers and/or ambulation (gait belt, sit-to-stand, lift, walker, cane, etc )  - [place] fall precautions as indicated by assessment  - Record patient progress and toleration of activity level on Mobility SBAR; progress patient to next Phase/Stage  - Instruct patient to call for assistance with activity based on assessment  - Consider rehabilitation consult to assist with strengthening/weightbearing, etc   Outcome: Not Progressing     Problem: DISCHARGE PLANNING  Goal: Discharge to home or other facility with appropriate resources  Description: INTERVENTIONS:  - Identify barriers to discharge w/patient and caregiver  - Arrange for needed discharge resources and transportation as appropriate  - Identify discharge learning needs (meds, wound care, etc )  - Arrange for interpretive services to assist at discharge as needed  - Refer to Case Management Department for coordinating discharge planning if the patient needs post-hospital services based on physician/advanced practitioner order or complex needs related to functional status, cognitive ability, or social support system  Outcome: Not Progressing     Problem: Knowledge Deficit  Goal: Patient/family/caregiver demonstrates understanding of disease process, treatment plan, medications, and discharge instructions  Description: Complete learning assessment and assess knowledge base    Interventions:  - Provide teaching at level of understanding  - Provide teaching via preferred learning methods  Outcome: Not Progressing     Problem: Prexisting or High Potential for Compromised Skin Integrity  Goal: Skin integrity is maintained or improved  Description: INTERVENTIONS:  - Identify patients at risk for skin breakdown  - Assess and monitor skin integrity  - Assess and monitor nutrition and hydration status  - Monitor labs   - Assess for incontinence   - Turn and reposition patient  - Assist with mobility/ambulation  - Relieve pressure over bony prominences  - Avoid friction and shearing  - Provide appropriate hygiene as needed including keeping skin clean and dry  - Evaluate need for skin moisturizer/barrier cream  - Collaborate with interdisciplinary team   - Patient/family teaching  - Consider wound care consult   Outcome: Not Progressing     Problem: Nutrition/Hydration-ADULT  Goal: Nutrient/Hydration intake appropriate for improving, restoring or maintaining nutritional needs  Description: Monitor and assess patient's nutrition/hydration status for malnutrition  Collaborate with interdisciplinary team and initiate plan and interventions as ordered  Monitor patient's weight and dietary intake as ordered or per policy  Utilize nutrition screening tool and intervene as necessary  Determine patient's food preferences and provide high-protein, high-caloric foods as appropriate       INTERVENTIONS:  - Monitor oral intake, urinary output, labs, and treatment plans  - Assess nutrition and hydration status and recommend course of action  - Evaluate amount of meals eaten  - Assist patient with eating if necessary   - Allow adequate time for meals  - Recommend/ encourage appropriate diets, oral nutritional supplements, and vitamin/mineral supplements  - Order, calculate, and assess calorie counts as needed  - Recommend, monitor, and adjust tube feedings and TPN/PPN based on assessed needs  - Assess need for intravenous fluids  - Provide specific nutrition/hydration education as appropriate  - Include patient/family/caregiver in decisions related to nutrition  Outcome: Not Progressing

## 2020-08-20 NOTE — PLAN OF CARE
Problem: Potential for Falls  Goal: Patient will remain free of falls  Description: INTERVENTIONS:  - Assess patient frequently for physical needs  -  Identify cognitive and physical deficits and behaviors that affect risk of falls    -  [place] fall precautions as indicated by assessment   - Educate patient/family on patient safety including physical limitations  - Instruct patient to call for assistance with activity based on assessment  - Modify environment to reduce risk of injury  - Consider OT/PT consult to assist with strengthening/mobility  Outcome: Progressing     Problem: PAIN - ADULT  Goal: Verbalizes/displays adequate comfort level or baseline comfort level  Description: Interventions:  - Encourage patient to monitor pain and request assistance  - Assess pain using appropriate pain scale  - Administer analgesics based on type and severity of pain and evaluate response  - Implement non-pharmacological measures as appropriate and evaluate response  - Consider cultural and social influences on pain and pain management  - Notify physician/advanced practitioner if interventions unsuccessful or patient reports new pain  Outcome: Progressing     Problem: INFECTION - ADULT  Goal: Absence or prevention of progression during hospitalization  Description: INTERVENTIONS:  - Assess and monitor for signs and symptoms of infection  - Monitor lab/diagnostic results  - Monitor all insertion sites, i e  indwelling lines, tubes, and drains  - Monitor endotracheal if appropriate and nasal secretions for changes in amount and color  - [place] appropriate cooling/warming therapies per order  - Administer medications as ordered  - Instruct and encourage patient and family to use good hand hygiene technique  - Identify and instruct in appropriate isolation precautions for identified infection/condition  Outcome: Progressing     Problem: SAFETY ADULT  Goal: Patient will remain free of falls  Description: INTERVENTIONS:  - Assess patient frequently for physical needs  -  Identify cognitive and physical deficits and behaviors that affect risk of falls    -  [place] fall precautions as indicated by assessment   - Educate patient/family on patient safety including physical limitations  - Instruct patient to call for assistance with activity based on assessment  - Modify environment to reduce risk of injury  - Consider OT/PT consult to assist with strengthening/mobility  Outcome: Progressing  Goal: Maintain or return to baseline ADL function  Description: INTERVENTIONS:  -  Assess patient's ability to carry out ADLs; assess patient's baseline for ADL function and identify physical deficits which impact ability to perform ADLs (bathing, care of mouth/teeth, toileting, grooming, dressing, etc )  - Assess/evaluate cause of self-care deficits   - Assess range of motion  - Assess patient's mobility; develop plan if impaired  - Assess patient's need for assistive devices and provide as appropriate  - Encourage maximum independence but intervene and supervise when necessary  - Involve family in performance of ADLs  - Assess for home care needs following discharge   - Consider OT consult to assist with ADL evaluation and planning for discharge  - Provide patient education as appropriate  Outcome: Progressing  Goal: Maintain or return mobility status to optimal level  Description: INTERVENTIONS:  - Assess patient's baseline mobility status (ambulation, transfers, stairs, etc )    - Identify cognitive and physical deficits and behaviors that affect mobility  - Identify mobility aids required to assist with transfers and/or ambulation (gait belt, sit-to-stand, lift, walker, cane, etc )  - [place] fall precautions as indicated by assessment  - Record patient progress and toleration of activity level on Mobility SBAR; progress patient to next Phase/Stage  - Instruct patient to call for assistance with activity based on assessment  - Consider rehabilitation consult to assist with strengthening/weightbearing, etc   Outcome: Progressing     Problem: DISCHARGE PLANNING  Goal: Discharge to home or other facility with appropriate resources  Description: INTERVENTIONS:  - Identify barriers to discharge w/patient and caregiver  - Arrange for needed discharge resources and transportation as appropriate  - Identify discharge learning needs (meds, wound care, etc )  - Arrange for interpretive services to assist at discharge as needed  - Refer to Case Management Department for coordinating discharge planning if the patient needs post-hospital services based on physician/advanced practitioner order or complex needs related to functional status, cognitive ability, or social support system  Outcome: Progressing     Problem: Knowledge Deficit  Goal: Patient/family/caregiver demonstrates understanding of disease process, treatment plan, medications, and discharge instructions  Description: Complete learning assessment and assess knowledge base    Interventions:  - Provide teaching at level of understanding  - Provide teaching via preferred learning methods  Outcome: Progressing     Problem: Prexisting or High Potential for Compromised Skin Integrity  Goal: Skin integrity is maintained or improved  Description: INTERVENTIONS:  - Identify patients at risk for skin breakdown  - Assess and monitor skin integrity  - Assess and monitor nutrition and hydration status  - Monitor labs   - Assess for incontinence   - Turn and reposition patient  - Assist with mobility/ambulation  - Relieve pressure over bony prominences  - Avoid friction and shearing  - Provide appropriate hygiene as needed including keeping skin clean and dry  - Evaluate need for skin moisturizer/barrier cream  - Collaborate with interdisciplinary team   - Patient/family teaching  - Consider wound care consult   Outcome: Progressing     Problem: Nutrition/Hydration-ADULT  Goal: Nutrient/Hydration intake appropriate for improving, restoring or maintaining nutritional needs  Description: Monitor and assess patient's nutrition/hydration status for malnutrition  Collaborate with interdisciplinary team and initiate plan and interventions as ordered  Monitor patient's weight and dietary intake as ordered or per policy  Utilize nutrition screening tool and intervene as necessary  Determine patient's food preferences and provide high-protein, high-caloric foods as appropriate       INTERVENTIONS:  - Monitor oral intake, urinary output, labs, and treatment plans  - Assess nutrition and hydration status and recommend course of action  - Evaluate amount of meals eaten  - Assist patient with eating if necessary   - Allow adequate time for meals  - Recommend/ encourage appropriate diets, oral nutritional supplements, and vitamin/mineral supplements  - Order, calculate, and assess calorie counts as needed  - Recommend, monitor, and adjust tube feedings and TPN/PPN based on assessed needs  - Assess need for intravenous fluids  - Provide specific nutrition/hydration education as appropriate  - Include patient/family/caregiver in decisions related to nutrition  Outcome: Progressing

## 2020-08-20 NOTE — ASSESSMENT & PLAN NOTE
Presenting with elevated BNP 41,086, suspect CHF exacerbation  Echo showed ejection fraction 71% normal diastolic function  Normal right ventricular size and function  Moderate pulmonary hypertension    Diuresed with 40 mg Lasix IV b i d 8/18 without improvement in respiratory status  Hold diuretics per nephro due to kidney function

## 2020-08-20 NOTE — OCCUPATIONAL THERAPY NOTE
OccupationalTherapy Progress Note(time=8437-1530)     Patient Name: Rosalinda Beltran  [id number] Date: 8/20/2020  Problem List  Principal Problem:    Multifocal pneumonia  Active Problems:    Diabetes mellitus type 2    Essential hypertension    Transaminitis    Anemia    Peripheral arterial disease (HCC)    Atrial fibrillation     Hyponatremia    Acute respiratory failure with hypoxia (HCC)    SALO on CKD stage 4    Osteomyelitis of foot (HCC)    Acute on chronic diastolic congestive heart failure (HCC)    Acidosis    Hypoalbuminemia       08/20/20 1530   Restrictions/Precautions   Weight Bearing Precautions Per Order No   Braces or Orthoses   (globiped shoe L foot)   Other Precautions Cognitive; Chair Alarm; Bed Alarm; Fall Risk;Multiple lines;O2   Pain Assessment   Pain Assessment Tool FLACC   Pain Rating: FLACC (Rest) - Face 0   Pain Rating: FLACC (Rest) - Legs 0   Pain Rating: FLACC (Rest) - Activity 0   Pain Rating: FLACC (Rest) - Cry 0   Pain Rating: FLACC (Rest) - Consolability 0   Score: FLACC (Rest) 0   ADL   Where Assessed Edge of bed   LB Dressing Assistance 1  Total Assistance   LB Dressing Deficit Don/doff R sock; Don/doff L sock   Functional Standing Tolerance   Time 1-2mins   Bed Mobility   Rolling R 4  Minimal assistance   Additional items Assist x 1; Increased time required;Verbal cues;LE management   Transfers   Sit to Stand 3  Moderate assistance   Additional items Assist x 1; Increased time required;Verbal cues   Stand to Sit 3  Moderate assistance   Additional items Assist x 1; Increased time required;Verbal cues   Additional Comments bp's=123/66(EOB)   Functional Mobility   Functional Mobility 3  Moderate assistance   Additional Comments x1   Additional items Rolling walker   Therapeutic Excerise-Strength   UE Strength   (b/l UE AROM exercises, all planes, [id number])   Cognition   Overall Cognitive Status Impaired   Arousal/Participation Alert   Attention Attends with cues to redirect   Orientation Level Oriented to person;Oriented to place;Oriented to time   Memory Decreased short term memory;Decreased recall of precautions   Following Commands Follows one step commands without difficulty   Comments hx Alzheimer's   Activity Tolerance   Activity Tolerance Patient limited by fatigue   Medical Staff Made Aware nsg   Assessment   Assessment Pt seen for 40 min tx session with focus on functional balance, functional mobility, LE ADLs, transfer safety, and activity tolerance  Pt able to tolerate OOB mobility; sitting balance=f/f+, standing balance=f-/p+  Pt able to tolerate b/l UE A/AAROM exercises--i e shr flex/ext/abd/add, chest press, elbow flex/ext, incline shr press  Pt required physical assistance to maintain transfer safety  Pt fatigues quickly with functional tasks  Tx tolerated well  Will continue  Plan   Treatment Interventions ADL retraining; Endurance training;UE strengthening/ROM; Functional transfer training;Cognitive reorientation;Patient/family training; Compensatory technique education   Goal Expiration Date 09/01/20   OT Treatment Day 2   OT Frequency 3-5x/wk   Recommendation   OT Discharge Recommendation   (continue PT/OT in his LTC facility)   OT - OK to Discharge Yes   Barthel Index   Feeding 5   Bathing 0   Grooming Score 0   Dressing Score 5   Bladder Score 0   Bowels Score 5   Toilet Use Score 5   Transfers (Bed/Chair) Score 5   Mobility (Level Surface) Score 0   Stairs Score 0   Barthel Index Score 25   Ingrid Colvin OT

## 2020-08-20 NOTE — ASSESSMENT & PLAN NOTE
No signs of active bleeding, CT chest abdomen pelvis negative for any source of bleeding  Hemoglobin on admission received 1 unit of blood yesterday due to hemoglobin being 6 9    Hemoglobin today 8 5  Questionable worsening kidney function, fecal occult blood pending

## 2020-08-20 NOTE — PROGRESS NOTES
NEPHROLOGY PROGRESS NOTE   Nayla Correa 80 y o  male MRN: [id number]  Unit/Bed#: [facility] 2 -01 Encounter: [id number]      ASSESSMENT/PLAN:  Chronic kidney disease, stage IV:  - suspect secondary to hypertensive nephrosclerosis + diabetic nephropathy + age-related nephron loss  - upon review medical records, it appears that the patient has a baseline creatinine high 2s to low 3s mg/dL  - patient follows with Dr Marlene Eaton an outpatient      Acute kidney injury (POA) on chronic kidney disease, stage IV:  - acute kidney injury suspect prerenal azotemia + cardiorenal syndrome + underlying pneumonia + progression to ATN +/- progression of chronic kidney disease   - patient was admitted with a creatinine of 3 87 mg/dL     - patient's creatinine today is at 3 66 mg/dL,     - UA revealed urine specific gravity 1 020, trace protein, large blood, innumerable RBC, 1-2 coarse granular casts    - urine sodium 63, urine creatinine 46 8, urine urea nitrogen 551, urine chloride 64   - CT scan revealed moderate bilateral renal atrophy  No nephrolithiasis or obstructive uropathy      - per patient and daughter, both do not wish to pursue dialysis  - IV furosemide on hold  - post isolyte 250 mL yesterday  - avoid NSAIDS, nephrotoxins, IV contrast if possible  - adjust medications to appropriate GFR   - avoid hypotension/ fluctuations in blood pressure to prevent decreased renal perfusion    - monitor volume status with strict intake/output  - please perform daily standing weight if hemodynamically stable       Hypertension:  - blood pressure stable  - home medication regimen:  Amlodipine 5 mg daily + furosemide 40 mg daily  - currently on:  Amlodipine 5 mg daily  - optimize hemodynamics  - maintain MAP > 65mmHg  - avoid hypotension/ fluctuations in blood pressure       Electrolytes:  - hyponatremia with most recent sodium 134 mEq              - continue 1 5 L fluid restriction    - with history of hyperkalemia, most recent potassium 4 6                - maintain low-potassium diet                - on veltassa twice weekly as outpatient  - will continue to monitor with lab studies      Acid/base:  - most recent bicarbonate 14 with elevated anion gap of 20                - currently on: Sodium bicarbonate supplements 1300 mg t i d   - will continue to monitor with lab studies      Anemia likely of chronic disease:  - most recent hemoglobin at 8 5 grams/deciliter                - post one unit PRBC yesterday    - maintain hemoglobin greater than 8 grams/deciliter  - transfuse for hemoglobin < 7 grams/deciliter per primary team    - on oral iron supplements as outpatient  - recommend FOBT    - management per primary team       Mineral bone disease of chronic kidney disease:  - on calcitriol 0 25 mcg 3 times weekly  - magnesium 2 3   - hyperphosphatemia with most recent phosphorus elevated at 5 4               - monitor phosphorus levels, may initiate binders if phosphorus does not improve  - will continue to monitor PTH and phosphorus as outpatient       DM:  - most recent hemoglobin A1c: 7 1    - currently on insulin    - management as per primary team      CHF: EF 60%  - CT scan as below  - BNP upon admission 41,000    - home diuretic regimen:  Furosemide 40 mg daily  - diuretics on hold  - monitor volume status closely  - management as per primary team       Other problems:   - pneumonia:              - ATDYH x-ray revealed multifocal bilateral consolidation and ground-glass opacity   Nodule areas are more suggestive of pneumonia than edema    - CT scan revealed patchy ground-glass opacities throughout the lungs and a bronchial vascular distribution associated interlobular septal thickening suggested of bacterial, viral, or atypical pneumonia or ARDS  Pulmonary hemorrhage or pulmonary alveolar proteinosis could also have this appearance  No evidence of pulmonary edema    Left upper lobe subpleural 7 mm pulmonary nodule, indeterminate  Small to moderate bilateral pleural effusions  -  on Rocephin + doxycycline in which patient was already on for osteomyelitis of foot               - per primary team and Pulmonary      - suspected COVID-19 infection:              - suspicion due to positive exposure, radiographic finding, hypoxia and elevated inflammatory markers/D-dimer               - continued on COVID-19 pathway treatment  - per primary team and Pulmonary     - AFib:              - FR Coumadin as outpatient   Currently on heparin drip               - per primary team      - other CT scan findings:  Diffuse sclerotic lesions throughout the visualized axial and appendicular skeleton suggesting osseous metastasis  Significantly enlarged prostate  Constipation  - reviewed with primary team     SUBJECTIVE:  Patient seen and examined via window and with assistance of nursing staff and primary provider to limit exposure of potential COVID-19  OBJECTIVE:  Current Weight: Weight - Scale: 100 kg (220 lb 7 4 oz)  Vitals:    08/20/20 1047   BP:    Pulse:    Resp:    Temp:    SpO2: (!) 89%       Intake/Output Summary (Last 24 hours) at 8/20/2020 1105  Last data filed at 8/20/2020 0607  Gross per 24 hour   Intake 350 ml   Output 500 ml   Net -150 ml     Patient seen and examined via window and with assistance of nursing staff and primary provider to limit exposure of potential COVID-19  General: no acute distress   Skin: warm, no rash   Eyes:  Sclerae anicteric  ENT:  Moist lips and mucous membranes  Neck:  Supple trachea midline  Chest:  Diminished breath sounds with rhonchi  CVS:  Regular rate regular rhythm  Abdomen:  Soft, nontender, normoactive bowel sounds  Extremities:  Generalized edema, left hand edema  Neuro:  Lethargic, oriented to person, place, time  Disoriented to situation          Medications:  Scheduled Meds:  Current Facility-Administered Medications Medication Dose Route Frequency Provider Last Rate    acetaminophen  650 mg Oral Q6H PRN Roula Hewitt PA-C      amLODIPine  5 mg Oral Daily Lizz K Patt, DO      ascorbic acid  1,000 mg Oral Q12H [address] Malena Ramirez PA-C      atorvastatin  40 mg Oral HS Roula Hewitt PA-C      calcitriol  0 25 mcg Oral Once per day on Mon Wed Fri Malena Ramirez PA-C      cefTRIAXone  2,000 mg Intravenous Q24H VINOD OrtegaC 2,000 mg (08/19/20 2128)    cholecalciferol  2,000 Units Oral Daily Malena Ramirez PA-C      dexamethasone  6 mg Intravenous Q24H Roula Hewitt PA-C      doxycycline hyclate  100 mg Oral Q12H [address] Malena Ramirez PA-C      finasteride  5 mg Oral Daily Roula Hewitt PA-C      FLUoxetine  40 mg Oral Daily Malena Ramirez PA-C      guaiFENesin  600 mg Oral Q12H [address] Malena Ramirez PA-C      insulin lispro  1-6 Units Subcutaneous TID AC Malena Ramirez PA-C      insulin lispro  1-6 Units Subcutaneous HS Malena Ramirez PA-C      memantine  5 mg Oral BID Roula Hewitt PA-C      zinc sulfate  220 mg Oral Daily Malena Ramirez PA-C      Followed by   Lenore Buck ON 8/25/2020] multivitamin-minerals  1 tablet Oral Daily Malena Ramirez PA-C      ondansetron  4 mg Intravenous Q6H PRN Roula Hewitt PA-C      primidone  50 mg Oral Daily Malena Ramirez PA-C      sodium bicarbonate  1,300 mg Oral TID after meals COLLEEN Aguayo      tamsulosin  0 4 mg Oral Daily With [email or web address]         PRN Meds:   acetaminophen    ondansetron    Continuous Infusions:     Laboratory Results:  Results from last 7 days   Lab Units 08/20/20  0924 08/19/20  0559 08/18/20  0621 08/17/20  1418 08/17/20  1417   WBC Thousand/uL 11 55* 11 10* 15 36*  --  15 55*   HEMOGLOBIN g/dL 8 5* 6 9* 7 2*  --  7 8*   HEMATOCRIT % 27 3* 22 6* 23 7*  --  26 1*   PLATELETS Thousands/uL 351 324 305  --  340   SODIUM mmol/L 134* 134* 132* 132*  --    POTASSIUM mmol/L 4 6 4 6 5 0 4 9  --    CHLORIDE mmol/L 100 101 99* 98*  -- CO2 mmol/L 14* 16* 14* 16*  --    BUN mg/dL 118* 109* 108* 100*  --    CREATININE mg/dL 3 66* 3 72* 3 82* 3 87*  --    CALCIUM mg/dL 7 7* 8 0* 8 1* 8 1*  --    MAGNESIUM mg/dL  --  2 3  --   --   --    PHOSPHORUS mg/dL 5 4* 5 6*  --   --   --

## 2020-08-20 NOTE — ASSESSMENT & PLAN NOTE
Recent hospital admission to [facility] from 7/21/20 to 8/7/20 for left foot osteomyelitis  Patient started on oral doxycycline for indefinite suppression  Wound care

## 2020-08-20 NOTE — ASSESSMENT & PLAN NOTE
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

## 2020-08-20 NOTE — ASSESSMENT & PLAN NOTE
Presenting after being found to be hypoxic at nursing home  Chest x-ray with multifocal bilateral consolidation and ground-glass opacity  Positive COVID exposure at nursing home, had 4 negative COVID-19 tests in the past 11 days    Troponin elevated at 0 13  BNP elevated at 41,086  D-dimer elevated at 3 23-->3 78  Transaminitis- , ALT 83, alk-phos 251    Ferritin 332  Procalcitonin 1 09-->1 62 (with worsening kidney function too)    CT chest showed ground-glass opacities throughout the lungs suspect bacterial, viral or atypical pneumonia or ARDS, PA P, no evidence of pulmonary edema  Small to moderate bilateral pleural effusions  Suspect COVID-19 infection, possible superimposed bacterial infection, continue COVID-19 pathway  Ceftriaxone/doxy  Vitamin D3, vitamin-C, zinc, atorvastatin    Dexamethasone 6 mg daily for 10 days  Started on anticoagulation-intermediate intensity with heparin drip , blood daughter due to hemoglobin dropped requiring transfusion  Pulmonology on board, appreciate recommendations

## 2020-08-20 NOTE — ASSESSMENT & PLAN NOTE
CT showed diffuse sclerotic lesions throughout the skeleton suggesting oasis metastasis  Diffuse sclerotic lesions throughout the thoracic, lumbar spine, pelvis, ribs, sternum, clavicles and shoulders  ?  Prostate cancer, patient with intermittent hematuria in the past   Refused cystoscopy last year  Oncology consult  Patient already established care with palliative care as outpatient

## 2020-08-20 NOTE — PLAN OF CARE
Problem: OCCUPATIONAL THERAPY ADULT  Goal: Performs self-care activities at highest level of function for planned discharge setting  See evaluation for individualized goals  Description: Treatment Interventions: ADL retraining, Functional transfer training, UE strengthening/ROM, Endurance training, Patient/family training, Equipment evaluation/education, Compensatory technique education, Energy conservation, Activityengagement, Continued evaluation          See flowsheet documentation for full assessment, interventions and recommendations  Outcome: Progressing  Note: Limitation: Decreased ADL status, Decreased UE ROM, Decreased UE strength, Decreased Safe judgement during ADL, Decreased cognition, Decreased endurance, Decreased self-care trans, Decreased high-level ADLs  Prognosis: Fair  Assessment: Pt seen for 40 min tx session with focus on functional balance, functional mobility, LE ADLs, transfer safety, and activity tolerance  Pt able to tolerate OOB mobility; sitting balance=f/f+, standing balance=f-/p+  Pt able to tolerate b/l UE A/AAROM exercises--i e shr flex/ext/abd/add, chest press, elbow flex/ext, incline shr press  Pt required physical assistance to maintain transfer safety  Pt fatigues quickly with functional tasks  Tx tolerated well  Will continue  OT Discharge Recommendation: (continue PT/OT in his LTC facility)  OT - OK to Discharge:  Yes

## 2020-08-20 NOTE — PROGRESS NOTES
Progress Note - Pulmonary   Gab Odor 80 y o  male MRN: [id number]  Unit/Bed#: [facility] 2 -01 Encounter: [id number]      Assessment / Plan :  1  Acute hypoxic respiratory failure  · Patient did not require oxygen prior to this hospital stay  · Continue maintain SpO2 greater than or equal to 88%  Continue to titrate oxygen back to room air as tolerated by patient  · Patient is still requiring 7  liters/minute nasal cannula today   · Patient will likely need home O2 assessment prior to hospital discharge  2  Suspect COVID-19 infection  · High suspicion of COVID-19 secondary to patient with a positive exposure to COVID-19 at a nursing home  Chest x-ray, hypoxia and elevated inflammatory markers/D-dimer all consistent with COVID infection  · COVID-19 PCR is negative on August 7th, 17th, and 18th  · Influenza and respiratory panel negative  · D-dimer is elevated, continue heparin GTT  · Interleukin 6 pending this a m  Sosa Prows · Strep and Legionella urinary antigens are negative  · Continue airborne precaution isolation  · Continue COVID-19 pathway  · Consider infectious disease consult for further evaluation given slightly worsening oxygen saturations today  3  Abnormal chest x-ray with multifocal pneumonia  · Continue IV Rocephin  · Continue oral doxycycline which the patient was already taken for osteomyelitis of his foot as an outpatient  · WBCs are trending down, 15 36> 11 55 this a m  · Continue to trend procalcitonin:  1 62 > 1 48   · Continue pulmonary toileting including Ativan as tolerated, increase today is able, incentive spirometry  4  Acute on chronic diastolic CHF exacerbation  · Patient had elevated BNP at 41,000 on admission  Echocardiogram from 2017 revealed abnormal left ventricular relaxation with grade 1 diastolic dysfunction    · Repeat echocardiogram from 08/18/2020:  Mild increased left ventricular wall thickness with normal left ventricular systolic function, ejection fraction of 10%, normal diastolic function  Patient had normal right ventricle size and systolic function, mild aortic valve stenosis with an estimated pulmonary artery pressure of 52 mm Hg  · Continue to monitor daily weights: weight this AM: 220 lbs   · Continue to the monitor accurate I&Os, as of today he is positive 240 mL   · Continue with diuresis per primary care service and Nephrology  · Continue to monitor kidney function    5  SALO with CKD stage 4  · Nephrology is following, appreciate their input  Subjective:   Mr Calderon Rash his mind bed upon my assessment today  He reports that he is feeling about the same  He still has a dry nonproductive cough  He is seeming depressed being in this room  Otherwise he offers no complaints  Objective:   Vitals: Blood pressure 121/65, pulse 77, temperature (!) 97 1 °F (36 2 °C), temperature source Oral, resp  rate 16, height 6' (1 829 m), weight 100 kg (220 lb 7 4 oz), SpO2 (!) 87 %  , 7 lpm nc, Body mass index is 29 9 kg/m²  Intake/Output Summary (Last 24 hours) at 8/20/2020 0851  Last data filed at 8/20/2020 0607  Gross per 24 hour   Intake 350 ml   Output 500 ml   Net -150 ml         Physical Exam  Gen: Awake, alert, oriented x 3, no acute distress  HEENT: Mucous membranes moist, no oral lesions, no thrush  NECK: No accessory muscle use, JVP not elevated  Cardiac: Regular, single S1, single S2, no murmurs, no rubs, no gallops  Lungs:  Breath sounds are decreased in the bilateral lower lobes otherwise clear to auscultation throughout all lung fields  Abdomen: normoactive bowel sounds, soft nontender, nondistended, no rebound or rigidity, no guarding  Extremities: no cyanosis, no clubbing, no edema    Labs: I have personally reviewed pertinent lab results  , ABG: No results found for: PHART, JBF0GTT, PO2ART, LBN9DEF, P1NRNMQJ, BEART, SOURCE, BNP: No results found for: BNP, CBC: No results found for: WBC, HGB, HCT, MCV, PLT, ADJUSTEDWBC, MCH, MCHC, RDW, MPV, NRBC, CMP: No results found for: SODIUM, K, CL, CO2, ANIONGAP, BUN, CREATININE, GLUCOSE, CALCIUM, AST, ALT, ALKPHOS, PROT, BILITOT, EGFR, PT/INR: No results found for: PT, INR, Troponin: No results found for: TROPONINI,    Imaging and other studies: I have personally reviewed pertinent reports  CT Chest / Ab/ Pelvis :   FINDINGS:     CHEST     LUNGS:  Patchy groundglass opacification throughout the lungs with thickened intralobular septae primarily in a bronchovascular distribution, with relative sparing of the apices and lung bases  No evidence of pulmonary interstitial edema      Subpleural nodule in the left upper lobe measuring 7 mm, series 3 image 38  Mild compressive atelectasis at the lung bases  There is no tracheal or endobronchial lesion      PLEURA:  Small to moderate bilateral pleural effusions      HEART/GREAT VESSELS:  Normal heart size  No thoracic aortic aneurysm        MEDIASTINUM AND JUSTINA:  Scattered subcentimeter mediastinal and hilar lymph nodes  No pathologic enlargement      CHEST WALL AND LOWER NECK:   Left thyroid 2 cm low-attenuation nodule      ABDOMEN     LIVER/BILIARY TREE:  Unremarkable      GALLBLADDER:  No calcified gallstones  No pericholecystic inflammatory change      SPLEEN:  Unremarkable      PANCREAS:  Near complete fatty replacement of the pancreas      ADRENAL GLANDS:  Unremarkable      KIDNEYS/URETERS:  Moderate bilateral renal atrophy  No nephrolithiasis or obstructive uropathy      STOMACH AND BOWEL:  Periumbilical 3 cm fat-containing hernia  Moderate amount of stool throughout the entire colon  No evidence of colitis, diverticulitis or bowel obstruction      APPENDIX:  No findings to suggest appendicitis      ABDOMINOPELVIC CAVITY:  No ascites  No pneumoperitoneum    No lymphadenopathy      VESSELS:  No abdominal aortic aneurysm      PELVIS     REPRODUCTIVE ORGANS:  Significant prostate enlargement making a posterior impression on the bladder      URINARY BLADDER: Unremarkable      ABDOMINAL WALL/INGUINAL REGIONS:  Mild anasarca      OSSEOUS STRUCTURES:  Diffuse sclerotic lesions throughout the thoracic, lumbar spine and pelvis  Lesions also present in the ribs, sternum, clavicles and shoulders  Large T11 superior endplate Schmorl's node  Possible superimposed chronic fracture      IMPRESSION:        1  Patchy groundglass opacities throughout the lungs in a bronchovascular distribution associated intralobular septal thickening suggestive of bacterial, viral or atypical pneumonia or ARDS  Pulmonary hemorrhage or pulmonary alveolar proteinosis (PAP)   could also have this appearance  No evidence of pulmonary edema  2   Left upper lobe subpleural 7 mm pulmonary nodule, indeterminate  3   Small to moderate bilateral pleural effusions  4   Diffuse sclerotic lesions throughout the visualized axial and appendicular skeleton suggesting osseous metastases  5   Significantly enlarged prostate  No obstructive uropathy  6   Findings suggestive of constipation      Colt Villafana PA-C

## 2020-08-20 NOTE — ASSESSMENT & PLAN NOTE
Anticoagulated on Coumadin  Coumadin was held on admission     Patient was started on heparin drip for COVID pathway  Hold heparin drip now, due to anemia requiring transfusion

## 2020-08-20 NOTE — ASSESSMENT & PLAN NOTE
SALO on CKD stage 4  Evaluated by Nephrology, appreciate recommendations  Received diuretics without improvement of kidney function  Received gentle hydration without improvement of kidney function  Urine workup  Not a candidate for hemodialysis, also daughter said he would not like hemodialysis

## 2020-08-21 PROBLEM — Z71.89 GOALS OF CARE, COUNSELING/DISCUSSION: Status: ACTIVE | Noted: 2020-08-21

## 2020-08-21 LAB
ANION GAP SERPL CALCULATED.3IONS-SCNC: 19 MMOL/L (ref 4–13)
APTT PPP: 41 SECONDS (ref 23–37)
APTT PPP: 59 SECONDS (ref 23–37)
APTT PPP: 85 SECONDS (ref 23–37)
BASOPHILS # BLD AUTO: 0.02 THOUSANDS/ΜL (ref 0–0.1)
BASOPHILS NFR BLD AUTO: 0 % (ref 0–1)
BUN SERPL-MCNC: 127 MG/DL (ref 5–25)
CALCIUM SERPL-MCNC: 8.1 MG/DL (ref 8.3–10.1)
CHLORIDE SERPL-SCNC: 99 MMOL/L (ref 100–108)
CO2 SERPL-SCNC: 17 MMOL/L (ref 21–32)
CREAT SERPL-MCNC: 3.5 MG/DL (ref 0.6–1.3)
EOSINOPHIL # BLD AUTO: 0 THOUSAND/ΜL (ref 0–0.61)
EOSINOPHIL NFR BLD AUTO: 0 % (ref 0–6)
ERYTHROCYTE [DISTWIDTH] IN BLOOD BY AUTOMATED COUNT: 15.7 % (ref 11.6–15.1)
ERYTHROCYTE [DISTWIDTH] IN BLOOD BY AUTOMATED COUNT: 15.9 % (ref 11.6–15.1)
GFR SERPL CREATININE-BSD FRML MDRD: 15 ML/MIN/1.73SQ M
GLUCOSE SERPL-MCNC: 167 MG/DL (ref 65–140)
GLUCOSE SERPL-MCNC: 202 MG/DL (ref 65–140)
GLUCOSE SERPL-MCNC: 226 MG/DL (ref 65–140)
GLUCOSE SERPL-MCNC: 258 MG/DL (ref 65–140)
GLUCOSE SERPL-MCNC: 309 MG/DL (ref 65–140)
HCT VFR BLD AUTO: 27.4 % (ref 36.5–49.3)
HCT VFR BLD AUTO: 28.3 % (ref 36.5–49.3)
HGB BLD-MCNC: 8.4 G/DL (ref 12–17)
HGB BLD-MCNC: 8.7 G/DL (ref 12–17)
IMM GRANULOCYTES # BLD AUTO: 0.16 THOUSAND/UL (ref 0–0.2)
IMM GRANULOCYTES NFR BLD AUTO: 1 % (ref 0–2)
INR PPP: 1.9 (ref 0.84–1.19)
LACTATE SERPL-SCNC: 1.8 MMOL/L (ref 0.5–2)
LYMPHOCYTES # BLD AUTO: 0.42 THOUSANDS/ΜL (ref 0.6–4.47)
LYMPHOCYTES NFR BLD AUTO: 3 % (ref 14–44)
MCH RBC QN AUTO: 28.4 PG (ref 26.8–34.3)
MCH RBC QN AUTO: 28.5 PG (ref 26.8–34.3)
MCHC RBC AUTO-ENTMCNC: 30.7 G/DL (ref 31.4–37.4)
MCHC RBC AUTO-ENTMCNC: 30.7 G/DL (ref 31.4–37.4)
MCV RBC AUTO: 93 FL (ref 82–98)
MCV RBC AUTO: 93 FL (ref 82–98)
MONOCYTES # BLD AUTO: 1.23 THOUSAND/ΜL (ref 0.17–1.22)
MONOCYTES NFR BLD AUTO: 8 % (ref 4–12)
NEUTROPHILS # BLD AUTO: 13.17 THOUSANDS/ΜL (ref 1.85–7.62)
NEUTS SEG NFR BLD AUTO: 88 % (ref 43–75)
NRBC BLD AUTO-RTO: 0 /100 WBCS
PLATELET # BLD AUTO: 394 THOUSANDS/UL (ref 149–390)
PLATELET # BLD AUTO: 403 THOUSANDS/UL (ref 149–390)
PMV BLD AUTO: 10.2 FL (ref 8.9–12.7)
PMV BLD AUTO: 10.3 FL (ref 8.9–12.7)
POTASSIUM SERPL-SCNC: 4.9 MMOL/L (ref 3.5–5.3)
PROTHROMBIN TIME: 21.4 SECONDS (ref 11.6–14.5)
RBC # BLD AUTO: 2.96 MILLION/UL (ref 3.88–5.62)
RBC # BLD AUTO: 3.05 MILLION/UL (ref 3.88–5.62)
SODIUM SERPL-SCNC: 135 MMOL/L (ref 136–145)
WBC # BLD AUTO: 15 THOUSAND/UL (ref 4.31–10.16)
WBC # BLD AUTO: 15.63 THOUSAND/UL (ref 4.31–10.16)

## 2020-08-21 PROCEDURE — 85025 COMPLETE CBC W/AUTO DIFF WBC: CPT | Performed by: INTERNAL MEDICINE

## 2020-08-21 PROCEDURE — 85610 PROTHROMBIN TIME: CPT | Performed by: PHYSICIAN ASSISTANT

## 2020-08-21 PROCEDURE — 99233 SBSQ HOSP IP/OBS HIGH 50: CPT | Performed by: INTERNAL MEDICINE

## 2020-08-21 PROCEDURE — 85027 COMPLETE CBC AUTOMATED: CPT | Performed by: PHYSICIAN ASSISTANT

## 2020-08-21 PROCEDURE — 80048 BASIC METABOLIC PNL TOTAL CA: CPT | Performed by: NURSE PRACTITIONER

## 2020-08-21 PROCEDURE — 99232 SBSQ HOSP IP/OBS MODERATE 35: CPT | Performed by: INTERNAL MEDICINE

## 2020-08-21 PROCEDURE — 84154 ASSAY OF PSA FREE: CPT | Performed by: INTERNAL MEDICINE

## 2020-08-21 PROCEDURE — 85730 THROMBOPLASTIN TIME PARTIAL: CPT | Performed by: INTERNAL MEDICINE

## 2020-08-21 PROCEDURE — 99223 1ST HOSP IP/OBS HIGH 75: CPT | Performed by: INTERNAL MEDICINE

## 2020-08-21 PROCEDURE — 82948 REAGENT STRIP/BLOOD GLUCOSE: CPT

## 2020-08-21 PROCEDURE — 84153 ASSAY OF PSA TOTAL: CPT | Performed by: INTERNAL MEDICINE

## 2020-08-21 PROCEDURE — 85730 THROMBOPLASTIN TIME PARTIAL: CPT | Performed by: PHYSICIAN ASSISTANT

## 2020-08-21 PROCEDURE — 83605 ASSAY OF LACTIC ACID: CPT | Performed by: NURSE PRACTITIONER

## 2020-08-21 RX ORDER — HEPARIN SODIUM 10000 [USP'U]/100ML
3-30 INJECTION, SOLUTION INTRAVENOUS
Status: DISCONTINUED | OUTPATIENT
Start: 2020-08-21 | End: 2020-08-22

## 2020-08-21 RX ADMIN — GUAIFENESIN 600 MG: 600 TABLET ORAL at 20:06

## 2020-08-21 RX ADMIN — CALCITRIOL CAPSULES 0.25 MCG 0.25 MCG: 0.25 CAPSULE ORAL at 08:29

## 2020-08-21 RX ADMIN — OXYCODONE HYDROCHLORIDE AND ACETAMINOPHEN 1000 MG: 500 TABLET ORAL at 20:06

## 2020-08-21 RX ADMIN — FLUOXETINE 40 MG: 20 CAPSULE ORAL at 08:28

## 2020-08-21 RX ADMIN — INSULIN LISPRO 5 UNITS: 100 INJECTION, SOLUTION INTRAVENOUS; SUBCUTANEOUS at 08:46

## 2020-08-21 RX ADMIN — SODIUM BICARBONATE 650 MG TABLET 1300 MG: at 17:38

## 2020-08-21 RX ADMIN — OXYCODONE HYDROCHLORIDE AND ACETAMINOPHEN 1000 MG: 500 TABLET ORAL at 08:28

## 2020-08-21 RX ADMIN — INSULIN LISPRO 1 UNITS: 100 INJECTION, SOLUTION INTRAVENOUS; SUBCUTANEOUS at 20:05

## 2020-08-21 RX ADMIN — MEMANTINE 5 MG: 5 TABLET ORAL at 17:38

## 2020-08-21 RX ADMIN — GUAIFENESIN 600 MG: 600 TABLET ORAL at 08:29

## 2020-08-21 RX ADMIN — TAMSULOSIN HYDROCHLORIDE 0.4 MG: 0.4 CAPSULE ORAL at 16:38

## 2020-08-21 RX ADMIN — MEMANTINE 5 MG: 5 TABLET ORAL at 08:28

## 2020-08-21 RX ADMIN — SODIUM BICARBONATE 650 MG TABLET 1300 MG: at 08:28

## 2020-08-21 RX ADMIN — INSULIN LISPRO 2 UNITS: 100 INJECTION, SOLUTION INTRAVENOUS; SUBCUTANEOUS at 17:39

## 2020-08-21 RX ADMIN — ATORVASTATIN CALCIUM 40 MG: 40 TABLET, FILM COATED ORAL at 20:06

## 2020-08-21 RX ADMIN — HEPARIN SODIUM AND DEXTROSE 18 UNITS/KG/HR: 10000; 5 INJECTION INTRAVENOUS at 22:21

## 2020-08-21 RX ADMIN — ZINC SULFATE 220 MG (50 MG) CAPSULE 220 MG: CAPSULE at 08:29

## 2020-08-21 RX ADMIN — CEFEPIME HYDROCHLORIDE 1000 MG: 1 INJECTION, POWDER, FOR SOLUTION INTRAMUSCULAR; INTRAVENOUS at 13:25

## 2020-08-21 RX ADMIN — AMLODIPINE BESYLATE 5 MG: 5 TABLET ORAL at 08:29

## 2020-08-21 RX ADMIN — Medication 2000 UNITS: at 08:28

## 2020-08-21 RX ADMIN — DOXYCYCLINE 100 MG: 100 CAPSULE ORAL at 20:07

## 2020-08-21 RX ADMIN — SODIUM BICARBONATE 650 MG TABLET 1300 MG: at 13:02

## 2020-08-21 RX ADMIN — INSULIN LISPRO 3 UNITS: 100 INJECTION, SOLUTION INTRAVENOUS; SUBCUTANEOUS at 13:02

## 2020-08-21 RX ADMIN — DOXYCYCLINE 100 MG: 100 CAPSULE ORAL at 08:29

## 2020-08-21 RX ADMIN — DEXAMETHASONE SODIUM PHOSPHATE 6 MG: 4 INJECTION, SOLUTION INTRAMUSCULAR; INTRAVENOUS at 20:04

## 2020-08-21 RX ADMIN — PRIMIDONE 50 MG: 50 TABLET ORAL at 08:29

## 2020-08-21 RX ADMIN — CEFTRIAXONE 2000 MG: 2 INJECTION, POWDER, FOR SOLUTION INTRAMUSCULAR; INTRAVENOUS at 00:20

## 2020-08-21 RX ADMIN — FINASTERIDE 5 MG: 5 TABLET, FILM COATED ORAL at 08:29

## 2020-08-21 RX ADMIN — HEPARIN SODIUM AND DEXTROSE 18 UNITS/KG/HR: 10000; 5 INJECTION INTRAVENOUS at 01:36

## 2020-08-21 NOTE — ASSESSMENT & PLAN NOTE
SALO on CKD stage 4  Evaluated by Nephrology, appreciate recommendations  Received diuretics without improvement of kidney function  Received gentle hydration with some/minimal improvement of kidney function  Not a candidate for hemodialysis, also daughter said he would not like hemodialysis

## 2020-08-21 NOTE — QUICK NOTE
Call from nurse  Patient complaining of sudden onset cramping in thigh  Has been off anticoagulation due to drop on HGB  There has daniel no active signs of bleeding  HGB has been stable since transfusion  D-dimer was elevated  Appears more risky to remain off anticoagulation  Will continue to monitor closely for bleeding  Restart heparin

## 2020-08-21 NOTE — ASSESSMENT & PLAN NOTE
Anticoagulated on Coumadin  Coumadin was held on admission  Patient was started on heparin drip for COVID pathway  8/19 in the evening heparin was held    It was restarted overnight due to worsening respiratory status

## 2020-08-21 NOTE — ASSESSMENT & PLAN NOTE
Presenting with acute hypoxia due to multifocal pneumonia viral/bacterial/atypical/COVID-19  Currently on 15 L mid flow, intermittently on a non-rebreather  Started on COVID-19 pathway  RVP negative  Continue supplemental oxygen maintain oxygenation greater than 88%  Overnight his oxygen requirement worsened  He was placed back on heparin drip  Highly unlikely he had a PE, his INR was 1 9 and he was off heparin drip a little bit over  24 hours   Suspect worsening pneumonia

## 2020-08-21 NOTE — SOCIAL WORK
Current LOS 4 days; GMLOS 4 days  Pt is a bundle for CHF  Pt is a readmission and risk of unplanned readmission is high (41)  CM was told that pt is confused and so CM called pt's daughter Chantal Vela   [phone] for d/c planning  Needed to leave voicemail  Pt was admitted to [facility] from [facility]; he is not a bedhold but [facility] will accept back upon d/c if needed  CM was told that pt is in ESRD and does not want HD  Per Dr Malik Smiley' note pt may qualify for in patient hospice  Ref sent to [facility]  CM following

## 2020-08-21 NOTE — ASSESSMENT & PLAN NOTE
Presenting with elevated BNP 41,086, suspect CHF exacerbation  Echo showed ejection fraction 03% normal diastolic function  Normal right ventricular size and function  Moderate pulmonary hypertension    Diuresed with 40 mg Lasix IV b i d 8/18 without improvement in respiratory status  Hold diuretics per nephro due to kidney function

## 2020-08-21 NOTE — PLAN OF CARE
Problem: Potential for Falls  Goal: Patient will remain free of falls  Description: INTERVENTIONS:  - Assess patient frequently for physical needs  -  Identify cognitive and physical deficits and behaviors that affect risk of falls    -  [place] fall precautions as indicated by assessment   - Educate patient/family on patient safety including physical limitations  - Instruct patient to call for assistance with activity based on assessment  - Modify environment to reduce risk of injury  - Consider OT/PT consult to assist with strengthening/mobility  Outcome: Progressing     Problem: PAIN - ADULT  Goal: Verbalizes/displays adequate comfort level or baseline comfort level  Description: Interventions:  - Encourage patient to monitor pain and request assistance  - Assess pain using appropriate pain scale  - Administer analgesics based on type and severity of pain and evaluate response  - Implement non-pharmacological measures as appropriate and evaluate response  - Consider cultural and social influences on pain and pain management  - Notify physician/advanced practitioner if interventions unsuccessful or patient reports new pain  Outcome: Progressing     Problem: INFECTION - ADULT  Goal: Absence or prevention of progression during hospitalization  Description: INTERVENTIONS:  - Assess and monitor for signs and symptoms of infection  - Monitor lab/diagnostic results  - Monitor all insertion sites, i e  indwelling lines, tubes, and drains  - Monitor endotracheal if appropriate and nasal secretions for changes in amount and color  - [place] appropriate cooling/warming therapies per order  - Administer medications as ordered  - Instruct and encourage patient and family to use good hand hygiene technique  - Identify and instruct in appropriate isolation precautions for identified infection/condition  Outcome: Progressing     Problem: SAFETY ADULT  Goal: Patient will remain free of falls  Description: INTERVENTIONS:  - Assess patient frequently for physical needs  -  Identify cognitive and physical deficits and behaviors that affect risk of falls    -  [place] fall precautions as indicated by assessment   - Educate patient/family on patient safety including physical limitations  - Instruct patient to call for assistance with activity based on assessment  - Modify environment to reduce risk of injury  - Consider OT/PT consult to assist with strengthening/mobility  Outcome: Progressing  Goal: Maintain or return to baseline ADL function  Description: INTERVENTIONS:  -  Assess patient's ability to carry out ADLs; assess patient's baseline for ADL function and identify physical deficits which impact ability to perform ADLs (bathing, care of mouth/teeth, toileting, grooming, dressing, etc )  - Assess/evaluate cause of self-care deficits   - Assess range of motion  - Assess patient's mobility; develop plan if impaired  - Assess patient's need for assistive devices and provide as appropriate  - Encourage maximum independence but intervene and supervise when necessary  - Involve family in performance of ADLs  - Assess for home care needs following discharge   - Consider OT consult to assist with ADL evaluation and planning for discharge  - Provide patient education as appropriate  Outcome: Progressing  Goal: Maintain or return mobility status to optimal level  Description: INTERVENTIONS:  - Assess patient's baseline mobility status (ambulation, transfers, stairs, etc )    - Identify cognitive and physical deficits and behaviors that affect mobility  - Identify mobility aids required to assist with transfers and/or ambulation (gait belt, sit-to-stand, lift, walker, cane, etc )  - [place] fall precautions as indicated by assessment  - Record patient progress and toleration of activity level on Mobility SBAR; progress patient to next Phase/Stage  - Instruct patient to call for assistance with activity based on assessment  - Consider rehabilitation consult to assist with strengthening/weightbearing, etc   Outcome: Progressing     Problem: DISCHARGE PLANNING  Goal: Discharge to home or other facility with appropriate resources  Description: INTERVENTIONS:  - Identify barriers to discharge w/patient and caregiver  - Arrange for needed discharge resources and transportation as appropriate  - Identify discharge learning needs (meds, wound care, etc )  - Arrange for interpretive services to assist at discharge as needed  - Refer to Case Management Department for coordinating discharge planning if the patient needs post-hospital services based on physician/advanced practitioner order or complex needs related to functional status, cognitive ability, or social support system  Outcome: Progressing     Problem: Knowledge Deficit  Goal: Patient/family/caregiver demonstrates understanding of disease process, treatment plan, medications, and discharge instructions  Description: Complete learning assessment and assess knowledge base    Interventions:  - Provide teaching at level of understanding  - Provide teaching via preferred learning methods  Outcome: Progressing     Problem: Prexisting or High Potential for Compromised Skin Integrity  Goal: Skin integrity is maintained or improved  Description: INTERVENTIONS:  - Identify patients at risk for skin breakdown  - Assess and monitor skin integrity  - Assess and monitor nutrition and hydration status  - Monitor labs   - Assess for incontinence   - Turn and reposition patient  - Assist with mobility/ambulation  - Relieve pressure over bony prominences  - Avoid friction and shearing  - Provide appropriate hygiene as needed including keeping skin clean and dry  - Evaluate need for skin moisturizer/barrier cream  - Collaborate with interdisciplinary team   - Patient/family teaching  - Consider wound care consult   Outcome: Progressing     Problem: Nutrition/Hydration-ADULT  Goal: Nutrient/Hydration intake appropriate for improving, restoring or maintaining nutritional needs  Description: Monitor and assess patient's nutrition/hydration status for malnutrition  Collaborate with interdisciplinary team and initiate plan and interventions as ordered  Monitor patient's weight and dietary intake as ordered or per policy  Utilize nutrition screening tool and intervene as necessary  Determine patient's food preferences and provide high-protein, high-caloric foods as appropriate       INTERVENTIONS:  - Monitor oral intake, urinary output, labs, and treatment plans  - Assess nutrition and hydration status and recommend course of action  - Evaluate amount of meals eaten  - Assist patient with eating if necessary   - Allow adequate time for meals  - Recommend/ encourage appropriate diets, oral nutritional supplements, and vitamin/mineral supplements  - Order, calculate, and assess calorie counts as needed  - Recommend, monitor, and adjust tube feedings and TPN/PPN based on assessed needs  - Assess need for intravenous fluids  - Provide specific nutrition/hydration education as appropriate  - Include patient/family/caregiver in decisions related to nutrition  Outcome: Progressing

## 2020-08-21 NOTE — PROGRESS NOTES
Progress Note - Pulmonary   Katina Barrios 80 y o  male MRN: [id number]  Unit/Bed#: [facility] 68 2 -01 Encounter: [id number]      Assessment/Plan:  1  Acute hypoxic respiratory failure  1  Secondary to COVID 19 and multifocal pneumonia  2  Titrate oxygen as needed to maintain SpO2>/=89%  3  Pulmonary toilet as tolerated: cough deep breathe, prone  2  Suspected COVID-19  1  High suspicion for COVID 19 regardless of negative tests-noted significant exposure with chest imaging, inflammatory markers and hypoxia consistent with presentation of COVID 19 infection  2  RP2 panel negative  3  Continue heparin drip as he was on coumadin in the outpatient setting with noted elevated D-dimer  4  IL 6 9 9  5   4  6  Continue with airborne precautions  7  Continue current COVID-19 pathway -if he continues to worsen would recommend ID consult  And further palliative care evaluation   3  Acute on chronic diastolic CHF  1  Elevated BNP on admission 41,000 with known diastolic dysfunction and pulmonary HTN PAP 52mmHG  2  Daily weights, Strict I &O  3  Diuresis management per nephrology  4  Daily creatinine  4  Abnormal CXR with multifocal pneumonia  1  Most recent PCT 1 48 on 8/19-continue with 5 day course of Rocephin/doxycycline  2  Continue pulmonary toilet    5  Noted sclerotic lesions concerning for bone metastases for suspected prostate cancer   1  Continue with palliative care consult    Subjective:     Per nursing staff continues with non productive cough and mild dyspnea     Objective:         Vitals: Blood pressure 131/64, pulse 82, temperature 97 5 °F (36 4 °C), resp  rate 22, height 6' (1 829 m), weight 101 kg (222 lb 10 6 oz), SpO2 (!) 88 %  , 15L [place], Body mass index is 30 2 kg/m²  Intake/Output Summary (Last 24 hours) at 8/21/2020 1415  Last data filed at 8/21/2020 0301  Gross per 24 hour   Intake    Output 1100 ml   Net -1100 ml         Physical Exam-deferred due to COVID 19 infection    Labs:    I have personally reviewed pertinent lab results  , CBC:   Lab Results   Component Value Date    WBC 15 00 (H) 08/21/2020    HGB 8 4 (L) 08/21/2020    HCT 27 4 (L) 08/21/2020    MCV 93 08/21/2020     (H) 08/21/2020    MCH 28 4 08/21/2020    MCHC 30 7 (L) 08/21/2020    RDW 15 7 (H) 08/21/2020    MPV 10 3 08/21/2020    NRBC 0 08/21/2020   , CMP:   Lab Results   Component Value Date    SODIUM 135 (L) 08/21/2020    K 4 9 08/21/2020    CL 99 (L) 08/21/2020    CO2 17 (L) 08/21/2020     (H) 08/21/2020    CREATININE 3 50 (H) 08/21/2020    CALCIUM 8 1 (L) 08/21/2020    EGFR 15 08/21/2020     Imaging and other studies: no new imaging to review      COLLEEN Darden

## 2020-08-21 NOTE — NURSING NOTE
Placed pt on non re-breather 15L mid flow  Pt O2 sat kashmir to 90-91%  Pt removed mask twice and pt O2 sat dropped to 42%  Pt was told the importance of keeping mask on and he said the mask was annoying  Pt de-sats very quickly

## 2020-08-21 NOTE — HOSPICE NOTE
Hospice referral received  Consulted with Dr Bourgeois Seen and COVID positive bed not available at [facility] until Sunday  Made Mak Guerrero aware  Liaison will FU tomorrow

## 2020-08-21 NOTE — PLAN OF CARE
Problem: Potential for Falls  Goal: Patient will remain free of falls  Description: INTERVENTIONS:  - Assess patient frequently for physical needs  -  Identify cognitive and physical deficits and behaviors that affect risk of falls    -  [place] fall precautions as indicated by assessment   - Educate patient/family on patient safety including physical limitations  - Instruct patient to call for assistance with activity based on assessment  - Modify environment to reduce risk of injury  - Consider OT/PT consult to assist with strengthening/mobility  Outcome: Progressing     Problem: PAIN - ADULT  Goal: Verbalizes/displays adequate comfort level or baseline comfort level  Description: Interventions:  - Encourage patient to monitor pain and request assistance  - Assess pain using appropriate pain scale  - Administer analgesics based on type and severity of pain and evaluate response  - Implement non-pharmacological measures as appropriate and evaluate response  - Consider cultural and social influences on pain and pain management  - Notify physician/advanced practitioner if interventions unsuccessful or patient reports new pain  Outcome: Progressing     Problem: INFECTION - ADULT  Goal: Absence or prevention of progression during hospitalization  Description: INTERVENTIONS:  - Assess and monitor for signs and symptoms of infection  - Monitor lab/diagnostic results  - Monitor all insertion sites, i e  indwelling lines, tubes, and drains  - Monitor endotracheal if appropriate and nasal secretions for changes in amount and color  - [place] appropriate cooling/warming therapies per order  - Administer medications as ordered  - Instruct and encourage patient and family to use good hand hygiene technique  - Identify and instruct in appropriate isolation precautions for identified infection/condition  Outcome: Progressing     Problem: SAFETY ADULT  Goal: Patient will remain free of falls  Description: INTERVENTIONS:  - Assess patient frequently for physical needs  -  Identify cognitive and physical deficits and behaviors that affect risk of falls    -  [place] fall precautions as indicated by assessment   - Educate patient/family on patient safety including physical limitations  - Instruct patient to call for assistance with activity based on assessment  - Modify environment to reduce risk of injury  - Consider OT/PT consult to assist with strengthening/mobility  Outcome: Progressing  Goal: Maintain or return to baseline ADL function  Description: INTERVENTIONS:  -  Assess patient's ability to carry out ADLs; assess patient's baseline for ADL function and identify physical deficits which impact ability to perform ADLs (bathing, care of mouth/teeth, toileting, grooming, dressing, etc )  - Assess/evaluate cause of self-care deficits   - Assess range of motion  - Assess patient's mobility; develop plan if impaired  - Assess patient's need for assistive devices and provide as appropriate  - Encourage maximum independence but intervene and supervise when necessary  - Involve family in performance of ADLs  - Assess for home care needs following discharge   - Consider OT consult to assist with ADL evaluation and planning for discharge  - Provide patient education as appropriate  Outcome: Progressing  Goal: Maintain or return mobility status to optimal level  Description: INTERVENTIONS:  - Assess patient's baseline mobility status (ambulation, transfers, stairs, etc )    - Identify cognitive and physical deficits and behaviors that affect mobility  - Identify mobility aids required to assist with transfers and/or ambulation (gait belt, sit-to-stand, lift, walker, cane, etc )  - [place] fall precautions as indicated by assessment  - Record patient progress and toleration of activity level on Mobility SBAR; progress patient to next Phase/Stage  - Instruct patient to call for assistance with activity based on assessment  - Consider rehabilitation consult to assist with strengthening/weightbearing, etc   Outcome: Progressing     Problem: DISCHARGE PLANNING  Goal: Discharge to home or other facility with appropriate resources  Description: INTERVENTIONS:  - Identify barriers to discharge w/patient and caregiver  - Arrange for needed discharge resources and transportation as appropriate  - Identify discharge learning needs (meds, wound care, etc )  - Arrange for interpretive services to assist at discharge as needed  - Refer to Case Management Department for coordinating discharge planning if the patient needs post-hospital services based on physician/advanced practitioner order or complex needs related to functional status, cognitive ability, or social support system  Outcome: Progressing     Problem: Knowledge Deficit  Goal: Patient/family/caregiver demonstrates understanding of disease process, treatment plan, medications, and discharge instructions  Description: Complete learning assessment and assess knowledge base    Interventions:  - Provide teaching at level of understanding  - Provide teaching via preferred learning methods  Outcome: Progressing     Problem: Prexisting or High Potential for Compromised Skin Integrity  Goal: Skin integrity is maintained or improved  Description: INTERVENTIONS:  - Identify patients at risk for skin breakdown  - Assess and monitor skin integrity  - Assess and monitor nutrition and hydration status  - Monitor labs   - Assess for incontinence   - Turn and reposition patient  - Assist with mobility/ambulation  - Relieve pressure over bony prominences  - Avoid friction and shearing  - Provide appropriate hygiene as needed including keeping skin clean and dry  - Evaluate need for skin moisturizer/barrier cream  - Collaborate with interdisciplinary team   - Patient/family teaching  - Consider wound care consult   Outcome: Progressing     Problem: Nutrition/Hydration-ADULT  Goal: Nutrient/Hydration intake appropriate for improving, restoring or maintaining nutritional needs  Description: Monitor and assess patient's nutrition/hydration status for malnutrition  Collaborate with interdisciplinary team and initiate plan and interventions as ordered  Monitor patient's weight and dietary intake as ordered or per policy  Utilize nutrition screening tool and intervene as necessary  Determine patient's food preferences and provide high-protein, high-caloric foods as appropriate       INTERVENTIONS:  - Monitor oral intake, urinary output, labs, and treatment plans  - Assess nutrition and hydration status and recommend course of action  - Evaluate amount of meals eaten  - Assist patient with eating if necessary   - Allow adequate time for meals  - Recommend/ encourage appropriate diets, oral nutritional supplements, and vitamin/mineral supplements  - Order, calculate, and assess calorie counts as needed  - Recommend, monitor, and adjust tube feedings and TPN/PPN based on assessed needs  - Assess need for intravenous fluids  - Provide specific nutrition/hydration education as appropriate  - Include patient/family/caregiver in decisions related to nutrition  Outcome: Progressing

## 2020-08-21 NOTE — CONSULTS
Consultation - [address] Chris 80 y o  male MRN: [id number]  Unit/Bed#: [facility] 2 -01 Encounter: [id number]      Assessment/Plan     Assessment:  Patient Active Problem List   Diagnosis    Acute renal failure superimposed on stage 3 chronic kidney disease ([address] )    PVD (peripheral vascular disease)    Diabetes mellitus type 2    Essential hypertension    HLD (hyperlipidemia)    Atherosclerosis of artery of extremity with ulceration (HCC)    Transaminitis    Anemia    Depression    Ulcer of left heel (HCC)    Enlarged prostate    Incomplete bladder emptying    Chronic kidney disease stage 4    Tremor    Osteomyelitis of right foot (HCC)    Other chronic osteomyelitis, multiple sites ([address] )    Acute venous stasis dermatitis    Atherosclerosis of native artery of extremity (HCC)    Diastolic dysfunction    Edema of extremities    Other complications due to other vascular device, implant, and graft    Wound, surgical, nonhealing    Hyperlipidemia    Hypertension    Osteomyelitis of left foot ([address] )    Peripheral arterial disease ([address] )    Diabetes mellitus with neuropathy ([address] )    Type II diabetes mellitus with foot ulcer ([address] )    Hyperkalemia    Coagulopathy ([address] )    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    Atrial fibrillation     Anemia of chronic disease    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    Secondary hyperparathyroidism of renal origin ([address] )    Hyponatremia    Multifocal pneumonia    Acute respiratory failure with hypoxia ([address] )    SALO on CKD stage 4    Osteomyelitis of foot ([address] )    Acute on chronic diastolic congestive heart failure (HCC)    Acidosis    Hypoalbuminemia    Bony metastasis (HCC)       Plan:  1  Symptom management: per primary team    2  Goals of care: Continue current treatments and have hospice evaluate further  Once disposition for hospice is made will start to transition to more comfort focused cares   Daughter is debating about biopsy to determine malignancy for their family history and to know if they need to be screened on a certain malignancy themselves  CM referral for hospice  3  Psychosocial support: Time spent providing supportive listening  Daughter would very much like to visit her father and asks if he can be moved to a non COVID room if he continues to test negative  History of Present Illness   Physician Requesting Consult: Caron Hutchins MD  Reason for Consult / Principal Problem: goals of care  Hx and PE limited by: patient not competent  HPI: Salvatore Collazo is a 80y o  year old male who presented on 8/17 for hypoxic respiratory failure and bilateral pneumonia  He had a COVID exposure but has been swabbed on 4 different occurrences and has remained negative- regardless- given CT imaging- decision made to treat for COVID  Patient has history os PAD and CKD4 to 5- he was seen previously by our team and at that time goals were clear of no amputation and no dialysis  He is now found to have diffuses osseous lesions concerning for metastatic malignancy  Oncology consult is pending but daughter will not pursue any treatments  She is considering biopsy for family history reasons  Overnight he developed worsening hypoxia, currently on mid-flow with saturations in the mid to high 80s  Discussed goals of care and she is thinking it is time to transition to hospice cares  Discussed different levels of hospice and with current care needs may be IPU appropriate but unsure of decision by D given ambiguous COVID diagnosis       Inpatient consult to Palliative Care  Consult performed by: Crissy Luna DO  Consult ordered by: Caron Hutchins MD          Review of Systems   Unable to perform ROS: Age   Limited, denies pain or discomfort    Historical Information   Past Medical History:   Diagnosis Date    A-fib [facility])     [facility]'s disease [facility]     Depression     Diabetes mellitus 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[facility] )     Memory loss     Osteomyelitis of right foot ([facility] )     Renal disorder      Past Surgical History:   Procedure Laterality Date    APPENDECTOMY  1945    FEMORAL ARTERY - TIBIAL ARTERY BYPASS GRAFT Right 01/08/2014    R SFA- PT w/ nonrev GSV, [facility]ena [facility]- [facility]    FEMORAL ARTERY - TIBIAL ARTERY BYPASS GRAFT Left 06/04/2015    L SFA- PT w/ Cryovein- Ivan/ [facility]    FOOT SURGERY  06/14/2013    I&D with vac    IR LOWER EXTREMITY / INTERVENTION  7/24/2020    WOUND DEBRIDEMENT Right 03/07/2014    R thigh wound washout, V[facility]- [facility]     E-Cigarette/Vaping    E-Cigarette Use Never User      E-Cigarette/Vaping Substances    Nicotine No     THC No     CBD No     Flavoring No     Other No     Unknown No      Social History     Socioeconomic History    Marital status:       Spouse name: None    Number of children: None    Years of education: None    Highest education level: None   Occupational History    Occupation: Retired   Social Needs    Financial resource strain: None    Food insecurity     Worry: None     Inability: None    Transportation needs     Medical: None     Non-medical: None   Tobacco Use    Smoking status: Never Smoker    Smokeless tobacco: Never Used   Substance and Sexual Activity    Alcohol use: Yes     Comment: one beer on his birthday    Drug use: No    Sexual activity: Never   Lifestyle    Physical activity     Days per week: None     Minutes per session: None    Stress: None   Relationships    Social connections     Talks on phone: None     Gets together: None     Attends Sikhism service: None     Active member of club or organization: None     Attends meetings of clubs or organizations: None     Relationship status: None    Intimate partner violence     Fear of current or ex partner: None     Emotionally abused: None     Physically abused: None     Forced sexual activity: None   Other Topics Concern    None   Social History Narrative    · Most recent tobacco use screenin2019      · Do you currently or have you served in the [employer]:    Yes      · Were you activated, into active duty, as a member of the [employer] or as a Reservist:   Yes        · Exercise level:   Occasional      · Diet:   Regular      · Caffeine intake:   Occasional      · Sunscreen used routinely:   Yes      · Smoke alarm in home:   Yes      Family History   Problem Relation Age of Onset    Diabetes Mother     Heart failure Mother     Hypertension Mother     Cancer Father     Hypertension Sister     Hyperthyroidism Sister     Stroke Brother     Diabetes Maternal Grandmother     Clotting disorder Maternal Grandmother     No Known Problems Maternal Grandfather     No Known Problems Paternal Grandmother     No Known Problems Paternal Grandfather     Diabetes Brother     Arthritis Brother     Glaucoma Brother     Cataracts Brother     No Known Problems Son     Hyperthyroidism Daughter     Kidney disease Daughter     COPD Daughter     Diabetes Brother     Cancer Brother        Meds/Allergies   all current active meds have been reviewed and current meds:   Current Facility-Administered Medications   Medication Dose Route Frequency    acetaminophen (TYLENOL) tablet 650 mg  650 mg Oral Q6H PRN    amLODIPine (NORVASC) tablet 5 mg  5 mg Oral Daily    ascorbic acid (VITAMIN C) tablet 1,000 mg  1,000 mg Oral Q12H [address]    atorvastatin (LIPITOR) tablet 40 mg  40 mg Oral HS    calcitriol (ROCALTROL) capsule 0 25 mcg  0 25 mcg Oral Once per day on     cefTRIAXone (ROCEPHIN) 2,000 mg in dextrose 5 % 50 mL IVPB  2,000 mg Intravenous Q24H    cholecalciferol (VITAMIN D3) tablet 2,000 Units  2,000 Units Oral Daily    dexamethasone (DECADRON) injection 6 mg  6 mg Intravenous Q24H    doxycycline hyclate (VIBRAMYCIN) capsule 100 mg  100 mg Oral Q12H [facility]    finasteride (PROSCAR) tablet 5 mg  5 mg Oral Daily    FLUoxetine (PROzac) capsule 40 mg  40 mg Oral Daily    guaiFENesin (MUCINEX) 12 hr tablet 600 mg  600 mg Oral Q12H [facility]    heparin (porcine) 25,000 units in 250 mL infusion (premix)  3-30 Units/kg/hr (Order-Specific) Intravenous Titrated    insulin lispro (HumaLOG) 100 units/mL subcutaneous injection 1-6 Units  1-6 Units Subcutaneous TID AC    insulin lispro (HumaLOG) 100 units/mL subcutaneous injection 1-6 Units  1-6 Units Subcutaneous HS    memantine (NAMENDA) tablet 5 mg  5 mg Oral BID    zinc sulfate (ZINCATE) capsule 220 mg  220 mg Oral Daily    Followed by   Eliana Weiss ON 8/25/2020] multivitamin-minerals (CENTRUM ADULTS) tablet 1 tablet  1 tablet Oral Daily    ondansetron (ZOFRAN) injection 4 mg  4 mg Intravenous Q6H PRN    primidone (MYSOLINE) tablet 50 mg  50 mg Oral Daily    sodium bicarbonate tablet 1,300 mg  1,300 mg Oral TID after meals    tamsulosin (FLOMAX) capsule 0 4 mg  0 4 mg Oral Daily With Dinner       Palliative Care Medications: NA    Allergies   Allergen Reactions    Metoprolol Bradycardia    Unasyn [Ampicillin-Sulbactam Sodium] Rash       Objective     Physical Exam  Vitals signs and nursing note reviewed  Constitutional:       General: He is not in acute distress  Appearance: He is ill-appearing  HENT:      Head: Normocephalic and atraumatic  Right Ear: External ear normal       Left Ear: External ear normal       Nose: Nose normal    Eyes:      General: No scleral icterus  Right eye: No discharge  Left eye: No discharge  Cardiovascular:      Rate and Rhythm: Normal rate and regular rhythm  Pulmonary:      Effort: Pulmonary effort is normal  No respiratory distress  Breath sounds: Wheezing and rales present     Abdominal:      General: Bowel sounds are normal  There is no distension  Palpations: Abdomen is soft  Skin:     General: Skin is warm and dry  Coloration: Skin is pale  Findings: Lesion present  Neurological:      Mental Status: He is alert  Comments: Alert and oriented to name and place, knew to look at whiteboard for date and year  Psychiatric:         Mood and Affect: Mood normal          Lab Results:   I have personally reviewed pertinent labs  , CBC:   Lab Results   Component Value Date    WBC 15 00 (H) 08/21/2020    HGB 8 4 (L) 08/21/2020    HCT 27 4 (L) 08/21/2020    MCV 93 08/21/2020     (H) 08/21/2020    MCH 28 4 08/21/2020    MCHC 30 7 (L) 08/21/2020    RDW 15 7 (H) 08/21/2020    MPV 10 3 08/21/2020    NRBC 0 08/21/2020   , CMP:   Lab Results   Component Value Date    SODIUM 135 (L) 08/21/2020    K 4 9 08/21/2020    CL 99 (L) 08/21/2020    CO2 17 (L) 08/21/2020     (H) 08/21/2020    CREATININE 3 50 (H) 08/21/2020    CALCIUM 8 1 (L) 08/21/2020    EGFR 15 08/21/2020   , PT/PTT:  Lab Results   Component Value Date    PTT 59 (H) 08/21/2020     Imaging Studies: I have personally reviewed pertinent reports  EKG, Pathology, and Other Studies: I have personally reviewed pertinent reports  Code Status: Level 3 - DNAR and DNI  Advance Directive and Living Will: Yes    Power of :    POLST:      Counseling / Coordination of Care  Total floor / unit time spent today 55+ minutes  Greater than 50% of total time was spent with the patient and / or family counseling and / or coordination of care  A description of the counseling / coordination of care: chart review, medication review, goals of care

## 2020-08-21 NOTE — SOCIAL WORK
Received called back from pt's daughter  She is agreeable to referral being sent to [facility]  She wants to discuss with her sister  She has questions about visitation at the hospice house while pt is presumed covid + and has questions also if he will be on ABX or not  [facility] may have a hospice bed on Sunday for patient  Hospice to follow up with daughter  CM following

## 2020-08-21 NOTE — ASSESSMENT & PLAN NOTE
Recent hospital admission to [facility] from 7/21/20 to 8/7/20 for left foot osteomyelitis  Patient started on oral doxycycline for indefinite suppression  Wound care

## 2020-08-21 NOTE — ASSESSMENT & PLAN NOTE
Discussed with Nephrology, patient is not a candidate for hemodialysis and he refused hemodialysis 2 weeks ago, and his daughter confirmed that  Discussed with Oncology, the only way to find out what is the source of bony metastases is to do a bone biopsy  He would not be a candidate for any chemo  PSA ordered  Daughter would not like any invasive diagnostics  Discussed with infectious disease, due to low GFR he is not a candidate for Remdesivir and he is out of the window period  He is not a candidate for convalescent plasma because he tested negative for COVID-19  He is DNR DNI  Palliative care talked to the patient and his daughter  Hospice was consulted  There is no available COVID-19 bed until Sunday

## 2020-08-21 NOTE — PROGRESS NOTES
Progress Note - Ariel Norton 6/28/1930, 80 y o  male MRN: [id number]    [facility] [place] [facility] Encounter: [id number]    Primary Care Provider: Lay Perdomo MD   Date and time admitted to hospital: 8/17/2020  1:55 PM        Acute respiratory failure with hypoxia [facility])  Assessment & Plan  Presenting with acute hypoxia due to multifocal pneumonia viral/bacterial/atypical/COVID-19  Currently on 15 L mid flow, intermittently on a non-rebreather  Started on COVID-19 pathway  RVP negative  Continue supplemental oxygen maintain oxygenation greater than 88%  Overnight his oxygen requirement worsened  He was placed back on heparin drip  Highly unlikely he had a PE, his INR was 1 9 and he was off heparin drip a little bit over  24 hours  Suspect worsening pneumonia    * Multifocal pneumonia  Assessment & Plan  Presenting after being found to be hypoxic at nursing home  Chest x-ray with multifocal bilateral consolidation and ground-glass opacity  Positive COVID exposure at nursing home, had 4 negative COVID-19 tests in the past 11 days    Troponin elevated at 0 13  BNP elevated at 41,086  D-dimer elevated at 3 23-->3 78  Transaminitis- , ALT 83, alk-phos 251    Ferritin 332  Procalcitonin 1 09-->1 62 (with worsening kidney function too)    CT chest 8/19 showed ground-glass opacities throughout the lungs suspect bacterial, viral or atypical pneumonia or ARDS, PAP, no evidence of pulmonary edema  Small to moderate bilateral pleural effusions  Suspect COVID-19 infection, possible superimposed bacterial infection, continue COVID-19 pathway  Ceftriaxone/doxy--> cefepime/doxy  Vitamin D3, vitamin-C, zinc, atorvastatin  Dexamethasone 6 mg daily for 10 days  Started on anticoagulation-intermediate intensity with heparin drip , stopped due to hemoglobin drop requiring transfusion     Discussed with infectious disease, he is not a candidate for on the severe due to kidney function and outside the window period  He is not a candidate for convalescent plasma since he is COVID-19 negative  Pulmonology on board, appreciate recommendations      Acute on chronic diastolic congestive heart failure ([facility] [address] )  Assessment & Plan  Presenting with elevated BNP 41,086, suspect CHF exacerbation  Echo showed ejection fraction 27% normal diastolic function  Normal right ventricular size and function  Moderate pulmonary hypertension  Diuresed with 40 mg Lasix IV b i d 8/18 without improvement in respiratory status  Hold diuretics per nephro due to kidney function    SALO on CKD stage 4  Assessment & Plan  SALO on CKD stage 4  Evaluated by Nephrology, appreciate recommendations  Received diuretics without improvement of kidney function  Received gentle hydration with some/minimal improvement of kidney function  Not a candidate for hemodialysis, also daughter said he would not like hemodialysis    Bony metastasis ([facility] [address] )  Assessment & Plan  CT showed diffuse sclerotic lesions throughout the skeleton suggesting oasis metastasis  Diffuse sclerotic lesions throughout the thoracic, lumbar spine, pelvis, ribs, sternum, clavicles and shoulders  ?  Prostate cancer, patient with intermittent hematuria in the past   Refused cystoscopy last year  Will check PSA  Discussed with Oncology, family would like to know the source of the cancer  The only way to find out the source if to do a bone biopsy  Daughter would not like him to have a bone biopsy  Palliative care on board, hospice consult placed    Acidosis  Assessment & Plan  Due to worsening kidney function  On sodium bicarb tabs 1300 mg t i d       Goals of care, counseling/discussion  Assessment & Plan  Discussed with Nephrology, patient is not a candidate for hemodialysis and he refused hemodialysis 2 weeks ago, and his daughter confirmed that  Discussed with Oncology, the only way to find out what is the source of bony metastases is to do a bone biopsy    He would not be a candidate for any chemo  PSA ordered  Daughter would not like any invasive diagnostics  Discussed with infectious disease, due to low GFR he is not a candidate for Remdesivir and he is out of the window period  He is not a candidate for convalescent plasma because he tested negative for COVID-19  He is DNR DNI  Palliative care talked to the patient and his daughter  Hospice was consulted  There is no available COVID-19 bed until Sunday  Osteomyelitis of foot [facility])  Assessment & Plan  Recent hospital admission to [facility] from 7/21/20 to 8/7/20 for left foot osteomyelitis  Patient started on oral doxycycline for indefinite suppression  Wound care    Hyponatremia  Assessment & Plan  Mild hyponatremia, likely dilutional    Atrial fibrillation   Assessment & Plan  Anticoagulated on Coumadin  Coumadin was held on admission  Patient was started on heparin drip for COVID pathway  8/19 in the evening heparin was held  It was restarted overnight due to worsening respiratory status    Peripheral arterial disease [facility])  Assessment & Plan  Evaluated at [facility]  During recent hospitalization end of July 2020  Left lower extremity angiogram with completely occluded above the knee to below the knee bypass  Likely chronically occluded  Not a candidate for 3rd time redo bypass due to age, comorbidities, and patient wishes  Patient at that time did not want any further amputation or interventions  Maintained on Coumadin, Plavix, statin  Anemia  Assessment & Plan  No signs of active bleeding, CT chest abdomen pelvis negative for any source of bleeding  Received 1 unit of blood on 08/19 due to hemoglobin being 6 9  Hemoglobin today 8 4 today  Questionable worsening kidney function, fecal occult blood pending    Transaminitis  Assessment & Plan  With transaminitis- , ALT 83, alk phos 251  Will continue to trend      Essential hypertension  Assessment & Plan  Home regimen amlodipine 5 mg daily  Continue here  Diabetes mellitus type 2  Assessment & Plan  Lab Results   Component Value Date    HGBA1C 7 1 (H) 2020   Hold oral antihyperglycemics-glipizide  Placed on insulin sliding scale  VTE Pharmacologic Prophylaxis:   Pharmacologic: Heparin Drip  Mechanical VTE Prophylaxis in Place: Yes    Patient Centered Rounds: I have performed bedside rounds with nursing staff today  Discussions with Specialists or Other Care Team Provider:  Nephrology, Oncology, palliative Care, Infectious Disease    Education and Discussions with Family / Patient:  Patient and his daughter over the phone    Time Spent for Care: 45 minutes  More than 50% of total time spent on counseling and coordination of care as described above  Current Length of Stay: 4 day(s)    Current Patient Status: Inpatient   Certification Statement: The patient will continue to require additional inpatient hospital stay due to Above    Discharge Plan: To be determined    Code Status: Level 3 - DNAR and DNI      Subjective:   Patient was seen and evaluated bedside  He is on 15 L mid flow and intermittently on a non-rebreather  He says his breathing is okay    Objective:     Vitals:   Temp (24hrs), Av 3 °F (36 3 °C), Min:97 2 °F (36 2 °C), Max:97 5 °F (36 4 °C)    Temp:  [97 2 °F (36 2 °C)-97 5 °F (36 4 °C)] 97 5 °F (36 4 °C)  HR:  [79-87] 82  Resp:  [20-25] 22  BP: (130-131)/(60-64) 131/64  SpO2:  [82 %-90 %] 88 %  Body mass index is 30 2 kg/m²  Input and Output Summary (last 24 hours): Intake/Output Summary (Last 24 hours) at 2020 1901  Last data filed at 2020 0301  Gross per 24 hour   Intake    Output 1100 ml   Net -1100 ml       Physical Exam:     Physical Exam  Constitutional:       Appearance: He is ill-appearing  HENT:      Head: Atraumatic  Neck:      Musculoskeletal: Neck supple  Cardiovascular:      Rate and Rhythm: Normal rate and regular rhythm  Heart sounds: No murmur   No friction rub  No gallop  Pulmonary:      Comments: On 15 L mid flow, decreased breath sounds bilaterally with some coarse breath sounds, no wheezing  Abdominal:      General: Bowel sounds are normal  There is no distension  Palpations: Abdomen is soft  Tenderness: There is no abdominal tenderness  Musculoskeletal:         General: No swelling  Skin:     General: Skin is warm and dry  Neurological:      General: No focal deficit present  Mental Status: He is alert  Psychiatric:      Comments: Seems depressed       Additional Data:     Labs:    Results from last 7 days   Lab Units 08/21/20  0531   WBC Thousand/uL 15 00*   HEMOGLOBIN g/dL 8 4*   HEMATOCRIT % 27 4*   PLATELETS Thousands/uL 394*   NEUTROS PCT % 88*   LYMPHS PCT % 3*   MONOS PCT % 8   EOS PCT % 0     Results from last 7 days   Lab Units 08/21/20  0531  08/18/20  0621   SODIUM mmol/L 135*   < > 132*   POTASSIUM mmol/L 4 9   < > 5 0   CHLORIDE mmol/L 99*   < > 99*   CO2 mmol/L 17*   < > 14*   BUN mg/dL 127*   < > 108*   CREATININE mg/dL 3 50*   < > 3 82*   ANION GAP mmol/L 19*   < > 19*   CALCIUM mg/dL 8 1*   < > 8 1*   ALBUMIN g/dL  --   --  2 2*   TOTAL BILIRUBIN mg/dL  --   --  0 32   ALK PHOS U/L  --   --  257*   ALT U/L  --   --  66   AST U/L  --   --  64*   GLUCOSE RANDOM mg/dL 226*   < > 195*    < > = values in this interval not displayed       Results from last 7 days   Lab Units 08/21/20  0124   INR  1 90*     Results from last 7 days   Lab Units 08/21/20  1647 08/21/20  1126 08/21/20  0814 08/20/20  2123 08/20/20  1521 08/20/20  1121 08/20/20  0736 08/19/20  2108 08/19/20  1538 08/19/20  1123 08/19/20  0729 08/18/20  2304   POC GLUCOSE mg/dl 202* 258* 309* 336* 330* 371* 223* 283* 335* 262* 184* 202*         Results from last 7 days   Lab Units 08/21/20  0116 08/19/20  0559 08/18/20  1339 08/17/20  1633 08/17/20  1418 08/17/20  1417   LACTIC ACID mmol/L 1 8  --   --  1 6  --  2 9*   PROCALCITONIN ng/ml  --  1 48* 1 62*  -- 1 09*  --            * I Have Reviewed All Lab Data Listed Above  * Additional Pertinent Lab Tests Reviewed: Torey English Admission Reviewed    Imaging:    Imaging Reports Reviewed Today Include: all  Imaging Personally Reviewed by Myself Includes:      Recent Cultures (last 7 days):     Results from last 7 days   Lab Units 08/18/20  0621 08/17/20  1418   BLOOD CULTURE   --  No Growth at 72 hrs  No Growth at 72 hrs     LEGIONELLA URINARY ANTIGEN  Negative  --        Last 24 Hours Medication List:   Current Facility-Administered Medications   Medication Dose Route Frequency Provider Last Rate    acetaminophen  650 mg Oral Q6H PRN Justine Ramirez PA-C      amLODIPine  5 mg Oral Daily Lizz Beltre DO      ascorbic acid  1,000 mg Oral Q12H [address] Malena Ramirez PA-C      atorvastatin  40 mg Oral HS Justine Ramirez PA-C      calcitriol  0 25 mcg Oral Once per day on Mon Wed Fri Justine Ramirez PA-C      cefepime  1,000 mg Intravenous Q12H Caron Hutchins MD 1,000 mg (08/21/20 1325)    cholecalciferol  2,000 Units Oral Daily Malena Ramirez PA-C      dexamethasone  6 mg Intravenous Q24H Justine Ramirez PA-C      doxycycline hyclate  100 mg Oral Q12H [address] Malena Ramirez PA-C      finasteride  5 mg Oral Daily Justine Ramirez PA-C      FLUoxetine  40 mg Oral Daily Justine Ramirez PA-C      guaiFENesin  600 mg Oral Q12H [address] Malena Ramirez PA-C      heparin (porcine)  3-30 Units/kg/hr (Order-Specific) Intravenous Titrated Ray Jackson PA-C 18 Units/kg/hr (08/21/20 0136)    insulin lispro  1-6 Units Subcutaneous TID AC ED Schuster-ELMA      insulin lispro  1-6 Units Subcutaneous HS Malena Ramirez PA-C      memantine  5 mg Oral BID Justine Ramirez PA-C      zinc sulfate  220 mg Oral Daily Malena Ramirez PA-C      Followed by   Sherrie Henry ON 8/25/2020] multivitamin-minerals  1 tablet Oral Daily Malena Ramirez PA-C      ondansetron  4 mg Intravenous Q6H PRN Justine Ramirez PA-C      primidone 50 mg Oral Daily Malena Ramirez PA-C      sodium bicarbonate  1,300 mg Oral TID after meals COLLEEN Aguayo      tamsulosin  0 4 mg Oral Daily With [employer] JYOTI Romero          Today, Patient Was Seen By: Tyler Rosa MD    ** Please Note: Dictation voice to text software may have been used in the creation of this document   **

## 2020-08-21 NOTE — ASSESSMENT & PLAN NOTE
No signs of active bleeding, CT chest abdomen pelvis negative for any source of bleeding  Received 1 unit of blood on 08/19 due to hemoglobin being 6 9  Hemoglobin today 8 4 today  Questionable worsening kidney function, fecal occult blood pending

## 2020-08-21 NOTE — ASSESSMENT & PLAN NOTE
Presenting after being found to be hypoxic at nursing home  Chest x-ray with multifocal bilateral consolidation and ground-glass opacity  Positive COVID exposure at nursing home, had 4 negative COVID-19 tests in the past 11 days    Troponin elevated at 0 13  BNP elevated at 41,086  D-dimer elevated at 3 23-->3 78  Transaminitis- , ALT 83, alk-phos 251    Ferritin 332  Procalcitonin 1 09-->1 62 (with worsening kidney function too)    CT chest 8/19 showed ground-glass opacities throughout the lungs suspect bacterial, viral or atypical pneumonia or ARDS, PAP, no evidence of pulmonary edema  Small to moderate bilateral pleural effusions  Suspect COVID-19 infection, possible superimposed bacterial infection, continue COVID-19 pathway  Ceftriaxone/doxy--> cefepime/doxy  Vitamin D3, vitamin-C, zinc, atorvastatin  Dexamethasone 6 mg daily for 10 days  Started on anticoagulation-intermediate intensity with heparin drip , stopped due to hemoglobin drop requiring transfusion  Discussed with infectious disease, he is not a candidate for on the severe due to kidney function and outside the window period    He is not a candidate for convalescent plasma since he is COVID-19 negative  Pulmonology on board, appreciate recommendations

## 2020-08-21 NOTE — PHYSICAL THERAPY NOTE
Physical Therapy Cancellation Note            Attempted to see pt for PT treatment interventions  Pt's Rn, Agustina Dinero reports pt is not medically stable for PT interventions as pt's o2 sats are in 80's at rest on non- rebreather and mid flow o2  Will continue to follow as medically stable and appropriate    Macel Sicks, PTA

## 2020-08-21 NOTE — PROGRESS NOTES
Progress Note - Nephrology   Kurt Lunch 80 y o  male MRN: [id number]  Unit/Bed#: [facility] [address] Encounter: [id number]      Assessment / Plan:  1  SALO on top of CKD stage 4 vs progression - prerenal azotemia on differential as well as CRS vs ATN  -diuretics on hold  -UA suggests ATN  -no plans for RRT as per d/w daughter   -peak sCr 3 87, now 3 66 with higher   -avoid nephrotoxins/IV dye  -slight improvement in sCr s/p 250ml IVF  Patient tolerating oral diet  Will hold on further IVF in light of increased oxygen requirement since last night  -CT imaging incredibly concerning for metastasis  Patient is not an HD candidate  Appreciate palliative care recommendations  Hospice referral has been placed  2  CKD stage 4 in setting of HTN/DM/age related nephron loss - baseline serum creatinine in high 2s to low 3s, follows with Dr Faith Boo as an outpatient  3  Elevated anion gap acidosis - AG 19, bicarb improved to 17 today, lactate normal on last check  ? D/t renal impairment  Continue sodium bicarbonate 1300mg TID  Repeat lactate normal   4  Hypoalbuminemia - alb 2 2, monitor this  Suspect due to acute illness +/- malnutrition  5  Azotemia with concern for uremia - ? D/t blood loss vs intravascular volume depletion  BUN higher at 127 today  If BP drops, could consider isolyte bolus  6  Mild hyponatremia, likely dilutional - improved after IVF, monitor BMP  7  Anemia in setting of presumed malignancy with mets, ? Prostate Ca- Hgb 8 4 today s/p transfusion, monitor Hgb  8  CHD, EF 60% - diuretcs on hold as concern for possible intravacular volume depletion and COVID 19 as well as PNA, on Abx  9  Other issues: afib, DM2        Subjective:   Patient has no complaints per nursing today  His shortness of breath seems to have improved  He did have an episode of desaturation with oxygen level going into the 40s last night  He is on 15 L mid flow and non-rebreather        Objective:     Vitals: Blood pressure 131/64, pulse 82, temperature 97 5 °F (36 4 °C), resp  rate 22, height 6' (1 829 m), weight 101 kg (222 lb 10 6 oz), SpO2 (!) 88 %  ,Body mass index is 30 2 kg/m²  Temp (24hrs), Av 3 °F (36 3 °C), Min:97 2 °F (36 2 °C), Max:97 5 °F (36 4 °C)      Weight (last 2 days)     Date/Time   Weight    20 0600   101 (222 66)    20 0600   100 (220 46)                Intake/Output Summary (Last 24 hours) at 2020 1230  Last data filed at 2020 0301  Gross per 24 hour   Intake    Output 1100 ml   Net -1100 ml     I/O last 24 hours:   In: -   Out: 80 [Urine:1100]        Physical Exam(based on visualization from outside patient's window to reduce the risk of COVID exposure):   General: NAD, on NRB, sitting up in chair  HEENT: MMM  Neck: supple, no overt thyromegaly  Pulm: symmetric chest expansion  Abd: soft, ND  Ext: trace to no pedal edema, Left arm edema  : +external urinary catheter with yellow urine  Psych: flat affect  Neuro: hard of hearing, awake      Invasive Devices     Peripheral Intravenous Line            Peripheral IV 20 Distal;Right;Upper;Ventral (anterior) Arm 3 days    Peripheral IV 20 Distal;Left;Upper;Ventral (anterior) Arm less than 1 day          Drain            External Urinary Catheter Large 3 days                Medications:    Scheduled Meds:  Current Facility-Administered Medications   Medication Dose Route Frequency Provider Last Rate    acetaminophen  650 mg Oral Q6H PRN Demetrio Ramachandran PA-C      amLODIPine  5 mg Oral Daily Lizz K Patt, DO      ascorbic acid  1,000 mg Oral Q12H [address] Malena Ramirez PA-C      atorvastatin  40 mg Oral HS Malena Ramirez PA-C      calcitriol  0 25 mcg Oral Once per day on  Malena Ramirez PA-C      cefTRIAXone  2,000 mg Intravenous Q24H Demetrio Ramachandran PA-C 2,000 mg (20 0020)    cholecalciferol  2,000 Units Oral Daily Malena Ramirez PA-C      dexamethasone  6 mg Intravenous Q24H Demetrio Ramachandran PA-C      doxycycline hyclate  100 mg Oral Q12H [address] Malena Ramirez PA-C      finasteride  5 mg Oral Daily Sarah SoJYOTI perdomo      FLUoxetine  40 mg Oral Daily Sarah SoJYOTI perdomo      guaiFENesin  600 mg Oral Q12H [address] Malena Diazr, PA-C      heparin (porcine)  3-30 Units/kg/hr (Order-Specific) Intravenous Titrated Benjie Fernandez PA-C 18 Units/kg/hr (08/21/20 0136)    insulin lispro  1-6 Units Subcutaneous TID AC ED Schuster-ELMA      insulin lispro  1-6 Units Subcutaneous HS Malena Ramirez PA-C      memantine  5 mg Oral BID Sarah Henriquez PA-C      zinc sulfate  220 mg Oral Daily Malena Ramirez PA-C      Followed by   Taylor Shah ON 8/25/2020] multivitamin-minerals  1 tablet Oral Daily Malena Ramirez PA-C      ondansetron  4 mg Intravenous Q6H PRN Sarah Henriquez PA-C      primidone  50 mg Oral Daily Malena Ramirez PA-C      sodium bicarbonate  1,300 mg Oral TID after meals COLLEEN Aguayo      tamsulosin  0 4 mg Oral Daily With [employer]YOTI         PRN Meds:   acetaminophen    ondansetron    Continuous Infusions:heparin (porcine), 3-30 Units/kg/hr (Order-Specific), Last Rate: 18 Units/kg/hr (08/21/20 0136)            LAB RESULTS:      Results from last 7 days   Lab Units 08/21/20  0531 08/21/20  0052 08/20/20  0924 08/19/20  0559 08/18/20  0621 08/17/20  1418 08/17/20  1417   WBC Thousand/uL 15 00* 15 63* 11 55* 11 10* 15 36*  --  15 55*   HEMOGLOBIN g/dL 8 4* 8 7* 8 5* 6 9* 7 2*  --  7 8*   HEMATOCRIT % 27 4* 28 3* 27 3* 22 6* 23 7*  --  26 1*   PLATELETS Thousands/uL 394* 403* 351 324 305  --  340   NEUTROS PCT % 88*  --  87* 89* 87*  --  82*   LYMPHS PCT % 3*  --  6* 5* 5*  --  8*   MONOS PCT % 8  --  6 5 7  --  9   EOS PCT % 0  --  0 0 0  --  0   POTASSIUM mmol/L 4 9  --  4 6 4 6 5 0 4 9  --    CHLORIDE mmol/L 99*  --  100 101 99* 98*  --    CO2 mmol/L 17*  --  14* 16* 14* 16*  --    BUN mg/dL 127*  --  118* 109* 108* 100*  --    CREATININE mg/dL 3 50*  --  3 66* 3 72* 3 82* 3 87*  -- CALCIUM mg/dL 8 1*  --  7 7* 8 0* 8 1* 8 1*  --    ALK PHOS U/L  --   --   --   --  257* 251*  --    ALT U/L  --   --   --   --  66 83*  --    AST U/L  --   --   --   --  64* 126*  --    MAGNESIUM mg/dL  --   --   --  2 3  --   --   --    PHOSPHORUS mg/dL  --   --  5 4* 5 6*  --   --   --        CUTURES:  Lab Results   Component Value Date    BLOODCX No Growth at 72 hrs  08/17/2020    BLOODCX No Growth at 72 hrs  08/17/2020    BLOODCX No Growth After 5 Days  07/21/2020    BLOODCX No Growth After 5 Days  07/21/2020    URINECX 40,000-49,000 cfu/ml Escherichia coli (A) 07/08/2020    URINECX No Growth <1000 cfu/mL 03/28/2019    URINECX >100,000 cfu/ml Klebsiella pneumoniae 09/07/2017                 Portions of the record may have been created with voice recognition software  Occasional wrong word or "sound a like" substitutions may have occurred due to the inherent limitations of voice recognition software  Read the chart carefully and recognize, using context, where substitutions have occurred  If you have any questions, please contact the dictating provider

## 2020-08-21 NOTE — ASSESSMENT & PLAN NOTE
Evaluated at [facility]  During recent hospitalization end of July 2020  Left lower extremity angiogram with completely occluded above the knee to below the knee bypass  Likely chronically occluded  Not a candidate for 3rd time redo bypass due to age, comorbidities, and patient wishes  Patient at that time did not want any further amputation or interventions  Maintained on Coumadin, Plavix, statin

## 2020-08-21 NOTE — ASSESSMENT & PLAN NOTE
CT showed diffuse sclerotic lesions throughout the skeleton suggesting oasis metastasis  Diffuse sclerotic lesions throughout the thoracic, lumbar spine, pelvis, ribs, sternum, clavicles and shoulders  ?  Prostate cancer, patient with intermittent hematuria in the past   Refused cystoscopy last year  Will check PSA  Discussed with Oncology, family would like to know the source of the cancer  The only way to find out the source if to do a bone biopsy  Daughter would not like him to have a bone biopsy    Palliative care on board, hospice consult placed

## 2020-08-22 LAB
ALBUMIN SERPL BCP-MCNC: 1.9 G/DL (ref 3.5–5)
ALP SERPL-CCNC: 211 U/L (ref 46–116)
ALT SERPL W P-5'-P-CCNC: 33 U/L (ref 12–78)
ANION GAP SERPL CALCULATED.3IONS-SCNC: 16 MMOL/L (ref 4–13)
APTT PPP: 133 SECONDS (ref 23–37)
APTT PPP: 148 SECONDS (ref 23–37)
APTT PPP: 94 SECONDS (ref 23–37)
AST SERPL W P-5'-P-CCNC: 22 U/L (ref 5–45)
BACTERIA BLD CULT: NORMAL
BACTERIA BLD CULT: NORMAL
BASOPHILS # BLD AUTO: 0.01 THOUSANDS/ΜL (ref 0–0.1)
BASOPHILS NFR BLD AUTO: 0 % (ref 0–1)
BILIRUB SERPL-MCNC: 0.33 MG/DL (ref 0.2–1)
BUN SERPL-MCNC: 132 MG/DL (ref 5–25)
CALCIUM SERPL-MCNC: 7.8 MG/DL (ref 8.3–10.1)
CHLORIDE SERPL-SCNC: 102 MMOL/L (ref 100–108)
CO2 SERPL-SCNC: 17 MMOL/L (ref 21–32)
CREAT SERPL-MCNC: 3.54 MG/DL (ref 0.6–1.3)
EOSINOPHIL # BLD AUTO: 0 THOUSAND/ΜL (ref 0–0.61)
EOSINOPHIL NFR BLD AUTO: 0 % (ref 0–6)
ERYTHROCYTE [DISTWIDTH] IN BLOOD BY AUTOMATED COUNT: 15.9 % (ref 11.6–15.1)
GFR SERPL CREATININE-BSD FRML MDRD: 14 ML/MIN/1.73SQ M
GLUCOSE SERPL-MCNC: 139 MG/DL (ref 65–140)
GLUCOSE SERPL-MCNC: 200 MG/DL (ref 65–140)
GLUCOSE SERPL-MCNC: 241 MG/DL (ref 65–140)
GLUCOSE SERPL-MCNC: 251 MG/DL (ref 65–140)
GLUCOSE SERPL-MCNC: 255 MG/DL (ref 65–140)
HCT VFR BLD AUTO: 26.8 % (ref 36.5–49.3)
HEMOCCULT STL QL: NEGATIVE
HGB BLD-MCNC: 8.3 G/DL (ref 12–17)
IMM GRANULOCYTES # BLD AUTO: 0.18 THOUSAND/UL (ref 0–0.2)
IMM GRANULOCYTES NFR BLD AUTO: 1 % (ref 0–2)
LYMPHOCYTES # BLD AUTO: 0.63 THOUSANDS/ΜL (ref 0.6–4.47)
LYMPHOCYTES NFR BLD AUTO: 4 % (ref 14–44)
MCH RBC QN AUTO: 28.9 PG (ref 26.8–34.3)
MCHC RBC AUTO-ENTMCNC: 31 G/DL (ref 31.4–37.4)
MCV RBC AUTO: 93 FL (ref 82–98)
MONOCYTES # BLD AUTO: 0.98 THOUSAND/ΜL (ref 0.17–1.22)
MONOCYTES NFR BLD AUTO: 6 % (ref 4–12)
NEUTROPHILS # BLD AUTO: 13.68 THOUSANDS/ΜL (ref 1.85–7.62)
NEUTS SEG NFR BLD AUTO: 89 % (ref 43–75)
NRBC BLD AUTO-RTO: 0 /100 WBCS
PLATELET # BLD AUTO: 382 THOUSANDS/UL (ref 149–390)
PMV BLD AUTO: 10 FL (ref 8.9–12.7)
POTASSIUM SERPL-SCNC: 5 MMOL/L (ref 3.5–5.3)
PROT SERPL-MCNC: 6.8 G/DL (ref 6.4–8.2)
RBC # BLD AUTO: 2.87 MILLION/UL (ref 3.88–5.62)
SODIUM SERPL-SCNC: 135 MMOL/L (ref 136–145)
WBC # BLD AUTO: 15.48 THOUSAND/UL (ref 4.31–10.16)

## 2020-08-22 PROCEDURE — 82948 REAGENT STRIP/BLOOD GLUCOSE: CPT

## 2020-08-22 PROCEDURE — 99233 SBSQ HOSP IP/OBS HIGH 50: CPT | Performed by: NURSE PRACTITIONER

## 2020-08-22 PROCEDURE — 99232 SBSQ HOSP IP/OBS MODERATE 35: CPT | Performed by: INTERNAL MEDICINE

## 2020-08-22 PROCEDURE — 85025 COMPLETE CBC W/AUTO DIFF WBC: CPT | Performed by: INTERNAL MEDICINE

## 2020-08-22 PROCEDURE — 82272 OCCULT BLD FECES 1-3 TESTS: CPT | Performed by: NURSE PRACTITIONER

## 2020-08-22 PROCEDURE — 85730 THROMBOPLASTIN TIME PARTIAL: CPT | Performed by: INTERNAL MEDICINE

## 2020-08-22 PROCEDURE — 80053 COMPREHEN METABOLIC PANEL: CPT | Performed by: INTERNAL MEDICINE

## 2020-08-22 RX ORDER — HYDROMORPHONE HCL/PF 1 MG/ML
0.2 SYRINGE (ML) INJECTION EVERY 2 HOUR PRN
Status: DISCONTINUED | OUTPATIENT
Start: 2020-08-22 | End: 2020-08-23 | Stop reason: HOSPADM

## 2020-08-22 RX ORDER — LORAZEPAM 2 MG/ML
0.5 INJECTION INTRAMUSCULAR EVERY 2 HOUR PRN
Status: DISCONTINUED | OUTPATIENT
Start: 2020-08-22 | End: 2020-08-23 | Stop reason: HOSPADM

## 2020-08-22 RX ORDER — SEVELAMER HYDROCHLORIDE 800 MG/1
800 TABLET, FILM COATED ORAL
Status: DISCONTINUED | OUTPATIENT
Start: 2020-08-22 | End: 2020-08-22

## 2020-08-22 RX ORDER — HYDROMORPHONE HCL/PF 1 MG/ML
0.5 SYRINGE (ML) INJECTION EVERY 2 HOUR PRN
Status: DISCONTINUED | OUTPATIENT
Start: 2020-08-22 | End: 2020-08-22

## 2020-08-22 RX ADMIN — HEPARIN SODIUM AND DEXTROSE 12 UNITS/KG/HR: 10000; 5 INJECTION INTRAVENOUS at 15:17

## 2020-08-22 RX ADMIN — HYDROMORPHONE HYDROCHLORIDE 0.5 MG: 1 INJECTION, SOLUTION INTRAMUSCULAR; INTRAVENOUS; SUBCUTANEOUS at 15:43

## 2020-08-22 RX ADMIN — TAMSULOSIN HYDROCHLORIDE 0.4 MG: 0.4 CAPSULE ORAL at 16:35

## 2020-08-22 RX ADMIN — GUAIFENESIN 600 MG: 600 TABLET ORAL at 08:38

## 2020-08-22 RX ADMIN — FINASTERIDE 5 MG: 5 TABLET, FILM COATED ORAL at 08:38

## 2020-08-22 RX ADMIN — INSULIN LISPRO 3 UNITS: 100 INJECTION, SOLUTION INTRAVENOUS; SUBCUTANEOUS at 17:14

## 2020-08-22 RX ADMIN — LORAZEPAM 0.5 MG: 2 INJECTION INTRAMUSCULAR; INTRAVENOUS at 21:19

## 2020-08-22 RX ADMIN — SODIUM CHLORIDE 500 ML: 0.9 INJECTION, SOLUTION INTRAVENOUS at 09:05

## 2020-08-22 RX ADMIN — ZINC SULFATE 220 MG (50 MG) CAPSULE 220 MG: CAPSULE at 08:38

## 2020-08-22 RX ADMIN — FLUOXETINE 40 MG: 20 CAPSULE ORAL at 08:38

## 2020-08-22 RX ADMIN — CEFEPIME HYDROCHLORIDE 1000 MG: 1 INJECTION, POWDER, FOR SOLUTION INTRAMUSCULAR; INTRAVENOUS at 01:39

## 2020-08-22 RX ADMIN — MEMANTINE 5 MG: 5 TABLET ORAL at 16:35

## 2020-08-22 RX ADMIN — SODIUM BICARBONATE 650 MG TABLET 1300 MG: at 16:35

## 2020-08-22 RX ADMIN — SEVELAMER HYDROCHLORIDE 800 MG: 800 TABLET, FILM COATED PARENTERAL at 09:05

## 2020-08-22 RX ADMIN — SEVELAMER HYDROCHLORIDE 800 MG: 800 TABLET, FILM COATED PARENTERAL at 12:20

## 2020-08-22 RX ADMIN — AMLODIPINE BESYLATE 5 MG: 5 TABLET ORAL at 08:38

## 2020-08-22 RX ADMIN — INSULIN LISPRO 2 UNITS: 100 INJECTION, SOLUTION INTRAVENOUS; SUBCUTANEOUS at 08:37

## 2020-08-22 RX ADMIN — OXYCODONE HYDROCHLORIDE AND ACETAMINOPHEN 1000 MG: 500 TABLET ORAL at 08:38

## 2020-08-22 RX ADMIN — SODIUM BICARBONATE 650 MG TABLET 1300 MG: at 08:38

## 2020-08-22 RX ADMIN — HYDROMORPHONE HYDROCHLORIDE 0.2 MG: 1 INJECTION, SOLUTION INTRAMUSCULAR; INTRAVENOUS; SUBCUTANEOUS at 18:50

## 2020-08-22 RX ADMIN — PRIMIDONE 50 MG: 50 TABLET ORAL at 08:39

## 2020-08-22 RX ADMIN — HYDROMORPHONE HYDROCHLORIDE 0.2 MG: 1 INJECTION, SOLUTION INTRAMUSCULAR; INTRAVENOUS; SUBCUTANEOUS at 23:17

## 2020-08-22 RX ADMIN — SODIUM BICARBONATE 650 MG TABLET 1300 MG: at 12:20

## 2020-08-22 RX ADMIN — INSULIN LISPRO 3 UNITS: 100 INJECTION, SOLUTION INTRAVENOUS; SUBCUTANEOUS at 12:22

## 2020-08-22 RX ADMIN — SEVELAMER HYDROCHLORIDE 800 MG: 800 TABLET, FILM COATED PARENTERAL at 16:35

## 2020-08-22 RX ADMIN — MEMANTINE 5 MG: 5 TABLET ORAL at 08:39

## 2020-08-22 RX ADMIN — DOXYCYCLINE 100 MG: 100 CAPSULE ORAL at 08:38

## 2020-08-22 RX ADMIN — CEFEPIME HYDROCHLORIDE 1000 MG: 1 INJECTION, POWDER, FOR SOLUTION INTRAMUSCULAR; INTRAVENOUS at 12:20

## 2020-08-22 RX ADMIN — Medication 2000 UNITS: at 08:38

## 2020-08-22 NOTE — SOCIAL WORK
Anna from [facility] will meet with pt's daughter later today to discuss hospice  [address] can accept pt tomorrow  Pt's daughter Ramya [place] called CM to ask if pt can return to [facility] on palliative care  CM called RN supv at [facility]  Pt cannot return to [facility] while presumed covid + an on isolation airborne precautions  Also visisiors are not allowed at [facility] due to covid, even in end of life situations  CM informed Ramya Andrade of above and recommended that pt go to [facility]  She has questions for MD  CM asked Dr Gerald Posada to call when she is able

## 2020-08-22 NOTE — PROGRESS NOTES
NEPHROLOGY PROGRESS NOTE   Benito Fisher 80 y o  male MRN: [id number]  Unit/Bed#: [facility] 2 [address] 87 [id number] Encounter: [id number]  Reason for Consult: SALO on CKD IV    ASSESSMENT/PLAN:  SALO on CKD IV versus progression of chronic disease:  Suspected prerenal azotemia with progression to ATN, +/- CRS  -presented with creatinine of  -peak creatinine of 3 87   -diuretics remain on hold, last dose 8/18   -creatinine did slightly improve with small IV bolus  -will provide slow IV bolus today    -creatinine stable at 3 5   -patient is a poor candidate for RRT  Per Dr Elva Meng note, spoke with daughter    -continue to avoid nephrotoxins  -palliative Care following  Planning to transition to hospice care tomorrow once bed is available  CKD stage 4:  Likely secondary to hypertension, diabetes, and age-related nephron loss   -baseline creatinine in the high 2's to low 3's  -follows with Dr Dayanara Gonzalez  Azotemia with concern for uremia: (worsening)  -patient is a poor candidate for dialysis  -will give slow bolus of NSS today  Elevated anion gap acidosis:  Likely secondary to renal failure   -continues on sodium bicarbonate 1300 mg t i d  -lactate is normal   -will continue to monitor  Hypoalbuminemia:  Suspected poor nutrition as well as acute illness   -will continue to monitor  Anemia: In the setting of malignancy with bony metastasis, ? Prostate cancer   -continue to monitor and transfuse as needed   -palliative care and hospice following  There is no bed available until tomorrow  CHF:  EF of 58%  Suspected intravascular depletion   -diuretics currently on hold  -received Lasix on 08/18 without improvement in respiratory status  -receiving IVF today    -continue daily weights, fluid restriction and I/O  Acute respiratory failure with hypoxia:  Secondary to pneumonia as well as COVID-19  Requiring mid flow oxygen with intermittent non-rebreather      Hyperphosphatemia: in the setting of renal failure    -continue to monitor    -placed on phoslo with meals  MBD in CKD:   -continue calcitriol 0 25 mcg three times per week  -continue to monitor Mag, PTH, and phosphorus as outpatient depending on goals of care  Other:  Atrial fibrillation, diabetes  Disposition:  Awaiting hospice bed availability, likely Sunday  SUBJECTIVE:  The patient is resting in his bed  He is currently on a non-rebreather  He is awake and alert  He denies chest pain  He denies nausea, vomiting, diarrhea  He is asking when he can go home  He states that he has a good appetite      OBJECTIVE:  Current Weight: Weight - Scale: 100 kg (221 lb 5 5 oz)  Vitals:    08/21/20 1018 08/21/20 2115 08/22/20 0535 08/22/20 0647   BP:  129/65  129/65   BP Location:  Right arm     Pulse:  79  (!) 42   Resp:  22  (!) 28   Temp:  (!) 97 3 °F (36 3 °C)  (!) 97 1 °F (36 2 °C)   TempSrc:  Oral     SpO2: (!) 88% 93%  91%   Weight:   100 kg (221 lb 5 5 oz)    Height:           Intake/Output Summary (Last 24 hours) at 8/22/2020 0831  Last data filed at 8/22/2020 0501  Gross per 24 hour   Intake 100 ml   Output 600 ml   Net -500 ml     General: NAD  Skin: warm, dry, intact, no rash  HEENT: Moist mucous membranes, sclera anicteric, normocephalic, atraumatic  Neck: No apparent JVD appreciated  Chest: lung sounds clear B/L, on non-rebreather   CVS:Regular rate and rhythm, no murmer   Abdomen: Soft, round, non-tender, +BS, Anderson catheter  Extremities:  Trace B/L LE edema present, left upper extremity edema  Neuro: alert and oriented  Psych: appropriate mood and affect     Medications:    Current Facility-Administered Medications:     acetaminophen (TYLENOL) tablet 650 mg, 650 mg, Oral, Q6H PRN, Malena Ramirez PA-C    amLODIPine (NORVASC) tablet 5 mg, 5 mg, Oral, Daily, Lizz WOOD Patt, DO, 5 mg at 08/21/20 8537    ascorbic acid (VITAMIN C) tablet 1,000 mg, 1,000 mg, Oral, Q12H [address], Malena Ramirez PA-C, 1,000 mg at 08/21/20 2006    atorvastatin (LIPITOR) tablet 40 mg, 40 mg, Oral, HS, ED Schuster-C, 40 mg at 08/21/20 2006    calcitriol (ROCALTROL) capsule 0 25 mcg, 0 25 mcg, Oral, Once per day on Mon Wed Fri, ED Schuster-C, 0 25 mcg at 08/21/20 7992    cefepime (MAXIPIME) 1,000 mg in dextrose 5 % 50 mL IVPB, 1,000 mg, Intravenous, Q12H, Nicho English MD, Last Rate: 100 mL/hr at 08/22/20 0139, 1,000 mg at 08/22/20 0139    cholecalciferol (VITAMIN D3) tablet 2,000 Units, 2,000 Units, Oral, Daily, Joanna Cadet PA-C, 2,000 Units at 08/21/20 3025    dexamethasone (DECADRON) injection 6 mg, 6 mg, Intravenous, Q24H, ED Schuster-ELMA, 6 mg at 08/21/20 2004    doxycycline hyclate (VIBRAMYCIN) capsule 100 mg, 100 mg, Oral, Q12H [address], ED Schuster-ELMA, 100 mg at 08/21/20 2007    finasteride (PROSCAR) tablet 5 mg, 5 mg, Oral, Daily, ED Schuster-C, 5 mg at 08/21/20 1195    FLUoxetine (PROzac) capsule 40 mg, 40 mg, Oral, Daily, DE Schuster-C, 40 mg at 08/21/20 4252    guaiFENesin (MUCINEX) 12 hr tablet 600 mg, 600 mg, Oral, Q12H [address], Malena Ramirez PA-C, 600 mg at 08/21/20 2006    heparin (porcine) 25,000 units in 250 mL infusion (premix), 3-30 Units/kg/hr (Order-Specific), Intravenous, Titrated, Josy Og PA-C, Last Rate: 15 mL/hr at 08/22/20 0421, 15 Units/kg/hr at 08/22/20 0421    insulin lispro (HumaLOG) 100 units/mL subcutaneous injection 1-6 Units, 1-6 Units, Subcutaneous, TID AC, 2 Units at 08/21/20 1739 **AND** Fingerstick Glucose (POCT), , , TID AC, Malena Ramirez PA-C    insulin lispro (HumaLOG) 100 units/mL subcutaneous injection 1-6 Units, 1-6 Units, Subcutaneous, HS, Malena Ramirez PA-C, 1 Units at 08/21/20 2005    memantine [facility]) tablet 5 mg, 5 mg, Oral, BID, Malena Ramirez PA-C, 5 mg at 08/21/20 1738    zinc sulfate (ZINCATE) capsule 220 mg, 220 mg, Oral, Daily, 220 mg at 08/21/20 0829 **FOLLOWED BY** [START ON 8/25/2020] multivitamin-minerals (CENTRUM ADULTS) tablet 1 tablet, 1 tablet, Oral, Daily, Malena Ramirez PA-C    ondansetron [facility]) injection 4 mg, 4 mg, Intravenous, Q6H PRN, Sarah Henriquez PA-C    primidone (MYSOLINE) tablet 50 mg, 50 mg, Oral, Daily, Malena Ramirez PA-C, 50 mg at 08/21/20 6128    sodium bicarbonate tablet 1,300 mg, 1,300 mg, Oral, TID after meals, COLLEEN Aguayo, 1,300 mg at 08/21/20 1738    tamsulosin (FLOMAX) capsule 0 4 mg, 0 4 mg, Oral, Daily With Hai Ramirez PA-C, 0 4 mg at 08/21/20 1638    Laboratory Results:  Results from last 7 days   Lab Units 08/22/20  0326 08/22/20  0250 08/21/20  0531 08/21/20  0052 08/20/20  0924 08/19/20  0559 08/18/20  0621 08/17/20  1418   WBC Thousand/uL  --  15 48* 15 00* 15 63* 11 55* 11 10* 15 36*  --    HEMOGLOBIN g/dL  --  8 3* 8 4* 8 7* 8 5* 6 9* 7 2*  --    HEMATOCRIT %  --  26 8* 27 4* 28 3* 27 3* 22 6* 23 7*  --    PLATELETS Thousands/uL  --  382 394* 403* 351 324 305  --    SODIUM mmol/L 135*  --  135*  --  134* 134* 132* 132*   POTASSIUM mmol/L 5 0  --  4 9  --  4 6 4 6 5 0 4 9   CHLORIDE mmol/L 102  --  99*  --  100 101 99* 98*   CO2 mmol/L 17*  --  17*  --  14* 16* 14* 16*   BUN mg/dL 132*  --  127*  --  118* 109* 108* 100*   CREATININE mg/dL 3 54*  --  3 50*  --  3 66* 3 72* 3 82* 3 87*   CALCIUM mg/dL 7 8*  --  8 1*  --  7 7* 8 0* 8 1* 8 1*   MAGNESIUM mg/dL  --   --   --   --   --  2 3  --   --    PHOSPHORUS mg/dL  --   --   --   --  5 4* 5 6*  --   --    ALK PHOS U/L 211*  --   --   --   --   --  257* 251*   ALT U/L 33  --   --   --   --   --  66 83*   AST U/L 22  --   --   --   --   --  64* 126*

## 2020-08-22 NOTE — PLAN OF CARE
Problem: Potential for Falls  Goal: Patient will remain free of falls  Description: INTERVENTIONS:  - Assess patient frequently for physical needs  -  Identify cognitive and physical deficits and behaviors that affect risk of falls    -  [place] fall precautions as indicated by assessment   - Educate patient/family on patient safety including physical limitations  - Instruct patient to call for assistance with activity based on assessment  - Modify environment to reduce risk of injury  - Consider OT/PT consult to assist with strengthening/mobility  Outcome: Progressing     Problem: PAIN - ADULT  Goal: Verbalizes/displays adequate comfort level or baseline comfort level  Description: Interventions:  - Encourage patient to monitor pain and request assistance  - Assess pain using appropriate pain scale  - Administer analgesics based on type and severity of pain and evaluate response  - Implement non-pharmacological measures as appropriate and evaluate response  - Consider cultural and social influences on pain and pain management  - Notify physician/advanced practitioner if interventions unsuccessful or patient reports new pain  Outcome: Progressing     Problem: INFECTION - ADULT  Goal: Absence or prevention of progression during hospitalization  Description: INTERVENTIONS:  - Assess and monitor for signs and symptoms of infection  - Monitor lab/diagnostic results  - Monitor all insertion sites, i e  indwelling lines, tubes, and drains  - Monitor endotracheal if appropriate and nasal secretions for changes in amount and color  - [place] appropriate cooling/warming therapies per order  - Administer medications as ordered  - Instruct and encourage patient and family to use good hand hygiene technique  - Identify and instruct in appropriate isolation precautions for identified infection/condition  Outcome: Progressing     Problem: SAFETY ADULT  Goal: Patient will remain free of falls  Description: INTERVENTIONS:  - Assess patient frequently for physical needs  -  Identify cognitive and physical deficits and behaviors that affect risk of falls    -  [place] fall precautions as indicated by assessment   - Educate patient/family on patient safety including physical limitations  - Instruct patient to call for assistance with activity based on assessment  - Modify environment to reduce risk of injury  - Consider OT/PT consult to assist with strengthening/mobility  Outcome: Progressing  Goal: Maintain or return to baseline ADL function  Description: INTERVENTIONS:  -  Assess patient's ability to carry out ADLs; assess patient's baseline for ADL function and identify physical deficits which impact ability to perform ADLs (bathing, care of mouth/teeth, toileting, grooming, dressing, etc )  - Assess/evaluate cause of self-care deficits   - Assess range of motion  - Assess patient's mobility; develop plan if impaired  - Assess patient's need for assistive devices and provide as appropriate  - Encourage maximum independence but intervene and supervise when necessary  - Involve family in performance of ADLs  - Assess for home care needs following discharge   - Consider OT consult to assist with ADL evaluation and planning for discharge  - Provide patient education as appropriate  Outcome: Progressing  Goal: Maintain or return mobility status to optimal level  Description: INTERVENTIONS:  - Assess patient's baseline mobility status (ambulation, transfers, stairs, etc )    - Identify cognitive and physical deficits and behaviors that affect mobility  - Identify mobility aids required to assist with transfers and/or ambulation (gait belt, sit-to-stand, lift, walker, cane, etc )  - [place] fall precautions as indicated by assessment  - Record patient progress and toleration of activity level on Mobility SBAR; progress patient to next Phase/Stage  - Instruct patient to call for assistance with activity based on assessment  - Consider rehabilitation consult to assist with strengthening/weightbearing, etc   Outcome: Progressing     Problem: DISCHARGE PLANNING  Goal: Discharge to home or other facility with appropriate resources  Description: INTERVENTIONS:  - Identify barriers to discharge w/patient and caregiver  - Arrange for needed discharge resources and transportation as appropriate  - Identify discharge learning needs (meds, wound care, etc )  - Arrange for interpretive services to assist at discharge as needed  - Refer to Case Management Department for coordinating discharge planning if the patient needs post-hospital services based on physician/advanced practitioner order or complex needs related to functional status, cognitive ability, or social support system  Outcome: Progressing     Problem: Knowledge Deficit  Goal: Patient/family/caregiver demonstrates understanding of disease process, treatment plan, medications, and discharge instructions  Description: Complete learning assessment and assess knowledge base    Interventions:  - Provide teaching at level of understanding  - Provide teaching via preferred learning methods  Outcome: Progressing     Problem: Prexisting or High Potential for Compromised Skin Integrity  Goal: Skin integrity is maintained or improved  Description: INTERVENTIONS:  - Identify patients at risk for skin breakdown  - Assess and monitor skin integrity  - Assess and monitor nutrition and hydration status  - Monitor labs   - Assess for incontinence   - Turn and reposition patient  - Assist with mobility/ambulation  - Relieve pressure over bony prominences  - Avoid friction and shearing  - Provide appropriate hygiene as needed including keeping skin clean and dry  - Evaluate need for skin moisturizer/barrier cream  - Collaborate with interdisciplinary team   - Patient/family teaching  - Consider wound care consult   Outcome: Progressing     Problem: Nutrition/Hydration-ADULT  Goal: Nutrient/Hydration intake appropriate for improving, restoring or maintaining nutritional needs  Description: Monitor and assess patient's nutrition/hydration status for malnutrition  Collaborate with interdisciplinary team and initiate plan and interventions as ordered  Monitor patient's weight and dietary intake as ordered or per policy  Utilize nutrition screening tool and intervene as necessary  Determine patient's food preferences and provide high-protein, high-caloric foods as appropriate       INTERVENTIONS:  - Monitor oral intake, urinary output, labs, and treatment plans  - Assess nutrition and hydration status and recommend course of action  - Evaluate amount of meals eaten  - Assist patient with eating if necessary   - Allow adequate time for meals  - Recommend/ encourage appropriate diets, oral nutritional supplements, and vitamin/mineral supplements  - Order, calculate, and assess calorie counts as needed  - Recommend, monitor, and adjust tube feedings and TPN/PPN based on assessed needs  - Assess need for intravenous fluids  - Provide specific nutrition/hydration education as appropriate  - Include patient/family/caregiver in decisions related to nutrition  Outcome: Progressing

## 2020-08-22 NOTE — HOSPICE NOTE
Hospice Liaison met with daughters aXvi Rabago and Ramya Felt in the [address]  They are hesitant to intiate hospice without having the opportunity to talk to patient and confirm that he is in support also  CM to check on arranging visit either in person or facetime  Daughter considering taking patient to his home in [place] with hospice  At present our hospice does not have availably in that area but may open up next week  CM to update Liaison on decision

## 2020-08-22 NOTE — SOCIAL WORK
Daughter Martha Rosa would like to either Face Time with patient or meet with him in the room to discuss what he would like to do for hospice: Home Hospice vs in patient hospice at [facility]  CM asked that nursing follow up with daughter to see which is more feasible giving pt's covid isolation  Daughter not comfortable making a choice regarding hospice at this time  Daughter aware that hospice does not come to the home everyday and that if pt is d/c'ing home with hospice, family would need to be available at the home to provide support  Daughter also aware that [facility] is not able to go to [facility] at this time and an outside hospice agency would need to be selected  FRANK serrano

## 2020-08-23 VITALS
DIASTOLIC BLOOD PRESSURE: 61 MMHG | SYSTOLIC BLOOD PRESSURE: 117 MMHG | TEMPERATURE: 98.4 F | OXYGEN SATURATION: 88 % | RESPIRATION RATE: 24 BRPM | BODY MASS INDEX: 29.98 KG/M2 | HEART RATE: 81 BPM | WEIGHT: 221.34 LBS | HEIGHT: 72 IN

## 2020-08-23 PROCEDURE — 99239 HOSP IP/OBS DSCHRG MGMT >30: CPT | Performed by: INTERNAL MEDICINE

## 2020-08-23 RX ADMIN — HYDROMORPHONE HYDROCHLORIDE 0.2 MG: 1 INJECTION, SOLUTION INTRAMUSCULAR; INTRAVENOUS; SUBCUTANEOUS at 13:48

## 2020-08-23 RX ADMIN — LORAZEPAM 0.5 MG: 2 INJECTION INTRAMUSCULAR; INTRAVENOUS at 03:36

## 2020-08-23 RX ADMIN — LORAZEPAM 0.5 MG: 2 INJECTION INTRAMUSCULAR; INTRAVENOUS at 08:46

## 2020-08-23 RX ADMIN — LORAZEPAM 0.5 MG: 2 INJECTION INTRAMUSCULAR; INTRAVENOUS at 05:52

## 2020-08-23 RX ADMIN — LORAZEPAM 0.5 MG: 2 INJECTION INTRAMUSCULAR; INTRAVENOUS at 15:42

## 2020-08-23 RX ADMIN — LORAZEPAM 0.5 MG: 2 INJECTION INTRAMUSCULAR; INTRAVENOUS at 11:20

## 2020-08-23 NOTE — ASSESSMENT & PLAN NOTE
Presenting with acute hypoxia due to multifocal pneumonia viral/bacterial/atypical/COVID-19    Currently on 15 L mid flow and a non-rebreather  High suspicious for COVID-19 due to clinical picture and CT scan  Continue COVID-19 pathway  RVP negative  Family decided for comfort care, patient will be transferred to inpatient hospice today

## 2020-08-23 NOTE — ASSESSMENT & PLAN NOTE
Discussed with Nephrology, patient is not a candidate for hemodialysis and he refused hemodialysis 2 weeks ago, and his daughter confirmed that  Discussed with Oncology, the only way to find out what is the source of bony metastases is to do a bone biopsy  He would not be a candidate for any chemo  PSA ordered  Daughter would not like any invasive diagnostics  Discussed with infectious disease, due to low GFR he is not a candidate for Remdesivir and he is out of the window period  He is not a candidate for convalescent plasma because he tested negative for COVID-19  He is DNR DNI  Daughters met with hospice liaison today  For now plan is to discharge to inpatient hospice tomorrow  Patients daughter is coming to the hospital to discuss transition to comfort care

## 2020-08-23 NOTE — ASSESSMENT & PLAN NOTE
Recent hospital admission to [facility] from 7/21/20 to 8/7/20 for left foot osteomyelitis  Patient started on oral doxycycline for indefinite suppression  Wound care

## 2020-08-23 NOTE — ASSESSMENT & PLAN NOTE
Presenting with elevated BNP 41,086  Echo showed ejection fraction 33% normal diastolic function  Normal right ventricular size and function  Moderate pulmonary hypertension    Diuresed with 40 mg Lasix IV b i d 8/18 without improvement in respiratory status  Hold diuretics per nephro due to kidney function

## 2020-08-23 NOTE — ASSESSMENT & PLAN NOTE
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

## 2020-08-23 NOTE — ASSESSMENT & PLAN NOTE
Presenting after being found to be hypoxic at nursing home  Chest x-ray with multifocal bilateral consolidation and ground-glass opacity  Positive COVID exposure at nursing home, had 4 negative COVID-19 tests in the past 11 days  Even though Covid tests were negative there is a strong suspicious he has COVID and we are treating him as    Troponin elevated at 0 13  BNP elevated at 41,086  D-dimer elevated at 3 23-->3 78  Transaminitis- , ALT 83, alk-phos 251    Ferritin 332  Procalcitonin 1 09-->1 62 (with worsening kidney function too)    CT chest 8/19 showed ground-glass opacities throughout the lungs suspect bacterial, viral or atypical pneumonia or ARDS, PAP, no evidence of pulmonary edema  Small to moderate bilateral pleural effusions  Suspect COVID-19 infection, possible superimposed bacterial infection,   Started on COVID-19 pathway  Ceftriaxone/doxy--> cefepime/doxy 6/7  Vitamin D3, vitamin-C, zinc, atorvastatin  Dexamethasone 6 mg daily   Heparin drip  Discussed with infectious disease, he is not a candidate for Remdesivir due to kidney function and outside the window period    He is not a candidate for convalescent plasma since he is COVID-19 negative  Pulmonology on board, appreciate recommendations

## 2020-08-23 NOTE — ASSESSMENT & PLAN NOTE
SALO on CKD stage 4  Evaluated by Nephrology, appreciate recommendations  Received diuretics without improvement of kidney function  Received gentle hydration with some/minimal improvement of kidney function  Not a candidate for hemodialysis, patient refused dialysis 2 weeks ago which daughter confirmed

## 2020-08-23 NOTE — ASSESSMENT & PLAN NOTE
Presenting with elevated BNP 41,086, suspect CHF exacerbation  Echo showed ejection fraction 89% normal diastolic function  Normal right ventricular size and function  Moderate pulmonary hypertension    Diuresed with 40 mg Lasix IV b i d 8/18 without improvement in respiratory status  Hold diuretics per nephro due to kidney function

## 2020-08-23 NOTE — ASSESSMENT & PLAN NOTE
Presenting with acute hypoxia due to multifocal pneumonia viral/bacterial/atypical/COVID-19    Currently on 15 L mid flow and a  non-rebreather  Continue COVID-19 pathway  RVP negative  Continue supplemental oxygen maintain oxygenation greater than 88%

## 2020-08-23 NOTE — PLAN OF CARE
Problem: Potential for Falls  Goal: Patient will remain free of falls  Description: INTERVENTIONS:  - Assess patient frequently for physical needs  -  Identify cognitive and physical deficits and behaviors that affect risk of falls    -  [place] fall precautions as indicated by assessment   - Educate patient/family on patient safety including physical limitations  - Instruct patient to call for assistance with activity based on assessment  - Modify environment to reduce risk of injury  - Consider OT/PT consult to assist with strengthening/mobility  Outcome: Progressing     Problem: PAIN - ADULT  Goal: Verbalizes/displays adequate comfort level or baseline comfort level  Description: Interventions:  - Encourage patient to monitor pain and request assistance  - Assess pain using appropriate pain scale  - Administer analgesics based on type and severity of pain and evaluate response  - Implement non-pharmacological measures as appropriate and evaluate response  - Consider cultural and social influences on pain and pain management  - Notify physician/advanced practitioner if interventions unsuccessful or patient reports new pain  Outcome: Progressing     Problem: INFECTION - ADULT  Goal: Absence or prevention of progression during hospitalization  Description: INTERVENTIONS:  - Assess and monitor for signs and symptoms of infection  - Monitor lab/diagnostic results  - Monitor all insertion sites, i e  indwelling lines, tubes, and drains  - Monitor endotracheal if appropriate and nasal secretions for changes in amount and color  - [place] appropriate cooling/warming therapies per order  - Administer medications as ordered  - Instruct and encourage patient and family to use good hand hygiene technique  - Identify and instruct in appropriate isolation precautions for identified infection/condition  Outcome: Progressing     Problem: SAFETY ADULT  Goal: Patient will remain free of falls  Description: INTERVENTIONS:  - Assess patient frequently for physical needs  -  Identify cognitive and physical deficits and behaviors that affect risk of falls    -  [place] fall precautions as indicated by assessment   - Educate patient/family on patient safety including physical limitations  - Instruct patient to call for assistance with activity based on assessment  - Modify environment to reduce risk of injury  - Consider OT/PT consult to assist with strengthening/mobility  Outcome: Progressing  Goal: Maintain or return to baseline ADL function  Description: INTERVENTIONS:  -  Assess patient's ability to carry out ADLs; assess patient's baseline for ADL function and identify physical deficits which impact ability to perform ADLs (bathing, care of mouth/teeth, toileting, grooming, dressing, etc )  - Assess/evaluate cause of self-care deficits   - Assess range of motion  - Assess patient's mobility; develop plan if impaired  - Assess patient's need for assistive devices and provide as appropriate  - Encourage maximum independence but intervene and supervise when necessary  - Involve family in performance of ADLs  - Assess for home care needs following discharge   - Consider OT consult to assist with ADL evaluation and planning for discharge  - Provide patient education as appropriate  Outcome: Progressing  Goal: Maintain or return mobility status to optimal level  Description: INTERVENTIONS:  - Assess patient's baseline mobility status (ambulation, transfers, stairs, etc )    - Identify cognitive and physical deficits and behaviors that affect mobility  - Identify mobility aids required to assist with transfers and/or ambulation (gait belt, sit-to-stand, lift, walker, cane, etc )  - [place] fall precautions as indicated by assessment  - Record patient progress and toleration of activity level on Mobility SBAR; progress patient to next Phase/Stage  - Instruct patient to call for assistance with activity based on assessment  - Consider rehabilitation consult to assist with strengthening/weightbearing, etc   Outcome: Progressing     Problem: DISCHARGE PLANNING  Goal: Discharge to home or other facility with appropriate resources  Description: INTERVENTIONS:  - Identify barriers to discharge w/patient and caregiver  - Arrange for needed discharge resources and transportation as appropriate  - Identify discharge learning needs (meds, wound care, etc )  - Arrange for interpretive services to assist at discharge as needed  - Refer to Case Management Department for coordinating discharge planning if the patient needs post-hospital services based on physician/advanced practitioner order or complex needs related to functional status, cognitive ability, or social support system  Outcome: Progressing     Problem: Knowledge Deficit  Goal: Patient/family/caregiver demonstrates understanding of disease process, treatment plan, medications, and discharge instructions  Description: Complete learning assessment and assess knowledge base    Interventions:  - Provide teaching at level of understanding  - Provide teaching via preferred learning methods  Outcome: Progressing     Problem: Prexisting or High Potential for Compromised Skin Integrity  Goal: Skin integrity is maintained or improved  Description: INTERVENTIONS:  - Identify patients at risk for skin breakdown  - Assess and monitor skin integrity  - Assess and monitor nutrition and hydration status  - Monitor labs   - Assess for incontinence   - Turn and reposition patient  - Assist with mobility/ambulation  - Relieve pressure over bony prominences  - Avoid friction and shearing  - Provide appropriate hygiene as needed including keeping skin clean and dry  - Evaluate need for skin moisturizer/barrier cream  - Collaborate with interdisciplinary team   - Patient/family teaching  - Consider wound care consult   Outcome: Progressing     Problem: Nutrition/Hydration-ADULT  Goal: Nutrient/Hydration intake appropriate for improving, restoring or maintaining nutritional needs  Description: Monitor and assess patient's nutrition/hydration status for malnutrition  Collaborate with interdisciplinary team and initiate plan and interventions as ordered  Monitor patient's weight and dietary intake as ordered or per policy  Utilize nutrition screening tool and intervene as necessary  Determine patient's food preferences and provide high-protein, high-caloric foods as appropriate       INTERVENTIONS:  - Monitor oral intake, urinary output, labs, and treatment plans  - Assess nutrition and hydration status and recommend course of action  - Evaluate amount of meals eaten  - Assist patient with eating if necessary   - Allow adequate time for meals  - Recommend/ encourage appropriate diets, oral nutritional supplements, and vitamin/mineral supplements  - Order, calculate, and assess calorie counts as needed  - Recommend, monitor, and adjust tube feedings and TPN/PPN based on assessed needs  - Assess need for intravenous fluids  - Provide specific nutrition/hydration education as appropriate  - Include patient/family/caregiver in decisions related to nutrition  Outcome: Progressing

## 2020-08-23 NOTE — DISCHARGE SUMMARY
Discharge Summary - Armando Obrien 80 y o  male MRN: [id number]    Unit/Bed#: [address] [place] 222-01 Encounter: [id number]    Admission Date:   Admission Orders (From admission, onward)     Ordered        08/17/20 1531  Inpatient Admission  Once                     Admitting Diagnosis: Elevated troponin [R79 89]  SALO (acute kidney injury) ([address] ) [N17 9]  Respiratory failure with hypoxia ([address] ) [J96 91]  Shortness of breath [R06 02]  Multifocal pneumonia [J18 9]    HPI: Armando Obrien is a 80 y o  male with past medical history of chronic osteomyelitis, anemia, CKD stage 4, atrial fibrillation, peripheral arterial disease, type 2 diabetes, essential hypertension,      He [facility] with complaint of shortness of breath  Patient is a very poor historian and unable to provide details about his past medical history or current medical condition  While at [facility] he was noted to have hypoxia with O2 saturation dipping to high 80s to 90s  He is not normally on oxygen  Currently denies any chest pain, chest pressure  Patient became quite agitated upon asking questions    Procedures Performed: none    Summary of Hospital Course:  Patient was admitted with acute hypoxic respiratory failure, acute kidney injury on CKD stage 4  Patient tested negative for COVID-19 4 times in the past 11 days  He had a positive exposure to CIVID 19 at a rehab  CT of the chest showed multifocal pneumonia viral/bacterial/atypical/COVID-19 cannot be excluded  He was started on COVID-19 pathway with antibiotics, dexamethasone, heparin drip, viatmins  Patient is not a candidate for Remdesivir due to kidney function, not a candidate for plasma due to COVID-19 negative results  Evaluated by pulmonology  Patients condition continued to worsen despite all the measures taken  Patient presented with acute kidney injury on CKD stage 4  Patient refused hemodialysis 2 weeks ago while he was in the hospital for osteomyelitis  Daughter confirmed that he would not like hemodialysis  Unfortunately with IV diuresis and then IV fluids his kidney function did not improve  For possible acute on chronic diastolic heart failure he was initially diuresed without improvement in respiratory status  CT of chest abdomen pelvis showed diffuse sclerotic lesions throughout the skeleton suggesting osseous metastasis  Lesions are in the Thoracics, lumbar spine, pelvis, ribs, sternum, clavicle and shoulders  Suspect prostate cancer  Discussed with Oncology and patient's daughter, the only way to find out the primary source is do a bone biopsy which patient daughter did not want her father to have  During hospital stay patient received 1 unit of blood due to hemoglobin being 6 9  Suspect secondary to worsening kidney function  His hemoglobin remained stable throughout the hospital stay after the transfusion  Evaluated by palliative care and hospice  Daughters made a decision to transition patient to comfort care  Patient will be discharged to inpatient hospice today  Significant Findings, Care, Treatment and Services Provided:   Chest x-ray 08/17/2020  IMPRESSION:  Multifocal bilateral consolidation and groundglass opacity  The nodular areas are more suggestive of pneumonia than edema  CT chest abdomen pelvis 08/19/2020  IMPRESSION:  1  Patchy groundglass opacities throughout the lungs in a bronchovascular distribution associated intralobular septal thickening suggestive of bacterial, viral or atypical pneumonia or ARDS  Pulmonary hemorrhage or pulmonary alveolar proteinosis (PAP)   could also have this appearance  No evidence of pulmonary edema  2   Left upper lobe subpleural 7 mm pulmonary nodule, indeterminate  3   Small to moderate bilateral pleural effusions  4   Diffuse sclerotic lesions throughout the visualized axial and appendicular skeleton suggesting osseous metastases  5   Significantly enlarged prostate    No obstructive uropathy  6   Findings suggestive of constipation  Discharge Diagnosis:   Acute hypoxic respiratory failure secondary to multifocal pneumonia  Multifocal pneumonia, secondary to persumed COVID-19  Acute on chronic diastolic congestive heart failure  Acute kidney injury on CKD stage 4  Anion gap metabolic acidosis due to acute kidney injury on CKD stage 4  New diffuse osseous metastasis, unknown primary source  Atrial fibrillation  Hyponatremia  Osteomyelitis of the left foot  Peripheral artery disease  Type 2 diabetes  Essential hypertension    Resolved Problems  Date Reviewed: 8/22/2020    None          Condition at Discharge: poor         Discharge instructions/Information to patient and family:   See after visit summary for information provided to patient and family  Provisions for Follow-Up Care:  See after visit summary for information related to follow-up care and any pertinent home health orders  PCP: Bob Castellano MD    Disposition: IP  hospice    Planned Readmission: No      Discharge Statement   I spent 45 minutes discharging the patient  This time was spent on the day of discharge  I had direct contact with the patient on the day of discharge  Additional documentation is required if more than 30 minutes were spent on discharge  Discharge Medications:  See after visit summary for reconciled discharge medications provided to patient and family

## 2020-08-23 NOTE — ASSESSMENT & PLAN NOTE
Anticoagulated on Coumadin  Coumadin was held on admission     Patient on heparin drip for COVID pathway

## 2020-08-23 NOTE — ASSESSMENT & PLAN NOTE
Presenting after being found to be hypoxic at nursing home  Chest x-ray with multifocal bilateral consolidation and ground-glass opacity  Positive COVID exposure at nursing home, had 4 negative COVID-19 tests in the past 11 days  Even though Covid tests were negative there is a strong suspicious he has COVID and we are treating him as    Troponin elevated at 0 13  BNP elevated at 41,086  D-dimer elevated at 3 23-->3 78  Transaminitis- , ALT 83, alk-phos 251    Ferritin 332  Procalcitonin 1 09-->1 62 (with worsening kidney function too)    CT chest 8/19 showed ground-glass opacities throughout the lungs suspect bacterial, viral or atypical pneumonia or ARDS, PAP, no evidence of pulmonary edema  Small to moderate bilateral pleural effusions  Suspect COVID-19 infection, possible superimposed bacterial infection, continue COVID-19 pathway  Ceftriaxone/doxy--> cefepime/doxy 6/7  Vitamin D3, vitamin-C, zinc, atorvastatin  Dexamethasone 6 mg daily for 10 days  Continue heparin drip  Discussed with infectious disease, he is not a candidate for on the severe due to kidney function and outside the window period    He is not a candidate for convalescent plasma since he is COVID-19 negative  Pulmonology on board, appreciate recommendations

## 2020-08-23 NOTE — QUICK NOTE
Following patient for SALO on CKD IV  Patient is poor dialysis candidate due to multiple comorbidities  Hospice is following  Patient has been made comfort care  He is planning to be discharged to inpatient hospice tomorrow  Will sign off for renal     Thank you

## 2020-08-23 NOTE — PROGRESS NOTES
Progress Note - Aurelio Alarcon 6/28/1930, 80 y o  male MRN: [id number]    Unit[facility]Bed[facility] [address] [id number] Encounter: [id number]    Primary Care Provider: Adwoa Cerna MD   Date and time admitted to hospital: 8/17/2020  1:55 PM        Acute respiratory failure with hypoxia [facility])  Assessment & Plan  Presenting with acute hypoxia due to multifocal pneumonia viral/bacterial/atypical/COVID-19  Currently on 15 L mid flow and a  non-rebreather  Continue COVID-19 pathway  RVP negative  Continue supplemental oxygen maintain oxygenation greater than 88%    * Multifocal pneumonia  Assessment & Plan  Presenting after being found to be hypoxic at nursing home  Chest x-ray with multifocal bilateral consolidation and ground-glass opacity  Positive COVID exposure at nursing home, had 4 negative COVID-19 tests in the past 11 days  Even though Covid tests were negative there is a strong suspicious he has COVID and we are treating him as    Troponin elevated at 0 13  BNP elevated at 41,086  D-dimer elevated at 3 23-->3 78  Transaminitis- , ALT 83, alk-phos 251    Ferritin 332  Procalcitonin 1 09-->1 62 (with worsening kidney function too)    CT chest 8/19 showed ground-glass opacities throughout the lungs suspect bacterial, viral or atypical pneumonia or ARDS, PAP, no evidence of pulmonary edema  Small to moderate bilateral pleural effusions  Suspect COVID-19 infection, possible superimposed bacterial infection, continue COVID-19 pathway  Ceftriaxone/doxy--> cefepime/doxy 6/7  Vitamin D3, vitamin-C, zinc, atorvastatin  Dexamethasone 6 mg daily for 10 days  Continue heparin drip  Discussed with infectious disease, he is not a candidate for on the severe due to kidney function and outside the window period    He is not a candidate for convalescent plasma since he is COVID-19 negative  Pulmonology on board, appreciate recommendations      Acute on chronic diastolic congestive heart failure ([facility] [address] )  Assessment & Plan  Presenting with elevated BNP 41,086, suspect CHF exacerbation  Echo showed ejection fraction 63% normal diastolic function  Normal right ventricular size and function  Moderate pulmonary hypertension  Diuresed with 40 mg Lasix IV b i d 8/18 without improvement in respiratory status  Hold diuretics per nephro due to kidney function    SALO on CKD stage 4  Assessment & Plan  SALO on CKD stage 4  Evaluated by Nephrology, appreciate recommendations  Received diuretics without improvement of kidney function  Received gentle hydration with some/minimal improvement of kidney function  Not a candidate for hemodialysis, also daughter said he would not like hemodialysis    Bony metastasis ([facility] [address] )  Assessment & Plan  CT showed diffuse sclerotic lesions throughout the skeleton suggesting oasis metastasis  Diffuse sclerotic lesions throughout the thoracic, lumbar spine, pelvis, ribs, sternum, clavicles and shoulders  ?  Prostate cancer, patient with intermittent hematuria in the past   Refused cystoscopy last year  Will check PSA  Discussed with Oncology, family would like to know the source of the cancer  The only way to find out the source if to do a bone biopsy  Daughter would not like him to have a bone biopsy  Acidosis  Assessment & Plan  Due to worsening kidney function  On sodium bicarb tabs 1300 mg t i d       Goals of care, counseling/discussion  Assessment & Plan  Discussed with Nephrology, patient is not a candidate for hemodialysis and he refused hemodialysis 2 weeks ago, and his daughter confirmed that  Discussed with Oncology, the only way to find out what is the source of bony metastases is to do a bone biopsy  He would not be a candidate for any chemo  PSA ordered  Daughter would not like any invasive diagnostics  Discussed with infectious disease, due to low GFR he is not a candidate for Remdesivir and he is out of the window period    He is not a candidate for convalescent plasma because he tested negative for COVID-19  He is DNR DNI  Daughters met with hospice liaison today  For now plan is to discharge to inpatient hospice tomorrow  Patients daughter is coming to the hospital to discuss transition to comfort care  Osteomyelitis of foot [facility])  Assessment & Plan  Recent hospital admission to [facility] from 7/21/20 to 8/7/20 for left foot osteomyelitis  Patient started on oral doxycycline for indefinite suppression  Wound care    Hyponatremia  Assessment & Plan  Mild hyponatremia, likely dilutional    Atrial fibrillation   Assessment & Plan  Anticoagulated on Coumadin  Coumadin was held on admission  Patient on heparin drip for COVID pathway      Peripheral arterial disease [facility])  Assessment & Plan  Evaluated at [facility]  During recent hospitalization end of July 2020  Left lower extremity angiogram with completely occluded above the knee to below the knee bypass  Likely chronically occluded  Not a candidate for 3rd time redo bypass due to age, comorbidities, and patient wishes  Patient at that time did not want any further amputation or interventions  Maintained on Coumadin, Plavix, statin  Anemia  Assessment & Plan  No signs of active bleeding, CT chest abdomen pelvis negative for any source of bleeding  Received 1 unit of blood on 08/19 due to hemoglobin being 6 9  Hemoglobin today 8 3 today  Questionable due to worsening kidney function    Transaminitis  Assessment & Plan  With transaminitis- , ALT 83, alk phos 251  Will continue to trend  Essential hypertension  Assessment & Plan  Home regimen amlodipine 5 mg daily  Continue here  Diabetes mellitus type 2  Assessment & Plan  Lab Results   Component Value Date    HGBA1C 7 1 (H) 07/22/2020   Hold oral antihyperglycemics-glipizide  Placed on insulin sliding scale          VTE Pharmacologic Prophylaxis:   Pharmacologic: Heparin Drip  Mechanical VTE Prophylaxis in Place: Yes    Patient Centered Rounds: I have performed bedside rounds with nursing staff today  Discussions with Specialists or Other Care Team Provider:  Nurses    Education and Discussions with Family / Patient:  Daughter over the phone and patient    Time Spent for Care: 45 minutes  More than 50% of total time spent on counseling and coordination of care as described above  Current Length of Stay: 5 day(s)    Current Patient Status: Inpatient   Certification Statement: The patient will continue to require additional inpatient hospital stay due to Above    Discharge Plan:  Most likely discharge tomorrow to inpatient hospice    Code Status: Level 3 - DNAR and DNI      Subjective:   Patient was seen and evaluated bedside, in mild respiratory distress  He is on 15 L mid flow and non-rebreather  He started to hallucinate  Objective:     Vitals:   Temp (24hrs), Av 2 °F (36 2 °C), Min:97 1 °F (36 2 °C), Max:97 3 °F (36 3 °C)    Temp:  [97 1 °F (36 2 °C)-97 3 °F (36 3 °C)] 97 1 °F (36 2 °C)  HR:  [37-87] 87  Resp:  [20-28] 28  BP: (129-131)/(63-65) 131/63  SpO2:  [78 %-95 %] 78 %  Body mass index is 30 02 kg/m²  Input and Output Summary (last 24 hours): Intake/Output Summary (Last 24 hours) at 2020  Last data filed at 2020 0501  Gross per 24 hour   Intake 100 ml   Output 600 ml   Net -500 ml       Physical Exam:     Physical Exam  Constitutional:       Appearance: He is ill-appearing  Comments: Mild respiratory distress   HENT:      Head: Atraumatic  Neck:      Musculoskeletal: Neck supple  Cardiovascular:      Rate and Rhythm: Normal rate and regular rhythm  Heart sounds: No murmur  No friction rub  No gallop  Pulmonary:      Comments: On 15 L mid flow and non-rebreather  Decreased breath sounds bilaterally, mild respiratory distress  Abdominal:      General: Bowel sounds are normal  There is no distension  Palpations: Abdomen is soft  Tenderness: There is no abdominal tenderness     Skin: General: Skin is warm and dry  Neurological:      General: No focal deficit present  Mental Status: He is alert  Psychiatric:      Comments: Intermittently confused, and hallucinating intermittently       Additional Data:     Labs:    Results from last 7 days   Lab Units 08/22/20  0250   WBC Thousand/uL 15 48*   HEMOGLOBIN g/dL 8 3*   HEMATOCRIT % 26 8*   PLATELETS Thousands/uL 382   NEUTROS PCT % 89*   LYMPHS PCT % 4*   MONOS PCT % 6   EOS PCT % 0     Results from last 7 days   Lab Units 08/22/20  0326   SODIUM mmol/L 135*   POTASSIUM mmol/L 5 0   CHLORIDE mmol/L 102   CO2 mmol/L 17*   BUN mg/dL 132*   CREATININE mg/dL 3 54*   ANION GAP mmol/L 16*   CALCIUM mg/dL 7 8*   ALBUMIN g/dL 1 9*   TOTAL BILIRUBIN mg/dL 0 33   ALK PHOS U/L 211*   ALT U/L 33   AST U/L 22   GLUCOSE RANDOM mg/dL 139     Results from last 7 days   Lab Units 08/21/20  0124   INR  1 90*     Results from last 7 days   Lab Units 08/22/20  1525 08/22/20  1115 08/22/20  0805 08/21/20  2016 08/21/20  1647 08/21/20  1126 08/21/20  0814 08/20/20  2123 08/20/20  1521 08/20/20  1121 08/20/20  0736 08/19/20  2108   POC GLUCOSE mg/dl 255* 251* 200* 167* 202* 258* 309* 336* 330* 371* 223* 283*         Results from last 7 days   Lab Units 08/21/20  0116 08/19/20  0559 08/18/20  1339 08/17/20  1633 08/17/20  1418 08/17/20  1417   LACTIC ACID mmol/L 1 8  --   --  1 6  --  2 9*   PROCALCITONIN ng/ml  --  1 48* 1 62*  --  1 09*  --            * I Have Reviewed All Lab Data Listed Above  * Additional Pertinent Lab Tests Reviewed: [address] Admission Reviewed    Imaging:    Imaging Reports Reviewed Today Include: all  Imaging Personally Reviewed by Myself Includes:      Recent Cultures (last 7 days):     Results from last 7 days   Lab Units 08/18/20  0621 08/17/20  1418   BLOOD CULTURE   --  No Growth After 5 Days  No Growth After 5 Days     LEGIONELLA URINARY ANTIGEN  Negative  --        Last 24 Hours Medication List:   Current Facility-Administered Medications   Medication Dose Route Frequency Provider Last Rate    acetaminophen  650 mg Oral Q6H PRN Donemariangel Sanchez PA-C      amLODIPine  5 mg Oral Daily Lizz Beltre DO      ascorbic acid  1,000 mg Oral Q12H [address] Malena Ramirez PA-C      atorvastatin  40 mg Oral HS Donemariangel Sanchez PA-C      calcitriol  0 25 mcg Oral Once per day on Mon Wed Fri Everett Sanchez PA-C      cefepime  1,000 mg Intravenous Q12H Jonn Bryant MD 1,000 mg (08/22/20 1220)    cholecalciferol  2,000 Units Oral Daily Malena Ramirez PA-C      dexamethasone  6 mg Intravenous Q24H Donel JYOTI Sanchez      doxycycline hyclate  100 mg Oral Q12H [address] Malena Ramirez PA-C      finasteride  5 mg Oral Daily Donel JYOTI Sanchez      FLUoxetine  40 mg Oral Daily Donel JYOTI Sanchez      guaiFENesin  600 mg Oral Q12H [address] Malena Ramirez PA-C      heparin (porcine)  3-30 Units/kg/hr (Order-Specific) Intravenous Titrated Shama Wolfe PA-C 10 Units/kg/hr (08/22/20 1941)    HYDROmorphone  0 2 mg Intravenous Q2H PRN Ora Jean MD      insulin lispro  1-6 Units Subcutaneous TID AC Malena Ramirez PA-C      insulin lispro  1-6 Units Subcutaneous HS Malena Ramirez PA-C      LORazepam  0 5 mg Intravenous Q2H PRN Ora Jean MD      memantine  5 mg Oral BID Everett Sanchez PA-C      zinc sulfate  220 mg Oral Daily Malena Ramirez PA-C      Followed by   Linden Unger ON 8/25/2020] multivitamin-minerals  1 tablet Oral Daily Malena Ramirez PA-C      ondansetron  4 mg Intravenous Q6H PRN Everett Sanchez PA-C      primidone  50 mg Oral Daily Donemariangel Sanchez PA-C      sevelamer  800 mg Oral TID With Meals COLLEEN Siddiqui      sodium bicarbonate  1,300 mg Oral TID after meals COLLEEN Aguayo      tamsulosin  0 4 mg Oral Daily With [email or web address]          Today, Patient Was Seen By: Jonn Bryant MD    ** Please Note: Dictation voice to text software may have been used in the creation of this document   **

## 2020-08-23 NOTE — PROGRESS NOTES
Patients daughter Joyce Kim came to the hospital   Saw her father    Patient transitioned to comfort care  Plan to discharge to inpatient hospice tomorrow

## 2020-08-23 NOTE — SOCIAL WORK
Notified by attending pt's daughter in last evening to see pt and decision was made to make comfort [facility] reached out to w/c Hospice Liaison to inform her of same- will need paperwork completed- once obtained will arrange transport

## 2020-08-23 NOTE — ASSESSMENT & PLAN NOTE
No signs of active bleeding, CT chest abdomen pelvis negative for any source of bleeding  Received 1 unit of blood on 08/19 due to hemoglobin being 6 9  Hemoglobin today 8 3 today  Questionable due to worsening kidney function

## 2020-08-23 NOTE — SOCIAL WORK
CM spoke with Hospice Liaison, Rosamaria Dubois who stated pt's daughter meeting Hospice SW at hospice house with pt approved to come this day  [facility] DNR form to be faxed to this CM for physician signature  [facility] to transport pt at 4pm   CMN completed  Attending/RN made aware of above  No further d/c needs identified  no

## 2020-08-23 NOTE — ASSESSMENT & PLAN NOTE
CT showed diffuse sclerotic lesions throughout the skeleton suggesting oasis metastasis  Diffuse sclerotic lesions throughout the thoracic, lumbar spine, pelvis, ribs, sternum, clavicles and shoulders  ?  Prostate cancer, patient with intermittent hematuria in the past   Refused cystoscopy last year  Will check PSA  Discussed with Oncology, family would like to know the source of the cancer  The only way to find out the source if to do a bone biopsy  Daughter would not like him to have a bone biopsy

## 2020-08-23 NOTE — ASSESSMENT & PLAN NOTE
CT showed diffuse sclerotic lesions throughout the skeleton suggesting osseous metastasis  Diffuse sclerotic lesions throughout the thoracic, lumbar spine, pelvis, ribs, sternum, clavicles and shoulders  ?  Prostate cancer, patient with intermittent hematuria in the past   Refused cystoscopy last year  PSA pending  Discussed with Oncology, family would like to know the source of the cancer  The only way to find out the source is to do a bone biopsy  Daughter would not like him to have a bone biopsy

## 2020-08-24 ENCOUNTER — PATIENT OUTREACH (OUTPATIENT)
Dept: CASE MANAGEMENT | Facility: OTHER | Age: 85
End: 2020-08-24

## 2020-08-24 ENCOUNTER — EPISODE CHANGES (OUTPATIENT)
Dept: CASE MANAGEMENT | Facility: HOSPITAL | Age: 85
End: 2020-08-24

## 2020-08-24 ENCOUNTER — TELEPHONE (OUTPATIENT)
Dept: NEPHROLOGY | Facility: CLINIC | Age: 85
End: 2020-08-24

## 2020-08-24 ENCOUNTER — EPISODE CHANGES (OUTPATIENT)
Dept: CASE MANAGEMENT | Facility: OTHER | Age: 85
End: 2020-08-24

## 2020-08-24 NOTE — TELEPHONE ENCOUNTER
----- Message from Rodriguez Vang MD sent at 8/24/2020  2:55 PM EDT -----  Do you have the daughters phone number?

## 2020-08-24 NOTE — PROGRESS NOTES
Upon entering patient's medical record for chart review, CM was prompted with  notification  Inbasket notification received for new bundle patient at [facility]  BPCI form updated, care coordination note removed and bundle episode resolved  CM removed self from care team and sent Inbasket message to Transitional Care Specialist regarding bundle closure

## 2020-08-25 LAB
PSA FREE MFR SERPL: 4.8 %
PSA FREE SERPL-MCNC: 42.3 NG/ML
PSA SERPL-MCNC: 883 NG/ML (ref 0–4)

## 2020-09-10 NOTE — PROGRESS NOTES
NEPHROLOGY PROGRESS NOTE    Dameon Rosado 80 y o  male MRN: [id number]  Unit/Bed#:  Encounter: [id number]  Reason for Consult:  Chronic kidney disease    The patient is here for routine follow-up he is doing very well but recently did bump his toe the wound is healed now  Other than that he has no complaints  He is tolerating his medications and has had no other intercurrent illnesses  ASSESSMENT/PLAN:  1  Renal    Patient's chronic kidney disease and his creatinine team to increased significantly after he underwent some vascular procedures  I do not know if this occurred subacutely due to atheroemboli or some other factors but it appears now his baseline creatinine is in the mid 3 range  It remained relatively stable he has no symptoms of uremia or volume overload  Hyperkalemia is under good control on Kayexalate which she takes a couple times a week  He also has dietary discretion on potassium foods  Will continue to monitor with labs and blood pressure control  SUBJECTIVE:  Review of Systems   Constitution: Negative for chills and fever  HENT: Negative  Eyes: Negative  Cardiovascular: Negative  Negative for chest pain, dyspnea on exertion and orthopnea  Respiratory: Negative for cough and shortness of breath  Gastrointestinal: Negative  Genitourinary: Negative  OBJECTIVE:  Current Weight: Weight - Scale: 90 7 kg (200 lb)  [email or web address]:     Blood pressure 140/68, pulse 80, height 5' 11" (1 803 m), weight 90 7 kg (200 lb)  , Body mass index is 27 89 kg/m²  [unfilled]    Physical Exam: /68 (BP Location: Left arm, Patient Position: Sitting, Cuff Size: Standard)   Pulse 80   Ht 5' 11" (1 803 m)   Wt 90 7 kg (200 lb)   BMI 27 89 kg/m²   Physical Exam   Constitutional: No distress  HENT:   Mouth/Throat: No oropharyngeal exudate  Eyes: No scleral icterus  Neck: Neck supple  No JVD present  Cardiovascular: Normal rate  Exam reveals no friction rub  Pulmonary/Chest: Effort normal and breath sounds normal  No respiratory distress  He has no wheezes  He has no rales  Abdominal: Soft  Bowel sounds are normal  He exhibits no distension  There is no tenderness  There is no rebound         Medications:    Current Outpatient Prescriptions:     amLODIPine (NORVASC) 5 mg tablet, Take 1 tablet by mouth daily, Disp: , Rfl:     atorvastatin (LIPITOR) 10 mg tablet, Take 1 tablet by mouth daily with dinner, Disp: , Rfl: 0    Cholecalciferol (VITAMIN D) 2000 units CAPS, Take 2,000 Units by mouth once a week tuesdays , Disp: , Rfl:     clopidogrel (PLAVIX) 75 mg tablet, Take 1 tablet by mouth daily, Disp: , Rfl: 0    furosemide (LASIX) 40 mg tablet, Take 40 mg by mouth daily, Disp: , Rfl:     glipiZIDE (GLUCOTROL XL) 2 5 mg 24 hr tablet, Take 1 tablet by mouth daily, Disp: , Rfl:     Memantine HCl ER (NAMENDA XR) 14 MG CP24, Take 1 capsule by mouth daily, Disp: , Rfl:     sertraline (ZOLOFT) 100 mg tablet, Take 100 mg by mouth daily  , Disp: , Rfl:     sodium bicarbonate 650 mg tablet, Take 1 tablet by mouth 2 (two) times daily after meals, Disp: 60 tablet, Rfl: 0    tamsulosin (FLOMAX) 0 4 mg, Take 0 4 mg by mouth daily with dinner, Disp: , Rfl:     warfarin (COUMADIN) 5 mg tablet, Take 1 tablet by mouth daily, Disp: , Rfl: 0    metoprolol succinate (TOPROL-XL) 25 mg 24 hr tablet, Take 1 tablet by mouth daily, Disp: , Rfl: 0    NIFEdipine (ADALAT CC) 30 MG 24 hr tablet, Take 1 tablet by mouth daily, Disp: 30 tablet, Rfl: 0    primidone (MYSOLINE) 50 mg tablet, Take 50 mg by mouth daily, Disp: , Rfl:     Laboratory Results:  Lab Results   Component Value Date    WBC 7 13 09/18/2017    HGB 7 8 (L) 09/18/2017    HCT 24 2 (L) 09/18/2017    MCV 94 09/18/2017     09/18/2017     Lab Results   Component Value Date    GLUCOSE 183 (H) 01/02/2018    CALCIUM 7 8 (L) 02/26/2018     02/26/2018    K 4 9 02/26/2018    CO2 24 02/26/2018     (H) 02/26/2018 BUN 45 (H) 02/26/2018    CREATININE 3 34 (H) 02/26/2018     Lab Results   Component Value Date    CALCIUM 7 8 (L) 02/26/2018    PHOS 3 2 08/29/2017     No results found for: LABPROT med eval

## 2020-09-21 RX ORDER — FLUOXETINE HYDROCHLORIDE 40 MG/1
CAPSULE ORAL
Qty: 90 CAPSULE | Refills: 1 | OUTPATIENT
Start: 2020-09-21

## 2021-02-24 NOTE — CONSULTS
Assumed care of pt. 0750-VSS. Assessment completed. Denies needs. 0825-ambulated to nicu to see infant. 0920-ambulated back to room. 0928-scheduled meds given by DARBY Conklin RN.  0950-awake in bed. Denies needs. 1100-ambulated to bathroom. Voided 600 ml without diff. lakia-care instructions reviewed with pt who verbalized understanding and completed. 1230-ambulated to bathroom. Voided 500 ml without diff. Denies needs. 1410-breast feeding. Denies needs. 1505-VSS. Reassessment completed. Advised needs to take dressing off today. PT will call when ready to shower to wrap IV site. 1630-asleep in bed. NAD. 1730-awake in bed. Denies needs. 1800-IV site wrapped for shower. 1925-Bedside and Verbal shift change report given to JULIA Osuna RN  (oncoming nurse) by MUNA Pineda LPN (offgoing nurse). Report given with SBAR, Kardex, Intake/Output, MAR and Recent Results. Reason For Visit  Your here for follow-up to monitor your chronic kidney disease        History of Present Illness  The patient is here for follow-up of chronic kidney disease  He is here any stable clinically he has had no intercurrent illnesses or hospitalizations  He has been taking his Sodium polystyramine with no difficulty  No intercurrent illnesses or hospitalizations  Review of Systems    Constitutional: no fever, no chills and no anorexia  Integumentary: no rashes  Gastrointestinal: no abdominal pain, no nausea, no diarrhea and no vomiting  Respiratory: no shortness of breath and no cough  Cardiovascular: no orthopnea, no chest pain, no palpitations and no lower extremity edema  Musculoskeletal: Some left-sided shoulder pain  Neurological: no headache, no lightheadedness and no dizziness  Genitourinary: nocturia, but no dysuria, no hematuria and no incomplete emptying of bladder  Eyes: No complaints of eyesight problems or dryness of eyes  ENT: no complaints of hearing loss, no nasal discharge  Current Meds   1  AmLODIPine Besylate 5 MG Oral Tablet; Take 1 tablet daily; Therapy: (Recorded:[id number]) to Recorded   2  Atorvastatin Calcium 10 MG Oral Tablet; Take 1 tablet daily; Therapy: (Recorded:[id number]) to Recorded   3  Clopidogrel Bisulfate 75 MG Oral Tablet; TAKE 1 TABLET DAILY; Therapy: (Recorded:[id number]) to Recorded   4  Furosemide 40 MG Oral Tablet; TAKE 1 TABLET DAILY; Therapy: (Recorded:[id number]) to Recorded   5  GlipiZIDE ER 2 5 MG Oral Tablet Extended Release 24 Hour; TAKE 1 TABLET DAILY AS   DIRECTED; Therapy: (Recorded:52Vdv1588) to Recorded   6  Namenda XR 14 MG Oral Capsule Extended Release 24 Hour; take 1 capsule daily; Therapy: (Recorded:04Qce0279) to Recorded   7  Sertraline HCl - 50 MG Oral Tablet; TAKE 1 TABLET DAILY; Therapy: (Recorded:[id number]) to Recorded   8   Sodium Bicarbonate 650 MG Oral Tablet; take one tablet by mouth every day;   Therapy: (Recorded:19Sep2017) to Recorded   9  Sodium Polystyrene Sulfonate 15 GM/60ML Oral Suspension; TAKE 15 GRAMS (60 MLS)   BY MOUTH ON MONDAY,WEDNESDAY AND FRIDAY; Therapy: 19Sep2017 to (Evaluate:18Mar2018)  Requested for: 19Sep2017; Last   Rx:19Sep2017 Ordered   10  Tamsulosin HCl - 0 4 MG Oral Capsule; TAKE ONE CAPSULE EVERY DAY; Therapy: (Recorded:18Jul2017) to Recorded   11  Tylenol TABS; TAKE 1 TO 2 TABLETS EVERY 6 HOURS AS NEEDED; Therapy: (Recorded:14Nov2017) to Recorded   12  Vitamin D (Ergocalciferol) CAPS; TAKE ONE CAPSULE BY MOUTH WEEKLY; Therapy: (Recorded:14Nov2017) to Recorded   13  Warfarin Sodium 4 MG Oral Tablet; TAKE 1 TABLET EVERY OTHER DAY; Therapy: (Recorded:14Nov2017) to Recorded   14  Warfarin Sodium 5 MG Oral Tablet; TAKE 1 TABLET DAILY OTHER DAY; Therapy: (Recorded:14Nov2017) to Recorded    The medication list was reviewed and updated today  Allergies    1  Unasyn SOLR    Vitals   Recorded: [id number] 02:54PM Recorded: [id number] 02:28PM   Heart Rate 78    Respiration 18    Systolic 714    Diastolic 68    Height  5 ft 11 in   Weight  213 lb    BMI Calculated  29 71   BSA Calculated  2 17     Physical Exam    Constitutional: General appearance: No acute distress, well appearing and well nourished  ENT: External ears and nose appear normal      Eyes: Anicteric sclerae  JVD:  No JVD present  Pulmonary: Respiratory effort: No increased work of breathing or signs of respiratory distress  Auscultation of lungs: Clear to auscultation  Cardiovascular: 2/6 systolic murmur regular rhythm  Abdomen: Non-tender, no masses  Extremities: No cyanosis, clubbing or edema     Neurologic: Non Focal         Results/Data  (1) BASIC METABOLIC PROFILE [id number] 01:19PM Eder Aragon Order Number: [id number]     Test Name Result Flag Reference   SODIUM 141 mmol/L  136-145   POTASSIUM 4 9 mmol/L  3 5-5 3   CHLORIDE 110 mmol/L H 100-108   CARBON DIOXIDE 22 mmol/L  21-32   ANION GAP (CALC) 9 mmol/L  4-13   BLOOD UREA NITROGEN 41 mg/dL H 5-25   CREATININE 3 23 mg/dL H 0 60-1 30   Standardized to IDMS reference method   CALCIUM 8 1 mg/dL L 8 3-10 1   eGFR 16 ml/min/1 73sq m     [facility] Disease Education Program recommendations are as follows:  GFR calculation is accurate only with a steady state creatinine  Chronic Kidney disease less than 60 ml/min/1 73 sq  meters  Kidney failure less than 15 ml/min/1 73 sq  meters  GLUCOSE FASTING 198 mg/dL H 65-99   Specimen collection should occur prior to Sulfasalazine administration due to the potential for falsely depressed results  Specimen collection should occur prior to Sulfapyridine administration due to the potential for falsely elevated results  Assessment    1  Chronic kidney disease, stage 3 (585 3) (N18 3)   2  Hypertension (401 9) (I10)    Plan  Chronic kidney disease, stage 3    · Sodium Bicarbonate 650 MG Oral Tablet; TAKE 1 TABLET TWICE DAILY WITH  MEALS   Rx By: webtide; Dispense: 90 Days ; #:180 Tablet; Refill: 3; For: Chronic kidney disease, stage 3; GARY = N; Sent To: [facility][id number]  · (1) BASIC METABOLIC PROFILE; Status:Active; Requested DEV:[id number]; Perform:[facility]; XIM:[id number]; For:Chronic kidney disease, stage 3; Ordered By:Milton Simmons;   · (1) BASIC METABOLIC PROFILE; Status:Active; Requested NMB:[id number]; Perform:[facility]; [id number]; For:Chronic kidney disease, stage 3; Ordered By:Milton Simmons;  Hyperkalemia    · Sodium Polystyrene Sulfonate 15 GM/60ML Oral Suspension; TAKE 15 GRAMS  (60 MLS) BY MOUTH ON MONDAY,WEDNESDAY AND FRIDAY   Rx By: webtide; Dispense: 30 Days ; #:1 X 500 ML Bottle; Refill: 5; For: Hyperkalemia; GARY = N; Sent To: [facility] #[id number]    your creatinine level was stable around 3 which has been year baseline for about a year and a half so there has been no worsening over that period time  You feel well have no other symptoms  Your blood pressure was borderline but at home it has been better and other checks around 140 so will not going to change any medications  Continue to monitor continue with wound care continue current medications no changes  Your potassium level is also in the normal range  Discussion/Summary   patient's chronic kidney disease baseline creatinine has been around 3 this level was latest at 3 2  Of the LEs urine have not been relatively stable since the rapid increase  We do not know definitively what happened but he did have some vascular procedures done at that time so there may been atheroemboli sub clinically  For now he did not have a kidney biopsy I would not recommend as I do not see anything that would really change in management  Will continue to monitor  His blood pressure is elevated today in the office but his daughter states that it usually is little bit better in the 140 range will continue current medications and follow  I instructed them to call me if there is any problems or concerns  With respect hyperkalemia this is improved as with serial monitoring it has been in the 4s so he can continue with his binder as prescribed  Future Appointments    Signatures   Electronically signed by : HERVE Sifuentes ; Nov 14 2017  2:57PM EST                       (Author)    Electronically signed by :  HERVE Sifuentes ; Nov 14 2017  2:58PM EST                       (Author)

## 2021-04-21 NOTE — PROGRESS NOTES
General Surgery: Consult Note      PATIENT NAME: Emi Daniels   YOB: 1953    ADMISSION DATE: 4/21/2021 11:00 AM     TODAY'S DATE: 4/21/2021    Chief Complaint: Chest pain    HISTORY OF PRESENT ILLNESS:  The patient is a 76 y.o. female medical history including heart failure, COPD, hepatitis who presented to the ED due to concerns for chest pain. 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 Lesion was noted to be near the pancreatic body during the ultrasound. CT imaging today also shows the enhancing mass near the pancreatic head and difficult to distinguish if it originates in the stomach or pancreas. The stomach and portions of the small bowel are dilated and fluid-filled. Patient was instructed to follow-up with GI and surgery, however, has not to these appointments yet. Patient endorses difficulty with PO intake stating she gets nauseous when she eats. Recently, she endorses decreased frequency of bowel movements. She states that her last movement was yesterday however is very watery in nature. States that she often gets emesis when she tries to eat. Endorses weight loss, however, is unable to discern how much or over what period of time. On exam, she is afebrile and hemodynamically stable. Nontoxic in appearance. Abdomen is soft and not significantly distended. She does have some tenderness in the right lower quadrant without guarding or peritoneal signs. Plan for admission to medicine team for further work-up as well as CRYSTAL.     Past Medical History:        Diagnosis Date    Appendicitis 3/27/2017    Cerebral artery occlusion with cerebral infarction [facility])     CHF (congestive heart failure) (HCC)     Chronic respiratory failure with hypoxia and hypercapnia (HCC)     Chronic rhinitis     Cigarette smoker     [facility] 8/5/2018    Depression     Dysphagia     Progress Note - Junior Plaster 6/28/1930, 80 y o  male MRN: [id number]    Unit/Bed#: [facility] [address] 222-01 Encounter: [id number]    Primary Care Provider: Bob Castellano MD   Date and time admitted to hospital: 8/17/2020  1:55 PM        Acute respiratory failure with hypoxia [facility])  Assessment & Plan  Presenting with acute hypoxia  Required 6 L of oxygen admission, shortly after placed on mid flow, currently down to 6 L nasal cannula  Started on COVID-19 pathway  RVP negative  Continue supplemental oxygen maintain oxygenation greater than 88%    * Multifocal pneumonia  Assessment & Plan  Presenting after being found to be hypoxic at nursing home  Chest x-ray with multifocal bilateral consolidation and ground-glass opacity  Positive COVID exposure at nursing home, had 4 negative COVID-19 tests in the past 11 days    Troponin elevated at 0 13  BNP elevated at 41,086  D-dimer elevated at 3 23-->3 78  Transaminitis- , ALT 83, alk-phos 251    Ferritin 332  Procalcitonin 1 09-->1 62 (with worsening kidney function too)    CT chest showed ground-glass opacities throughout the lungs suspect bacterial, viral or atypical pneumonia or ARDS, PA P, no evidence of pulmonary edema  Small to moderate bilateral pleural effusions  Suspect COVID-19 infection, possible superimposed bacterial infection, continue COVID-19 pathway  Ceftriaxone/doxy  Vitamin D3, vitamin-C, zinc, atorvastatin  Dexamethasone 6 mg daily for 10 days  Started on anticoagulation-intermediate intensity with heparin drip , blood daughter due to hemoglobin dropped requiring transfusion  Pulmonology on board, appreciate recommendations      Acute on chronic diastolic congestive heart failure [facility])  Assessment & Plan  Presenting with elevated BNP 41,086, suspect CHF exacerbation  Echo showed ejection fraction 29% normal diastolic function  Normal right ventricular size and function  Moderate pulmonary hypertension    Diuresed with 40 mg Lasix Essential hypertension 2018    GERD (gastroesophageal reflux disease)     Heart failure, diastolic, with acute decompensation ([facility] 2018    Hepatitis C, chronic (HCC)     History of smoking at least 1 pack per day for at least 30 years     Hyperlipidemia     Hypertension     Migraine     Moderate COPD (chronic obstructive pulmonary disease) (HCC)     Multiple transfusions     Neurogenic dysphagia     Osteoarthritis     hands    Pneumonia     Scoliosis     Substance abuse ([facility]     ivda and cocaine abuse in past       Past Surgical History:        Procedure Laterality Date    APPENDECTOMY  2017     SECTION      GASTRIC BYPASS SURGERY      HIP SURGERY Right 2019    HIP TOTAL ARTHROPLASTY ANTERIOR APPROACH - [facility],  C-ARM, (Right )    LAPAROSCOPIC APPENDECTOMY N/A 3/27/2017    APPENDECTOMY LAPAROSCOPIC performed by Kandice Giles MD at [address] [facility]      hgsil    MEDICATION INJECTION Left 3/25/2021    STEROID INJECTION LEFT HIP  WITH FLUORO performed by Iker Ragsdale MD at [address] [facility] N/A 11/10/2018    COLONOSCOPY WITH BIOPSY performed by Yuliana Gunderson MD at [facility]    SC EGD TRANSORAL BIOPSY SINGLE/MULTIPLE N/A 2018    EGD ESOPHAGOGASTRODUODENOSCOPY performed by Carito Montiel MD at [address] [facility] Right 2019    HIP TOTAL ARTHROPLASTY ANTERIOR APPROACH - [facility],  C-ARM, performed by Angela Larios DO at [address]      oesophagitis, candidiasis    UPPER GASTROINTESTINAL ENDOSCOPY  2018    UPPER GASTROINTESTINAL ENDOSCOPY N/A 2018    EGD CONTROL HEMORRHAGE performed by Carito Montiel MD at [facility] N/A 2021    EGD W/EUS FNA performed by Rita Chavez MD at [facility]       Medications Prior to Admission:   Not in a hospital admission.     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  Prostate cancer, patient with intermittent hematuria in the past   Refused cystoscopy last year  Oncology consult  Patient already established care with palliative care as outpatient  Acidosis  Assessment & Plan  Due to worsening kidney function  On sodium bicarb tabs 1300 mg t i d   Lactic acid pending    Osteomyelitis of foot ([facility] )  Assessment & Plan  Recent hospital admission to [facility] from 7/21/20 to 8/7/20 for left foot osteomyelitis  Patient started on oral doxycycline for indefinite suppression  Wound care    Hyponatremia  Assessment & Plan  Mild hyponatremia, likely dilutional    Atrial fibrillation   Assessment & Plan  Anticoagulated on Coumadin  Coumadin was held on admission  Patient was started on heparin drip for COVID pathway  Hold heparin drip now, due to anemia requiring transfusion    Peripheral arterial disease [facility])  Assessment & Plan  Evaluated at [facility]  During recent hospitalization end of July 2020  Left lower extremity angiogram with completely occluded above the knee to below the knee bypass  Likely chronically occluded  Not a candidate for 3rd time redo bypass due to age, comorbidities, and patient wishes  Patient at that time did not want any further amputation or interventions  Maintained on Coumadin, Plavix, statin  Anemia  Assessment & Plan  No signs of active bleeding, CT chest abdomen pelvis negative for any source of bleeding  Hemoglobin on admission received 1 unit of blood yesterday due to hemoglobin being 6 9  Hemoglobin today 8 5  Questionable worsening kidney function, fecal occult blood pending    Transaminitis  Assessment & Plan  With transaminitis- , ALT 83, alk phos 251  Will continue to trend  Essential hypertension  Assessment & Plan  Home regimen amlodipine 5 mg daily  Continue here  Diabetes mellitus type 2  Assessment & Plan  Lab Results   Component Value Date    HGBA1C 7 1 (H) 2020   Hold oral antihyperglycemics-glipizide  Placed on insulin sliding scale  VTE Pharmacologic Prophylaxis:   Pharmacologic: Pharmacologic VTE Prophylaxis contraindicated due to Anemia  Mechanical VTE Prophylaxis in Place: Yes    Patient Centered Rounds: Discussed with nursing    Discussions with Specialists or Other Care Team Provider:  Pulmonology, nephrology    Education and Discussions with Family / Patient:  Patient and his daughter over the phone    Time Spent for Care: 45 minutes  More than 50% of total time spent on counseling and coordination of care as described above  Current Length of Stay: 3 day(s)    Current Patient Status: Inpatient   Certification Statement: The patient will continue to require additional inpatient hospital stay due to Above    Discharge Plan: To be determined    Code Status: Level 3 - DNAR and DNI      Subjective:   Patient was seen and evaluated  He is sitting in a chair  Denies any pain  Says his breathing is better  Objective:     Vitals:   Temp (24hrs), Av 1 °F (36 2 °C), Min:97 1 °F (36 2 °C), Max:97 1 °F (36 2 °C)    Temp:  [97 1 °F (36 2 °C)] 97 1 °F (36 2 °C)  HR:  [77-84] 84  Resp:  [16-22] 16  BP: (119-123)/(65-66) 123/66  SpO2:  [82 %-92 %] 82 %  Body mass index is 29 9 kg/m²       Input and Output Summary (last 24 hours): Intake/Output Summary (Last 24 hours) at 8/20/2020 1909  Last data filed at 8/20/2020 0607  Gross per 24 hour   Intake    Output 500 ml   Net -500 ml       Physical Exam:     Physical Exam  Constitutional:       General: He is not in acute distress  HENT:      Head: Atraumatic  Neck:      Musculoskeletal: Neck supple  Cardiovascular:      Rate and Rhythm: Normal rate and regular rhythm  Heart sounds: No murmur  No friction rub  No gallop  Pulmonary:      Comments: On 6 L oxygen via nasal cannula, decreased breath sounds bilaterally with with a coarse breath  Abdominal:      General: Bowel sounds are normal  There is no distension  Palpations: Abdomen is soft  Musculoskeletal:         General: No swelling  Skin:     General: Skin is warm and dry  Neurological:      General: No focal deficit present  Mental Status: He is alert  Psychiatric:      Comments: Seems depressed         Additional Data:     Labs:    Results from last 7 days   Lab Units 08/20/20  0924   WBC Thousand/uL 11 55*   HEMOGLOBIN g/dL 8 5*   HEMATOCRIT % 27 3*   PLATELETS Thousands/uL 351   NEUTROS PCT % 87*   LYMPHS PCT % 6*   MONOS PCT % 6   EOS PCT % 0     Results from last 7 days   Lab Units 08/20/20  0924  08/18/20  0621   SODIUM mmol/L 134*   < > 132*   POTASSIUM mmol/L 4 6   < > 5 0   CHLORIDE mmol/L 100   < > 99*   CO2 mmol/L 14*   < > 14*   BUN mg/dL 118*   < > 108*   CREATININE mg/dL 3 66*   < > 3 82*   ANION GAP mmol/L 20*   < > 19*   CALCIUM mg/dL 7 7*   < > 8 1*   ALBUMIN g/dL  --   --  2 2*   TOTAL BILIRUBIN mg/dL  --   --  0 32   ALK PHOS U/L  --   --  257*   ALT U/L  --   --  66   AST U/L  --   --  64*   GLUCOSE RANDOM mg/dL 235*   < > 195*    < > = values in this interval not displayed       Results from last 7 days   Lab Units 08/17/20  1418   INR  2 82*     Results from last 7 days   Lab Units 08/20/20  1521 08/20/20  1121 08/20/20  0736 08/19/20  2108 08/19/20  1538 08/19/20  1123 08/19/20  0729 08/18/20  2304 08/18/20  1534 08/18/20  1136 08/18/20  0736 08/17/20  2123   POC GLUCOSE mg/dl 330* 371* 223* 283* 335* 262* 184* 202* 205* 191* 199* 239*         Results from last 7 days   Lab Units 08/19/20  0559 08/18/20  1339 08/17/20  1633 08/17/20  1418 08/17/20  1417   LACTIC ACID mmol/L  --   --  1 6  --  2 9*   PROCALCITONIN ng/ml 1 48* 1 62*  --  1 09*  --            * I Have Reviewed All Lab Data Listed Above  * Additional Pertinent Lab Tests Reviewed: Torey [address] Admission Reviewed    Imaging:    Imaging Reports Reviewed Today Include: all  Imaging Personally Reviewed by Myself Includes:      Recent Cultures (last 7 days):     Results from last 7 days   Lab Units 08/18/20  0621 08/17/20  1418   BLOOD CULTURE   --  No Growth at 48 hrs  No Growth at 48 hrs     LEGIONELLA URINARY ANTIGEN  Negative  --        Last 24 Hours Medication List:   Current Facility-Administered Medications   Medication Dose Route Frequency Provider Last Rate    acetaminophen  650 mg Oral Q6H PRN Roula Hewitt PA-C      amLODIPine  5 mg Oral Daily Lizz Beltre DO      ascorbic acid  1,000 mg Oral Q12H [facility] Malena Ramirez PA-C      atorvastatin  40 mg Oral HS Roula SprJYOTI conteh      calcitriol  0 25 mcg Oral Once per day on Mon Wed Fri Roula Hewitt PA-C      cefTRIAXone  2,000 mg Intravenous Q24H VINOD OrtegaC 2,000 mg (08/19/20 2128)    cholecalciferol  2,000 Units Oral Daily Malena Ramirez PA-C      dexamethasone  6 mg Intravenous Q24H Roula SprJYOTI conteh      doxycycline hyclate  100 mg Oral Q12H [facility] Malena Ramirez PA-C      finasteride  5 mg Oral Daily Roula SprJYOTI conteh      FLUoxetine  40 mg Oral Daily Roula SprJYOTI conteh      guaiFENesin  600 mg Oral Q12H [facility] Malena Ramirez PA-C      insulin lispro  1-6 Units Subcutaneous TID AC Malena Ramirez PA-C      insulin lispro  1-6 Units Subcutaneous HS Malena Ramirez PA-C      memantine  5 mg Oral BID EXAMINATION: CT OF THE ABDOMEN AND PELVIS WITH CONTRAST 4/21/2021 12:58 pm TECHNIQUE: CT of the abdomen and pelvis was performed with the administration of intravenous contrast. Multiplanar reformatted images are provided for review. Dose modulation, iterative reconstruction, and/or weight based adjustment of the mA/kV was utilized to reduce the radiation dose to as low as reasonably achievable. COMPARISON: CT abdomen and pelvis performed 04/03/2021. HISTORY: ORDERING SYSTEM PROVIDED HISTORY: abdominal pain, recent FNA of pancreatic mass, on Anticoagulation TECHNOLOGIST PROVIDED HISTORY: abdominal pain, recent FNA of pancreatic mass, on Anticoagulation Decision Support Exception->Emergency Medical Condition (MA) Reason for Exam: abdominal pain, recent FNA of pancreatic mass, on Anticoagulation Acuity: Unknown Type of Exam: Unknown FINDINGS: Lower Chest: The lung bases are without consolidation or effusion. Visualized abdominal structures are unremarkable. Organs: The liver and spleen are normal size and overall attenuation. The gallbladder and adrenal glands are unremarkable. There is redemonstration of a peripherally enhancing mass in the pancreatic body that is not significantly changed compared to prior exam.  The kidneys are without obstructive uropathy. The ureters are not dilated. The urinary bladder is unremarkable. GI/Bowel: The stomach is fluid-filled and otherwise unremarkable loops of small bowel are normal in caliber without evidence for obstruction. The colon contains air and fecal residue and is otherwise unremarkable. There is no intraperitoneal free air or free fluid. Harsh Hoyles Pelvis: Uterus appears age-appropriate. Peritoneum/Retroperitoneum: The psoas muscles are symmetric. The abdominal aorta is normal in caliber. The inferior vena cava is unremarkable. There is no retroperitoneal or mesenteric adenopathy. Bones/Soft Tissues: The extra-abdominal soft tissues are unremarkable.   There is no acute Justine Ramirez PA-C      zinc sulfate  220 mg Oral Daily Malena Ramirez PA-C      Followed by   Sherrie Henry ON 8/25/2020] multivitamin-minerals  1 tablet Oral Daily Malena Ramirez PA-C      ondansetron  4 mg Intravenous Q6H PRN Justine Ramirez PA-C      primidone  50 mg Oral Daily Malena Ramirez PA-C      sodium bicarbonate  1,300 mg Oral TID after meals COLLEEN Aguayo      tamsulosin  0 4 mg Oral Daily With BoostUpJYOTI          Today, Patient Was Seen By: Caron Hutchins MD    ** Please Note: Dictation voice to text software may have been used in the creation of this document   ** osseous abnormality. No acute abdominal or pelvic abnormality. Redemonstration of a peripherally enhancing mass in the pancreatic body that is not significantly changed compared to prior examination. ASSESSMENT:  Active Hospital Problems    Diagnosis Date Noted    Right-sided chest wall pain [R07.89] 04/21/2021    Acute kidney injury ([facility] [N17.9] 04/21/2021    H/O malignant gastrointestinal stromal tumor (GIST) [Z85.09] 04/21/2021    Homeless [Z59.0] 04/04/2021    History of hepatitis C [Z86.19] 08/11/2019    COPD without exacerbation ([facility] [J44.9] 96/45/8238    Alcoholic cirrhosis of liver without ascites ([facility] [K70.30]     Chronic heart failure with reduced ejection fraction and diastolic dysfunction ([facility] [I50.42] 10/23/2018    Essential hypertension [I10] 10/11/2018    History of substance abuse [facility]) [F19.11] 10/11/2018       76 y.o. female presents with chest pain. Found to have CRYSTAL. General surgery consulted due to recent diagnosis of GIST tumor     Plan:  1. Medical management per primary team  2. Patient with recent diagnosis of GIST tumor. Patient does endorse some obstructive-like symptoms including nausea/emesis, inability to tolerate PO intake, and decreased frequency of bowel function. Due to this, we will plan to obtain SBFT tomorrow AM to ensure the tumor is not causing any level of obstruction. 3. Due to the pancreatic involvement of the tumor, the patient will need to follow-up with a hepatobiliary surgeon to discuss the possibility of resection. Nazia Stroud DO  General Surgery PGY-2     Attending Physician Statement  I have discussed the case with Dr Vanessa Parsons, including pertinent history and exam findings with the resident. I have seen and examined the patient and the key elements of the encounter have been performed by me. I agree with the assessment, plan and orders as documented by the resident.       Electronically signed by Johanny Jeffrey DO  on

## 2023-04-11 NOTE — PROGRESS NOTES
Progress Note - Junior Plaster 6/28/1930, 80 y o  male MRN: [id number]    Unit/Bed#: -[facility] Encounter: [id number]    Primary Care Provider: Bob Castellano MD   Date and time admitted to hospital: 7/21/2020 12:32 PM        * 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  Assessment & Plan  · Polymicrobial wound cultures positive for MRSA, Klebsiella, Enterococcus - unclear which is pathogen  · ID following - input appreciated  · On vancomycin, cefazolin  · Possible surgical intervention after left lower extremity vascular optimization    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  Assessment & Plan  · Seen by Podiatry - input appreciated - local wound care recommended      Peripheral arterial disease ([facility] [address] )  Assessment & Plan  · LEADS - RLE - LILIAN 1 2/88/52, 50-75% stenosis of prox-mid SFA, >75% stenosis in proximal calf bypass graft, high grade stenosis vs occlusion of distal SFA artery/stent; LLE - LILIAN 0 78/86/32 with occlusion of the distal SFA and above knee popliteal artery/stent    · Left lower extremity angiogram plan per vascular  · Nephrology consulted in view of CKD 4  · Coumadin held and begun on heparin drip per vascular  · Continue Plavix, statin    Parkinsonism ([facility] [address] )  Assessment & Plan  · Follows with Dr Jean Stern of Neurology as outpatient  · Ct home meds     Tremor  Assessment & Plan  · Follows with neurology as outpatient  · Ct home medications    Essential hypertension  Assessment & Plan  On Lasix 40 mg daily and Amlodipine 5 mg daily at home  Lasix held in view of CKD 4 with plan for left lower extremity angiogram  BP acceptable    Chronic kidney disease stage 4  Assessment & Plan  · Baseline creatinine in the high 2s to low 3s  · Nephrology consulted in view of requirement for angiogram  · On Mucomyst, IV fluids starting overnight  · Lasix held   · Avoid hypotension, nephrotoxic medications    Atrial fibrillation   Assessment & Plan  · Coumadin held for angiogram and begun on heparin drip    Diabetes Wife calls in requesting a refill of the meloxicam and is wondering if you can put some refills on it. She states that the patient is doing much better since he started the medication. Please send in to [facility]    LOV: 12/29/22  NOV: 1/4/24  Last refill: 12/29/22    Okay to send in?   mellitus type 2  Assessment & Plan  Lab Results   Component Value Date    HGBA1C 7 1 (H) 2020       · Controlled for patient's age as evidenced by A1c  · Glipizide held  · Hyperglycemic at present  · Continue SSI  · NPO after midnight today  · Begin Lantus hs after diet ordered post procedure    Anemia of chronic disease  Assessment & Plan  · Monitor hemoglobin and transfuse PRN    VTE Pharmacologic Prophylaxis:   Pharmacologic: Heparin Drip  Mechanical VTE Prophylaxis in Place: Yes    Patient Centered Rounds: Discussed with RN    Discussions with Specialists or Other Care Team Provider:  Discussed with vascular    Education and Discussions with Family / Patient:  Discussed with patient and discussed with daughter at the bedside    Time Spent for Care: 20 minutes  More than 50% of total time spent on counseling and coordination of care as described above  Current Length of Stay: 2 day(s)    Current Patient Status: Inpatient   Certification Statement: The patient will continue to require additional inpatient hospital stay due to Peripheral arterial disease, probable osteomyelitis with polymicrobial infection    Code Status: Level 1 - Full Code    Subjective:   No fever or chills  No nausea, vomiting or diarrhea  No pain  Objective:     Vitals:   Temp (24hrs), Av 4 °F (36 9 °C), Min:97 6 °F (36 4 °C), Max:99 4 °F (37 4 °C)    Temp:  [97 6 °F (36 4 °C)-99 4 °F (37 4 °C)] 97 6 °F (36 4 °C)  HR:  [71-79] 75  Resp:  [16-19] 18  BP: (106-148)/(50-72) 106/50  SpO2:  [99 %-100 %] 100 %  Body mass index is 26 85 kg/m²  Physical Exam:     Physical Exam   HENT:   Head: Normocephalic and atraumatic  Eyes: Pupils are equal, round, and reactive to light  EOM are normal    Neck: Normal range of motion  Neck supple  Cardiovascular: Normal rate and regular rhythm  Pulmonary/Chest: Effort normal and breath sounds normal    Abdominal: Soft  Bowel sounds are normal    Musculoskeletal: He exhibits no edema  Neurological: He is alert  Skin: Skin is warm and dry  Psychiatric: He has a normal mood and affect  Additional Data:     Labs:    Results from last 7 days   Lab Units 07/23/20  0536 07/21/20  1337   WBC Thousand/uL 14 17* 9 98   HEMOGLOBIN g/dL 7 7* 7 7*   HEMATOCRIT % 25 7* 26 0*   PLATELETS Thousands/uL 286 271   NEUTROS PCT %  --  72   LYMPHS PCT %  --  14   MONOS PCT %  --  11   EOS PCT %  --  2     Results from last 7 days   Lab Units 07/23/20  0536 07/21/20  1337   SODIUM mmol/L 135* 136   POTASSIUM mmol/L 4 8 4 2   CHLORIDE mmol/L 105 107   CO2 mmol/L 22 22   BUN mg/dL 39* 43*   CREATININE mg/dL 2 81* 2 95*   ANION GAP mmol/L 8 7   CALCIUM mg/dL 8 0* 7 4*   ALBUMIN g/dL  --  2 4*   TOTAL BILIRUBIN mg/dL  --  0 34   ALK PHOS U/L  --  296*   ALT U/L  --  12   AST U/L  --  8   GLUCOSE RANDOM mg/dL 164* 346*     Results from last 7 days   Lab Units 07/23/20  0536   INR  1 76*     Results from last 7 days   Lab Units 07/23/20  1604 07/23/20  1048 07/23/20  0609 07/22/20  2102 07/22/20  1607 07/22/20  1038 07/22/20  0551 07/21/20  2059   POC GLUCOSE mg/dl 231* 285* 160* 289* 216* 215* 97 498*     Results from last 7 days   Lab Units 07/22/20  0442   HEMOGLOBIN A1C % 7 1*       * I Have Reviewed All Lab Data Listed Above  * Additional Pertinent Lab Tests Reviewed: Torey [facility] Admission Reviewed      Recent Cultures (last 7 days):     Results from last 7 days   Lab Units 07/21/20  1337   BLOOD CULTURE  No Growth at 48 hrs  No Growth at 48 hrs         Last 24 Hours Medication List:     Current Facility-Administered Medications:  acetaminophen 650 mg Oral Q6H PRN Smitha Shin MD    acetylcysteine 1,200 mg Oral BID COLLEEN Hidalgo    [START ON 7/24/2020] amLODIPine 5 mg Oral Daily COLLEEN Hidalgo    cefazolin 1,000 mg Intravenous Q8H Kevin Santiago MD Last Rate: 1,000 mg (07/23/20 1111)   clopidogrel 75 mg Oral Daily Smitha Shin MD    finasteride 5 mg Oral Daily Smitha Shin MD FLUoxetine 40 mg Oral Daily Frandy Corey MD    heparin (porcine) 3-30 Units/kg/hr (Order-Specific) Intravenous Titrated Newaygo Ng, PA-C Last Rate: 15 Units/kg/hr (07/23/20 2000)   heparin (porcine) 3,400 Units Intravenous Q1H PRN Newaygo Ng, PA-C    heparin (porcine) 6,800 Units Intravenous Q1H PRN Newaygo Ng, PA-C    insulin lispro 1-6 Units Subcutaneous 4x Daily (AC & HS) Frandy Corey MD    memantine 5 mg Oral Daily Frandy Corey MD    ondansetron 4 mg Intravenous Q4H PRN Frandy Corey MD    pravastatin 40 mg Oral Daily With Esvin Barahona MD    primidone 50 mg Oral Daily Frandy Corey MD    sodium bicarbonate 650 mg Oral BID after meals Frandy Corey MD    [place] ON 7/24/2020] sodium chloride 60 mL/hr Intravenous Continuous COLLEEN Baugh    tamsulosin 0 4 mg Oral Daily With Esvin Barahona MD    vancomycin 10 mg/kg Intravenous Q24H Frandy Corey MD Last Rate: 1,000 mg (07/23/20 1624)        Today, Patient Was Seen By: Lou Rubi MD    ** Please Note: Dictation voice to text software may have been used in the creation of this document   **

## 2023-06-20 NOTE — LETTER
January 23, 2020     Ge Mtz MD  [id number] [address] [facility] [address]    Patient: Armando Gregg   YOB: 1930   Date of Visit: 1/23/2020     Dear Dr Arina Lama      Thank you for referring Jacobmartinez Dee to me for evaluation  Below are the relevant portions of my assessment and plan of care  If you have questions, please do not hesitate to call me  I look forward to following Sarahy Gutierrez along with you  Sincerely,        Valorie Vizcarra PA-C        CC: CARLOS Zhao MD    Progress Notes:      PVD (peripheral vascular disease) ([facility] [address] )    Peripheral arterial disease with chronic L heel wound    80 y o  M DM, Htn, HLD, CKD 3, with underlying severe PAD and chronic LEFT heel wound present for 6 years  Hx LEFT AK to BK popliteal artery bypass using cadaveric vein by Dr Shane Winston  He has prior L femoropopliteal stents that have occluded  He was seen in 2017 by Dr tSeffany Fuentes who contemplated an angiogram with balloon angioplasty of the proximal anastomosis of the left above-knee to below-knee popliteal bypass  The patient did not want to proceed with interventions due to CKD and it was reported that the wound actually healed at one point  The patient was lost to office follow up      1/23/2020: The patient lives alone  He uses a cane and has no pain when walking  He has shoes to accomodates the wound so he can walk  He reports severe diabetic neuropathy for years so he does not feel his feet  He has followed regularly for years with Dr Radha Victoria who has been taking care of his L heel wound  Unfortunately, he was never able to heal the LEFT heel wound which is present for 6 years  The patient is seen in the office today and he is accompanied by his daughter today  They tell me that he had been doing ok until about 3 months ago when he apparently developed an infection in the LEFT heel   He tells me that he is ordered for antibiotics and he is ordered to obtain MRI of the L  foot for   Leia Duong  We reviewed his SACHIN which shows significant drop in LILIAN, metatarsal and great toe pressures  The great toe pressure would be below healing in a diabetic  VAS lower extremity arterial duplex 1/21/2020  R 0 67/116/46; < 75% below the knee SFA-PTA graft at previous PTA site  L 0 37/65/27; occlusion SFA - Pop stent and a 50-75% stenosis at the proximal deep femoral artery     Recommendations:  Continue with local wound care, antibiotics and work up with Dr Leia Duong podiatry  She reportedly ordered MRI of the L foot  Given his age and renal function (stage IV), we will await Dr Leia Duong' opinion as to whether or not the wound can be managed with local wound care only  If podiatric procedures are needed, the patient and daughter understand that angiography and intervention may be recommended  The procedure may put him at risk for dialysis  We will see him back in the office after podiatry work up [employer]  I do not seem to have access to Dr Leia Duong' notes  Continue with Betadine to heel wound  Continue with Plavix, warfarin and statin therapy  Will copy Dr Lamont Urena, nephrology and Dr Leia Duong  Podiatry  HTN (hypertension)  -BP stable; c/w omt and tlc    Ulcer of left heel (HCC)  -L heel ulcer as discussed under PAD    CKD (chronic kidney disease)  -CKD, stage IV, severe  -Serum creatine which has been stable at 3; CCl est 17  -Followed by Dr Lamont Urena  Home

## 2024-07-31 NOTE — ASSESSMENT & PLAN NOTE
Detail Level: Zone Lab Results   Component Value Date    HGBA1C 7 1 (H) 07/22/2020       · Controlled for patient's age as evidenced by A1c  · Glipizide held  · Ct Lantus 5 units in am, SSI  · Monitor blood sugars and adjust insulin as needed